# Patient Record
Sex: MALE | Race: WHITE | NOT HISPANIC OR LATINO | Employment: OTHER | URBAN - NONMETROPOLITAN AREA
[De-identification: names, ages, dates, MRNs, and addresses within clinical notes are randomized per-mention and may not be internally consistent; named-entity substitution may affect disease eponyms.]

---

## 2019-06-28 ENCOUNTER — HOSPITAL ENCOUNTER (EMERGENCY)
Facility: HOSPITAL | Age: 44
Discharge: HOME | End: 2019-06-29
Attending: EMERGENCY MEDICINE | Admitting: EMERGENCY MEDICINE

## 2019-06-28 DIAGNOSIS — R07.9 CHEST PAIN, UNSPECIFIED TYPE: ICD-10-CM

## 2019-06-28 DIAGNOSIS — R45.851 SUICIDAL IDEATION: Primary | ICD-10-CM

## 2019-06-28 LAB
AMPHET+METHAMPHET UR QL: NEGATIVE
BARBITURATES UR QL SCN: NEGATIVE
BENZODIAZ UR QL SCN: POSITIVE
BILIRUB UR QL STRIP: ABNORMAL
CANNABINOIDS SERPL QL: POSITIVE
CLARITY UR: CLEAR
COCAINE UR QL: POSITIVE
COLOR UR: ABNORMAL
ETHANOL UR QL: <0.01 %
GLUCOSE UR STRIP-MCNC: NEGATIVE MG/DL
HGB UR QL STRIP.AUTO: NEGATIVE
HOLD SPECIMEN: NORMAL
KETONES UR QL STRIP: ABNORMAL
LEUKOCYTE ESTERASE UR QL STRIP.AUTO: NEGATIVE
METHADONE UR QL SCN: NEGATIVE
NITRITE UR QL STRIP: NEGATIVE
OPIATES UR QL: POSITIVE
PCP UR QL SCN: NEGATIVE
PH UR STRIP.AUTO: 6 [PH] (ref 5–8)
PROT UR QL STRIP: NEGATIVE
SP GR UR STRIP: 1.02 (ref 1–1.03)
TROPONIN I SERPL-MCNC: <0.012 NG/ML (ref 0–0.03)
UROBILINOGEN UR QL STRIP: ABNORMAL
WHOLE BLOOD HOLD SPECIMEN: NORMAL
WHOLE BLOOD HOLD SPECIMEN: NORMAL

## 2019-06-28 PROCEDURE — 36415 COLL VENOUS BLD VENIPUNCTURE: CPT

## 2019-06-28 PROCEDURE — 93005 ELECTROCARDIOGRAM TRACING: CPT

## 2019-06-28 PROCEDURE — 80307 DRUG TEST PRSMV CHEM ANLYZR: CPT | Performed by: PHYSICIAN ASSISTANT

## 2019-06-28 PROCEDURE — 99285 EMERGENCY DEPT VISIT HI MDM: CPT

## 2019-06-28 PROCEDURE — 93010 ELECTROCARDIOGRAM REPORT: CPT | Performed by: INTERNAL MEDICINE

## 2019-06-28 PROCEDURE — 93005 ELECTROCARDIOGRAM TRACING: CPT | Performed by: EMERGENCY MEDICINE

## 2019-06-28 PROCEDURE — 84484 ASSAY OF TROPONIN QUANT: CPT

## 2019-06-28 PROCEDURE — 25010000003 HYDROXYZINE PER 25 MG: Performed by: PHYSICIAN ASSISTANT

## 2019-06-28 PROCEDURE — 96372 THER/PROPH/DIAG INJ SC/IM: CPT

## 2019-06-28 PROCEDURE — 81003 URINALYSIS AUTO W/O SCOPE: CPT | Performed by: PHYSICIAN ASSISTANT

## 2019-06-28 RX ORDER — HYDROXYZINE HYDROCHLORIDE 50 MG/ML
25 INJECTION, SOLUTION INTRAMUSCULAR ONCE
Status: COMPLETED | OUTPATIENT
Start: 2019-06-28 | End: 2019-06-28

## 2019-06-28 RX ORDER — ALPRAZOLAM 1 MG/1
1 TABLET ORAL 2 TIMES DAILY PRN
COMMUNITY
End: 2019-10-23 | Stop reason: SDUPTHER

## 2019-06-28 RX ORDER — NICOTINE 21 MG/24HR
1 PATCH, TRANSDERMAL 24 HOURS TRANSDERMAL
Status: DISCONTINUED | OUTPATIENT
Start: 2019-06-28 | End: 2019-06-29 | Stop reason: HOSPADM

## 2019-06-28 RX ORDER — HYDROCODONE BITARTRATE AND ACETAMINOPHEN 10; 325 MG/1; MG/1
1 TABLET ORAL EVERY 6 HOURS PRN
COMMUNITY
End: 2019-10-23 | Stop reason: SDUPTHER

## 2019-06-28 RX ADMIN — NICOTINE 1 PATCH: 21 PATCH, EXTENDED RELEASE TRANSDERMAL at 19:17

## 2019-06-28 RX ADMIN — HYDROXYZINE HYDROCHLORIDE 25 MG: 50 INJECTION, SOLUTION INTRAMUSCULAR at 17:33

## 2019-06-29 VITALS
DIASTOLIC BLOOD PRESSURE: 76 MMHG | SYSTOLIC BLOOD PRESSURE: 131 MMHG | HEIGHT: 73 IN | TEMPERATURE: 98.5 F | BODY MASS INDEX: 33.13 KG/M2 | OXYGEN SATURATION: 98 % | HEART RATE: 84 BPM | RESPIRATION RATE: 17 BRPM | WEIGHT: 250 LBS

## 2019-06-29 RX ORDER — HYDROCODONE BITARTRATE AND ACETAMINOPHEN 7.5; 325 MG/1; MG/1
1 TABLET ORAL ONCE
Status: COMPLETED | OUTPATIENT
Start: 2019-06-29 | End: 2019-06-29

## 2019-06-29 RX ORDER — ALPRAZOLAM 0.5 MG/1
0.25 TABLET ORAL ONCE
Status: COMPLETED | OUTPATIENT
Start: 2019-06-29 | End: 2019-06-29

## 2019-06-29 RX ADMIN — ALPRAZOLAM 0.25 MG: 0.5 TABLET ORAL at 04:26

## 2019-06-29 RX ADMIN — METOPROLOL TARTRATE 25 MG: 25 TABLET, FILM COATED ORAL at 07:37

## 2019-06-29 RX ADMIN — HYDROCODONE BITARTRATE AND ACETAMINOPHEN 1 TABLET: 7.5; 325 TABLET ORAL at 07:37

## 2019-10-23 ENCOUNTER — OFFICE VISIT (OUTPATIENT)
Dept: INTERNAL MEDICINE | Facility: CLINIC | Age: 44
End: 2019-10-23

## 2019-10-23 ENCOUNTER — TELEPHONE (OUTPATIENT)
Dept: INTERNAL MEDICINE | Facility: CLINIC | Age: 44
End: 2019-10-23

## 2019-10-23 VITALS
TEMPERATURE: 99.3 F | WEIGHT: 268.13 LBS | HEIGHT: 73 IN | HEART RATE: 87 BPM | RESPIRATION RATE: 18 BRPM | SYSTOLIC BLOOD PRESSURE: 160 MMHG | OXYGEN SATURATION: 97 % | BODY MASS INDEX: 35.54 KG/M2 | DIASTOLIC BLOOD PRESSURE: 98 MMHG

## 2019-10-23 DIAGNOSIS — E66.09 CLASS 2 OBESITY DUE TO EXCESS CALORIES WITH BODY MASS INDEX (BMI) OF 35.0 TO 35.9 IN ADULT, UNSPECIFIED WHETHER SERIOUS COMORBIDITY PRESENT: ICD-10-CM

## 2019-10-23 DIAGNOSIS — F41.9 ANXIETY: ICD-10-CM

## 2019-10-23 DIAGNOSIS — R06.02 SHORTNESS OF BREATH: ICD-10-CM

## 2019-10-23 DIAGNOSIS — G89.21 CHRONIC PAIN DUE TO TRAUMA: Primary | ICD-10-CM

## 2019-10-23 DIAGNOSIS — F31.9 BIPOLAR 1 DISORDER (HCC): ICD-10-CM

## 2019-10-23 DIAGNOSIS — R06.2 WHEEZING: ICD-10-CM

## 2019-10-23 DIAGNOSIS — Z13.220 SCREENING, LIPID: ICD-10-CM

## 2019-10-23 DIAGNOSIS — I10 ESSENTIAL HYPERTENSION: ICD-10-CM

## 2019-10-23 DIAGNOSIS — Z82.49 FAMILY HISTORY OF HEART ATTACK: ICD-10-CM

## 2019-10-23 PROCEDURE — 99204 OFFICE O/P NEW MOD 45 MIN: CPT | Performed by: FAMILY MEDICINE

## 2019-10-23 RX ORDER — HYDROCODONE BITARTRATE AND ACETAMINOPHEN 10; 325 MG/1; MG/1
1 TABLET ORAL EVERY 6 HOURS PRN
Qty: 90 TABLET | Refills: 0 | Status: SHIPPED | OUTPATIENT
Start: 2019-10-23 | End: 2019-10-25 | Stop reason: SDUPTHER

## 2019-10-23 RX ORDER — LURASIDONE HYDROCHLORIDE 40 MG/1
40 TABLET, FILM COATED ORAL DAILY
COMMUNITY
End: 2020-07-31 | Stop reason: ALTCHOICE

## 2019-10-23 RX ORDER — ALPRAZOLAM 1 MG/1
1 TABLET ORAL 2 TIMES DAILY PRN
Qty: 60 TABLET | Refills: 0 | Status: SHIPPED | OUTPATIENT
Start: 2019-10-23 | End: 2019-11-20 | Stop reason: SDUPTHER
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
## 2019-10-23 NOTE — TELEPHONE ENCOUNTER
PA only approved for 7 day supply due to [facility] policy on new members.

## 2019-10-24 ENCOUNTER — TELEPHONE (OUTPATIENT)
Dept: INTERNAL MEDICINE | Facility: CLINIC | Age: 44
End: 2019-10-24

## 2019-10-24 LAB
ALBUMIN SERPL-MCNC: 5 G/DL (ref 3.5–5.5)
ALBUMIN/GLOB SERPL: 2.2 {RATIO} (ref 1.2–2.2)
ALP SERPL-CCNC: 70 IU/L (ref 39–117)
ALT SERPL-CCNC: 23 IU/L (ref 0–44)
AST SERPL-CCNC: 16 IU/L (ref 0–40)
BASOPHILS # BLD AUTO: 0 X10E3/UL (ref 0–0.2)
BASOPHILS NFR BLD AUTO: 0 %
BILIRUB SERPL-MCNC: 0.5 MG/DL (ref 0–1.2)
BUN SERPL-MCNC: 13 MG/DL (ref 6–24)
BUN/CREAT SERPL: 14 (ref 9–20)
CALCIUM SERPL-MCNC: 9.5 MG/DL (ref 8.7–10.2)
CHLORIDE SERPL-SCNC: 104 MMOL/L (ref 96–106)
CHOLEST SERPL-MCNC: 259 MG/DL (ref 100–199)
CO2 SERPL-SCNC: 22 MMOL/L (ref 20–29)
CREAT SERPL-MCNC: 0.95 MG/DL (ref 0.76–1.27)
EOSINOPHIL # BLD AUTO: 0.1 X10E3/UL (ref 0–0.4)
EOSINOPHIL NFR BLD AUTO: 1 %
ERYTHROCYTE [DISTWIDTH] IN BLOOD BY AUTOMATED COUNT: 13.6 % (ref 12.3–15.4)
GLOBULIN SER CALC-MCNC: 2.3 G/DL (ref 1.5–4.5)
GLUCOSE SERPL-MCNC: 91 MG/DL (ref 65–99)
HCT VFR BLD AUTO: 46 % (ref 37.5–51)
HDLC SERPL-MCNC: 42 MG/DL
HGB BLD-MCNC: 15.9 G/DL (ref 13–17.7)
IMM GRANULOCYTES # BLD AUTO: 0 X10E3/UL (ref 0–0.1)
IMM GRANULOCYTES NFR BLD AUTO: 0 %
LDLC SERPL CALC-MCNC: 182 MG/DL (ref 0–99)
LYMPHOCYTES # BLD AUTO: 1.7 X10E3/UL (ref 0.7–3.1)
LYMPHOCYTES NFR BLD AUTO: 24 %
MCH RBC QN AUTO: 29.5 PG (ref 26.6–33)
MCHC RBC AUTO-ENTMCNC: 34.6 G/DL (ref 31.5–35.7)
MCV RBC AUTO: 85 FL (ref 79–97)
MONOCYTES # BLD AUTO: 0.4 X10E3/UL (ref 0.1–0.9)
MONOCYTES NFR BLD AUTO: 6 %
NEUTROPHILS # BLD AUTO: 4.9 X10E3/UL (ref 1.4–7)
NEUTROPHILS NFR BLD AUTO: 69 %
PLATELET # BLD AUTO: 225 X10E3/UL (ref 150–450)
POTASSIUM SERPL-SCNC: 4.3 MMOL/L (ref 3.5–5.2)
PROT SERPL-MCNC: 7.3 G/DL (ref 6–8.5)
RBC # BLD AUTO: 5.39 X10E6/UL (ref 4.14–5.8)
SODIUM SERPL-SCNC: 141 MMOL/L (ref 134–144)
TRIGL SERPL-MCNC: 173 MG/DL (ref 0–149)
VLDLC SERPL CALC-MCNC: 35 MG/DL (ref 5–40)
WBC # BLD AUTO: 7.2 X10E3/UL (ref 3.4–10.8)

## 2019-10-24 NOTE — TELEPHONE ENCOUNTER
I see where you have a message on this,    Pt had come in to get the rest of the rx,  Told him Dr Hagen is out and you will discuss with her on Friday and then give him a call.

## 2019-10-24 NOTE — TELEPHONE ENCOUNTER
Yessica arellano called in regards to the patients PA # for his prescription for hydrocodone. He was seeing if we had the same number that he had. Couldn't find any info in notes about it. Told him to call Jackie in the morning for more information.  The PA# that he gave me was [id number]

## 2019-10-25 RX ORDER — HYDROCODONE BITARTRATE AND ACETAMINOPHEN 10; 325 MG/1; MG/1
1 TABLET ORAL EVERY 6 HOURS PRN
Qty: 69 TABLET | Refills: 0 | Status: SHIPPED | OUTPATIENT
Start: 2019-10-25 | End: 2019-11-20 | Stop reason: SDUPTHER

## 2019-10-26 DIAGNOSIS — E78.00 HIGH CHOLESTEROL: Primary | ICD-10-CM

## 2019-10-26 NOTE — TELEPHONE ENCOUNTER
----- Message from Liza Hagen DO sent at 10/25/2019  7:23 AM CDT -----  Cholesterol is too high.  Recommend using a statin to lower.  lipitor (atorvastatin) 10 mg daily.    Low cholesterol diet.   Exercise.   Repeat liver function studies at 4 and 8 weeks   Office visit at 12 weeks with cmp and lipid profile

## 2019-10-26 NOTE — TELEPHONE ENCOUNTER
Called Pt and informed him of his lab results and recommendations from Dr. Hagen. Pt voiced understanding and has no questions or concerns at this time, Pt is doing well at this time and he was able to the remaining medication filled he will  on the 29th.

## 2019-10-26 NOTE — TELEPHONE ENCOUNTER
Additional not from Dr. Hagen.....    Cholesterol profile is abnl  Elevated total cholesterol, LDL and triglycerides.   Needs statin   Lipitor 10 mg po daily   Repeat lfts in 4 and 8 weeks   OV and lab in 12 weeks   cmp and lipid.   Please do 2 days prior to visit.

## 2019-10-29 RX ORDER — ATORVASTATIN CALCIUM 10 MG/1
10 TABLET, FILM COATED ORAL DAILY
Qty: 30 TABLET | Refills: 3 | Status: SHIPPED | OUTPATIENT
Start: 2019-10-29 | End: 2019-12-31

## 2019-11-04 ENCOUNTER — HOSPITAL ENCOUNTER (OUTPATIENT)
Dept: CARDIOLOGY | Facility: HOSPITAL | Age: 44
Discharge: HOME | End: 2019-11-04
Admitting: FAMILY MEDICINE

## 2019-11-04 VITALS
HEIGHT: 73 IN | DIASTOLIC BLOOD PRESSURE: 79 MMHG | SYSTOLIC BLOOD PRESSURE: 140 MMHG | WEIGHT: 268.96 LBS | BODY MASS INDEX: 35.65 KG/M2

## 2019-11-04 DIAGNOSIS — R06.02 SHORTNESS OF BREATH: ICD-10-CM

## 2019-11-04 LAB
BH CV ECHO MEAS - AO MAX PG (FULL): 2.9 MMHG
BH CV ECHO MEAS - AO MAX PG: 7.3 MMHG
BH CV ECHO MEAS - AO MEAN PG (FULL): 2 MMHG
BH CV ECHO MEAS - AO MEAN PG: 4 MMHG
BH CV ECHO MEAS - AO ROOT AREA (BSA CORRECTED): 1.4
BH CV ECHO MEAS - AO ROOT AREA: 8.6 CM^2
BH CV ECHO MEAS - AO ROOT DIAM: 3.3 CM
BH CV ECHO MEAS - AO V2 MAX: 135 CM/SEC
BH CV ECHO MEAS - AO V2 MEAN: 96.6 CM/SEC
BH CV ECHO MEAS - AO V2 VTI: 27.8 CM
BH CV ECHO MEAS - AVA(I,A): 3.2 CM^2
BH CV ECHO MEAS - AVA(I,D): 3.2 CM^2
BH CV ECHO MEAS - AVA(V,A): 3.5 CM^2
BH CV ECHO MEAS - AVA(V,D): 3.5 CM^2
BH CV ECHO MEAS - BSA(HAYCOCK): 2.5 M^2
BH CV ECHO MEAS - BSA: 2.4 M^2
BH CV ECHO MEAS - BZI_BMI: 35.4 KILOGRAMS/M^2
BH CV ECHO MEAS - BZI_METRIC_HEIGHT: 185.4 CM
BH CV ECHO MEAS - BZI_METRIC_WEIGHT: 121.6 KG
BH CV ECHO MEAS - EDV(CUBED): 139 ML
BH CV ECHO MEAS - EDV(MOD-SP4): 130 ML
BH CV ECHO MEAS - EDV(TEICH): 128.4 ML
BH CV ECHO MEAS - EF(CUBED): 74.6 %
BH CV ECHO MEAS - EF(MOD-SP4): 52 %
BH CV ECHO MEAS - EF(TEICH): 66.1 %
BH CV ECHO MEAS - ESV(CUBED): 35.3 ML
BH CV ECHO MEAS - ESV(MOD-SP4): 62.4 ML
BH CV ECHO MEAS - ESV(TEICH): 43.5 ML
BH CV ECHO MEAS - FS: 36.7 %
BH CV ECHO MEAS - IVS/LVPW: 0.87
BH CV ECHO MEAS - IVSD: 0.94 CM
BH CV ECHO MEAS - LA DIMENSION: 3.8 CM
BH CV ECHO MEAS - LA/AO: 1.2
BH CV ECHO MEAS - LAT PEAK E' VEL: 11.1 CM/SEC
BH CV ECHO MEAS - LV DIASTOLIC VOL/BSA (35-75): 53.3 ML/M^2
BH CV ECHO MEAS - LV MASS(C)D: 193.6 GRAMS
BH CV ECHO MEAS - LV MASS(C)DI: 79.4 GRAMS/M^2
BH CV ECHO MEAS - LV MAX PG: 4.4 MMHG
BH CV ECHO MEAS - LV MEAN PG: 2 MMHG
BH CV ECHO MEAS - LV SYSTOLIC VOL/BSA (12-30): 25.6 ML/M^2
BH CV ECHO MEAS - LV V1 MAX: 105 CM/SEC
BH CV ECHO MEAS - LV V1 MEAN: 67.6 CM/SEC
BH CV ECHO MEAS - LV V1 VTI: 19.8 CM
BH CV ECHO MEAS - LVIDD: 5.2 CM
BH CV ECHO MEAS - LVIDS: 3.3 CM
BH CV ECHO MEAS - LVLD AP4: 9.6 CM
BH CV ECHO MEAS - LVLS AP4: 7.9 CM
BH CV ECHO MEAS - LVOT AREA (M): 4.5 CM^2
BH CV ECHO MEAS - LVOT AREA: 4.5 CM^2
BH CV ECHO MEAS - LVOT DIAM: 2.4 CM
BH CV ECHO MEAS - LVPWD: 1.1 CM
BH CV ECHO MEAS - MED PEAK E' VEL: 6.64 CM/SEC
BH CV ECHO MEAS - MV A MAX VEL: 58.7 CM/SEC
BH CV ECHO MEAS - MV DEC TIME: 0.23 SEC
BH CV ECHO MEAS - MV E MAX VEL: 52.9 CM/SEC
BH CV ECHO MEAS - MV E/A: 0.9
BH CV ECHO MEAS - SI(AO): 97.6 ML/M^2
BH CV ECHO MEAS - SI(CUBED): 42.6 ML/M^2
BH CV ECHO MEAS - SI(LVOT): 36.8 ML/M^2
BH CV ECHO MEAS - SI(MOD-SP4): 27.7 ML/M^2
BH CV ECHO MEAS - SI(TEICH): 34.8 ML/M^2
BH CV ECHO MEAS - SV(AO): 237.8 ML
BH CV ECHO MEAS - SV(CUBED): 103.7 ML
BH CV ECHO MEAS - SV(LVOT): 89.6 ML
BH CV ECHO MEAS - SV(MOD-SP4): 67.6 ML
BH CV ECHO MEAS - SV(TEICH): 84.9 ML
BH CV ECHO MEASUREMENTS AVERAGE E/E' RATIO: 5.96
LEFT ATRIUM VOLUME INDEX: 26.4 ML/M2
LEFT ATRIUM VOLUME: 64.5 CM3
MAXIMAL PREDICTED HEART RATE: 176 BPM
STRESS TARGET HR: 150 BPM

## 2019-11-04 PROCEDURE — 93306 TTE W/DOPPLER COMPLETE: CPT | Performed by: INTERNAL MEDICINE

## 2019-11-04 PROCEDURE — 93306 TTE W/DOPPLER COMPLETE: CPT

## 2019-11-05 ENCOUNTER — TELEPHONE (OUTPATIENT)
Dept: INTERNAL MEDICINE | Facility: CLINIC | Age: 44
End: 2019-11-05

## 2019-11-05 NOTE — TELEPHONE ENCOUNTER
Pt called and stated that he made his apt with pain management they can't get him In until January.

## 2019-11-05 NOTE — TELEPHONE ENCOUNTER
Called pt to let him know of the results of his echocardiogram. Pt voiced understanding no questions at this time.

## 2019-11-20 ENCOUNTER — OFFICE VISIT (OUTPATIENT)
Dept: INTERNAL MEDICINE | Facility: CLINIC | Age: 44
End: 2019-11-20

## 2019-11-20 ENCOUNTER — TELEPHONE (OUTPATIENT)
Dept: INTERNAL MEDICINE | Facility: CLINIC | Age: 44
End: 2019-11-20

## 2019-11-20 VITALS
OXYGEN SATURATION: 99 % | RESPIRATION RATE: 18 BRPM | BODY MASS INDEX: 36.91 KG/M2 | SYSTOLIC BLOOD PRESSURE: 132 MMHG | HEART RATE: 76 BPM | TEMPERATURE: 99.1 F | WEIGHT: 278.5 LBS | DIASTOLIC BLOOD PRESSURE: 92 MMHG | HEIGHT: 73 IN

## 2019-11-20 DIAGNOSIS — R06.2 WHEEZING: ICD-10-CM

## 2019-11-20 DIAGNOSIS — G89.21 CHRONIC PAIN DUE TO TRAUMA: ICD-10-CM

## 2019-11-20 DIAGNOSIS — Z23 IMMUNIZATION DUE: Primary | ICD-10-CM

## 2019-11-20 DIAGNOSIS — M62.838 MUSCLE SPASM: ICD-10-CM

## 2019-11-20 PROCEDURE — G0008 ADMIN INFLUENZA VIRUS VAC: HCPCS | Performed by: FAMILY MEDICINE

## 2019-11-20 PROCEDURE — 99214 OFFICE O/P EST MOD 30 MIN: CPT | Performed by: FAMILY MEDICINE

## 2019-11-20 PROCEDURE — 90686 IIV4 VACC NO PRSV 0.5 ML IM: CPT | Performed by: FAMILY MEDICINE

## 2019-11-20 RX ORDER — CHLORPROMAZINE HYDROCHLORIDE 25 MG/1
TABLET, FILM COATED ORAL
COMMUNITY
End: 2020-10-02 | Stop reason: SDUPTHER

## 2019-11-20 RX ORDER — TIZANIDINE HYDROCHLORIDE 4 MG/1
4 CAPSULE, GELATIN COATED ORAL 3 TIMES DAILY
Qty: 90 CAPSULE | Refills: 1 | Status: SHIPPED | OUTPATIENT
Start: 2019-11-20 | End: 2019-11-22 | Stop reason: SDUPTHER

## 2019-11-20 RX ORDER — HYDROCODONE BITARTRATE AND ACETAMINOPHEN 10; 325 MG/1; MG/1
1 TABLET ORAL EVERY 6 HOURS PRN
Qty: 69 TABLET | Refills: 0 | Status: SHIPPED | OUTPATIENT
Start: 2019-11-20 | End: 2019-12-18

## 2019-11-20 RX ORDER — CEPHALEXIN 500 MG/1
CAPSULE ORAL
COMMUNITY
End: 2020-03-13

## 2019-11-20 RX ORDER — ALPRAZOLAM 1 MG/1
1 TABLET ORAL 2 TIMES DAILY PRN
Qty: 60 TABLET | Refills: 0 | Status: SHIPPED | OUTPATIENT
Start: 2019-11-20 | End: 2019-12-18 | Stop reason: SDUPTHER

## 2019-11-20 NOTE — PROGRESS NOTES
Subjective     Chief Complaint   Patient presents with   • Follow-up   • Nasal Congestion     chest congestion has had a fever, has had about two months        History of Present Illness  Patient presents today for follow-up for refills on his medication specifically his pain medication.  He notes that he has been having some issues with coughing and congestion over the last week has really felt kind of poorly.  He has been doing Sudafed from over-the-counter  To see if this would help his respiratory issues.  Overall he feels his pain control is not as good as it needs to be he is waking up in the middle the night.  He does have significant leg pain.  He notes that he has muscle spasms that start and then after that it started and the pain intensifies.  He does not take a muscle relaxer at this point.  He notes that he does have an appointment with pain management in January.    Patient's PMR from outside medical facility reviewed and noted.    Review of Systems     Otherwise complete ROS reviewed and negative except as mentioned in the HPI.    Past Medical History:   Past Medical History:   Diagnosis Date   • Anxiety    • Arthritis    • Bipolar 1 disorder (CMS/HCC)    • Hypertension    • PTSD (post-traumatic stress disorder)      Past Surgical History:  Past Surgical History:   Procedure Laterality Date   • HIP SURGERY       Social History:  reports that he has been smoking cigarettes.  He has been smoking about 1.50 packs per day. He has never used smokeless tobacco. He reports that he does not drink alcohol or use drugs.    Family History: family history includes Arthritis in his father; Diabetes in his father; Heart attack in his mother; Hypertension in his father.       Allergies:  Allergies   Allergen Reactions   • Meloxicam Nausea Only   • Darvon [Propoxyphene] GI Intolerance   • Motrin [Ibuprofen] GI Intolerance   • Toradol [Ketorolac Tromethamine] GI Intolerance   • Tramadol GI Intolerance  "    Medications:  Prior to Admission medications    Medication Sig Start Date End Date Taking? Authorizing Provider   ALPRAZolam (XANAX) 1 MG tablet Take 1 tablet by mouth 2 (Two) Times a Day As Needed for Anxiety. 10/23/19  Yes Liza Hagen DO   atorvastatin (LIPITOR) 10 MG tablet Take 1 tablet by mouth Daily. 10/29/19  Yes Liza Hagen DO   chlorproMAZINE (THORAZINE) 25 MG tablet chlorpromazine 25 mg tablet   take one tablet po twice a day   Yes Roxanne Carrasco MD   HYDROcodone-acetaminophen (NORCO)  MG per tablet Take 1 tablet by mouth Every 6 (Six) Hours As Needed for Moderate Pain . 10/25/19  Yes Liza Hagen DO   lurasidone (LATUDA) 40 MG tablet tablet Take 40 mg by mouth Daily.   Yes Roxanne Carrasco MD   metoprolol tartrate (LOPRESSOR) 25 MG tablet Take 1 tablet by mouth 2 (Two) Times a Day. 10/23/19  Yes Liza Hagen DO   cephalexin (KEFLEX) 500 MG capsule cephalexin 500 mg capsule    ProviderRoxanne MD       Objective     Vital Signs: /92 (BP Location: Left arm, Patient Position: Sitting, Cuff Size: Adult)   Pulse 76   Temp 99.1 °F (37.3 °C) (Temporal)   Resp 18   Ht 185.4 cm (72.99\")   Wt 126 kg (278 lb 8 oz)   SpO2 99%   BMI 36.75 kg/m²   Physical Exam   Constitutional: He is oriented to person, place, and time. He appears well-developed and well-nourished.   HENT:   Head: Normocephalic and atraumatic.   Right Ear: External ear normal.   Left Ear: External ear normal.   Mouth/Throat: Oropharynx is clear and moist.   Eyes: Conjunctivae and EOM are normal. Pupils are equal, round, and reactive to light.   Neck: Normal range of motion. Neck supple.   Cardiovascular: Normal rate, regular rhythm, normal heart sounds and intact distal pulses. Exam reveals no gallop and no friction rub.   No murmur heard.  Pulmonary/Chest: Effort normal. He has wheezes.   Abdominal: Soft. Bowel sounds are normal.   Musculoskeletal: He exhibits no edema. "   Ambulates with the use of a cane.   Neurological: He is alert and oriented to person, place, and time.   Skin: Skin is warm and dry.   Psychiatric: He has a normal mood and affect. His behavior is normal.   Nursing note and vitals reviewed.      Patient's Body mass index is 36.75 kg/m².  T      Results Reviewed:  BUN   Date Value Ref Range Status   10/23/2019 13 6 - 24 mg/dL Final     Creatinine   Date Value Ref Range Status   10/23/2019 0.95 0.76 - 1.27 mg/dL Final     Sodium   Date Value Ref Range Status   10/23/2019 141 134 - 144 mmol/L Final     Potassium   Date Value Ref Range Status   10/23/2019 4.3 3.5 - 5.2 mmol/L Final     Chloride   Date Value Ref Range Status   10/23/2019 104 96 - 106 mmol/L Final     Total CO2   Date Value Ref Range Status   10/23/2019 22 20 - 29 mmol/L Final     Calcium   Date Value Ref Range Status   10/23/2019 9.5 8.7 - 10.2 mg/dL Final     ALT (SGPT)   Date Value Ref Range Status   10/23/2019 23 0 - 44 IU/L Final     AST (SGOT)   Date Value Ref Range Status   10/23/2019 16 0 - 40 IU/L Final     WBC   Date Value Ref Range Status   10/23/2019 7.2 3.4 - 10.8 x10E3/uL Final     Hematocrit   Date Value Ref Range Status   10/23/2019 46.0 37.5 - 51.0 % Final     Platelets   Date Value Ref Range Status   10/23/2019 225 150 - 450 x10E3/uL Final     Triglycerides   Date Value Ref Range Status   10/23/2019 173 (H) 0 - 149 mg/dL Final     HDL Cholesterol   Date Value Ref Range Status   10/23/2019 42 >39 mg/dL Final     LDL Cholesterol    Date Value Ref Range Status   10/23/2019 182 (H) 0 - 99 mg/dL Final         Assessment / Plan     Assessment/Plan:  1. Immunization due    - pneumococcal conj. 13-valent (PREVNAR-13) vaccine 0.5 mL  Influenza vaccine      2. Wheezing  Combivent Respimat 2 puffs 4 times a day  When his wheezing is better controlled he will need to have pulmonary function studies.    3. Muscle spasm  Tizanidine 4 mg every 8 hours as needed muscle spasm    4. Chronic pain due to  trauma    Refill pain medications and Xanax      Return in about 4 weeks (around 12/18/2019). unless patient needs to be seen sooner or acute issues arise.    Code Status: <no information>     I have discussed the patient results/orders and and plan/recommendation with them at today's visit.      Liza Hagen, DO   11/20/2019

## 2019-11-22 ENCOUNTER — TELEPHONE (OUTPATIENT)
Dept: INTERNAL MEDICINE | Facility: CLINIC | Age: 44
End: 2019-11-22

## 2019-11-22 RX ORDER — TIZANIDINE 4 MG/1
4 TABLET ORAL EVERY 8 HOURS PRN
Qty: 90 TABLET | Refills: 3 | Status: SHIPPED | OUTPATIENT
Start: 2019-11-22 | End: 2020-04-14 | Stop reason: SDUPTHER

## 2019-12-18 ENCOUNTER — OFFICE VISIT (OUTPATIENT)
Dept: INTERNAL MEDICINE | Facility: CLINIC | Age: 44
End: 2019-12-18

## 2019-12-18 ENCOUNTER — TELEPHONE (OUTPATIENT)
Dept: INTERNAL MEDICINE | Facility: CLINIC | Age: 44
End: 2019-12-18

## 2019-12-18 VITALS — BODY MASS INDEX: 37.69 KG/M2 | HEIGHT: 73 IN | RESPIRATION RATE: 22 BRPM | WEIGHT: 284.38 LBS

## 2019-12-18 DIAGNOSIS — Z79.891 NARCOTIC USE AGREEMENT EXISTS: ICD-10-CM

## 2019-12-18 DIAGNOSIS — Z01.00 EYE EXAM, ROUTINE: Primary | ICD-10-CM

## 2019-12-18 DIAGNOSIS — G89.21 CHRONIC PAIN DUE TO TRAUMA: ICD-10-CM

## 2019-12-18 DIAGNOSIS — F41.9 ANXIETY: Primary | ICD-10-CM

## 2019-12-18 DIAGNOSIS — Z02.83 ENCOUNTER FOR DRUG SCREENING: Primary | ICD-10-CM

## 2019-12-18 PROCEDURE — 99213 OFFICE O/P EST LOW 20 MIN: CPT | Performed by: FAMILY MEDICINE

## 2019-12-18 RX ORDER — HYDROXYZINE HYDROCHLORIDE 25 MG/1
TABLET, FILM COATED ORAL
Refills: 2 | COMMUNITY
Start: 2019-11-27 | End: 2020-07-31 | Stop reason: ALTCHOICE

## 2019-12-18 RX ORDER — PROPRANOLOL HYDROCHLORIDE 20 MG/1
20 TABLET ORAL 2 TIMES DAILY
Refills: 2 | COMMUNITY
Start: 2019-11-24 | End: 2020-04-14 | Stop reason: SDUPTHER

## 2019-12-18 RX ORDER — DOXEPIN HYDROCHLORIDE 10 MG/1
CAPSULE ORAL
Refills: 1 | COMMUNITY
Start: 2019-11-27 | End: 2020-10-02 | Stop reason: SDUPTHER

## 2019-12-18 RX ORDER — HYDROCODONE BITARTRATE AND ACETAMINOPHEN 10; 325 MG/1; MG/1
1 TABLET ORAL EVERY 4 HOURS PRN
Qty: 100 TABLET | Refills: 0 | Status: SHIPPED | OUTPATIENT
Start: 2019-12-18 | End: 2020-01-15 | Stop reason: SDUPTHER

## 2019-12-18 NOTE — PROGRESS NOTES
Subjective     Chief Complaint   Patient presents with   • Follow-up     med refills, pain management appt is next month       History of Present Illness  Still sore, lots of pain.  With the weather change he is experiencing more pain.  Notes that the medication does not last for 6 hours.  He does tend to take his Xanax at night.  This helps him sleep.  Thinks maybe needs stronger medication.  The patient does have an appointment with pain management, Dr. Meyer next month.  Stress with son.  Has been working at reducing the number of cigarettes he smokes per day he is down to 11.  He feels that this is helped his breathing significantly.    Patient's PMR from outside medical facility reviewed and noted.      Review of Systems     Otherwise complete ROS reviewed and negative except as mentioned in the HPI.    Past Medical History:   Past Medical History:   Diagnosis Date   • Anxiety    • Arthritis    • Bipolar 1 disorder (CMS/HCC)    • Hypertension    • PTSD (post-traumatic stress disorder)      Past Surgical History:  Past Surgical History:   Procedure Laterality Date   • HIP SURGERY       Social History:  reports that he has been smoking cigarettes. He has been smoking about 1.50 packs per day. He has never used smokeless tobacco. He reports that he does not drink alcohol or use drugs.    Family History: family history includes Arthritis in his father; Diabetes in his father; Heart attack in his mother; Hypertension in his father.       Allergies:  Allergies   Allergen Reactions   • Meloxicam Nausea Only   • Darvon [Propoxyphene] GI Intolerance   • Motrin [Ibuprofen] GI Intolerance   • Toradol [Ketorolac Tromethamine] GI Intolerance   • Tramadol GI Intolerance     Medications:  Prior to Admission medications    Medication Sig Start Date End Date Taking? Authorizing Provider   ALPRAZolam (XANAX) 1 MG tablet Take 1 tablet by mouth 2 (Two) Times a Day As Needed for Anxiety. 11/20/19  Yes Liza Hagen,  "DO   atorvastatin (LIPITOR) 10 MG tablet Take 1 tablet by mouth Daily. 10/29/19  Yes Liza Hagen DO   cephalexin (KEFLEX) 500 MG capsule cephalexin 500 mg capsule   Yes ProviderRoxanne MD   chlorproMAZINE (THORAZINE) 25 MG tablet chlorpromazine 25 mg tablet   take one tablet po twice a day   Yes Provider, MD Roxanne   doxepin (SINEquan) 10 MG capsule TAKE 1 TO 3 CAPSULES BY MOUTH AT BEDTIME AS NEEDED 11/27/19  Yes ProviderRoxanne MD   HYDROcodone-acetaminophen (NORCO)  MG per tablet Take 1 tablet by mouth Every 6 (Six) Hours As Needed for Moderate Pain . 11/20/19  Yes Liza Hagen DO   hydrOXYzine (ATARAX) 25 MG tablet TAKE 1/2 - 1 TABLET BY MOUTH THREE TIMES A DAY AS NEEDED ANXIETY 11/27/19  Yes ProviderRoxanne MD   ipratropium-albuterol (COMBIVENT RESPIMAT)  MCG/ACT inhaler Inhale 1 puff 4 (Four) Times a Day. 11/20/19  Yes Liza Hagen DO   lurasidone (LATUDA) 40 MG tablet tablet Take 40 mg by mouth Daily.   Yes ProviderRoxanne MD   metoprolol tartrate (LOPRESSOR) 25 MG tablet Take 1 tablet by mouth 2 (Two) Times a Day. 10/23/19  Yes Liza Hagen DO   propranolol (INDERAL) 20 MG tablet Take 20 mg by mouth 2 (Two) Times a Day. 11/24/19  Yes ProviderRoxanne MD   tiZANidine (ZANAFLEX) 4 MG tablet Take 1 tablet by mouth Every 8 (Eight) Hours As Needed for Muscle Spasms. 11/22/19  Yes Liza Hagen DO       Objective     Vital Signs: Resp 22   Ht 185.4 cm (72.99\")   Wt 129 kg (284 lb 6 oz)   BMI 37.53 kg/m²   Physical Exam   Constitutional: He is oriented to person, place, and time. He appears well-developed and well-nourished.   HENT:   Head: Normocephalic and atraumatic.   Right Ear: External ear normal.   Left Ear: External ear normal.   Mouth/Throat: Oropharynx is clear and moist.   Eyes: Pupils are equal, round, and reactive to light. Conjunctivae and EOM are normal.   Neck: Normal range of motion. No JVD present. "   Cardiovascular: Normal rate, regular rhythm and normal heart sounds. Exam reveals no gallop and no friction rub.   No murmur heard.  Pulmonary/Chest: Effort normal and breath sounds normal.   Improved inspiratory effort.   Abdominal: Soft. Bowel sounds are normal.   Musculoskeletal: He exhibits no edema.   Moves all extremities.  Does walk with a cane.   Neurological: He is alert and oriented to person, place, and time.   Skin: Skin is warm and dry.   Psychiatric: He has a normal mood and affect. His behavior is normal.   Nursing note and vitals reviewed.          Results Reviewed:  BUN   Date Value Ref Range Status   10/23/2019 13 6 - 24 mg/dL Final     Creatinine   Date Value Ref Range Status   10/23/2019 0.95 0.76 - 1.27 mg/dL Final     Sodium   Date Value Ref Range Status   10/23/2019 141 134 - 144 mmol/L Final     Potassium   Date Value Ref Range Status   10/23/2019 4.3 3.5 - 5.2 mmol/L Final     Chloride   Date Value Ref Range Status   10/23/2019 104 96 - 106 mmol/L Final     Total CO2   Date Value Ref Range Status   10/23/2019 22 20 - 29 mmol/L Final     Calcium   Date Value Ref Range Status   10/23/2019 9.5 8.7 - 10.2 mg/dL Final     ALT (SGPT)   Date Value Ref Range Status   10/23/2019 23 0 - 44 IU/L Final     AST (SGOT)   Date Value Ref Range Status   10/23/2019 16 0 - 40 IU/L Final     WBC   Date Value Ref Range Status   10/23/2019 7.2 3.4 - 10.8 x10E3/uL Final     Hematocrit   Date Value Ref Range Status   10/23/2019 46.0 37.5 - 51.0 % Final     Platelets   Date Value Ref Range Status   10/23/2019 225 150 - 450 x10E3/uL Final     Triglycerides   Date Value Ref Range Status   10/23/2019 173 (H) 0 - 149 mg/dL Final     HDL Cholesterol   Date Value Ref Range Status   10/23/2019 42 >39 mg/dL Final     LDL Cholesterol    Date Value Ref Range Status   10/23/2019 182 (H) 0 - 99 mg/dL Final         Assessment / Plan     Assessment/Plan:  1. Narcotic use agreement exists    - [employer] 13 (MW) - Urine,  Clean Catch  2.  Chronic pain.  Increase Norco to every 4 hours as needed.  patient did ask if he could utilize just a half a tablet instead of taking a whole tablet at the 4-hour alana.  Patient needs to keep a pain log/diary.  3.  Tobacco abuse   patient is to continue working to reduce the number of cigarettes he smokes.      Return in about 2 months (around 2/18/2020). unless patient needs to be seen sooner or acute issues arise.    Code Status: Full     I have discussed the patient results/orders and and plan/recommendation with them at today's visit.      Liza Hagen, DO   12/18/2019

## 2019-12-18 NOTE — TELEPHONE ENCOUNTER
Pt needs a referral for [facility] in order to establish care with them. Their fax number is listed below.     Fax: [phone]

## 2019-12-18 NOTE — TELEPHONE ENCOUNTER
Jayson called and stated that when he went to  his RX from his visit today his Xanax was not sent in.     After looking, it looks like it wasn't refilled. I have attached the Xanax as an unsigned order to this encounter if you wish to proceed with this refill.

## 2019-12-19 ENCOUNTER — TELEPHONE (OUTPATIENT)
Dept: INTERNAL MEDICINE | Facility: CLINIC | Age: 44
End: 2019-12-19

## 2019-12-19 DIAGNOSIS — F41.9 ANXIETY: Primary | ICD-10-CM

## 2019-12-20 RX ORDER — ALPRAZOLAM 1 MG/1
1 TABLET ORAL 2 TIMES DAILY PRN
Qty: 60 TABLET | Refills: 0 | OUTPATIENT
Start: 2019-12-20

## 2019-12-20 RX ORDER — ALPRAZOLAM 1 MG/1
1 TABLET ORAL 2 TIMES DAILY PRN
Qty: 60 TABLET | Refills: 0 | Status: SHIPPED | OUTPATIENT
Start: 2019-12-20 | End: 2020-01-15 | Stop reason: SDUPTHER

## 2019-12-23 LAB — DRUGS UR: NORMAL

## 2019-12-23 NOTE — TELEPHONE ENCOUNTER
I spoke to patient. He states that he is needing the referral because he is needing new eye glasses. Referral ordered at this time per Dr. Hagen's note.

## 2019-12-28 DIAGNOSIS — E78.00 HIGH CHOLESTEROL: ICD-10-CM

## 2019-12-31 RX ORDER — ATORVASTATIN CALCIUM 10 MG/1
TABLET, FILM COATED ORAL
Qty: 30 TABLET | Refills: 3 | Status: SHIPPED | OUTPATIENT
Start: 2019-12-31 | End: 2020-03-23

## 2020-01-15 ENCOUNTER — OFFICE VISIT (OUTPATIENT)
Dept: INTERNAL MEDICINE | Facility: CLINIC | Age: 45
End: 2020-01-15

## 2020-01-15 VITALS
RESPIRATION RATE: 20 BRPM | OXYGEN SATURATION: 95 % | TEMPERATURE: 98 F | SYSTOLIC BLOOD PRESSURE: 136 MMHG | BODY MASS INDEX: 36.78 KG/M2 | HEART RATE: 98 BPM | DIASTOLIC BLOOD PRESSURE: 102 MMHG | WEIGHT: 277.5 LBS | HEIGHT: 73 IN

## 2020-01-15 DIAGNOSIS — M62.838 MUSCLE SPASM: ICD-10-CM

## 2020-01-15 DIAGNOSIS — F31.9 BIPOLAR 1 DISORDER (HCC): ICD-10-CM

## 2020-01-15 DIAGNOSIS — I10 ESSENTIAL HYPERTENSION: ICD-10-CM

## 2020-01-15 DIAGNOSIS — F41.9 ANXIETY: ICD-10-CM

## 2020-01-15 DIAGNOSIS — M25.551 PAIN OF RIGHT HIP JOINT: Primary | ICD-10-CM

## 2020-01-15 DIAGNOSIS — Z79.891 NARCOTIC USE AGREEMENT EXISTS: ICD-10-CM

## 2020-01-15 DIAGNOSIS — M25.561 CHRONIC PAIN OF BOTH KNEES: ICD-10-CM

## 2020-01-15 DIAGNOSIS — G89.21 CHRONIC PAIN DUE TO TRAUMA: ICD-10-CM

## 2020-01-15 DIAGNOSIS — M25.562 CHRONIC PAIN OF BOTH KNEES: ICD-10-CM

## 2020-01-15 DIAGNOSIS — M54.41 CHRONIC MIDLINE LOW BACK PAIN WITH RIGHT-SIDED SCIATICA: ICD-10-CM

## 2020-01-15 DIAGNOSIS — G89.29 CHRONIC PAIN OF BOTH KNEES: ICD-10-CM

## 2020-01-15 DIAGNOSIS — G89.29 CHRONIC MIDLINE LOW BACK PAIN WITH RIGHT-SIDED SCIATICA: ICD-10-CM

## 2020-01-15 PROCEDURE — 99214 OFFICE O/P EST MOD 30 MIN: CPT | Performed by: FAMILY MEDICINE

## 2020-01-15 RX ORDER — ALPRAZOLAM 1 MG/1
1 TABLET ORAL 2 TIMES DAILY PRN
Qty: 60 TABLET | Refills: 0 | Status: SHIPPED | OUTPATIENT
Start: 2020-01-15 | End: 2020-02-12 | Stop reason: SDUPTHER

## 2020-01-15 RX ORDER — HYDROCODONE BITARTRATE AND ACETAMINOPHEN 10; 325 MG/1; MG/1
1 TABLET ORAL EVERY 4 HOURS PRN
Qty: 100 TABLET | Refills: 0 | Status: SHIPPED | OUTPATIENT
Start: 2020-01-15 | End: 2020-02-12 | Stop reason: SDUPTHER

## 2020-01-15 NOTE — PROGRESS NOTES
Subjective     Chief Complaint   Patient presents with   • Follow-up     needs med refill norco, james, needs xrays of right hip and back, requested by AURELIA       History of Present Illness  New left knee pain.  Patient states that he has been having cracking of his knee.  He has been having pain with some swelling.  He has no trauma.  He denies falling.  He has stumbled but he has caught himself.  He continues to have significant chronic daily pain related to low back.  He also has right hip into the right thigh pain that is chronic in nature.  He does have associated numbness with this in certain positions.  If he sits for too long his right leg becomes numb.  He continues to use Norco routinely for pain.  This does allow him to remain functional to care for his young son.  He has been having increased anxiety/panic attacks.  He feels this is mostly associated with the fact that he has run short on money this month and has had no money to pay for food.  He indicates that for the last 3 days he is not really eaten much but he was able to feed his son which was much more important to him.  He is utilizing food stamps and does visit [facility] and apparently the [facility] in [facility] also has a program that he can access every other month to get some food.  The patient does have an appointment with pain management.  Apparently they do want x-rays prior to his coming to see them.  We are more than happy to accommodate them and do the x-rays at our facility.  The patient will need to  a disc of his films prior to going to his appointment.  Patient's PMR from outside medical facility reviewed and noted.    Review of Systems     Otherwise complete ROS reviewed and negative except as mentioned in the HPI.    Past Medical History:   Past Medical History:   Diagnosis Date   • Anxiety    • Arthritis    • Bipolar 1 disorder (CMS/[facility])    • Hypertension    • PTSD (post-traumatic stress disorder)       Past Surgical History:  Past Surgical History:   Procedure Laterality Date   • HIP SURGERY       Social History:  reports that he has been smoking cigarettes. He has been smoking about 1.50 packs per day. He has never used smokeless tobacco. He reports that he does not drink alcohol or use drugs.    Family History: family history includes Arthritis in his father; Diabetes in his father; Heart attack in his mother; Hypertension in his father.       Allergies:  Allergies   Allergen Reactions   • Meloxicam Nausea Only   • Darvon [Propoxyphene] GI Intolerance   • Motrin [Ibuprofen] GI Intolerance   • Toradol [Ketorolac Tromethamine] GI Intolerance   • Tramadol GI Intolerance     Medications:  Prior to Admission medications    Medication Sig Start Date End Date Taking? Authorizing Provider   ALPRAZolam (XANAX) 1 MG tablet Take 1 tablet by mouth 2 (Two) Times a Day As Needed for Anxiety. 12/20/19  Yes Liza Hagen DO   atorvastatin (LIPITOR) 10 MG tablet TAKE 1 TABLET BY MOUTH EVERY DAY 12/31/19  Yes Liza Hagen DO   cephalexin (KEFLEX) 500 MG capsule cephalexin 500 mg capsule   Yes Provider, MD Roxanne   chlorproMAZINE (THORAZINE) 25 MG tablet chlorpromazine 25 mg tablet   take one tablet po twice a day   Yes Provider, MD Roxanne   doxepin (SINEquan) 10 MG capsule TAKE 1 TO 3 CAPSULES BY MOUTH AT BEDTIME AS NEEDED 11/27/19  Yes Provider, MD Roxanne   HYDROcodone-acetaminophen (NORCO)  MG per tablet Take 1 tablet by mouth Every 4 (Four) Hours As Needed for Moderate Pain . 12/18/19  Yes Liza Hagen DO   hydrOXYzine (ATARAX) 25 MG tablet TAKE 1/2 - 1 TABLET BY MOUTH THREE TIMES A DAY AS NEEDED ANXIETY 11/27/19  Yes ProviderRoxanne MD   ipratropium-albuterol (COMBIVENT RESPIMAT)  MCG/ACT inhaler Inhale 1 puff 4 (Four) Times a Day. 11/20/19  Yes Liza Hagen DO   lurasidone (LATUDA) 40 MG tablet tablet Take 40 mg by mouth Daily.   Yes ProviderRoxanne,  "MD   metoprolol tartrate (LOPRESSOR) 25 MG tablet Take 1 tablet by mouth 2 (Two) Times a Day. 10/23/19  Yes Liza Hagen DO   propranolol (INDERAL) 20 MG tablet Take 20 mg by mouth 2 (Two) Times a Day. 11/24/19  Yes Provider, Historical, MD   tiZANidine (ZANAFLEX) 4 MG tablet Take 1 tablet by mouth Every 8 (Eight) Hours As Needed for Muscle Spasms. 11/22/19  Yes Lzia Hagen DO       Objective     Vital Signs: BP (!) 136/102 (BP Location: Left arm, Patient Position: Sitting, Cuff Size: Adult)   Pulse 98   Temp 98 °F (36.7 °C) (Oral)   Resp 20   Ht 185.4 cm (72.99\")   Wt 126 kg (277 lb 8 oz)   SpO2 95%   BMI 36.62 kg/m²   Physical Exam   Constitutional: He is oriented to person, place, and time. He appears well-developed and well-nourished.   HENT:   Head: Normocephalic and atraumatic.   Right Ear: External ear normal.   Left Ear: External ear normal.   Nose: Nose normal.   Mouth/Throat: Oropharynx is clear and moist. No oropharyngeal exudate.   Eyes: Pupils are equal, round, and reactive to light. Conjunctivae and EOM are normal.   Neck: Normal range of motion. Neck supple. No JVD present.   Cardiovascular: Normal rate, regular rhythm, normal heart sounds and intact distal pulses. Exam reveals no gallop and no friction rub.   No murmur heard.  Pulmonary/Chest: Effort normal and breath sounds normal.   Abdominal: Soft. Bowel sounds are normal.   Musculoskeletal:   The patient has reduced flexion extension at the waist.  The patient has new crepitus at the right knee with flexion extension.  He has marked tenderness palpation over the supra patellar bursa laterally.  He is also experiencing infrapatellar pain with palpation.  He does have crepitus at the right knee with flexion extension however this is chronic.   Neurological: He is alert and oriented to person, place, and time. No cranial nerve deficit.   Skin: Skin is warm and dry.   Psychiatric: He has a normal mood and affect. His behavior " is normal.   Nursing note and vitals reviewed.            Results Reviewed:  BUN   Date Value Ref Range Status   10/23/2019 13 6 - 24 mg/dL Final     Creatinine   Date Value Ref Range Status   10/23/2019 0.95 0.76 - 1.27 mg/dL Final     Sodium   Date Value Ref Range Status   10/23/2019 141 134 - 144 mmol/L Final     Potassium   Date Value Ref Range Status   10/23/2019 4.3 3.5 - 5.2 mmol/L Final     Chloride   Date Value Ref Range Status   10/23/2019 104 96 - 106 mmol/L Final     Total CO2   Date Value Ref Range Status   10/23/2019 22 20 - 29 mmol/L Final     Calcium   Date Value Ref Range Status   10/23/2019 9.5 8.7 - 10.2 mg/dL Final     ALT (SGPT)   Date Value Ref Range Status   10/23/2019 23 0 - 44 IU/L Final     AST (SGOT)   Date Value Ref Range Status   10/23/2019 16 0 - 40 IU/L Final     WBC   Date Value Ref Range Status   10/23/2019 7.2 3.4 - 10.8 x10E3/uL Final     Hematocrit   Date Value Ref Range Status   10/23/2019 46.0 37.5 - 51.0 % Final     Platelets   Date Value Ref Range Status   10/23/2019 225 150 - 450 x10E3/uL Final     Triglycerides   Date Value Ref Range Status   10/23/2019 173 (H) 0 - 149 mg/dL Final     HDL Cholesterol   Date Value Ref Range Status   10/23/2019 42 >39 mg/dL Final     LDL Cholesterol    Date Value Ref Range Status   10/23/2019 182 (H) 0 - 99 mg/dL Final         Assessment / Plan     Assessment/Plan:  1. Pain of right hip joint    - XR Hip With or Without Pelvis 2 - 3 View Right    2. Chronic pain of both knees    - XR Knee 3 View Right  - XR Knee 3 View Left    3. Chronic midline low back pain with right-sided sciatica    - XR Spine Lumbar 2 or 3 View (In Office)    4. Essential hypertension  Continue current medications    5. Chronic pain due to trauma  LAMBERT  Refill Norco  6. Muscle spasm    Continue current medications   7. Anxiety    Refill alprazolam  8. Bipolar 1 disorder (CMS/HCC)  Continue current medications        Return in about 4 weeks (around 2/12/2020). unless  patient needs to be seen sooner or acute issues arise.    Code Status: full    I have discussed the patient results/orders and and plan/recommendation with them at today's visit.      Liza Hagen,    01/15/2020

## 2020-02-12 ENCOUNTER — OFFICE VISIT (OUTPATIENT)
Dept: INTERNAL MEDICINE | Facility: CLINIC | Age: 45
End: 2020-02-12

## 2020-02-12 VITALS
OXYGEN SATURATION: 95 % | DIASTOLIC BLOOD PRESSURE: 78 MMHG | SYSTOLIC BLOOD PRESSURE: 118 MMHG | RESPIRATION RATE: 22 BRPM | WEIGHT: 268.5 LBS | BODY MASS INDEX: 35.59 KG/M2 | HEIGHT: 73 IN | TEMPERATURE: 97.6 F | HEART RATE: 88 BPM

## 2020-02-12 DIAGNOSIS — I10 ESSENTIAL HYPERTENSION: Primary | ICD-10-CM

## 2020-02-12 DIAGNOSIS — J02.9 PHARYNGITIS, UNSPECIFIED ETIOLOGY: ICD-10-CM

## 2020-02-12 DIAGNOSIS — M25.562 CHRONIC PAIN OF BOTH KNEES: ICD-10-CM

## 2020-02-12 DIAGNOSIS — F31.9 BIPOLAR 1 DISORDER (HCC): ICD-10-CM

## 2020-02-12 DIAGNOSIS — G89.29 CHRONIC PAIN OF BOTH KNEES: ICD-10-CM

## 2020-02-12 DIAGNOSIS — M25.561 CHRONIC PAIN OF BOTH KNEES: ICD-10-CM

## 2020-02-12 DIAGNOSIS — J01.90 ACUTE NON-RECURRENT SINUSITIS, UNSPECIFIED LOCATION: ICD-10-CM

## 2020-02-12 DIAGNOSIS — G89.21 CHRONIC PAIN DUE TO TRAUMA: ICD-10-CM

## 2020-02-12 DIAGNOSIS — M25.551 PAIN OF RIGHT HIP JOINT: ICD-10-CM

## 2020-02-12 DIAGNOSIS — F41.9 ANXIETY: ICD-10-CM

## 2020-02-12 DIAGNOSIS — Z79.891 NARCOTIC USE AGREEMENT EXISTS: ICD-10-CM

## 2020-02-12 PROCEDURE — 99214 OFFICE O/P EST MOD 30 MIN: CPT | Performed by: FAMILY MEDICINE

## 2020-02-12 RX ORDER — MOMETASONE FUROATE 50 UG/1
2 SPRAY, METERED NASAL DAILY
Qty: 1 EACH | Refills: 12 | Status: SHIPPED | OUTPATIENT
Start: 2020-02-12 | End: 2020-10-02 | Stop reason: SDUPTHER

## 2020-02-12 RX ORDER — CEFUROXIME AXETIL 500 MG/1
500 TABLET ORAL 2 TIMES DAILY
Qty: 20 TABLET | Refills: 0 | Status: SHIPPED | OUTPATIENT
Start: 2020-02-12 | End: 2020-03-13

## 2020-02-12 RX ORDER — ALPRAZOLAM 1 MG/1
1 TABLET ORAL 2 TIMES DAILY PRN
Qty: 60 TABLET | Refills: 0 | Status: SHIPPED | OUTPATIENT
Start: 2020-02-12 | End: 2020-03-13 | Stop reason: SDUPTHER

## 2020-02-12 RX ORDER — HYDROCODONE BITARTRATE AND ACETAMINOPHEN 10; 325 MG/1; MG/1
1 TABLET ORAL EVERY 4 HOURS PRN
Qty: 100 TABLET | Refills: 0 | Status: SHIPPED | OUTPATIENT
Start: 2020-02-12 | End: 2020-03-13 | Stop reason: SDUPTHER

## 2020-02-12 NOTE — PROGRESS NOTES
Subjective     Chief Complaint   Patient presents with   • Follow-up     Chronic pain, hypertension, anxiety, and Bipolar   • Sore Throat     had a week, feels dehydrated, loosing voice, drainage, has been taking sudifed    • Med Refill     pain meds and BP meds       History of Present Illness   Patient has not been feeling well over the last week or so.  He had been up to [place] to visit his sister.  He became ill.  He is having hoarseness, drainage, head stuffiness.  He does not feel short of breath.  He has had no fever to his knowledge.  He has been taking his medications as prescribed.  Pain control is about her usual he does have more issues in the right cold weather with pain.  Stiffness is also worse during that timeframe.  He has been having issues with balance and has fallen.  His issues with anxiety are about the same he is taking his medication routinely.  His blood pressure has not been monitored.  Today it is in the normal range.  This is good considering the fact that he has been taking a decongestant.  Using afrin and sudafed.  Pain management appointment is in April.  He had to move back because he multiple x-rays prior to going.  Patient's PMR from outside medical facility reviewed and noted.    Review of Systems     Otherwise complete ROS reviewed and negative except as mentioned in the HPI.    Past Medical History:   Past Medical History:   Diagnosis Date   • Anxiety    • Arthritis    • Bipolar 1 disorder (CMS/[facility])    • Hypertension    • PTSD (post-traumatic stress disorder)      Past Surgical History:  Past Surgical History:   Procedure Laterality Date   • HIP SURGERY       Social History:  reports that he has been smoking cigarettes. He has been smoking about 1.50 packs per day. He has never used smokeless tobacco. He reports that he does not drink alcohol or use drugs.    Family History: family history includes Arthritis in his father; Diabetes in his father; Heart attack in his  mother; Hypertension in his father.     Allergies:  Allergies   Allergen Reactions   • Meloxicam Nausea Only   • Darvon [Propoxyphene] GI Intolerance   • Motrin [Ibuprofen] GI Intolerance   • Toradol [Ketorolac Tromethamine] GI Intolerance   • Tramadol GI Intolerance     Medications:  Prior to Admission medications    Medication Sig Start Date End Date Taking? Authorizing Provider   ALPRAZolam (XANAX) 1 MG tablet Take 1 tablet by mouth 2 (Two) Times a Day As Needed for Anxiety. 1/15/20  Yes Liza Hagen DO   atorvastatin (LIPITOR) 10 MG tablet TAKE 1 TABLET BY MOUTH EVERY DAY 12/31/19  Yes Liza Hagen DO   cephalexin (KEFLEX) 500 MG capsule cephalexin 500 mg capsule   Yes ProviderRoxanne MD   chlorproMAZINE (THORAZINE) 25 MG tablet chlorpromazine 25 mg tablet   take one tablet po twice a day   Yes ProviderRoxanne MD   doxepin (SINEquan) 10 MG capsule TAKE 1 TO 3 CAPSULES BY MOUTH AT BEDTIME AS NEEDED 11/27/19  Yes ProviderRoxanne MD   HYDROcodone-acetaminophen (NORCO)  MG per tablet Take 1 tablet by mouth Every 4 (Four) Hours As Needed for Moderate Pain . 1/15/20  Yes Liza Hagen DO   hydrOXYzine (ATARAX) 25 MG tablet TAKE 1/2 - 1 TABLET BY MOUTH THREE TIMES A DAY AS NEEDED ANXIETY 11/27/19  Yes ProviderRoxanne MD   ipratropium-albuterol (COMBIVENT RESPIMAT)  MCG/ACT inhaler Inhale 1 puff 4 (Four) Times a Day. 11/20/19  Yes Liza Hagen DO   lurasidone (LATUDA) 40 MG tablet tablet Take 40 mg by mouth Daily.   Yes Provider, MD Roxanne   metoprolol tartrate (LOPRESSOR) 25 MG tablet Take 1 tablet by mouth 2 (Two) Times a Day. 10/23/19  Yes Liza Hagen DO   propranolol (INDERAL) 20 MG tablet Take 20 mg by mouth 2 (Two) Times a Day. 11/24/19  Yes ProviderRoxanen MD   tiZANidine (ZANAFLEX) 4 MG tablet Take 1 tablet by mouth Every 8 (Eight) Hours As Needed for Muscle Spasms. 11/22/19  Yes Liza Hagen DO       Objective      "    Vital Signs: /78 (BP Location: Left arm, Patient Position: Sitting, Cuff Size: Large Adult)   Pulse 88   Temp 97.6 °F (36.4 °C) (Oral)   Resp 22   Ht 185.4 cm (72.99\")   Wt 122 kg (268 lb 8 oz)   SpO2 95%   BMI 35.43 kg/m²   Physical Exam   Constitutional: He is oriented to person, place, and time. He appears well-developed and well-nourished.   HENT:   Head: Normocephalic and atraumatic.   Right Ear: External ear normal.   Left Ear: External ear normal.   Mouth/Throat: Oropharynx is clear and moist.   Posterior oropharynx is erythematous.  Nasal mucosa is markedly edematous with purulent drainage.   Eyes: Pupils are equal, round, and reactive to light. Conjunctivae and EOM are normal.   Neck: Normal range of motion. Neck supple. No JVD present.   Cardiovascular: Normal rate, regular rhythm, normal heart sounds and intact distal pulses. Exam reveals no gallop and no friction rub.   No murmur heard.  Pulmonary/Chest: Effort normal and breath sounds normal.   Abdominal: Soft. Bowel sounds are normal.   Musculoskeletal: Normal range of motion.   Neurological: He is alert and oriented to person, place, and time.   Skin: Skin is warm and dry.   Psychiatric: He has a normal mood and affect. His behavior is normal.   Nursing note and vitals reviewed.            Results Reviewed:  BUN   Date Value Ref Range Status   10/23/2019 13 6 - 24 mg/dL Final     Creatinine   Date Value Ref Range Status   10/23/2019 0.95 0.76 - 1.27 mg/dL Final     Sodium   Date Value Ref Range Status   10/23/2019 141 134 - 144 mmol/L Final     Potassium   Date Value Ref Range Status   10/23/2019 4.3 3.5 - 5.2 mmol/L Final     Chloride   Date Value Ref Range Status   10/23/2019 104 96 - 106 mmol/L Final     Total CO2   Date Value Ref Range Status   10/23/2019 22 20 - 29 mmol/L Final     Calcium   Date Value Ref Range Status   10/23/2019 9.5 8.7 - 10.2 mg/dL Final     ALT (SGPT)   Date Value Ref Range Status   10/23/2019 23 0 - 44 IU/L " Final     AST (SGOT)   Date Value Ref Range Status   10/23/2019 16 0 - 40 IU/L Final     WBC   Date Value Ref Range Status   10/23/2019 7.2 3.4 - 10.8 x10E3/uL Final     Hematocrit   Date Value Ref Range Status   10/23/2019 46.0 37.5 - 51.0 % Final     Platelets   Date Value Ref Range Status   10/23/2019 225 150 - 450 x10E3/uL Final     Triglycerides   Date Value Ref Range Status   10/23/2019 173 (H) 0 - 149 mg/dL Final     HDL Cholesterol   Date Value Ref Range Status   10/23/2019 42 >39 mg/dL Final     LDL Cholesterol    Date Value Ref Range Status   10/23/2019 182 (H) 0 - 99 mg/dL Final         Assessment / Plan     Assessment/Plan:  1. Anxiety    - ALPRAZolam (XANAX) 1 MG tablet; Take 1 tablet by mouth 2 (Two) Times a Day As Needed for Anxiety.  Dispense: 60 tablet; Refill: 0    2. Narcotic use agreement exists    - HYDROcodone-acetaminophen (NORCO)  MG per tablet; Take 1 tablet by mouth Every 4 (Four) Hours As Needed for Moderate Pain .  Dispense: 100 tablet; Refill: 0    3. Essential hypertension  Continue present meds    4. Chronic pain due to trauma    Following with pain management is unable we will continue pain treatment until that time.  5. Pain of right hip joint      6. Chronic pain of both knees      7. Bipolar 1 disorder (CMS/HCC)  Continue current meds    8. Acute non-recurrent sinusitis, unspecified location    Ceftin 20 mg twice daily 10 days  Nasonex 1 puff each nares twice daily    9. Pharyngitis, unspecified etiology  Follow-up for 1        Return in about 4 weeks (around 3/11/2020). unless patient needs to be seen sooner or acute issues arise.        I have discussed the patient results/orders and and plan/recommendation with them at today's visit.      Liza Hagen,    02/12/2020

## 2020-03-13 ENCOUNTER — OFFICE VISIT (OUTPATIENT)
Dept: INTERNAL MEDICINE | Facility: CLINIC | Age: 45
End: 2020-03-13

## 2020-03-13 VITALS
SYSTOLIC BLOOD PRESSURE: 140 MMHG | RESPIRATION RATE: 22 BRPM | BODY MASS INDEX: 35.92 KG/M2 | HEIGHT: 73 IN | DIASTOLIC BLOOD PRESSURE: 92 MMHG | WEIGHT: 271 LBS | OXYGEN SATURATION: 97 % | TEMPERATURE: 97.5 F | HEART RATE: 81 BPM

## 2020-03-13 DIAGNOSIS — Z79.891 NARCOTIC USE AGREEMENT EXISTS: ICD-10-CM

## 2020-03-13 DIAGNOSIS — G56.02 CARPAL TUNNEL SYNDROME OF LEFT WRIST: ICD-10-CM

## 2020-03-13 DIAGNOSIS — Z02.83 ENCOUNTER FOR DRUG SCREENING: Primary | ICD-10-CM

## 2020-03-13 DIAGNOSIS — F31.9 BIPOLAR 1 DISORDER (HCC): ICD-10-CM

## 2020-03-13 DIAGNOSIS — Z79.899 ENCOUNTER FOR DRUG THERAPY: ICD-10-CM

## 2020-03-13 DIAGNOSIS — G89.29 CHRONIC MIDLINE LOW BACK PAIN WITH RIGHT-SIDED SCIATICA: ICD-10-CM

## 2020-03-13 DIAGNOSIS — M54.41 CHRONIC MIDLINE LOW BACK PAIN WITH RIGHT-SIDED SCIATICA: ICD-10-CM

## 2020-03-13 DIAGNOSIS — I10 ESSENTIAL HYPERTENSION: ICD-10-CM

## 2020-03-13 DIAGNOSIS — F41.9 ANXIETY: ICD-10-CM

## 2020-03-13 PROCEDURE — 99214 OFFICE O/P EST MOD 30 MIN: CPT | Performed by: FAMILY MEDICINE

## 2020-03-13 RX ORDER — ALPRAZOLAM 1 MG/1
1 TABLET ORAL 2 TIMES DAILY PRN
Qty: 60 TABLET | Refills: 0 | Status: SHIPPED | OUTPATIENT
Start: 2020-03-13 | End: 2020-04-14 | Stop reason: SDUPTHER

## 2020-03-13 RX ORDER — HYDROCODONE BITARTRATE AND ACETAMINOPHEN 10; 325 MG/1; MG/1
1 TABLET ORAL EVERY 4 HOURS PRN
Qty: 100 TABLET | Refills: 0 | Status: SHIPPED | OUTPATIENT
Start: 2020-03-13 | End: 2020-04-14 | Stop reason: SDUPTHER
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
## 2020-03-19 LAB — DRUGS UR: NORMAL

## 2020-03-23 DIAGNOSIS — E78.00 HIGH CHOLESTEROL: ICD-10-CM

## 2020-03-24 RX ORDER — ATORVASTATIN CALCIUM 10 MG/1
TABLET, FILM COATED ORAL
Qty: 90 TABLET | Refills: 1 | Status: SHIPPED | OUTPATIENT
Start: 2020-03-24 | End: 2020-10-02 | Stop reason: SDUPTHER

## 2020-04-14 ENCOUNTER — OFFICE VISIT (OUTPATIENT)
Dept: INTERNAL MEDICINE | Facility: CLINIC | Age: 45
End: 2020-04-14

## 2020-04-14 VITALS
HEIGHT: 73 IN | TEMPERATURE: 97.5 F | HEART RATE: 88 BPM | OXYGEN SATURATION: 99 % | DIASTOLIC BLOOD PRESSURE: 89 MMHG | SYSTOLIC BLOOD PRESSURE: 135 MMHG | BODY MASS INDEX: 34.99 KG/M2 | WEIGHT: 264 LBS

## 2020-04-14 DIAGNOSIS — Z79.899 LONG TERM USE OF DRUG: Primary | ICD-10-CM

## 2020-04-14 DIAGNOSIS — F31.9 BIPOLAR 1 DISORDER (HCC): ICD-10-CM

## 2020-04-14 DIAGNOSIS — Z79.899 ENCOUNTER FOR DRUG THERAPY: ICD-10-CM

## 2020-04-14 DIAGNOSIS — F41.9 ANXIETY: ICD-10-CM

## 2020-04-14 DIAGNOSIS — G89.21 CHRONIC PAIN DUE TO TRAUMA: ICD-10-CM

## 2020-04-14 DIAGNOSIS — I10 ESSENTIAL HYPERTENSION: ICD-10-CM

## 2020-04-14 DIAGNOSIS — M62.838 MUSCLE SPASM: ICD-10-CM

## 2020-04-14 DIAGNOSIS — Z79.891 NARCOTIC USE AGREEMENT EXISTS: ICD-10-CM

## 2020-04-14 DIAGNOSIS — M54.41 CHRONIC MIDLINE LOW BACK PAIN WITH RIGHT-SIDED SCIATICA: ICD-10-CM

## 2020-04-14 DIAGNOSIS — G89.29 CHRONIC MIDLINE LOW BACK PAIN WITH RIGHT-SIDED SCIATICA: ICD-10-CM

## 2020-04-14 PROCEDURE — 99214 OFFICE O/P EST MOD 30 MIN: CPT | Performed by: FAMILY MEDICINE

## 2020-04-14 RX ORDER — TIZANIDINE 4 MG/1
4 TABLET ORAL EVERY 8 HOURS PRN
Qty: 90 TABLET | Refills: 3 | Status: SHIPPED | OUTPATIENT
Start: 2020-04-14 | End: 2020-07-31 | Stop reason: SDUPTHER

## 2020-04-14 RX ORDER — HYDROCODONE BITARTRATE AND ACETAMINOPHEN 10; 325 MG/1; MG/1
1 TABLET ORAL EVERY 4 HOURS PRN
Qty: 100 TABLET | Refills: 0 | Status: SHIPPED | OUTPATIENT
Start: 2020-04-14 | End: 2020-06-23 | Stop reason: ALTCHOICE

## 2020-04-14 RX ORDER — ALPRAZOLAM 1 MG/1
1 TABLET ORAL 2 TIMES DAILY PRN
Qty: 60 TABLET | Refills: 0 | Status: SHIPPED | OUTPATIENT
Start: 2020-04-14 | End: 2020-05-20 | Stop reason: SDUPTHER

## 2020-04-14 NOTE — PROGRESS NOTES
Subjective     Chief Complaint   Patient presents with   • Med Refill       History of Present Illness  Patient has a pain management appointment on 14 May.  Overall he feels he has been doing about the same.  The cold weather does bother his joints a lot more.  He is tolerating his medications overall.  He did have an instance where he forgot to take his blood pressure medication, remembered it when he was in bed trying to sleep.  He got up and took what he believed to be his blood pressure medication unfortunately there was a sense Adderall.  He had a significant allergic reaction to this that included swelling of the face and hands shortness of breath.  He was transported to [facility] via private vehicle.  While there he received 2 doses of Ativan.  Patient now states that he has moved his sons location to a covered and has placed his medication on top of the refrigerator.  Patient's PMR from outside medical facility reviewed and noted.    Review of Systems     Otherwise complete ROS reviewed and negative except as mentioned in the HPI.    Past Medical History:   Past Medical History:   Diagnosis Date   • Anxiety    • Arthritis    • Bipolar 1 disorder (CMS/[facility])    • Hypertension    • PTSD (post-traumatic stress disorder)      Past Surgical History:  Past Surgical History:   Procedure Laterality Date   • HIP SURGERY       Social History:  reports that he has been smoking cigarettes. He has been smoking about 1.50 packs per day. He has never used smokeless tobacco. He reports that he does not drink alcohol or use drugs.    Family History: family history includes Arthritis in his father; Diabetes in his father; Heart attack in his mother; Hypertension in his father.       Allergies:  Allergies   Allergen Reactions   • Adderall [Amphetamine-Dextroamphetamine] Shortness Of Breath, Swelling, Palpitations and Hallucinations   • Meloxicam Nausea Only   • Darvon [Propoxyphene] GI Intolerance   •  Motrin [Ibuprofen] GI Intolerance   • Toradol [Ketorolac Tromethamine] GI Intolerance   • Tramadol GI Intolerance     Medications:  Prior to Admission medications    Medication Sig Start Date End Date Taking? Authorizing Provider   ALPRAZolam (XANAX) 1 MG tablet Take 1 tablet by mouth 2 (Two) Times a Day As Needed for Anxiety. 3/13/20  Yes Liza Hagen DO   atorvastatin (LIPITOR) 10 MG tablet TAKE 1 TABLET BY MOUTH EVERY DAY 3/24/20  Yes Liza Hagen DO   chlorproMAZINE (THORAZINE) 25 MG tablet chlorpromazine 25 mg tablet   take one tablet po twice a day   Yes Roxanne Carrasco MD   HYDROcodone-acetaminophen (NORCO)  MG per tablet Take 1 tablet by mouth Every 4 (Four) Hours As Needed for Moderate Pain . 3/13/20  Yes Liza Hagen DO   hydrOXYzine (ATARAX) 25 MG tablet TAKE 1/2 - 1 TABLET BY MOUTH THREE TIMES A DAY AS NEEDED ANXIETY 11/27/19  Yes Roxanne Carrasco MD   metoprolol tartrate (LOPRESSOR) 25 MG tablet Take 1 tablet by mouth 2 (Two) Times a Day. 10/23/19  Yes Liza Hagen DO   mometasone (NASONEX) 50 MCG/ACT nasal spray 2 sprays into the nostril(s) as directed by provider Daily. 2/12/20  Yes Liza Hagen DO   doxepin (SINEquan) 10 MG capsule TAKE 1 TO 3 CAPSULES BY MOUTH AT BEDTIME AS NEEDED 11/27/19   Roxanne Carrasco MD   ipratropium-albuterol (COMBIVENT RESPIMAT)  MCG/ACT inhaler Inhale 1 puff 4 (Four) Times a Day. 11/20/19   Liza Hagen DO   lurasidone (LATUDA) 40 MG tablet tablet Take 40 mg by mouth Daily.    Roxanne Carrasco MD   propranolol (INDERAL) 20 MG tablet Take 20 mg by mouth 2 (Two) Times a Day. 11/24/19   Roxanne Carrasco MD   tiZANidine (ZANAFLEX) 4 MG tablet Take 1 tablet by mouth Every 8 (Eight) Hours As Needed for Muscle Spasms. 11/22/19   Liza Hagen DO       Objective     Vital Signs: /89 (BP Location: Right arm, Patient Position: Sitting, Cuff Size: Adult)   Pulse 88   Temp 97.5 °F  "(36.4 °C) (Oral)   Ht 185.4 cm (72.99\")   Wt 120 kg (264 lb)   SpO2 99%   BMI 34.84 kg/m²   Physical Exam   Constitutional: He is oriented to person, place, and time. He appears well-developed and well-nourished.   HENT:   Head: Normocephalic and atraumatic.   Right Ear: External ear normal.   Left Ear: External ear normal.   Eyes: Pupils are equal, round, and reactive to light. Conjunctivae and EOM are normal.   Neck: Normal range of motion. Neck supple. No JVD present.   Cardiovascular: Normal rate, regular rhythm, normal heart sounds and intact distal pulses. Exam reveals no gallop and no friction rub.   No murmur heard.  Pulmonary/Chest: Effort normal and breath sounds normal.   Abdominal: Soft. Bowel sounds are normal.   Musculoskeletal: He exhibits no edema.   Moves all extremities.  Ambulates with cane.   Neurological: He is alert and oriented to person, place, and time. No cranial nerve deficit.   Skin: Skin is warm and dry.   Psychiatric: He has a normal mood and affect. His behavior is normal.   Nursing note and vitals reviewed.          Results Reviewed:  BUN   Date Value Ref Range Status   10/23/2019 13 6 - 24 mg/dL Final     Creatinine   Date Value Ref Range Status   10/23/2019 0.95 0.76 - 1.27 mg/dL Final     Sodium   Date Value Ref Range Status   10/23/2019 141 134 - 144 mmol/L Final     Potassium   Date Value Ref Range Status   10/23/2019 4.3 3.5 - 5.2 mmol/L Final     Chloride   Date Value Ref Range Status   10/23/2019 104 96 - 106 mmol/L Final     Total CO2   Date Value Ref Range Status   10/23/2019 22 20 - 29 mmol/L Final     Calcium   Date Value Ref Range Status   10/23/2019 9.5 8.7 - 10.2 mg/dL Final     ALT (SGPT)   Date Value Ref Range Status   10/23/2019 23 0 - 44 IU/L Final     AST (SGOT)   Date Value Ref Range Status   10/23/2019 16 0 - 40 IU/L Final     WBC   Date Value Ref Range Status   10/23/2019 7.2 3.4 - 10.8 x10E3/uL Final     Hematocrit   Date Value Ref Range Status   10/23/2019 " 46.0 37.5 - 51.0 % Final     Platelets   Date Value Ref Range Status   10/23/2019 225 150 - 450 x10E3/uL Final     Triglycerides   Date Value Ref Range Status   10/23/2019 173 (H) 0 - 149 mg/dL Final     HDL Cholesterol   Date Value Ref Range Status   10/23/2019 42 >39 mg/dL Final     LDL Cholesterol    Date Value Ref Range Status   10/23/2019 182 (H) 0 - 99 mg/dL Final         Assessment / Plan     Assessment/Plan:  1. Long term use of drug    - ToxASSURE Select 13 (MW) - Urine, Clean Catch    2. Narcotic use agreement exists    - HYDROcodone-acetaminophen (NORCO)  MG per tablet; Take 1 tablet by mouth Every 4 (Four) Hours As Needed for Moderate Pain .  Dispense: 100 tablet; Refill: 0    3. Anxiety    - ALPRAZolam (XANAX) 1 MG tablet; Take 1 tablet by mouth 2 (Two) Times a Day As Needed for Anxiety.  Dispense: 60 tablet; Refill: 0    4. Essential hypertension      5. Chronic midline low back pain with right-sided sciatica      6. Chronic pain due to trauma      7. Muscle spasm      8. Bipolar 1 disorder (CMS/[facility])      9. Encounter for drug therapy          Refill Norco as well as Xanax.  Monitor blood pressure at home 2-3 times a week.  Goal less than 130/80.  Keep pain management appointment.  We will transition to 3-month follow-ups.  He will continue to need drug screen due to the Xanax.  Use caution when taking medication.  Read labels prior to consuming medication.  Did discuss that if he inadvertently took another of his son's Adderall that the next rash would most likely be worse.  If it occurs again or anything like that occurs with any medication then the recommendation is to call 911 and transport via ambulance.    Return in about 3 months (around 7/14/2020). unless patient needs to be seen sooner or acute issues arise.    I have discussed the patient results/orders and and plan/recommendation with them at today's visit.      Liza Hagen, DO   04/14/2020

## 2020-04-16 LAB — DRUGS UR: NORMAL

## 2020-05-20 ENCOUNTER — TELEMEDICINE (OUTPATIENT)
Dept: INTERNAL MEDICINE | Facility: CLINIC | Age: 45
End: 2020-05-20

## 2020-05-20 DIAGNOSIS — F41.9 ANXIETY: ICD-10-CM

## 2020-05-20 DIAGNOSIS — Z72.0 TOBACCO ABUSE: Primary | ICD-10-CM

## 2020-05-20 DIAGNOSIS — G89.21 CHRONIC PAIN DUE TO TRAUMA: ICD-10-CM

## 2020-05-20 PROCEDURE — 99213 OFFICE O/P EST LOW 20 MIN: CPT | Performed by: FAMILY MEDICINE

## 2020-05-20 RX ORDER — ALPRAZOLAM 1 MG/1
1 TABLET ORAL 2 TIMES DAILY PRN
Qty: 60 TABLET | Refills: 0 | Status: SHIPPED | OUTPATIENT
Start: 2020-05-20 | End: 2020-06-23 | Stop reason: SDUPTHER

## 2020-05-20 NOTE — PROGRESS NOTES
Subjective     Cc: Patient is in need of a refill on his anxiety medicine.  Was in January.    History of Present Illness  Not have any new symptoms.  He is seeing pain management now.  They do not do anxiolytics.  He notes that they told him he has to quit smoking by October also quit seeing him.  Patient's PMR from outside medical facility reviewed and noted.    Review of Systems     Otherwise complete ROS reviewed and negative except as mentioned in the HPI.    Past Medical History:   Past Medical History:   Diagnosis Date   • Anxiety    • Arthritis    • Bipolar 1 disorder (CMS/HCC)    • Hypertension    • PTSD (post-traumatic stress disorder)      Past Surgical History:  Past Surgical History:   Procedure Laterality Date   • HIP SURGERY       Social History:  reports that he has been smoking cigarettes. He has been smoking about 1.50 packs per day. He has never used smokeless tobacco. He reports that he does not drink alcohol or use drugs.    Family History: family history includes Arthritis in his father; Diabetes in his father; Heart attack in his mother; Hypertension in his father.       Allergies:  Allergies   Allergen Reactions   • Adderall [Amphetamine-Dextroamphetamine] Shortness Of Breath, Swelling, Palpitations and Hallucinations   • Meloxicam Nausea Only   • Darvon [Propoxyphene] GI Intolerance   • Motrin [Ibuprofen] GI Intolerance   • Toradol [Ketorolac Tromethamine] GI Intolerance   • Tramadol GI Intolerance     Medications:  Prior to Admission medications    Medication Sig Start Date End Date Taking? Authorizing Provider   ALPRAZolam (XANAX) 1 MG tablet Take 1 tablet by mouth 2 (Two) Times a Day As Needed for Anxiety. 5/20/20   Liza Hagen DO   atorvastatin (LIPITOR) 10 MG tablet TAKE 1 TABLET BY MOUTH EVERY DAY 3/24/20   Liza Hagen DO   chlorproMAZINE (THORAZINE) 25 MG tablet chlorpromazine 25 mg tablet   take one tablet po twice a day    Provider, Historical, MD   doxepin  (SINEquan) 10 MG capsule TAKE 1 TO 3 CAPSULES BY MOUTH AT BEDTIME AS NEEDED 11/27/19   ProviderRoxanne MD   HYDROcodone-acetaminophen (NORCO)  MG per tablet Take 1 tablet by mouth Every 4 (Four) Hours As Needed for Moderate Pain . 4/14/20   Liza Hagen DO   hydrOXYzine (ATARAX) 25 MG tablet TAKE 1/2 - 1 TABLET BY MOUTH THREE TIMES A DAY AS NEEDED ANXIETY 11/27/19   ProviderRoxanne MD   ipratropium-albuterol (COMBIVENT RESPIMAT)  MCG/ACT inhaler Inhale 1 puff 4 (Four) Times a Day. 11/20/19   Liza Hagen DO   lurasidone (LATUDA) 40 MG tablet tablet Take 40 mg by mouth Daily.    ProviderRoxanne MD   metoprolol tartrate (LOPRESSOR) 25 MG tablet Take 1 tablet by mouth 2 (Two) Times a Day. 4/14/20   Liza Hagen DO   mometasone (NASONEX) 50 MCG/ACT nasal spray 2 sprays into the nostril(s) as directed by provider Daily. 2/12/20   Liza Hagen DO   tiZANidine (ZANAFLEX) 4 MG tablet Take 1 tablet by mouth Every 8 (Eight) Hours As Needed for Muscle Spasms. 4/14/20   Liza Hagen DO   ALPRAZolam (XANAX) 1 MG tablet Take 1 tablet by mouth 2 (Two) Times a Day As Needed for Anxiety. 4/14/20 5/20/20  Liza Hagen DO       Objective     Vital Signs: There were no vitals taken for this visit.  Physical Exam  This is a video of visit.  There is no physical exam.  The patient is alert and oriented x3 mood and affect are appropriate situation.          Results Reviewed:  BUN   Date Value Ref Range Status   10/23/2019 13 6 - 24 mg/dL Final     Creatinine   Date Value Ref Range Status   10/23/2019 0.95 0.76 - 1.27 mg/dL Final     Sodium   Date Value Ref Range Status   10/23/2019 141 134 - 144 mmol/L Final     Potassium   Date Value Ref Range Status   10/23/2019 4.3 3.5 - 5.2 mmol/L Final     Chloride   Date Value Ref Range Status   10/23/2019 104 96 - 106 mmol/L Final     Total CO2   Date Value Ref Range Status   10/23/2019 22 20 - 29 mmol/L Final     Calcium    Date Value Ref Range Status   10/23/2019 9.5 8.7 - 10.2 mg/dL Final     ALT (SGPT)   Date Value Ref Range Status   10/23/2019 23 0 - 44 IU/L Final     AST (SGOT)   Date Value Ref Range Status   10/23/2019 16 0 - 40 IU/L Final     WBC   Date Value Ref Range Status   10/23/2019 7.2 3.4 - 10.8 x10E3/uL Final     Hematocrit   Date Value Ref Range Status   10/23/2019 46.0 37.5 - 51.0 % Final     Platelets   Date Value Ref Range Status   10/23/2019 225 150 - 450 x10E3/uL Final     Triglycerides   Date Value Ref Range Status   10/23/2019 173 (H) 0 - 149 mg/dL Final     HDL Cholesterol   Date Value Ref Range Status   10/23/2019 42 >39 mg/dL Final     LDL Cholesterol    Date Value Ref Range Status   10/23/2019 182 (H) 0 - 99 mg/dL Final         Assessment / Plan     Assessment/Plan:  1. Anxiety    - ALPRAZolam (XANAX) 1 MG tablet; Take 1 tablet by mouth 2 (Two) Times a Day As Needed for Anxiety.  Dispense: 60 tablet; Refill: 0    2. Tobacco abuse  Tips and techniques for smoking cessation discussed with patient who verbalized understanding.    3. Chronic pain due to trauma    We will follow with pain management for control of his pain issues they are planning an injection soon.      No follow-ups on file. unless patient needs to be seen sooner or acute issues arise.      I have discussed the patient results/orders and and plan/recommendation with them at today's visit.      Liza Hagen,    05/20/2020

## 2020-06-23 ENCOUNTER — OFFICE VISIT (OUTPATIENT)
Dept: INTERNAL MEDICINE | Facility: CLINIC | Age: 45
End: 2020-06-23

## 2020-06-23 VITALS
HEART RATE: 80 BPM | TEMPERATURE: 98.3 F | DIASTOLIC BLOOD PRESSURE: 82 MMHG | SYSTOLIC BLOOD PRESSURE: 142 MMHG | WEIGHT: 270 LBS | BODY MASS INDEX: 35.78 KG/M2 | HEIGHT: 73 IN | OXYGEN SATURATION: 99 %

## 2020-06-23 DIAGNOSIS — E66.01 MORBIDLY OBESE (HCC): ICD-10-CM

## 2020-06-23 DIAGNOSIS — F41.9 ANXIETY: ICD-10-CM
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
## 2020-06-23 DIAGNOSIS — G89.21 CHRONIC PAIN DUE TO TRAUMA: Primary | ICD-10-CM

## 2020-06-23 PROCEDURE — 99214 OFFICE O/P EST MOD 30 MIN: CPT | Performed by: FAMILY MEDICINE

## 2020-06-23 RX ORDER — METHOCARBAMOL 500 MG/1
500 TABLET, FILM COATED ORAL 4 TIMES DAILY PRN
Qty: 120 TABLET | Refills: 3 | Status: SHIPPED | OUTPATIENT
Start: 2020-06-23 | End: 2020-10-02 | Stop reason: SDUPTHER

## 2020-06-23 RX ORDER — ALPRAZOLAM 1 MG/1
1 TABLET ORAL 2 TIMES DAILY PRN
Qty: 60 TABLET | Refills: 0 | Status: SHIPPED | OUTPATIENT
Start: 2020-06-23 | End: 2020-07-23 | Stop reason: SDUPTHER

## 2020-06-23 RX ORDER — OXYCODONE AND ACETAMINOPHEN 10; 325 MG/1; MG/1
1 TABLET ORAL EVERY 6 HOURS PRN
Qty: 120 TABLET | Refills: 0 | Status: SHIPPED | OUTPATIENT
Start: 2020-06-23 | End: 2020-07-23 | Stop reason: SDUPTHER

## 2020-06-23 NOTE — PROGRESS NOTES
Subjective     Chief Complaint   Patient presents with   • Follow-up     1 month follow up.       History of Present Illness  Patient here for follow-up.  He has been to pain management.  They did change his pain medicine to Percocet 10 1 every 6 hours as needed pain.  They also prescribed a muscle relaxer that his insurance would not pay for.  And subsequently then told him they would no longer see him because he did not tell the doctor he took Sudafed for his allergies.  Patient states he told the tech who took his urine about the medications he took including the Sudafed.  The patient is distraught and does not understand what he is supposed to do.  He is very angry about being thought that he might be taking medications because he is doing everything he can in his prior to be a good influence for his young son who has full custody.  Apparently the child's mother took significant drugs and in fact is in prison for this.  Patient did fall recently.  His leg popped at the hip and then gave way.  He landed on his knee there is bruising.  He is able to walk with his cane.    Patient's PMR from outside medical facility reviewed and noted.    Review of Systems     Otherwise complete ROS reviewed and negative except as mentioned in the HPI.    Past Medical History:   Past Medical History:   Diagnosis Date   • Anxiety    • Arthritis    • Bipolar 1 disorder (CMS/HCC)    • Hypertension    • PTSD (post-traumatic stress disorder)      Past Surgical History:  Past Surgical History:   Procedure Laterality Date   • HIP SURGERY       Social History:  reports that he has been smoking cigarettes. He has been smoking about 1.50 packs per day. He has never used smokeless tobacco. He reports that he does not drink alcohol or use drugs.    Family History: family history includes Arthritis in his father; Diabetes in his father; Heart attack in his mother; Hypertension in his father.       Allergies:  Allergies   Allergen  Reactions   • Adderall [Amphetamine-Dextroamphetamine] Shortness Of Breath, Swelling, Palpitations and Hallucinations   • Meloxicam Nausea Only   • Darvon [Propoxyphene] GI Intolerance   • Motrin [Ibuprofen] GI Intolerance   • Toradol [Ketorolac Tromethamine] GI Intolerance   • Tramadol GI Intolerance     Medications:  Prior to Admission medications    Medication Sig Start Date End Date Taking? Authorizing Provider   ALPRAZolam (XANAX) 1 MG tablet Take 1 tablet by mouth 2 (Two) Times a Day As Needed for Anxiety. 5/20/20  Yes Liza Hagen DO   atorvastatin (LIPITOR) 10 MG tablet TAKE 1 TABLET BY MOUTH EVERY DAY 3/24/20  Yes Liza Hagen DO   metoprolol tartrate (LOPRESSOR) 25 MG tablet Take 1 tablet by mouth 2 (Two) Times a Day. 4/14/20  Yes Liza Hagen DO   chlorproMAZINE (THORAZINE) 25 MG tablet chlorpromazine 25 mg tablet   take one tablet po twice a day    ProviderRoxanne MD   doxepin (SINEquan) 10 MG capsule TAKE 1 TO 3 CAPSULES BY MOUTH AT BEDTIME AS NEEDED 11/27/19   Roxanne Carrasco MD   HYDROcodone-acetaminophen (NORCO)  MG per tablet Take 1 tablet by mouth Every 4 (Four) Hours As Needed for Moderate Pain . 4/14/20   Liza Hagen DO   hydrOXYzine (ATARAX) 25 MG tablet TAKE 1/2 - 1 TABLET BY MOUTH THREE TIMES A DAY AS NEEDED ANXIETY 11/27/19   Roxanne Carrasco MD   ipratropium-albuterol (COMBIVENT RESPIMAT)  MCG/ACT inhaler Inhale 1 puff 4 (Four) Times a Day. 11/20/19   Liza Hagen DO   lurasidone (LATUDA) 40 MG tablet tablet Take 40 mg by mouth Daily.    Roxanne Carrasco MD   mometasone (NASONEX) 50 MCG/ACT nasal spray 2 sprays into the nostril(s) as directed by provider Daily. 2/12/20   Liza Hagen DO   tiZANidine (ZANAFLEX) 4 MG tablet Take 1 tablet by mouth Every 8 (Eight) Hours As Needed for Muscle Spasms. 4/14/20   Horn, Liza Fidelia, DO       Objective     Vital Signs: /82 (BP Location: Right arm, Patient  "Position: Sitting, Cuff Size: Adult)   Pulse 80   Temp 98.3 °F (36.8 °C) (Skin)   Ht 185.4 cm (72.99\")   Wt 122 kg (270 lb)   SpO2 99%   BMI 35.63 kg/m²   Physical Exam   Constitutional: He is oriented to person, place, and time. He appears well-developed and well-nourished.   HENT:   Head: Normocephalic and atraumatic.   Right Ear: External ear normal.   Left Ear: External ear normal.   Nose: Nose normal.   Mouth/Throat: Oropharynx is clear and moist. No oropharyngeal exudate.   Eyes: Pupils are equal, round, and reactive to light. Conjunctivae and EOM are normal.   Neck: Normal range of motion. Neck supple. No JVD present.   Cardiovascular: Normal rate, regular rhythm, normal heart sounds and intact distal pulses. Exam reveals no gallop and no friction rub.   No murmur heard.  Pulmonary/Chest: Effort normal and breath sounds normal.   Abdominal: Soft. Bowel sounds are normal.   Musculoskeletal: Normal range of motion. He exhibits no edema.   Neurological: He is alert and oriented to person, place, and time. No cranial nerve deficit.   Skin: Skin is warm and dry. Capillary refill takes less than 2 seconds.   Right knee bruising, evolving.   Psychiatric: He has a normal mood and affect. His behavior is normal. Judgment and thought content normal.   Nursing note and vitals reviewed.      Patient's Body mass index is 35.63 kg/m².    Results Reviewed:  BUN   Date Value Ref Range Status   10/23/2019 13 6 - 24 mg/dL Final     Creatinine   Date Value Ref Range Status   10/23/2019 0.95 0.76 - 1.27 mg/dL Final     Sodium   Date Value Ref Range Status   10/23/2019 141 134 - 144 mmol/L Final     Potassium   Date Value Ref Range Status   10/23/2019 4.3 3.5 - 5.2 mmol/L Final     Chloride   Date Value Ref Range Status   10/23/2019 104 96 - 106 mmol/L Final     Total CO2   Date Value Ref Range Status   10/23/2019 22 20 - 29 mmol/L Final     Calcium   Date Value Ref Range Status   10/23/2019 9.5 8.7 - 10.2 mg/dL Final "     ALT (SGPT)   Date Value Ref Range Status   10/23/2019 23 0 - 44 IU/L Final     AST (SGOT)   Date Value Ref Range Status   10/23/2019 16 0 - 40 IU/L Final     WBC   Date Value Ref Range Status   10/23/2019 7.2 3.4 - 10.8 x10E3/uL Final     Hematocrit   Date Value Ref Range Status   10/23/2019 46.0 37.5 - 51.0 % Final     Platelets   Date Value Ref Range Status   10/23/2019 225 150 - 450 x10E3/uL Final     Triglycerides   Date Value Ref Range Status   10/23/2019 173 (H) 0 - 149 mg/dL Final     HDL Cholesterol   Date Value Ref Range Status   10/23/2019 42 >39 mg/dL Final     LDL Cholesterol    Date Value Ref Range Status   10/23/2019 182 (H) 0 - 99 mg/dL Final         Assessment / Plan     Assessment/Plan:  1. Chronic pain due to trauma  [place] obtained and reviewed- Ambulatory Referral to Pain Management Dr. Mcneal, patient has seen him before in the distant past.  - oxyCODONE-acetaminophen (PERCOCET)  MG per tablet; Take 1 tablet by mouth Every 6 (Six) Hours As Needed for Moderate Pain .  Dispense: 120 tablet; Refill: 0  Trial of Robaxin to 500 mg 4 times daily as needed for muscle spasm  2. Anxiety    - ALPRAZolam (XANAX) 1 MG tablet; Take 1 tablet by mouth 2 (Two) Times a Day As Needed for Anxiety.  Dispense: 60 tablet; Refill: 0    3. Fall in home, initial encounter  Use cane when ambulatory    4. Morbidly obese (CMS/HCC)          Return in about 4 weeks (around 7/21/2020). unless patient needs to be seen sooner or acute issues arise.        I have discussed the patient results/orders and and plan/recommendation with them at today's visit.      Liza Hagen, DO   06/23/2020

## 2020-07-22 ENCOUNTER — TELEPHONE (OUTPATIENT)
Dept: INTERNAL MEDICINE | Facility: CLINIC | Age: 45
End: 2020-07-22

## 2020-07-23 DIAGNOSIS — G89.21 CHRONIC PAIN DUE TO TRAUMA: ICD-10-CM

## 2020-07-23 DIAGNOSIS — F41.9 ANXIETY: ICD-10-CM

## 2020-07-23 RX ORDER — OXYCODONE AND ACETAMINOPHEN 10; 325 MG/1; MG/1
1 TABLET ORAL EVERY 6 HOURS PRN
Qty: 16 TABLET | Refills: 0 | Status: SHIPPED | OUTPATIENT
Start: 2020-07-23 | End: 2020-07-31 | Stop reason: SDUPTHER

## 2020-07-23 RX ORDER — ALPRAZOLAM 1 MG/1
1 TABLET ORAL 2 TIMES DAILY PRN
Qty: 10 TABLET | Refills: 0 | Status: SHIPPED | OUTPATIENT
Start: 2020-07-23 | End: 2020-07-31 | Stop reason: SDUPTHER

## 2020-07-23 NOTE — TELEPHONE ENCOUNTER
Patient called needing refills on the following medicine. Dr. Hagen is out of office until Tuesday.

## 2020-07-31 ENCOUNTER — TELEPHONE (OUTPATIENT)
Dept: INTERNAL MEDICINE | Facility: CLINIC | Age: 45
End: 2020-07-31

## 2020-07-31 ENCOUNTER — OFFICE VISIT (OUTPATIENT)
Dept: INTERNAL MEDICINE | Facility: CLINIC | Age: 45
End: 2020-07-31

## 2020-07-31 VITALS
TEMPERATURE: 98.2 F | WEIGHT: 275 LBS | HEART RATE: 99 BPM | RESPIRATION RATE: 18 BRPM | SYSTOLIC BLOOD PRESSURE: 155 MMHG | BODY MASS INDEX: 36.45 KG/M2 | DIASTOLIC BLOOD PRESSURE: 95 MMHG | HEIGHT: 73 IN | OXYGEN SATURATION: 98 %

## 2020-07-31 DIAGNOSIS — G89.29 CHRONIC MIDLINE LOW BACK PAIN WITH RIGHT-SIDED SCIATICA: ICD-10-CM

## 2020-07-31 DIAGNOSIS — M54.41 CHRONIC MIDLINE LOW BACK PAIN WITH RIGHT-SIDED SCIATICA: ICD-10-CM

## 2020-07-31 DIAGNOSIS — F41.9 ANXIETY: ICD-10-CM

## 2020-07-31 DIAGNOSIS — M62.838 MUSCLE SPASM: ICD-10-CM

## 2020-07-31 DIAGNOSIS — G89.21 CHRONIC PAIN DUE TO TRAUMA: ICD-10-CM

## 2020-07-31 DIAGNOSIS — I10 ESSENTIAL HYPERTENSION: ICD-10-CM

## 2020-07-31 DIAGNOSIS — G89.21 CHRONIC PAIN DUE TO TRAUMA: Primary | ICD-10-CM

## 2020-07-31 PROCEDURE — 99213 OFFICE O/P EST LOW 20 MIN: CPT | Performed by: FAMILY MEDICINE

## 2020-07-31 RX ORDER — ALPRAZOLAM 1 MG/1
1 TABLET ORAL 2 TIMES DAILY PRN
Qty: 60 TABLET | Refills: 0 | Status: SHIPPED | OUTPATIENT
Start: 2020-07-31 | End: 2020-09-04 | Stop reason: SDUPTHER

## 2020-07-31 RX ORDER — OXYCODONE AND ACETAMINOPHEN 10; 325 MG/1; MG/1
1 TABLET ORAL EVERY 6 HOURS PRN
Qty: 16 TABLET | Refills: 0 | Status: SHIPPED | OUTPATIENT
Start: 2020-07-31 | End: 2020-07-31 | Stop reason: SDUPTHER

## 2020-07-31 RX ORDER — TIZANIDINE 4 MG/1
4 TABLET ORAL EVERY 8 HOURS PRN
Qty: 90 TABLET | Refills: 3 | Status: SHIPPED | OUTPATIENT
Start: 2020-07-31 | End: 2020-10-02 | Stop reason: SDUPTHER

## 2020-07-31 RX ORDER — ALPRAZOLAM 1 MG/1
1 TABLET ORAL 2 TIMES DAILY PRN
Qty: 10 TABLET | Refills: 0 | Status: SHIPPED | OUTPATIENT
Start: 2020-07-31 | End: 2020-07-31 | Stop reason: SDUPTHER

## 2020-07-31 RX ORDER — OXYCODONE AND ACETAMINOPHEN 10; 325 MG/1; MG/1
1 TABLET ORAL EVERY 6 HOURS PRN
Qty: 120 TABLET | Refills: 0 | Status: SHIPPED | OUTPATIENT
Start: 2020-07-31 | End: 2020-09-04 | Stop reason: SDUPTHER

## 2020-07-31 NOTE — TELEPHONE ENCOUNTER
Patient called stating that his RX quantities for Xanax and Percocet were wrong. The were refilled based on the previous order and not a 30 day supply. Please review.

## 2020-09-04 ENCOUNTER — OFFICE VISIT (OUTPATIENT)
Dept: INTERNAL MEDICINE | Facility: CLINIC | Age: 45
End: 2020-09-04

## 2020-09-04 VITALS
RESPIRATION RATE: 18 BRPM | WEIGHT: 272 LBS | HEART RATE: 100 BPM | DIASTOLIC BLOOD PRESSURE: 94 MMHG | TEMPERATURE: 97.8 F | SYSTOLIC BLOOD PRESSURE: 142 MMHG | HEIGHT: 73 IN | OXYGEN SATURATION: 99 % | BODY MASS INDEX: 36.05 KG/M2

## 2020-09-04 DIAGNOSIS — R60.0 SWELLING OF LEFT PAROTID GLAND: ICD-10-CM

## 2020-09-04 DIAGNOSIS — Z79.899 HIGH RISK MEDICATION USE: ICD-10-CM

## 2020-09-04 DIAGNOSIS — F41.9 ANXIETY: ICD-10-CM

## 2020-09-04 DIAGNOSIS — I10 ESSENTIAL HYPERTENSION: ICD-10-CM

## 2020-09-04 DIAGNOSIS — M54.50 PAIN, LUMBAR REGION: Primary | ICD-10-CM

## 2020-09-04 DIAGNOSIS — F31.9 BIPOLAR 1 DISORDER (HCC): ICD-10-CM

## 2020-09-04 DIAGNOSIS — G89.21 CHRONIC PAIN DUE TO TRAUMA: ICD-10-CM

## 2020-09-04 PROCEDURE — 99214 OFFICE O/P EST MOD 30 MIN: CPT | Performed by: FAMILY MEDICINE

## 2020-09-04 RX ORDER — OXYCODONE AND ACETAMINOPHEN 10; 325 MG/1; MG/1
1 TABLET ORAL EVERY 6 HOURS PRN
Qty: 120 TABLET | Refills: 0 | Status: SHIPPED | OUTPATIENT
Start: 2020-09-04 | End: 2020-10-02 | Stop reason: SDUPTHER

## 2020-09-04 RX ORDER — ALPRAZOLAM 1 MG/1
1 TABLET ORAL 2 TIMES DAILY PRN
Qty: 60 TABLET | Refills: 0 | Status: SHIPPED | OUTPATIENT
Start: 2020-09-04 | End: 2020-10-02 | Stop reason: SDUPTHER

## 2020-09-04 RX ORDER — RISPERIDONE 1 MG/1
1 TABLET ORAL NIGHTLY
Qty: 30 TABLET | Refills: 3 | Status: SHIPPED | OUTPATIENT
Start: 2020-09-04 | End: 2020-10-02 | Stop reason: SDUPTHER

## 2020-09-04 RX ORDER — AMOXICILLIN AND CLAVULANATE POTASSIUM 250; 62.5 MG/5ML; MG/5ML
500 POWDER, FOR SUSPENSION ORAL 3 TIMES DAILY
Qty: 300 ML | Refills: 0 | Status: SHIPPED | OUTPATIENT
Start: 2020-09-04 | End: 2020-09-14

## 2020-09-04 RX ORDER — CITALOPRAM 20 MG/1
20 TABLET ORAL DAILY
Qty: 30 TABLET | Refills: 3 | Status: SHIPPED | OUTPATIENT
Start: 2020-09-04 | End: 2020-10-02 | Stop reason: SDUPTHER

## 2020-09-04 NOTE — PROGRESS NOTES
Subjective     Chief Complaint   Patient presents with   • Pain     Infected tooth (Dentist said to see Physician)   • Med Refill       History of Present Illness  Patient presents this morning with complaints of left sided jaw pain anterior to the ear.  He had called his dentist and his dentist told him to come see us to get him on an antibiotic.  Patient is also here for refill on his medications.  The patient is actually quite anxious and anxiety stricken today.  He apparently has just recently lost his brother-in-law due to MVA.  He is getting ready to travel to [place] to be with his sister during her time of loss.  He has having a an increased amount of life stressors.  His son who has autism has just started back to school.  And is having some issues with behavior.  Patient relates that he has been able to contact his previous pain management physician and anticipates that he will be able to transition into his care for the pain management aspect of things in December some time.  He promises to keep us abreast of this occurrence.  Patient's PMR from outside medical facility reviewed and noted.    Review of Systems     Otherwise complete ROS reviewed and negative except as mentioned in the HPI.    Past Medical History:   Past Medical History:   Diagnosis Date   • Anxiety    • Arthritis    • Bipolar 1 disorder (CMS/[facility])    • Hypertension    • PTSD (post-traumatic stress disorder)      Past Surgical History:  Past Surgical History:   Procedure Laterality Date   • HIP SURGERY       Social History:  reports that he has been smoking cigarettes. He has a 15.00 pack-year smoking history. He has never used smokeless tobacco. He reports that he does not drink alcohol or use drugs.    Family History: family history includes Arthritis in his father; Diabetes in his father; Heart attack in his mother; Hypertension in his father.       Allergies:  Allergies   Allergen Reactions   • Adderall  [Amphetamine-Dextroamphetamine] Shortness Of Breath, Swelling, Palpitations and Hallucinations   • Meloxicam Nausea Only   • Darvon [Propoxyphene] GI Intolerance   • Motrin [Ibuprofen] GI Intolerance   • Toradol [Ketorolac Tromethamine] GI Intolerance   • Tramadol GI Intolerance     Medications:  Prior to Admission medications    Medication Sig Start Date End Date Taking? Authorizing Provider   ALPRAZolam (XANAX) 1 MG tablet Take 1 tablet by mouth 2 (Two) Times a Day As Needed for Anxiety. 7/31/20   Liza Hagen DO   atorvastatin (LIPITOR) 10 MG tablet TAKE 1 TABLET BY MOUTH EVERY DAY 3/24/20   Liza Hagen DO   chlorproMAZINE (THORAZINE) 25 MG tablet chlorpromazine 25 mg tablet   take one tablet po twice a day    ProviderRoxanne MD   citalopram (CeleXA) 20 MG tablet Take 1 tablet by mouth Daily. 9/4/20   Liza Hagen DO   doxepin (SINEquan) 10 MG capsule TAKE 1 TO 3 CAPSULES BY MOUTH AT BEDTIME AS NEEDED 11/27/19   ProviderRoxanne MD   ipratropium-albuterol (COMBIVENT RESPIMAT)  MCG/ACT inhaler Inhale 1 puff 4 (Four) Times a Day. 11/20/19   Liza Hagen DO   methocarbamol (ROBAXIN) 500 MG tablet Take 1 tablet by mouth 4 (Four) Times a Day As Needed for Muscle Spasms. 6/23/20   Liza Hagen DO   metoprolol tartrate (LOPRESSOR) 25 MG tablet Take 1 tablet by mouth 2 (Two) Times a Day. 7/31/20   Liza Hagen DO   mometasone (NASONEX) 50 MCG/ACT nasal spray 2 sprays into the nostril(s) as directed by provider Daily. 2/12/20   Liza Hagen DO   oxyCODONE-acetaminophen (PERCOCET)  MG per tablet Take 1 tablet by mouth Every 6 (Six) Hours As Needed for Moderate Pain . 7/31/20   Liza Hagen DO   risperiDONE (RisperDAL) 1 MG tablet Take 1 tablet by mouth Every Night. 9/4/20   Liza Hagen DO   tiZANidine (ZANAFLEX) 4 MG tablet Take 1 tablet by mouth Every 8 (Eight) Hours As Needed for Muscle Spasms. 7/31/20   Liza Hagen,  "DO       Objective     Vital Signs: /94 (BP Location: Right arm, Patient Position: Sitting, Cuff Size: Adult)   Pulse 100   Temp 97.8 °F (36.6 °C) (Skin)   Resp 18   Ht 185.4 cm (72.99\")   Wt 123 kg (272 lb)   SpO2 99%   BMI 35.90 kg/m²   Physical Exam   Constitutional: He is oriented to person, place, and time. He appears well-developed and well-nourished. He appears distressed.   Patient is markedly anxious and was unable to stand being in a room with no window.  We did transition him to a exam room with a window and he was able to calm himself down somewhat.   HENT:   Head: Normocephalic and atraumatic.   Right Ear: External ear normal.   Left Ear: External ear normal.   Nose: Nose normal.   Mouth/Throat: Oropharynx is clear and moist. No oropharyngeal exudate.   Patient is unable to fully open his mouth the left bucca mucosa is swollen.  I do not actually see any broken teeth at the lower jaw that would indicate a reason for abscessing.  When palpating on the exterior preauricular jaw there is a firm nodular mass.  This actually is in the area of the parotid gland.   Eyes: Pupils are equal, round, and reactive to light. Conjunctivae and EOM are normal. Right eye exhibits no discharge. Left eye exhibits no discharge. No scleral icterus.   Neck: Normal range of motion. Neck supple. No JVD present.   Cardiovascular: Normal rate, regular rhythm, normal heart sounds and intact distal pulses. Exam reveals no gallop and no friction rub.   No murmur heard.  Pulmonary/Chest: Effort normal and breath sounds normal.   Abdominal: Soft. Bowel sounds are normal.   Musculoskeletal: He exhibits no edema.   Moves all extremities.   Neurological: He is alert and oriented to person, place, and time. No cranial nerve deficit.   Skin: Skin is warm and dry. Capillary refill takes less than 2 seconds.   Psychiatric: He has a normal mood and affect. His behavior is normal. Judgment and thought content normal.   Nursing " note and vitals reviewed.      Patient's Body mass index is 35.9 kg/m².     Results Reviewed:  BUN   Date Value Ref Range Status   10/23/2019 13 6 - 24 mg/dL Final     Creatinine   Date Value Ref Range Status   10/23/2019 0.95 0.76 - 1.27 mg/dL Final     Sodium   Date Value Ref Range Status   10/23/2019 141 134 - 144 mmol/L Final     Potassium   Date Value Ref Range Status   10/23/2019 4.3 3.5 - 5.2 mmol/L Final     Chloride   Date Value Ref Range Status   10/23/2019 104 96 - 106 mmol/L Final     Total CO2   Date Value Ref Range Status   10/23/2019 22 20 - 29 mmol/L Final     Calcium   Date Value Ref Range Status   10/23/2019 9.5 8.7 - 10.2 mg/dL Final     ALT (SGPT)   Date Value Ref Range Status   10/23/2019 23 0 - 44 IU/L Final     AST (SGOT)   Date Value Ref Range Status   10/23/2019 16 0 - 40 IU/L Final     WBC   Date Value Ref Range Status   10/23/2019 7.2 3.4 - 10.8 x10E3/uL Final     Hematocrit   Date Value Ref Range Status   10/23/2019 46.0 37.5 - 51.0 % Final     Platelets   Date Value Ref Range Status   10/23/2019 225 150 - 450 x10E3/uL Final     Triglycerides   Date Value Ref Range Status   10/23/2019 173 (H) 0 - 149 mg/dL Final     HDL Cholesterol   Date Value Ref Range Status   10/23/2019 42 >39 mg/dL Final     LDL Cholesterol    Date Value Ref Range Status   10/23/2019 182 (H) 0 - 99 mg/dL Final         Assessment / Plan     Assessment/Plan:  1. Pain, lumbar region    - XR Spine Lumbar 2 or 3 View (In Office)    2. High risk medication use    - ECG 12 Lead; Future    3. Anxiety    - ALPRAZolam (XANAX) 1 MG tablet; Take 1 tablet by mouth 2 (Two) Times a Day As Needed for Anxiety.  Dispense: 60 tablet; Refill: 0    4. Chronic pain due to trauma    - oxyCODONE-acetaminophen (PERCOCET)  MG per tablet; Take 1 tablet by mouth Every 6 (Six) Hours As Needed for Moderate Pain .  Dispense: 120 tablet; Refill: 0    5. Swelling of left parotid gland  Augmentin for milligrams 3 times daily 10 days  Lemon  juice/lemon drops to improve saliva production    6. Bipolar 1 disorder (CMS/[facility])  Continue current medications    7. Essential hypertension  Continue current medications    Lab when patient returns  Return in about 4 weeks (around 10/2/2020). unless patient needs to be seen sooner or acute issues arise.      I have discussed the patient results/orders and and plan/recommendation with them at today's visit.      Liza Hagen,    09/04/2020

## 2020-09-15 RX ORDER — IPRATROPIUM/ALBUTEROL SULFATE 20-100 MCG
MIST INHALER (GRAM) INHALATION
Qty: 1 INHALER | Refills: 3 | Status: SHIPPED | OUTPATIENT
Start: 2020-09-15 | End: 2020-10-02 | Stop reason: SDUPTHER

## 2020-10-02 ENCOUNTER — OFFICE VISIT (OUTPATIENT)
Dept: INTERNAL MEDICINE | Facility: CLINIC | Age: 45
End: 2020-10-02

## 2020-10-02 VITALS
HEIGHT: 73 IN | WEIGHT: 280 LBS | DIASTOLIC BLOOD PRESSURE: 89 MMHG | OXYGEN SATURATION: 99 % | RESPIRATION RATE: 18 BRPM | TEMPERATURE: 98.7 F | SYSTOLIC BLOOD PRESSURE: 127 MMHG | HEART RATE: 88 BPM | BODY MASS INDEX: 37.11 KG/M2

## 2020-10-02 DIAGNOSIS — F41.9 ANXIETY: ICD-10-CM

## 2020-10-02 DIAGNOSIS — Z79.899 HIGH RISK MEDICATION USE: ICD-10-CM

## 2020-10-02 DIAGNOSIS — Z79.899 LONG TERM USE OF DRUG: Primary | ICD-10-CM

## 2020-10-02 DIAGNOSIS — F31.9 BIPOLAR 1 DISORDER (HCC): ICD-10-CM

## 2020-10-02 DIAGNOSIS — R79.89 LOW TESTOSTERONE: ICD-10-CM

## 2020-10-02 DIAGNOSIS — Z23 INFLUENZA VACCINE ADMINISTERED: ICD-10-CM

## 2020-10-02 DIAGNOSIS — G89.21 CHRONIC PAIN DUE TO TRAUMA: ICD-10-CM

## 2020-10-02 DIAGNOSIS — E78.00 HIGH CHOLESTEROL: ICD-10-CM

## 2020-10-02 PROCEDURE — 90686 IIV4 VACC NO PRSV 0.5 ML IM: CPT | Performed by: FAMILY MEDICINE

## 2020-10-02 PROCEDURE — 99214 OFFICE O/P EST MOD 30 MIN: CPT | Performed by: FAMILY MEDICINE

## 2020-10-02 PROCEDURE — G0008 ADMIN INFLUENZA VIRUS VAC: HCPCS | Performed by: FAMILY MEDICINE

## 2020-10-02 RX ORDER — IPRATROPIUM/ALBUTEROL SULFATE 20-100 MCG
1 MIST INHALER (GRAM) INHALATION 4 TIMES DAILY
Qty: 1 INHALER | Refills: 3 | Status: SHIPPED | OUTPATIENT
Start: 2020-10-02 | End: 2021-03-30 | Stop reason: SDUPTHER

## 2020-10-02 RX ORDER — CITALOPRAM 20 MG/1
20 TABLET ORAL DAILY
Qty: 30 TABLET | Refills: 3 | Status: SHIPPED | OUTPATIENT
Start: 2020-10-02 | End: 2020-12-29 | Stop reason: SDUPTHER

## 2020-10-02 RX ORDER — ALPRAZOLAM 1 MG/1
1 TABLET ORAL 2 TIMES DAILY PRN
Qty: 60 TABLET | Refills: 0 | Status: SHIPPED | OUTPATIENT
Start: 2020-10-02 | End: 2020-11-01 | Stop reason: SDUPTHER

## 2020-10-02 RX ORDER — RISPERIDONE 1 MG/1
1 TABLET ORAL NIGHTLY
Qty: 30 TABLET | Refills: 3 | Status: SHIPPED | OUTPATIENT
Start: 2020-10-02 | End: 2020-12-29 | Stop reason: SDUPTHER

## 2020-10-02 RX ORDER — METHOCARBAMOL 500 MG/1
500 TABLET, FILM COATED ORAL 4 TIMES DAILY PRN
Qty: 120 TABLET | Refills: 3 | Status: SHIPPED | OUTPATIENT
Start: 2020-10-02 | End: 2020-11-01 | Stop reason: SDUPTHER

## 2020-10-02 RX ORDER — CHLORPROMAZINE HYDROCHLORIDE 25 MG/1
25 TABLET, FILM COATED ORAL 2 TIMES DAILY
Qty: 180 TABLET | Refills: 1 | Status: SHIPPED | OUTPATIENT
Start: 2020-10-02 | End: 2020-11-03

## 2020-10-02 RX ORDER — ATORVASTATIN CALCIUM 10 MG/1
10 TABLET, FILM COATED ORAL DAILY
Qty: 90 TABLET | Refills: 1 | Status: SHIPPED | OUTPATIENT
Start: 2020-10-02 | End: 2020-11-03

## 2020-10-02 RX ORDER — TIZANIDINE 4 MG/1
4 TABLET ORAL EVERY 8 HOURS PRN
Qty: 90 TABLET | Refills: 3 | Status: SHIPPED | OUTPATIENT
Start: 2020-10-02 | End: 2020-11-03

## 2020-10-02 RX ORDER — AMOXICILLIN 500 MG/1
500 CAPSULE ORAL 3 TIMES DAILY
Qty: 30 CAPSULE | Refills: 0 | Status: SHIPPED | OUTPATIENT
Start: 2020-10-02 | End: 2020-11-03

## 2020-10-02 RX ORDER — MOMETASONE FUROATE 50 UG/1
2 SPRAY, METERED NASAL DAILY
Qty: 1 EACH | Refills: 12 | Status: SHIPPED | OUTPATIENT
Start: 2020-10-02 | End: 2021-03-30 | Stop reason: SDUPTHER

## 2020-10-02 RX ORDER — DOXEPIN HYDROCHLORIDE 10 MG/1
CAPSULE ORAL
Qty: 90 CAPSULE | Refills: 3 | Status: SHIPPED | OUTPATIENT
Start: 2020-10-02 | End: 2020-10-28 | Stop reason: SDUPTHER

## 2020-10-02 RX ORDER — OXYCODONE AND ACETAMINOPHEN 10; 325 MG/1; MG/1
1 TABLET ORAL EVERY 6 HOURS PRN
Qty: 120 TABLET | Refills: 0 | Status: SHIPPED | OUTPATIENT
Start: 2020-10-02 | End: 2020-11-01 | Stop reason: SDUPTHER

## 2020-10-02 NOTE — PROGRESS NOTES
Subjective     Chief Complaint   Patient presents with   • Med Refill       History of Present Illness  Patient is here for medication refill.  He continues to take Xanax and Percocet routinely.  He notes that he is having trouble stiffness.  His hands are stiff this morning he feels stiff for approximately 4 hours after he gets up and starts moving around.  He is hopeful to be established with a pain management doctor after the first of the year.  He is recently had to deal with increased stress with the murder of his sister's .  He believes she might come down to [place] to live now.  Patient's PMR from outside medical facility reviewed and noted.    Review of Systems     Otherwise complete ROS reviewed and negative except as mentioned in the HPI.    Past Medical History:   Past Medical History:   Diagnosis Date   • Anxiety    • Arthritis    • Bipolar 1 disorder (CMS/HCC)    • Hypertension    • PTSD (post-traumatic stress disorder)      Past Surgical History:  Past Surgical History:   Procedure Laterality Date   • HIP SURGERY       Social History:  reports that he has been smoking cigarettes. He has a 15.00 pack-year smoking history. He has never used smokeless tobacco. He reports that he does not drink alcohol or use drugs.    Family History: family history includes Arthritis in his father; Diabetes in his father; Heart attack in his mother; Hypertension in his father.       Allergies:  Allergies   Allergen Reactions   • Adderall [Amphetamine-Dextroamphetamine] Shortness Of Breath, Swelling, Palpitations and Hallucinations   • Meloxicam Nausea Only   • Darvon [Propoxyphene] GI Intolerance   • Motrin [Ibuprofen] GI Intolerance   • Toradol [Ketorolac Tromethamine] GI Intolerance   • Tramadol GI Intolerance     Medications:  Prior to Admission medications    Medication Sig Start Date End Date Taking? Authorizing Provider   ALPRAZolam (XANAX) 1 MG tablet Take 1 tablet by mouth 2 (Two) Times a Day As  "Needed for Anxiety. 9/4/20  Yes Liza Hagen DO   atorvastatin (LIPITOR) 10 MG tablet TAKE 1 TABLET BY MOUTH EVERY DAY 3/24/20  Yes Liza Hagen DO   chlorproMAZINE (THORAZINE) 25 MG tablet chlorpromazine 25 mg tablet   take one tablet po twice a day   Yes Provider, MD Roxanne   citalopram (CeleXA) 20 MG tablet Take 1 tablet by mouth Daily. 9/4/20  Yes Liza Hagen DO   Combivent Respimat  MCG/ACT inhaler INHALE 1 PUFF 4 (FOUR) TIMES A DAY. 9/15/20  Yes Liza Hagen DO   doxepin (SINEquan) 10 MG capsule TAKE 1 TO 3 CAPSULES BY MOUTH AT BEDTIME AS NEEDED 11/27/19  Yes ProviderRoxanne MD   methocarbamol (ROBAXIN) 500 MG tablet Take 1 tablet by mouth 4 (Four) Times a Day As Needed for Muscle Spasms. 6/23/20  Yes Liza Hagen DO   metoprolol tartrate (LOPRESSOR) 25 MG tablet Take 1 tablet by mouth 2 (Two) Times a Day. 7/31/20  Yes Liza Hagen DO   mometasone (NASONEX) 50 MCG/ACT nasal spray 2 sprays into the nostril(s) as directed by provider Daily. 2/12/20  Yes Liza Hagen DO   oxyCODONE-acetaminophen (PERCOCET)  MG per tablet Take 1 tablet by mouth Every 6 (Six) Hours As Needed for Moderate Pain . 9/4/20  Yes Liza Hagen DO   risperiDONE (RisperDAL) 1 MG tablet Take 1 tablet by mouth Every Night. 9/4/20  Yes Liza Hagen DO   tiZANidine (ZANAFLEX) 4 MG tablet Take 1 tablet by mouth Every 8 (Eight) Hours As Needed for Muscle Spasms. 7/31/20  Yes Liza Hagen,        Objective     Vital Signs: /89 (BP Location: Left arm, Patient Position: Sitting, Cuff Size: Large Adult)   Pulse 88   Temp 98.7 °F (37.1 °C) (Skin)   Resp 18   Ht 185.4 cm (72.99\")   Wt 127 kg (280 lb)   SpO2 99%   BMI 36.95 kg/m²   Physical Exam  Vitals signs and nursing note reviewed.   Constitutional:       General: He is not in acute distress.     Appearance: Normal appearance.   HENT:      Head: Normocephalic and atraumatic.      " Right Ear: External ear normal.      Left Ear: External ear normal.      Nose: Nose normal.      Mouth/Throat:      Mouth: Mucous membranes are moist.      Pharynx: Oropharynx is clear.   Eyes:      Extraocular Movements: Extraocular movements intact.      Conjunctiva/sclera: Conjunctivae normal.      Pupils: Pupils are equal, round, and reactive to light.   Neck:      Musculoskeletal: Normal range of motion and neck supple.   Cardiovascular:      Rate and Rhythm: Normal rate and regular rhythm.      Pulses: Normal pulses.      Heart sounds: Normal heart sounds.   Pulmonary:      Effort: Pulmonary effort is normal.      Breath sounds: Normal breath sounds.   Abdominal:      General: Bowel sounds are normal.      Palpations: Abdomen is soft.   Musculoskeletal:      Comments: Mild swelling of the PIP and the MP joints bilaterally   Skin:     General: Skin is warm and dry.      Capillary Refill: Capillary refill takes less than 2 seconds.   Neurological:      General: No focal deficit present.      Mental Status: He is alert and oriented to person, place, and time. Mental status is at baseline.   Psychiatric:         Mood and Affect: Mood normal.         Behavior: Behavior normal.         Thought Content: Thought content normal.         Judgment: Judgment normal.         Patient's Body mass index is 36.95 kg/m². .      Results Reviewed:  BUN   Date Value Ref Range Status   10/23/2019 13 6 - 24 mg/dL Final     Creatinine   Date Value Ref Range Status   10/23/2019 0.95 0.76 - 1.27 mg/dL Final     Sodium   Date Value Ref Range Status   10/23/2019 141 134 - 144 mmol/L Final     Potassium   Date Value Ref Range Status   10/23/2019 4.3 3.5 - 5.2 mmol/L Final     Chloride   Date Value Ref Range Status   10/23/2019 104 96 - 106 mmol/L Final     Total CO2   Date Value Ref Range Status   10/23/2019 22 20 - 29 mmol/L Final     Calcium   Date Value Ref Range Status   10/23/2019 9.5 8.7 - 10.2 mg/dL Final     ALT (SGPT)   Date  Value Ref Range Status   10/23/2019 23 0 - 44 IU/L Final     AST (SGOT)   Date Value Ref Range Status   10/23/2019 16 0 - 40 IU/L Final     WBC   Date Value Ref Range Status   10/23/2019 7.2 3.4 - 10.8 x10E3/uL Final     Hematocrit   Date Value Ref Range Status   10/23/2019 46.0 37.5 - 51.0 % Final     Platelets   Date Value Ref Range Status   10/23/2019 225 150 - 450 x10E3/uL Final     Triglycerides   Date Value Ref Range Status   10/23/2019 173 (H) 0 - 149 mg/dL Final     HDL Cholesterol   Date Value Ref Range Status   10/23/2019 42 >39 mg/dL Final     LDL Cholesterol    Date Value Ref Range Status   10/23/2019 182 (H) 0 - 99 mg/dL Final         Assessment / Plan     Assessment/Plan:  1. Long term use of drug    - Compliance Drug Analysis, Ur - Urine, Clean Catch  - CBC w AUTO Differential; Future    2. Anxiety    - ALPRAZolam (XANAX) 1 MG tablet; Take 1 tablet by mouth 2 (Two) Times a Day As Needed for Anxiety.  Dispense: 60 tablet; Refill: 0    3. Chronic pain due to trauma    - oxyCODONE-acetaminophen (PERCOCET)  MG per tablet; Take 1 tablet by mouth Every 6 (Six) Hours As Needed for Moderate Pain .  Dispense: 120 tablet; Refill: 0    4. High cholesterol    - atorvastatin (LIPITOR) 10 MG tablet; Take 1 tablet by mouth Daily.  Dispense: 90 tablet; Refill: 1  - Comprehensive metabolic panel; Future  - Lipid panel; Future    5. Low testosterone    - Testosterone; Future    6. Influenza vaccine administered    - FluLaval Quad >6 Months ([id number])    7. Bipolar 1 disorder (CMS/[facility])      8. High risk medication use      Continue current medications.  Follow-up in 1 month.  Patient is asking if he can have his Medicare wellness exam at that time.        Return in about 4 weeks (around 10/30/2020). unless patient needs to be seen sooner or acute issues arise.        I have discussed the patient results/orders and and plan/recommendation with them at today's visit.      Liza Hagen,    10/02/2020

## 2020-10-08 LAB — DRUGS UR: NORMAL

## 2020-10-28 RX ORDER — DOXEPIN HYDROCHLORIDE 10 MG/1
CAPSULE ORAL
Qty: 90 CAPSULE | Refills: 3 | Status: SHIPPED | OUTPATIENT
Start: 2020-10-28 | End: 2020-12-29 | Stop reason: SDUPTHER

## 2020-11-01 DIAGNOSIS — G89.21 CHRONIC PAIN DUE TO TRAUMA: ICD-10-CM

## 2020-11-01 DIAGNOSIS — F41.9 ANXIETY: ICD-10-CM

## 2020-11-03 ENCOUNTER — OFFICE VISIT (OUTPATIENT)
Dept: INTERNAL MEDICINE | Facility: CLINIC | Age: 45
End: 2020-11-03

## 2020-11-03 VITALS
BODY MASS INDEX: 37.25 KG/M2 | WEIGHT: 281.1 LBS | TEMPERATURE: 97.7 F | HEART RATE: 89 BPM | RESPIRATION RATE: 18 BRPM | OXYGEN SATURATION: 99 % | SYSTOLIC BLOOD PRESSURE: 142 MMHG | HEIGHT: 73 IN | DIASTOLIC BLOOD PRESSURE: 82 MMHG

## 2020-11-03 DIAGNOSIS — R50.81 FEVER IN OTHER DISEASES: ICD-10-CM

## 2020-11-03 DIAGNOSIS — R06.2 WHEEZING: ICD-10-CM

## 2020-11-03 DIAGNOSIS — R06.02 SHORTNESS OF BREATH: Primary | ICD-10-CM

## 2020-11-03 PROCEDURE — 99214 OFFICE O/P EST MOD 30 MIN: CPT | Performed by: FAMILY MEDICINE

## 2020-11-03 RX ORDER — ALPRAZOLAM 1 MG/1
1 TABLET ORAL 2 TIMES DAILY PRN
Qty: 60 TABLET | Refills: 0 | Status: SHIPPED | OUTPATIENT
Start: 2020-11-03 | End: 2020-11-30 | Stop reason: SDUPTHER

## 2020-11-03 RX ORDER — ALBUTEROL SULFATE 90 UG/1
2 AEROSOL, METERED RESPIRATORY (INHALATION) EVERY 4 HOURS PRN
Qty: 18 G | Refills: 1 | Status: SHIPPED | OUTPATIENT
Start: 2020-11-03 | End: 2021-03-02

## 2020-11-03 RX ORDER — AZITHROMYCIN 250 MG/1
TABLET, FILM COATED ORAL
Qty: 6 TABLET | Refills: 0 | Status: SHIPPED | OUTPATIENT
Start: 2020-11-03 | End: 2020-11-03

## 2020-11-03 RX ORDER — CEFDINIR 300 MG/1
300 CAPSULE ORAL 2 TIMES DAILY
Qty: 20 CAPSULE | Refills: 0 | Status: SHIPPED | OUTPATIENT
Start: 2020-11-03 | End: 2021-03-02

## 2020-11-03 RX ORDER — METHOCARBAMOL 500 MG/1
500 TABLET, FILM COATED ORAL 4 TIMES DAILY PRN
Qty: 120 TABLET | Refills: 3 | Status: SHIPPED | OUTPATIENT
Start: 2020-11-03 | End: 2020-11-30 | Stop reason: SDUPTHER

## 2020-11-03 RX ORDER — ZINC SULFATE 50(220)MG
220 CAPSULE ORAL 3 TIMES DAILY
Qty: 90 CAPSULE | Refills: 0 | Status: SHIPPED | OUTPATIENT
Start: 2020-11-03 | End: 2021-01-04

## 2020-11-03 RX ORDER — OXYCODONE AND ACETAMINOPHEN 10; 325 MG/1; MG/1
1 TABLET ORAL EVERY 6 HOURS PRN
Qty: 120 TABLET | Refills: 0 | Status: SHIPPED | OUTPATIENT
Start: 2020-11-03 | End: 2020-11-30 | Stop reason: SDUPTHER

## 2020-11-03 RX ORDER — ATORVASTATIN CALCIUM 40 MG/1
40 TABLET, FILM COATED ORAL DAILY
Qty: 30 TABLET | Refills: 0 | Status: SHIPPED | OUTPATIENT
Start: 2020-11-03 | End: 2020-11-25

## 2020-11-03 NOTE — PROGRESS NOTES
Clyde Tamayo  1975  [id number]    Verified patient's NAME and  before test was performed.     COVID-19 Test Performed:   Oropharyngeal Swab     Location:  [facility]     How did the patient tolerate the test?   Well tolerated by patient..    Staff Member Performing Test:  Liza Hagen DO    PPE Worn:    mask, gloves, eye protection, face shield, gown and respirator          Patient presented for COVID-19 testing as ordered by the provider. Appropriate PPE donned. Patient tolerated well. Patient was given COVID-19 Fact Sheet, How to Quarantine at Home Instructions, and Infection Prevention in the Home handout. Patient advised that results are expected within 2-4 days depending on the time of test.     While waiting for results of test, patient was asked to please call their primary care physician's office or the [place] hotline at [phone] for support of non-emergent symptoms expected with this virus such as increased shortness of breath, fever, cough, or other questions.    He was advised to seek care in the Emergency Department should extreme symptoms arise such as new onset confusion, extreme lethargy, hypoxia, or new hypotension.     Questions were engaged and answered to the best of my ability, patient expressed verbal understanding.

## 2020-11-03 NOTE — PROGRESS NOTES
Subjective     Chief Complaint   Patient presents with   • Cough     Symptoms started last Thursday.    • Fever     103.9 this morning.    • Vomiting   • Diarrhea   • Fatigue   • Shortness of Breath   • Headache       History of Present Illness  Patient presented with symptoms of cough, fever, myalgia, diarrhea, nasuea, headache.    Onset 5 days ago.  Seems to be getting worse.   Increased shortness of breath  Has been trying to use inhaler.   Feels like he has COVID because his brother was sick when he visited although he was not tested for covid.     Unable to take doxipin due to issues with making drug screen positive.     Patient's PMR from outside medical facility reviewed and noted.    Review of Systems     Otherwise complete ROS reviewed and negative except as mentioned in the HPI.    Past Medical History:   Past Medical History:   Diagnosis Date   • Anxiety    • Arthritis    • Bipolar 1 disorder (CMS/HCC)    • Hypertension    • PTSD (post-traumatic stress disorder)      Past Surgical History:  Past Surgical History:   Procedure Laterality Date   • HIP SURGERY       Social History:  reports that he has been smoking cigarettes. He has a 15.00 pack-year smoking history. He has never used smokeless tobacco. He reports that he does not drink alcohol or use drugs.    Family History: family history includes Arthritis in his father; Diabetes in his father; Heart attack in his mother; Hypertension in his father.       Allergies:  Allergies   Allergen Reactions   • Adderall [Amphetamine-Dextroamphetamine] Shortness Of Breath, Swelling, Palpitations and Hallucinations   • Meloxicam Nausea Only   • Darvon [Propoxyphene] GI Intolerance   • Motrin [Ibuprofen] GI Intolerance   • Toradol [Ketorolac Tromethamine] GI Intolerance   • Tramadol GI Intolerance     Medications:  Prior to Admission medications    Medication Sig Start Date End Date Taking? Authorizing Provider   chlorproMAZINE (THORAZINE) 25 MG tablet Take 1  tablet by mouth 2 (Two) Times a Day. 10/2/20  Yes Liza Hagen, DO   citalopram (CeleXA) 20 MG tablet Take 1 tablet by mouth Daily. 10/2/20  Yes Liza Hagen, DO   doxepin (SINEquan) 10 MG capsule Take 1-3 tabs at hs 10/28/20  Yes Liza Hagen, DO   ipratropium-albuterol (Combivent Respimat)  MCG/ACT inhaler Inhale 1 puff 4 (Four) Times a Day. 10/2/20  Yes Liza Hagen, DO   metoprolol tartrate (LOPRESSOR) 25 MG tablet Take 1 tablet by mouth 2 (Two) Times a Day. 10/2/20  Yes Liza Hagen, DO   mometasone (Nasonex) 50 MCG/ACT nasal spray 2 sprays into the nostril(s) as directed by provider Daily. 10/2/20  Liza Henriquez DO   risperiDONE (RisperDAL) 1 MG tablet Take 1 tablet by mouth Every Night. 10/2/20  Yes Liza Hagen DO   atorvastatin (LIPITOR) 10 MG tablet Take 1 tablet by mouth Daily. 10/2/20 11/3/20 Yes Liza Hagen, DO   tiZANidine (ZANAFLEX) 4 MG tablet Take 1 tablet by mouth Every 8 (Eight) Hours As Needed for Muscle Spasms. 10/2/20 11/3/20 Yes Liza Hagen, DO   albuterol sulfate  (90 Base) MCG/ACT inhaler Inhale 2 puffs Every 4 (Four) Hours As Needed for Wheezing. 11/3/20   Liza Hagen, DO   ALPRAZolam (XANAX) 1 MG tablet Take 1 tablet by mouth 2 (Two) Times a Day As Needed for Anxiety. 11/3/20   Liza Hagen, DO   atorvastatin (Lipitor) 40 MG tablet Take 1 tablet by mouth Daily. 11/3/20   Liza Hagen, DO   methocarbamol (ROBAXIN) 500 MG tablet Take 1 tablet by mouth 4 (Four) Times a Day As Needed for Muscle Spasms. 11/3/20   Liza Hagen, DO   oxyCODONE-acetaminophen (PERCOCET)  MG per tablet Take 1 tablet by mouth Every 6 (Six) Hours As Needed for Moderate Pain . 11/3/20   Liza Hagen DO   Spacer/Aero-Holding Chambers (E-Z Spacer) inhaler Use as instructed 11/3/20 11/3/21  Liza Hagen DO   Zinc 220 (50 Zn) MG capsule Take 220 mg by mouth 3 (Three) Times a Day. 11/3/20    "Liza Hagen DO       Objective     Vital Signs: /82 (BP Location: Left arm, Patient Position: Standing, Cuff Size: Adult)   Pulse 89   Temp 97.7 °F (36.5 °C) (Skin)   Resp 18   Ht 185.4 cm (72.99\")   Wt 128 kg (281 lb 1.6 oz)   SpO2 99%   BMI 37.10 kg/m²   Physical Exam  Vitals signs and nursing note reviewed.   Constitutional:       General: He is in acute distress.      Appearance: He is ill-appearing.   HENT:      Head: Normocephalic and atraumatic.      Right Ear: External ear normal.      Left Ear: External ear normal.      Nose: Nose normal.      Mouth/Throat:      Mouth: Mucous membranes are moist.   Eyes:      Extraocular Movements: Extraocular movements intact.      Conjunctiva/sclera: Conjunctivae normal.      Pupils: Pupils are equal, round, and reactive to light.   Neck:      Musculoskeletal: Normal range of motion and neck supple.   Cardiovascular:      Rate and Rhythm: Normal rate and regular rhythm.      Pulses: Normal pulses.      Heart sounds: Normal heart sounds.   Pulmonary:      Breath sounds: Wheezing (bilateral inspiratory and expiratory) present.   Abdominal:      General: Bowel sounds are normal.      Palpations: Abdomen is soft.   Musculoskeletal:      Comments: Moves all extremities   Skin:     General: Skin is warm and dry.      Capillary Refill: Capillary refill takes less than 2 seconds.   Neurological:      General: No focal deficit present.      Mental Status: He is alert and oriented to person, place, and time.   Psychiatric:         Mood and Affect: Mood normal.         Behavior: Behavior normal.         Thought Content: Thought content normal.         Judgment: Judgment normal.         Patient's Body mass index is 37.1 kg/m².       Results Reviewed:  BUN   Date Value Ref Range Status   10/23/2019 13 6 - 24 mg/dL Final     Creatinine   Date Value Ref Range Status   10/23/2019 0.95 0.76 - 1.27 mg/dL Final     Sodium   Date Value Ref Range Status   10/23/2019 141 " 134 - 144 mmol/L Final     Potassium   Date Value Ref Range Status   10/23/2019 4.3 3.5 - 5.2 mmol/L Final     Chloride   Date Value Ref Range Status   10/23/2019 104 96 - 106 mmol/L Final     Total CO2   Date Value Ref Range Status   10/23/2019 22 20 - 29 mmol/L Final     Calcium   Date Value Ref Range Status   10/23/2019 9.5 8.7 - 10.2 mg/dL Final     ALT (SGPT)   Date Value Ref Range Status   10/23/2019 23 0 - 44 IU/L Final     AST (SGOT)   Date Value Ref Range Status   10/23/2019 16 0 - 40 IU/L Final     WBC   Date Value Ref Range Status   10/23/2019 7.2 3.4 - 10.8 x10E3/uL Final     Hematocrit   Date Value Ref Range Status   10/23/2019 46.0 37.5 - 51.0 % Final     Platelets   Date Value Ref Range Status   10/23/2019 225 150 - 450 x10E3/uL Final     Triglycerides   Date Value Ref Range Status   10/23/2019 173 (H) 0 - 149 mg/dL Final     HDL Cholesterol   Date Value Ref Range Status   10/23/2019 42 >39 mg/dL Final     LDL Cholesterol    Date Value Ref Range Status   10/23/2019 182 (H) 0 - 99 mg/dL Final         Assessment / Plan     Assessment/Plan:  1. Shortness of breath    - COVID-19,[facility],NP/OP Swab in Transport Media or ESwab 72 HR TAT - Swab, Oropharynx    2. Wheezing      3. Fever in other diseases    Symptoms consistent with COVID  Add lipitor 40 mg daily, stop 10 mg daily tab  Add Zinc 220 mg tid  Albuterol inhaler with spacer    Omnicef 300 mg bid 10 days    If symptoms worsen go to the ER  Quarantine until results known        I have discussed the patient results/orders and and plan/recommendation with them at today's visit.      Liza Hagen,    11/03/2020

## 2020-11-05 LAB — SARS-COV-2 RNA RESP QL NAA+PROBE: NOT DETECTED

## 2020-11-11 ENCOUNTER — TELEPHONE (OUTPATIENT)
Dept: INTERNAL MEDICINE | Facility: CLINIC | Age: 45
End: 2020-11-11

## 2020-11-25 RX ORDER — ATORVASTATIN CALCIUM 40 MG/1
TABLET, FILM COATED ORAL
Qty: 30 TABLET | Refills: 0 | Status: SHIPPED | OUTPATIENT
Start: 2020-11-25 | End: 2020-12-02

## 2020-11-30 DIAGNOSIS — G89.21 CHRONIC PAIN DUE TO TRAUMA: ICD-10-CM

## 2020-11-30 DIAGNOSIS — F41.9 ANXIETY: ICD-10-CM

## 2020-12-01 RX ORDER — ALPRAZOLAM 1 MG/1
1 TABLET ORAL 2 TIMES DAILY PRN
Qty: 60 TABLET | Refills: 0 | Status: SHIPPED | OUTPATIENT
Start: 2020-12-01 | End: 2021-01-01 | Stop reason: SDUPTHER

## 2020-12-01 RX ORDER — METHOCARBAMOL 500 MG/1
500 TABLET, FILM COATED ORAL 4 TIMES DAILY PRN
Qty: 120 TABLET | Refills: 3 | Status: SHIPPED | OUTPATIENT
Start: 2020-12-01 | End: 2021-03-30

## 2020-12-01 RX ORDER — OXYCODONE AND ACETAMINOPHEN 10; 325 MG/1; MG/1
1 TABLET ORAL EVERY 6 HOURS PRN
Qty: 120 TABLET | Refills: 0 | Status: SHIPPED | OUTPATIENT
Start: 2020-12-01 | End: 2021-01-01 | Stop reason: SDUPTHER

## 2020-12-02 RX ORDER — ATORVASTATIN CALCIUM 40 MG/1
TABLET, FILM COATED ORAL
Qty: 30 TABLET | Refills: 0 | Status: SHIPPED | OUTPATIENT
Start: 2020-12-02 | End: 2021-01-26 | Stop reason: SDUPTHER

## 2020-12-29 RX ORDER — RISPERIDONE 1 MG/1
1 TABLET ORAL NIGHTLY
Qty: 30 TABLET | Refills: 3 | Status: SHIPPED | OUTPATIENT
Start: 2020-12-29 | End: 2021-03-02 | Stop reason: SDUPTHER

## 2020-12-29 RX ORDER — DOXEPIN HYDROCHLORIDE 10 MG/1
CAPSULE ORAL
Qty: 90 CAPSULE | Refills: 3 | Status: SHIPPED | OUTPATIENT
Start: 2020-12-29 | End: 2021-03-02

## 2020-12-29 RX ORDER — CITALOPRAM 20 MG/1
20 TABLET ORAL DAILY
Qty: 30 TABLET | Refills: 3 | Status: SHIPPED | OUTPATIENT
Start: 2020-12-29 | End: 2021-03-30 | Stop reason: SDUPTHER

## 2021-01-01 DIAGNOSIS — G89.21 CHRONIC PAIN DUE TO TRAUMA: ICD-10-CM

## 2021-01-01 DIAGNOSIS — F41.9 ANXIETY: ICD-10-CM

## 2021-01-04 ENCOUNTER — TELEPHONE (OUTPATIENT)
Dept: INTERNAL MEDICINE | Facility: CLINIC | Age: 46
End: 2021-01-04

## 2021-01-04 ENCOUNTER — OFFICE VISIT (OUTPATIENT)
Dept: INTERNAL MEDICINE | Facility: CLINIC | Age: 46
End: 2021-01-04

## 2021-01-04 VITALS
DIASTOLIC BLOOD PRESSURE: 78 MMHG | SYSTOLIC BLOOD PRESSURE: 150 MMHG | OXYGEN SATURATION: 99 % | RESPIRATION RATE: 20 BRPM | HEART RATE: 102 BPM | TEMPERATURE: 96.6 F

## 2021-01-04 DIAGNOSIS — R11.2 NON-INTRACTABLE VOMITING WITH NAUSEA, UNSPECIFIED VOMITING TYPE: ICD-10-CM

## 2021-01-04 DIAGNOSIS — I10 ESSENTIAL HYPERTENSION: ICD-10-CM

## 2021-01-04 DIAGNOSIS — H66.001 ACUTE SUPPURATIVE OTITIS MEDIA OF RIGHT EAR WITHOUT SPONTANEOUS RUPTURE OF TYMPANIC MEMBRANE, RECURRENCE NOT SPECIFIED: ICD-10-CM

## 2021-01-04 DIAGNOSIS — R50.9 FEVER, UNSPECIFIED FEVER CAUSE: Primary | ICD-10-CM

## 2021-01-04 DIAGNOSIS — R06.02 SHORTNESS OF BREATH: ICD-10-CM

## 2021-01-04 LAB
EXPIRATION DATE: NORMAL
EXPIRATION DATE: NORMAL
FLUAV AG NPH QL: NEGATIVE
FLUBV AG NPH QL: NEGATIVE
INTERNAL CONTROL: NORMAL
INTERNAL CONTROL: NORMAL
Lab: NORMAL
Lab: NORMAL
S PYO AG THROAT QL: NEGATIVE

## 2021-01-04 PROCEDURE — 96372 THER/PROPH/DIAG INJ SC/IM: CPT | Performed by: NURSE PRACTITIONER

## 2021-01-04 PROCEDURE — 87804 INFLUENZA ASSAY W/OPTIC: CPT | Performed by: NURSE PRACTITIONER

## 2021-01-04 PROCEDURE — 99213 OFFICE O/P EST LOW 20 MIN: CPT | Performed by: NURSE PRACTITIONER

## 2021-01-04 PROCEDURE — 87880 STREP A ASSAY W/OPTIC: CPT | Performed by: NURSE PRACTITIONER

## 2021-01-04 RX ORDER — ZINC GLUCONATE 50 MG
50 TABLET ORAL DAILY
Qty: 30 TABLET | Refills: 0 | Status: SHIPPED | OUTPATIENT
Start: 2021-01-04 | End: 2021-12-29 | Stop reason: SDUPTHER

## 2021-01-04 RX ORDER — BENZONATATE 100 MG/1
100 CAPSULE ORAL 3 TIMES DAILY PRN
Qty: 21 CAPSULE | Refills: 0 | Status: SHIPPED | OUTPATIENT
Start: 2021-01-04 | End: 2021-03-02

## 2021-01-04 RX ORDER — CHOLECALCIFEROL (VITAMIN D3) 50 MCG
2000 TABLET ORAL DAILY
Qty: 30 TABLET | Refills: 0 | Status: SHIPPED | OUTPATIENT
Start: 2021-01-04 | End: 2021-03-30

## 2021-01-04 RX ORDER — ALPRAZOLAM 1 MG/1
1 TABLET ORAL 2 TIMES DAILY PRN
Qty: 60 TABLET | Refills: 0 | Status: SHIPPED | OUTPATIENT
Start: 2021-01-04 | End: 2021-02-02 | Stop reason: SDUPTHER

## 2021-01-04 RX ORDER — METOPROLOL TARTRATE 50 MG/1
50 TABLET, FILM COATED ORAL 2 TIMES DAILY
Qty: 60 TABLET | Refills: 1 | Status: SHIPPED | OUTPATIENT
Start: 2021-01-04 | End: 2021-03-02 | Stop reason: SDUPTHER

## 2021-01-04 RX ORDER — DEXAMETHASONE SODIUM PHOSPHATE 10 MG/ML
10 INJECTION INTRAMUSCULAR; INTRAVENOUS ONCE
Status: COMPLETED | OUTPATIENT
Start: 2021-01-04 | End: 2021-01-04

## 2021-01-04 RX ORDER — ONDANSETRON 4 MG/1
4 TABLET, ORALLY DISINTEGRATING ORAL EVERY 8 HOURS PRN
Qty: 15 TABLET | Refills: 0 | Status: SHIPPED | OUTPATIENT
Start: 2021-01-04 | End: 2021-03-30 | Stop reason: SDUPTHER

## 2021-01-04 RX ORDER — OXYCODONE AND ACETAMINOPHEN 10; 325 MG/1; MG/1
1 TABLET ORAL EVERY 6 HOURS PRN
Qty: 60 TABLET | Refills: 0 | Status: SHIPPED | OUTPATIENT
Start: 2021-01-04 | End: 2021-01-21 | Stop reason: SDUPTHER

## 2021-01-04 RX ORDER — AZITHROMYCIN 250 MG/1
TABLET, FILM COATED ORAL
Qty: 6 TABLET | Refills: 0 | Status: SHIPPED | OUTPATIENT
Start: 2021-01-04 | End: 2021-03-02

## 2021-01-04 RX ADMIN — DEXAMETHASONE SODIUM PHOSPHATE 10 MG: 10 INJECTION INTRAMUSCULAR; INTRAVENOUS at 10:25

## 2021-01-04 NOTE — TELEPHONE ENCOUNTER
Called patient to confirm with him why his prescription was just half of the usual monthly prescription. Patient voiced understanding and had no further questions.

## 2021-01-04 NOTE — PROGRESS NOTES
"        Subjective     Chief Complaint   Patient presents with   • Fever   • Vomiting       History of Present Illness  Pt comes in today with complaints of fever, myalgias, nausea, vomiting, diarrhea, shortness of breath. Some chest pain. States his taste and smell are off. His blood pressure has been elevated over the past month. Consistently 150/100. Admits to cough and congestion. Fever as high as 101. Onset of symptoms was Thursday 12/31. States he has felt horrible. The only place he goes is [facility] for groceries, otherwise, he is at home with his son. States no hematemesis, no bloody stools. His right ear has been painful. Has not taken any medications for nausea. His fever \"broke\" this am.     Review of Systems   Otherwise ROS has been reviewed.     Past Medical History:   Past Medical History:   Diagnosis Date   • Anxiety    • Arthritis    • Bipolar 1 disorder (CMS/HCC)    • Hypertension    • PTSD (post-traumatic stress disorder)      Past Surgical History:  Past Surgical History:   Procedure Laterality Date   • HIP SURGERY       Social History:  reports that he has been smoking cigarettes. He has a 15.00 pack-year smoking history. He has never used smokeless tobacco. He reports that he does not drink alcohol or use drugs.    Family History: family history includes Arthritis in his father; Diabetes in his father; Heart attack in his mother; Hypertension in his father.      Allergies:  Allergies   Allergen Reactions   • Adderall [Amphetamine-Dextroamphetamine] Shortness Of Breath, Swelling, Palpitations and Hallucinations   • Meloxicam Nausea Only   • Darvon [Propoxyphene] GI Intolerance   • Motrin [Ibuprofen] GI Intolerance   • Toradol [Ketorolac Tromethamine] GI Intolerance   • Tramadol GI Intolerance     Medications:  Prior to Admission medications    Medication Sig Start Date End Date Taking? Authorizing Provider   albuterol sulfate  (90 Base) MCG/ACT inhaler Inhale 2 puffs Every 4 (Four) Hours " As Needed for Wheezing. 11/3/20   Liza Hagen DO   ALPRAZolam (XANAX) 1 MG tablet Take 1 tablet by mouth 2 (Two) Times a Day As Needed for Anxiety. 12/1/20   Liza Hagen DO   atorvastatin (LIPITOR) 40 MG tablet TAKE 1 TABLET BY MOUTH EVERY DAY 12/2/20   Liza Hagen DO   cefdinir (OMNICEF) 300 MG capsule Take 1 capsule by mouth 2 (Two) Times a Day. 11/3/20   Liza Hagen DO   citalopram (CeleXA) 20 MG tablet Take 1 tablet by mouth Daily. 12/29/20   Eli Teresa APRN   doxepin (SINEquan) 10 MG capsule Take 1-3 tabs at hs 12/29/20   Eli Teresa APRN   ipratropium-albuterol (Combivent Respimat)  MCG/ACT inhaler Inhale 1 puff 4 (Four) Times a Day. 10/2/20   Liza Hagen DO   methocarbamol (ROBAXIN) 500 MG tablet Take 1 tablet by mouth 4 (Four) Times a Day As Needed for Muscle Spasms. 12/1/20   Liza Hagen DO   metoprolol tartrate (LOPRESSOR) 25 MG tablet Take 1 tablet by mouth 2 (Two) Times a Day. 12/1/20   Liza Hagen DO   mometasone (Nasonex) 50 MCG/ACT nasal spray 2 sprays into the nostril(s) as directed by provider Daily. 10/2/20   Liza Hagen DO   oxyCODONE-acetaminophen (PERCOCET)  MG per tablet Take 1 tablet by mouth Every 6 (Six) Hours As Needed for Moderate Pain . 12/1/20   Liza Hagen DO   risperiDONE (RisperDAL) 1 MG tablet Take 1 tablet by mouth Every Night. 12/29/20   Eli Teresa APRN   Spacer/Aero-Holding Chambers (E-Z Spacer) inhaler Use as instructed 11/3/20 11/3/21  Liza Hagen DO   Zinc 220 (50 Zn) MG capsule Take 220 mg by mouth 3 (Three) Times a Day. 11/3/20   Liza Hagen DO       Objective     Vital Signs: /78   Pulse 102   Temp 96.6 °F (35.9 °C)   Resp 20   SpO2 99%   Physical Exam  Vitals signs reviewed.   Constitutional:       Appearance: He is well-developed. He is ill-appearing.   HENT:      Head: Normocephalic and atraumatic.      Right  Ear: Tympanic membrane is erythematous.      Mouth/Throat:      Mouth: Mucous membranes are dry.      Pharynx: Posterior oropharyngeal erythema present.   Eyes:      Conjunctiva/sclera: Conjunctivae normal.      Pupils: Pupils are equal, round, and reactive to light.   Neck:      Musculoskeletal: Normal range of motion and neck supple.      Vascular: No JVD.   Cardiovascular:      Rate and Rhythm: Normal rate and regular rhythm.   Pulmonary:      Effort: Pulmonary effort is normal.      Breath sounds: Wheezing and rhonchi present.   Abdominal:      General: Bowel sounds are normal.      Palpations: Abdomen is soft.   Musculoskeletal:         General: No deformity.   Lymphadenopathy:      Cervical: No cervical adenopathy.   Skin:     General: Skin is warm and dry.   Neurological:      Mental Status: He is alert and oriented to person, place, and time.   Psychiatric:         Behavior: Behavior normal.         Thought Content: Thought content normal.         Judgment: Judgment normal.       Results Reviewed:  BUN   Date Value Ref Range Status   10/23/2019 13 6 - 24 mg/dL Final     Creatinine   Date Value Ref Range Status   10/23/2019 0.95 0.76 - 1.27 mg/dL Final     Sodium   Date Value Ref Range Status   10/23/2019 141 134 - 144 mmol/L Final     Potassium   Date Value Ref Range Status   10/23/2019 4.3 3.5 - 5.2 mmol/L Final     Chloride   Date Value Ref Range Status   10/23/2019 104 96 - 106 mmol/L Final     Total CO2   Date Value Ref Range Status   10/23/2019 22 20 - 29 mmol/L Final     Calcium   Date Value Ref Range Status   10/23/2019 9.5 8.7 - 10.2 mg/dL Final     ALT (SGPT)   Date Value Ref Range Status   10/23/2019 23 0 - 44 IU/L Final     AST (SGOT)   Date Value Ref Range Status   10/23/2019 16 0 - 40 IU/L Final     WBC   Date Value Ref Range Status   10/23/2019 7.2 3.4 - 10.8 x10E3/uL Final     Hematocrit   Date Value Ref Range Status   10/23/2019 46.0 37.5 - 51.0 % Final     Platelets   Date Value Ref Range  Status   10/23/2019 225 150 - 450 x10E3/uL Final     Triglycerides   Date Value Ref Range Status   10/23/2019 173 (H) 0 - 149 mg/dL Final     HDL Cholesterol   Date Value Ref Range Status   10/23/2019 42 >39 mg/dL Final     LDL Cholesterol    Date Value Ref Range Status   10/23/2019 182 (H) 0 - 99 mg/dL Final         Assessment / Plan     Assessment/Plan:  Diagnoses and all orders for this visit:    1. Fever, unspecified fever cause (Primary)  -     POCT rapid strep A  -     POCT Influenza A/B  -     COVID-19,[facility] ROUTINE, NP/OP SWAB IN TRANSPORT MEDIA OR ESWAB 72 HR TAT - Swab, Oropharynx    2. Shortness of breath  -     COVID-19,[facility] ROUTINE, NP/OP SWAB IN TRANSPORT MEDIA OR ESWAB 72 HR TAT - Swab, Oropharynx  -     dexamethasone (DECADRON) injection 10 mg  -     ascorbic acid (VITAMIN C) 1000 MG tablet; Take 0.5 tablets by mouth Daily.  Dispense: 30 tablet; Refill: 0  -     Cholecalciferol (Vitamin D) 50 MCG (2000 UT) tablet; Take 2,000 Units by mouth Daily.  Dispense: 30 tablet; Refill: 0  -     benzonatate (Tessalon Perles) 100 MG capsule; Take 1 capsule by mouth 3 (Three) Times a Day As Needed for Cough.  Dispense: 21 capsule; Refill: 0    3. Essential hypertension  -     metoprolol tartrate (LOPRESSOR) 50 MG tablet; Take 1 tablet by mouth 2 (Two) Times a Day.  Dispense: 60 tablet; Refill: 1    4. Acute suppurative otitis media of right ear without spontaneous rupture of tympanic membrane, recurrence not specified  -     azithromycin (Zithromax Z-Wale) 250 MG tablet; Take 2 tablets the first day, then 1 tablet daily for 4 days.  Dispense: 6 tablet; Refill: 0    5. Non-intractable vomiting with nausea, unspecified vomiting type  -     ondansetron ODT (Zofran ODT) 4 MG disintegrating tablet; Place 1 tablet on the tongue Every 8 (Eight) Hours As Needed for Nausea or Vomiting.  Dispense: 15 tablet; Refill: 0    Other orders  -     zinc gluconate 50 MG tablet; Take 1 tablet by mouth Daily.  Dispense: 30  tablet; Refill: 0      No follow-ups on file. unless patient needs to be seen sooner or acute issues arise.     Code Status: Full.     I have discussed the patient results/orders and and plan/recommendation with them at today's visit.      Eli Teresa, APRN   01/04/2021

## 2021-01-04 NOTE — TELEPHONE ENCOUNTER
Pt called and stated her got disconnected from a call with one of the MA's earlier. He is wanting to know when he will be able to get the 120 percocet's instead of 60. I informed pt that Dr. Cortez does not fill for 120, only 60. I told pt that I would have someone call him back.

## 2021-01-06 ENCOUNTER — TELEPHONE (OUTPATIENT)
Dept: INTERNAL MEDICINE | Facility: CLINIC | Age: 46
End: 2021-01-06

## 2021-01-06 LAB — SARS-COV-2 RNA RESP QL NAA+PROBE: NOT DETECTED

## 2021-01-06 NOTE — TELEPHONE ENCOUNTER
[facility] called stating insurance won't pay for the following and they are waiting for us to call in something else.  [facility] stated they sent [facility] a [facility] to our clinic.      benzonatate (Tessalon Perles) 100 MG capsule [[id number]] (Order [id number])  Order

## 2021-01-06 NOTE — TELEPHONE ENCOUNTER
Called pt to tell him that his covid test was negative. Pt voiced understanding and also voiced that he still felt very bad. Pt stated that he had a high fever and felt dehydrated, but was drinking a lot of fluids. Spoke with Jessica and pt was advised to keep drinking fluids and that if he continued to feel bad that he should go to the ER.

## 2021-01-07 DIAGNOSIS — R06.02 SHORTNESS OF BREATH: Primary | ICD-10-CM

## 2021-01-07 RX ORDER — GUAIFENESIN AND CODEINE PHOSPHATE 100; 10 MG/5ML; MG/5ML
5 SOLUTION ORAL 3 TIMES DAILY PRN
Qty: 180 ML | Refills: 0 | Status: SHIPPED | OUTPATIENT
Start: 2021-01-07 | End: 2021-03-02

## 2021-01-07 NOTE — TELEPHONE ENCOUNTER
What would you suggest instead of the Tessalon Perles?  Please let me know and I can put in and send , thanks.

## 2021-01-19 DIAGNOSIS — G89.21 CHRONIC PAIN DUE TO TRAUMA: ICD-10-CM

## 2021-01-19 RX ORDER — OXYCODONE AND ACETAMINOPHEN 10; 325 MG/1; MG/1
1 TABLET ORAL EVERY 6 HOURS PRN
Qty: 60 TABLET | Refills: 0 | Status: CANCELLED | OUTPATIENT
Start: 2021-01-19

## 2021-01-19 NOTE — TELEPHONE ENCOUNTER
Caller: Clyde Tamayo    Relationship: Self    Best call back number: [phone]  Medication needed:   Requested Prescriptions     Pending Prescriptions Disp Refills   • oxyCODONE-acetaminophen (PERCOCET)  MG per tablet 60 tablet 0     Sig: Take 1 tablet by mouth Every 6 (Six) Hours As Needed for Moderate Pain .       When do you need the refill by: 01.19.21    What details did the patient provide when requesting the medication: WAS TOLD TO CALL BACK TO ORDER THE 2ND HALF   Does the patient have less than a 3 day supply:  [x] Yes  [] No    What is the patient's preferred pharmacy:        [facility]/pharmacy #[id number] - [place], [place] - [address] [phone] [facility] [phone] FX

## 2021-01-20 DIAGNOSIS — G89.21 CHRONIC PAIN DUE TO TRAUMA: ICD-10-CM

## 2021-01-20 RX ORDER — OXYCODONE AND ACETAMINOPHEN 10; 325 MG/1; MG/1
1 TABLET ORAL EVERY 6 HOURS PRN
Qty: 60 TABLET | Refills: 0 | Status: CANCELLED | OUTPATIENT
Start: 2021-01-20

## 2021-01-21 ENCOUNTER — TELEPHONE (OUTPATIENT)
Dept: INTERNAL MEDICINE | Facility: CLINIC | Age: 46
End: 2021-01-21

## 2021-01-21 DIAGNOSIS — G89.21 CHRONIC PAIN DUE TO TRAUMA: ICD-10-CM

## 2021-01-21 NOTE — TELEPHONE ENCOUNTER
PT called requesting other 1/2 of his refills for following.  PT is out of.    oxyCODONE-acetaminophen (PERCOCET)  MG per tablet [61567] (Order [id number])

## 2021-01-22 ENCOUNTER — TELEPHONE (OUTPATIENT)
Dept: INTERNAL MEDICINE | Facility: CLINIC | Age: 46
End: 2021-01-22

## 2021-01-22 RX ORDER — OXYCODONE AND ACETAMINOPHEN 10; 325 MG/1; MG/1
1 TABLET ORAL EVERY 6 HOURS PRN
Qty: 12 TABLET | Refills: 0 | Status: SHIPPED | OUTPATIENT
Start: 2021-01-22 | End: 2021-01-23 | Stop reason: SDUPTHER

## 2021-01-22 NOTE — TELEPHONE ENCOUNTER
PT CALLED CHECKING STATUS OF HIS PERCOCET.  I ACCIDENTALLY OPENED UP THIS MESSAGE.  I TOLD PT WAS CALLED INTO TO [facility] THIS MORNING.

## 2021-01-23 DIAGNOSIS — G89.21 CHRONIC PAIN DUE TO TRAUMA: ICD-10-CM

## 2021-01-23 RX ORDER — OXYCODONE AND ACETAMINOPHEN 10; 325 MG/1; MG/1
1 TABLET ORAL EVERY 6 HOURS PRN
Qty: 120 TABLET | Refills: 0 | Status: SHIPPED | OUTPATIENT
Start: 2021-01-23 | End: 2021-02-25 | Stop reason: SDUPTHER

## 2021-01-27 RX ORDER — ATORVASTATIN CALCIUM 40 MG/1
40 TABLET, FILM COATED ORAL DAILY
Qty: 30 TABLET | Refills: 0 | Status: SHIPPED | OUTPATIENT
Start: 2021-01-27 | End: 2021-02-24

## 2021-01-27 RX ORDER — TIZANIDINE HYDROCHLORIDE 4 MG/1
4 CAPSULE, GELATIN COATED ORAL 3 TIMES DAILY
Qty: 90 CAPSULE | Refills: 3 | Status: SHIPPED | OUTPATIENT
Start: 2021-01-27 | End: 2021-03-30 | Stop reason: SDUPTHER

## 2021-01-29 ENCOUNTER — TELEPHONE (OUTPATIENT)
Dept: INTERNAL MEDICINE | Facility: CLINIC | Age: 46
End: 2021-01-29

## 2021-01-29 NOTE — TELEPHONE ENCOUNTER
JACQUES FROM [facility] STATED THAT SHE WOULD LIKE SOME CLARIFICATION ON THE MEDICATION THAT WAS RECEIVED BY THEM.    QUESTIONS REGARDING:   methocarbamol (ROBAXIN) 500 MG tablet      TiZANidine (ZANAFLEX) 4 MG capsule     CALLBACK: [phone]

## 2021-01-29 NOTE — TELEPHONE ENCOUNTER
Called pharmacy to let them know that the patient is no longer taking the Robaxin, and will only be taking Zanaflex 4 mg. Pharmacy made note of the change.

## 2021-02-01 ENCOUNTER — TELEPHONE (OUTPATIENT)
Dept: INTERNAL MEDICINE | Facility: CLINIC | Age: 46
End: 2021-02-01

## 2021-02-01 NOTE — TELEPHONE ENCOUNTER
I called pt to reschedule his appt. He stated all he is needing is a refill on his ALPRAZolam (XANAX) 1 MG tablet [[id number]] (Order [id number])

## 2021-02-02 DIAGNOSIS — F41.9 ANXIETY: ICD-10-CM

## 2021-02-03 RX ORDER — ALPRAZOLAM 1 MG/1
1 TABLET ORAL 2 TIMES DAILY PRN
Qty: 60 TABLET | Refills: 0 | Status: SHIPPED | OUTPATIENT
Start: 2021-02-03 | End: 2021-03-02 | Stop reason: SDUPTHER

## 2021-02-24 ENCOUNTER — TELEPHONE (OUTPATIENT)
Dept: INTERNAL MEDICINE | Facility: CLINIC | Age: 46
End: 2021-02-24

## 2021-02-24 RX ORDER — ATORVASTATIN CALCIUM 40 MG/1
TABLET, FILM COATED ORAL
Qty: 30 TABLET | Refills: 0 | Status: SHIPPED | OUTPATIENT
Start: 2021-02-24 | End: 2021-03-23

## 2021-02-25 DIAGNOSIS — G89.21 CHRONIC PAIN DUE TO TRAUMA: ICD-10-CM

## 2021-02-25 NOTE — TELEPHONE ENCOUNTER
Needs refill on    oxyCODONE-acetaminophen (PERCOCET)  MG per tablet    [facility]/pharmacy #[id number] - [place], [place] - [address] [phone] [facility] [phone] FX

## 2021-02-26 RX ORDER — OXYCODONE AND ACETAMINOPHEN 10; 325 MG/1; MG/1
1 TABLET ORAL EVERY 6 HOURS PRN
Qty: 120 TABLET | Refills: 0 | Status: SHIPPED | OUTPATIENT
Start: 2021-02-26 | End: 2021-03-30 | Stop reason: SDUPTHER

## 2021-03-02 ENCOUNTER — OFFICE VISIT (OUTPATIENT)
Dept: INTERNAL MEDICINE | Facility: CLINIC | Age: 46
End: 2021-03-02

## 2021-03-02 VITALS
RESPIRATION RATE: 18 BRPM | DIASTOLIC BLOOD PRESSURE: 95 MMHG | BODY MASS INDEX: 40.87 KG/M2 | SYSTOLIC BLOOD PRESSURE: 155 MMHG | TEMPERATURE: 97.2 F | OXYGEN SATURATION: 98 % | WEIGHT: 308.4 LBS | HEIGHT: 73 IN | HEART RATE: 84 BPM

## 2021-03-02 DIAGNOSIS — Z79.899 LONG TERM USE OF DRUG: Primary | ICD-10-CM

## 2021-03-02 DIAGNOSIS — F41.9 ANXIETY: ICD-10-CM

## 2021-03-02 DIAGNOSIS — E78.2 MIXED HYPERLIPIDEMIA: ICD-10-CM

## 2021-03-02 DIAGNOSIS — R20.2 RIGHT HAND PARESTHESIA: ICD-10-CM

## 2021-03-02 DIAGNOSIS — R49.0 HOARSENESS OF VOICE: ICD-10-CM

## 2021-03-02 DIAGNOSIS — I10 ESSENTIAL HYPERTENSION: ICD-10-CM

## 2021-03-02 PROCEDURE — 99214 OFFICE O/P EST MOD 30 MIN: CPT | Performed by: FAMILY MEDICINE

## 2021-03-02 RX ORDER — DOXEPIN HYDROCHLORIDE 10 MG/1
CAPSULE ORAL
Qty: 90 CAPSULE | Refills: 3 | Status: SHIPPED | OUTPATIENT
Start: 2021-03-02 | End: 2021-03-30 | Stop reason: SDUPTHER

## 2021-03-02 RX ORDER — ALPRAZOLAM 1 MG/1
1 TABLET ORAL 2 TIMES DAILY PRN
Qty: 60 TABLET | Refills: 0 | Status: SHIPPED | OUTPATIENT
Start: 2021-03-02 | End: 2021-03-30 | Stop reason: SDUPTHER

## 2021-03-02 RX ORDER — RISPERIDONE 1 MG/1
1 TABLET ORAL NIGHTLY
Qty: 30 TABLET | Refills: 3 | Status: SHIPPED | OUTPATIENT
Start: 2021-03-02 | End: 2021-03-30 | Stop reason: SDUPTHER

## 2021-03-02 RX ORDER — METOPROLOL TARTRATE 50 MG/1
50 TABLET, FILM COATED ORAL 2 TIMES DAILY
Qty: 180 TABLET | Refills: 1 | Status: SHIPPED | OUTPATIENT
Start: 2021-03-02 | End: 2021-06-29 | Stop reason: SDUPTHER

## 2021-03-02 RX ORDER — ALBUTEROL SULFATE 90 UG/1
2 AEROSOL, METERED RESPIRATORY (INHALATION) EVERY 4 HOURS PRN
Qty: 18 G | Refills: 3 | Status: SHIPPED | OUTPATIENT
Start: 2021-03-02 | End: 2021-03-30 | Stop reason: SDUPTHER

## 2021-03-02 NOTE — PROGRESS NOTES
Subjective     Chief Complaint   Patient presents with   • Med Refill     Follow up.    • Hypertension     Keeping record. Reletively high, with occational low readings.    • Numbness     Right arm from elbow down.        History of Present Illness  Patient presents to office for follow-up.  He has been having many issues.  He has been having highs and lows in his blood pressure.  He has documented that has been down as low as 69 systolic.  He states he felt bad at that time lightheaded, dizzy not seeing well.  He is also been having some marked highs.  180 - 190 .  Patient notes that he is having numbness in his right arm from the elbow to the fourth and fifth fingers.  The numbness is in the medial elbow and radiates distally.  This comes and goes.  He has had a fall in the past and did injure that elbow he is not sure whether this is something that is related to that.  He is having problems with increasing hoarseness.  He is unable to talk for an extended period of time.  He has voice fluctuations when he is talking.  He is very hoarse.  Is very concerning to him.  He notes that it is causing his son to worry.  Patient notes that he has fallen again recently.  He is having some knee discomfort associated with this.  He has been more anxious over the last month or 2.  He is actually increased his Xanax to 3 times a day.  He has gained a significant amount of weight over the last 3 months.  He has gone from 128 kg to 140 kg    Patient's PMR from outside medical facility reviewed and noted.    Review of Systems     Otherwise complete ROS reviewed and negative except as mentioned in the HPI.    Past Medical History:   Past Medical History:   Diagnosis Date   • Anxiety    • Arthritis    • Bipolar 1 disorder (CMS/[facility])    • Hypertension    • PTSD (post-traumatic stress disorder)      Past Surgical History:  Past Surgical History:   Procedure Laterality Date   • HIP SURGERY       Social History: The patient is  .  He currently has a fiancé and then plans on getting  in 2 months.  He does not drink.  He does have history of smoking.  He does not use illicit drugs.      Family History: family history includes Arthritis in his father; Diabetes in his father; Heart attack in his mother; Hypertension in his father.       Allergies:  Allergies   Allergen Reactions   • Adderall [Amphetamine-Dextroamphetamine] Shortness Of Breath, Swelling, Palpitations and Hallucinations   • Meloxicam Nausea Only   • Darvon [Propoxyphene] GI Intolerance   • Motrin [Ibuprofen] GI Intolerance   • Toradol [Ketorolac Tromethamine] GI Intolerance   • Tramadol GI Intolerance     Medications:  Prior to Admission medications    Medication Sig Start Date End Date Taking? Authorizing Provider   albuterol sulfate  (90 Base) MCG/ACT inhaler Inhale 2 puffs Every 4 (Four) Hours As Needed for Wheezing. 11/3/20  Yes Liza Hagen DO   ALPRAZolam (XANAX) 1 MG tablet Take 1 tablet by mouth 2 (Two) Times a Day As Needed for Anxiety. 2/3/21  Yes Liza Hagen DO   ascorbic acid (VITAMIN C) 1000 MG tablet Take 0.5 tablets by mouth Daily. 1/4/21  Yes Eli Teresa APRN   atorvastatin (LIPITOR) 40 MG tablet TAKE 1 TABLET BY MOUTH EVERY DAY 2/24/21  Yes Liza Hagen DO   Cholecalciferol (Vitamin D) 50 MCG (2000 UT) tablet Take 2,000 Units by mouth Daily. 1/4/21 1/4/22 Yes Eli Teresa APRN   citalopram (CeleXA) 20 MG tablet Take 1 tablet by mouth Daily. 12/29/20  Yes Eli Teresa APRN   ipratropium-albuterol (Combivent Respimat)  MCG/ACT inhaler Inhale 1 puff 4 (Four) Times a Day. 10/2/20  Yes Liza Hagen DO   methocarbamol (ROBAXIN) 500 MG tablet Take 1 tablet by mouth 4 (Four) Times a Day As Needed for Muscle Spasms. 12/1/20  Yes Liza Hagen DO   metoprolol tartrate (LOPRESSOR) 50 MG tablet Take 1 tablet by mouth 2 (Two) Times a Day. 1/4/21  Yes Eli Teresa  "JOELLE Phoenix   mometasone (Nasonex) 50 MCG/ACT nasal spray 2 sprays into the nostril(s) as directed by provider Daily. 10/2/20  Yes Liza Hagen DO   ondansetron ODT (Zofran ODT) 4 MG disintegrating tablet Place 1 tablet on the tongue Every 8 (Eight) Hours As Needed for Nausea or Vomiting. 1/4/21  Yes Eli Teresa APRN   oxyCODONE-acetaminophen (PERCOCET)  MG per tablet Take 1 tablet by mouth Every 6 (Six) Hours As Needed for Moderate Pain . 2/26/21  Yes Liza Hagen DO   risperiDONE (RisperDAL) 1 MG tablet Take 1 tablet by mouth Every Night. 12/29/20  Yes Eli Teresa APRN   Spacer/Aero-Holding Chambers (E-Z Spacer) inhaler Use as instructed 11/3/20 11/3/21 Yes Liza Hagen DO   TiZANidine (ZANAFLEX) 4 MG capsule Take 1 capsule by mouth 3 (Three) Times a Day. 1/27/21  Yes Liza Hagen DO   zinc gluconate 50 MG tablet Take 1 tablet by mouth Daily. 1/4/21  Yes Eli Teresa APRN   azithromycin (Zithromax Z-Wale) 250 MG tablet Take 2 tablets the first day, then 1 tablet daily for 4 days. 1/4/21 3/2/21  Eli Teresa APRN   benzonatate (Tessalon Perles) 100 MG capsule Take 1 capsule by mouth 3 (Three) Times a Day As Needed for Cough. 1/4/21 3/2/21  Eli Teresa APRN   cefdinir (OMNICEF) 300 MG capsule Take 1 capsule by mouth 2 (Two) Times a Day. 11/3/20 3/2/21  Liza Hagen DO   doxepin (SINEquan) 10 MG capsule Take 1-3 tabs at hs 12/29/20 3/2/21  Eli Teresa APRN   guaiFENesin-codeine (GUAIFENESIN AC) 100-10 MG/5ML liquid Take 5 mL by mouth 3 (Three) Times a Day As Needed for Cough. 1/7/21 3/2/21  Eli Teresa, JOELLE       Objective     Vital Signs: /95 (BP Location: Left arm, Patient Position: Sitting, Cuff Size: Adult)   Pulse 84   Temp 97.2 °F (36.2 °C) (Skin)   Resp 18   Ht 185.4 cm (72.99\")   Wt (!) 140 kg (308 lb 6.4 oz)   SpO2 98%   BMI 40.70 kg/m²   Physical Exam  Vitals " signs and nursing note reviewed.   Constitutional:       General: He is not in acute distress.     Appearance: Normal appearance. He is obese. He is not ill-appearing or toxic-appearing.   HENT:      Head: Normocephalic and atraumatic.      Right Ear: Tympanic membrane, ear canal and external ear normal.      Left Ear: Tympanic membrane, ear canal and external ear normal.      Nose: Nose normal.      Mouth/Throat:      Mouth: Mucous membranes are moist.      Pharynx: Oropharynx is clear.      Comments: Vocal quality is hoarse.  The volume decreases as he talks more.  Eyes:      Extraocular Movements: Extraocular movements intact.      Conjunctiva/sclera: Conjunctivae normal.      Pupils: Pupils are equal, round, and reactive to light.   Neck:      Musculoskeletal: Normal range of motion and neck supple.   Cardiovascular:      Rate and Rhythm: Normal rate and regular rhythm.      Pulses: Normal pulses.      Heart sounds: Normal heart sounds.   Pulmonary:      Effort: Pulmonary effort is normal.      Breath sounds: Normal breath sounds.   Abdominal:      General: Bowel sounds are normal.      Palpations: Abdomen is soft.   Musculoskeletal:      Comments: Patient moves all extremities.  He has some medial elbow tenderness palpation on the right.  There is no reduced flexion or extension at the elbow.  He does have some suprapatellar bursal swelling on the left.  There is bruising noted below both knees.  Patient is able to move all extremities.  He walks with a cane.   Lymphadenopathy:      Cervical: No cervical adenopathy.   Skin:     General: Skin is warm.      Capillary Refill: Capillary refill takes less than 2 seconds.   Neurological:      General: No focal deficit present.      Mental Status: He is alert and oriented to person, place, and time.   Psychiatric:         Mood and Affect: Mood normal.         Behavior: Behavior normal.         Thought Content: Thought content normal.         Judgment: Judgment normal.          Patient's Body mass index is 40.7 kg/m². BMI is above normal parameters. Recommendations include: nutrition counseling.      Results Reviewed:  BUN   Date Value Ref Range Status   10/23/2019 13 6 - 24 mg/dL Final     Creatinine   Date Value Ref Range Status   10/23/2019 0.95 0.76 - 1.27 mg/dL Final     Sodium   Date Value Ref Range Status   10/23/2019 141 134 - 144 mmol/L Final     Potassium   Date Value Ref Range Status   10/23/2019 4.3 3.5 - 5.2 mmol/L Final     Chloride   Date Value Ref Range Status   10/23/2019 104 96 - 106 mmol/L Final     Total CO2   Date Value Ref Range Status   10/23/2019 22 20 - 29 mmol/L Final     Calcium   Date Value Ref Range Status   10/23/2019 9.5 8.7 - 10.2 mg/dL Final     ALT (SGPT)   Date Value Ref Range Status   10/23/2019 23 0 - 44 IU/L Final     AST (SGOT)   Date Value Ref Range Status   10/23/2019 16 0 - 40 IU/L Final     WBC   Date Value Ref Range Status   10/23/2019 7.2 3.4 - 10.8 x10E3/uL Final     Hematocrit   Date Value Ref Range Status   10/23/2019 46.0 37.5 - 51.0 % Final     Platelets   Date Value Ref Range Status   10/23/2019 225 150 - 450 x10E3/uL Final     Triglycerides   Date Value Ref Range Status   10/23/2019 173 (H) 0 - 149 mg/dL Final     HDL Cholesterol   Date Value Ref Range Status   10/23/2019 42 >39 mg/dL Final     LDL Cholesterol    Date Value Ref Range Status   10/23/2019 182 (H) 0 - 99 mg/dL Final         Assessment / Plan     Assessment/Plan:  1. Essential hypertension    - metoprolol tartrate (LOPRESSOR) 50 MG tablet; Take 1 tablet by mouth 2 (Two) Times a Day.  Dispense: 180 tablet; Refill: 1  Resume metoprolol.  Monitor blood pressure daily.    2. Anxiety    - ALPRAZolam (XANAX) 1 MG tablet; Take 1 tablet by mouth 2 (Two) Times a Day As Needed for Anxiety.  Dispense: 60 tablet; Refill: 0  Have encouraged patient to take medication as it is prescribed.  [place] has been reviewed.    3. Long term use of drug    - ToxASSURE Select 13 (MW) - Urine,  Clean Catch    4. Hoarseness of voice    - Ambulatory Referral to ENT (Otolaryngology)  Refer to Dr. Quiroz here in town.  Patient has a history of smoking.  Persistent hoarseness in a patient has smoked is concerning.    5. Right hand paresthesia    - Ambulatory Referral to Orthopedic Surgery    6. Mixed hyperlipidemia  Continue Lipitor.          Return in about 4 weeks (around 3/30/2021). unless patient needs to be seen sooner or acute issues arise.      I have discussed the patient results/orders and and plan/recommendation with them at today's visit.      Liza Hagen,    03/02/2021

## 2021-03-08 LAB — DRUGS UR: NORMAL

## 2021-03-23 RX ORDER — ATORVASTATIN CALCIUM 40 MG/1
TABLET, FILM COATED ORAL
Qty: 30 TABLET | Refills: 0 | Status: SHIPPED | OUTPATIENT
Start: 2021-03-23 | End: 2021-03-30 | Stop reason: SDUPTHER

## 2021-03-29 ENCOUNTER — TELEPHONE (OUTPATIENT)
Dept: INTERNAL MEDICINE | Facility: CLINIC | Age: 46
End: 2021-03-29

## 2021-03-29 RX ORDER — CITALOPRAM 20 MG/1
20 TABLET ORAL DAILY
Qty: 30 TABLET | Refills: 3 | Status: CANCELLED | OUTPATIENT
Start: 2021-03-29

## 2021-03-29 NOTE — TELEPHONE ENCOUNTER
Pharmacy Name:  [facility]     Pharmacy representative name: MITZY  Pharmacy representative phone number: [phone]     What medication are you calling in regards to: PHARMACIST ASKED WHY 90 DAYS SUPPLY     What question does the pharmacy have:     Who is the provider that prescribed the medication: MITZY NOGUEIRA

## 2021-03-30 ENCOUNTER — OFFICE VISIT (OUTPATIENT)
Dept: INTERNAL MEDICINE | Facility: CLINIC | Age: 46
End: 2021-03-30

## 2021-03-30 VITALS
TEMPERATURE: 98.2 F | SYSTOLIC BLOOD PRESSURE: 151 MMHG | RESPIRATION RATE: 19 BRPM | BODY MASS INDEX: 40.48 KG/M2 | DIASTOLIC BLOOD PRESSURE: 116 MMHG | WEIGHT: 305.4 LBS | HEIGHT: 73 IN | HEART RATE: 73 BPM

## 2021-03-30 DIAGNOSIS — G89.21 CHRONIC PAIN DUE TO TRAUMA: ICD-10-CM

## 2021-03-30 DIAGNOSIS — R49.0 HOARSENESS OF VOICE: ICD-10-CM

## 2021-03-30 DIAGNOSIS — F41.9 ANXIETY: ICD-10-CM

## 2021-03-30 DIAGNOSIS — E78.2 MIXED HYPERLIPIDEMIA: ICD-10-CM

## 2021-03-30 DIAGNOSIS — R11.2 NON-INTRACTABLE VOMITING WITH NAUSEA, UNSPECIFIED VOMITING TYPE: ICD-10-CM

## 2021-03-30 DIAGNOSIS — I10 ESSENTIAL HYPERTENSION: Primary | ICD-10-CM

## 2021-03-30 DIAGNOSIS — I10 ESSENTIAL HYPERTENSION: ICD-10-CM

## 2021-03-30 PROCEDURE — 99214 OFFICE O/P EST MOD 30 MIN: CPT | Performed by: FAMILY MEDICINE

## 2021-03-30 RX ORDER — OXYCODONE AND ACETAMINOPHEN 10; 325 MG/1; MG/1
1 TABLET ORAL EVERY 6 HOURS PRN
Qty: 120 TABLET | Refills: 0 | Status: SHIPPED | OUTPATIENT
Start: 2021-03-30 | End: 2021-04-29 | Stop reason: SDUPTHER

## 2021-03-30 RX ORDER — CITALOPRAM 20 MG/1
20 TABLET ORAL DAILY
Qty: 30 TABLET | Refills: 3 | Status: SHIPPED | OUTPATIENT
Start: 2021-03-30 | End: 2021-06-29 | Stop reason: SDUPTHER

## 2021-03-30 RX ORDER — METHYLPREDNISOLONE 4 MG/1
TABLET ORAL
Qty: 1 EACH | Refills: 0 | Status: SHIPPED | OUTPATIENT
Start: 2021-03-30 | End: 2021-04-30

## 2021-03-30 RX ORDER — DOXEPIN HYDROCHLORIDE 10 MG/1
CAPSULE ORAL
Qty: 90 CAPSULE | Refills: 3 | Status: SHIPPED | OUTPATIENT
Start: 2021-03-30 | End: 2021-04-21 | Stop reason: SDUPTHER

## 2021-03-30 RX ORDER — MOMETASONE FUROATE 50 UG/1
2 SPRAY, METERED NASAL DAILY
Qty: 1 EACH | Refills: 12 | Status: SHIPPED | OUTPATIENT
Start: 2021-03-30 | End: 2021-06-29 | Stop reason: SDUPTHER

## 2021-03-30 RX ORDER — RISPERIDONE 1 MG/1
1 TABLET ORAL NIGHTLY
Qty: 30 TABLET | Refills: 3 | Status: SHIPPED | OUTPATIENT
Start: 2021-03-30 | End: 2021-04-29 | Stop reason: SDUPTHER

## 2021-03-30 RX ORDER — IPRATROPIUM/ALBUTEROL SULFATE 20-100 MCG
1 MIST INHALER (GRAM) INHALATION 4 TIMES DAILY
Qty: 1 EACH | Refills: 3 | Status: SHIPPED | OUTPATIENT
Start: 2021-03-30 | End: 2021-04-29 | Stop reason: SDUPTHER

## 2021-03-30 RX ORDER — ALPRAZOLAM 1 MG/1
1 TABLET ORAL 2 TIMES DAILY PRN
Qty: 60 TABLET | Refills: 0 | Status: SHIPPED | OUTPATIENT
Start: 2021-03-30 | End: 2021-05-03 | Stop reason: SDUPTHER

## 2021-03-30 RX ORDER — TIZANIDINE HYDROCHLORIDE 4 MG/1
4 CAPSULE, GELATIN COATED ORAL 3 TIMES DAILY
Qty: 90 CAPSULE | Refills: 3 | Status: SHIPPED | OUTPATIENT
Start: 2021-03-30 | End: 2021-05-03 | Stop reason: SDUPTHER

## 2021-03-30 RX ORDER — ONDANSETRON 4 MG/1
4 TABLET, ORALLY DISINTEGRATING ORAL EVERY 8 HOURS PRN
Qty: 30 TABLET | Refills: 3 | Status: SHIPPED | OUTPATIENT
Start: 2021-03-30 | End: 2021-06-29 | Stop reason: SDUPTHER

## 2021-03-30 RX ORDER — ATORVASTATIN CALCIUM 40 MG/1
40 TABLET, FILM COATED ORAL DAILY
Qty: 90 TABLET | Refills: 3 | Status: SHIPPED | OUTPATIENT
Start: 2021-03-30 | End: 2021-06-29 | Stop reason: SDUPTHER

## 2021-03-30 RX ORDER — ALBUTEROL SULFATE 90 UG/1
2 AEROSOL, METERED RESPIRATORY (INHALATION) EVERY 4 HOURS PRN
Qty: 18 G | Refills: 3 | Status: SHIPPED | OUTPATIENT
Start: 2021-03-30 | End: 2021-04-29 | Stop reason: SDUPTHER

## 2021-03-30 NOTE — PROGRESS NOTES
Subjective     Chief Complaint   Patient presents with   • Back Pain     Follow up.        History of Present Illness  Saw Dr Quiroz for throat.  Needs throat biopsy.  Concern for vocal cord cancer.   Patient is scared.     Eating soft foods only  Voice is whispering.  Feels ill, bad. Like a shooting numbness through body.  Fleeting and periodic through the day.  Right knee is hurting a lot.  Feels like it pops out of place and hurts really bad.     Patient got stuck in [place] and needs rapid covid test prior to biopsy.        Patient's PMR from outside medical facility reviewed and noted.    Review of Systems     Otherwise complete ROS reviewed and negative except as mentioned in the HPI.    Past Medical History:   Past Medical History:   Diagnosis Date   • Anxiety    • Arthritis    • Bipolar 1 disorder (CMS/HCC)    • Hypertension    • PTSD (post-traumatic stress disorder)      Past Surgical History:  Past Surgical History:   Procedure Laterality Date   • HIP SURGERY       Social History:  reports that he has been smoking cigarettes. He has a 15.00 pack-year smoking history. He has never used smokeless tobacco. He reports that he does not drink alcohol and does not use drugs.    Family History: family history includes Arthritis in his father; Diabetes in his father; Heart attack in his mother; Hypertension in his father.       Allergies:  Allergies   Allergen Reactions   • Adderall [Amphetamine-Dextroamphetamine] Shortness Of Breath, Swelling, Palpitations and Hallucinations   • Meloxicam Nausea Only   • Darvon [Propoxyphene] GI Intolerance   • Motrin [Ibuprofen] GI Intolerance   • Toradol [Ketorolac Tromethamine] GI Intolerance   • Tramadol GI Intolerance     Medications:  Prior to Admission medications    Medication Sig Start Date End Date Taking? Authorizing Provider   albuterol sulfate  (90 Base) MCG/ACT inhaler Inhale 2 puffs Every 4 (Four) Hours As Needed for Wheezing. 3/2/21  Yes Xiao  Liza Mistry DO   ALPRAZolam (XANAX) 1 MG tablet Take 1 tablet by mouth 2 (Two) Times a Day As Needed for Anxiety. 3/2/21  Yes Liza Hagen DO   ascorbic acid (VITAMIN C) 1000 MG tablet Take 0.5 tablets by mouth Daily. 1/4/21  Yes Eli Teresa APRN   atorvastatin (LIPITOR) 40 MG tablet TAKE 1 TABLET BY MOUTH EVERY DAY 3/23/21  Yes Liza Hagen DO   Cholecalciferol (Vitamin D) 50 MCG (2000 UT) tablet Take 2,000 Units by mouth Daily. 1/4/21 1/4/22 Yes Eli Teresa APRN   citalopram (CeleXA) 20 MG tablet Take 1 tablet by mouth Daily. 12/29/20  Yes Eli Teresa APRN   doxepin (SINEquan) 10 MG capsule Take 1-3 tabs at hs 3/2/21  Yes Liza Hagen DO   ipratropium-albuterol (Combivent Respimat)  MCG/ACT inhaler Inhale 1 puff 4 (Four) Times a Day. 10/2/20  Yes Liza Hagen, DO   methocarbamol (ROBAXIN) 500 MG tablet Take 1 tablet by mouth 4 (Four) Times a Day As Needed for Muscle Spasms. 12/1/20  Yes Liza Hagen, DO   metoprolol tartrate (LOPRESSOR) 50 MG tablet Take 1 tablet by mouth 2 (Two) Times a Day. 3/2/21  Yes Liza Hagen, DO   mometasone (Nasonex) 50 MCG/ACT nasal spray 2 sprays into the nostril(s) as directed by provider Daily. 10/2/20  Yes Liza Hagen, DO   ondansetron ODT (Zofran ODT) 4 MG disintegrating tablet Place 1 tablet on the tongue Every 8 (Eight) Hours As Needed for Nausea or Vomiting. 1/4/21  Yes Eli Teresa APRN   oxyCODONE-acetaminophen (PERCOCET)  MG per tablet Take 1 tablet by mouth Every 6 (Six) Hours As Needed for Moderate Pain . 2/26/21  Yes Liza Hagen DO   risperiDONE (RisperDAL) 1 MG tablet Take 1 tablet by mouth Every Night. 3/2/21  Yes Liza Hagen DO   Spacer/Aero-Holding Chambers (E-Z Spacer) inhaler Use as instructed 11/3/20 11/3/21 Yes Liza Hagen DO   TiZANidine (ZANAFLEX) 4 MG capsule Take 1 capsule by mouth 3 (Three) Times a Day. 1/27/21  Yes  "Xiao Liza FideliaDO   zinc gluconate 50 MG tablet Take 1 tablet by mouth Daily. 1/4/21  Yes Eli Teresa APRN       Objective     Vital Signs: BP (!) 151/116 (BP Location: Left arm, Patient Position: Sitting, Cuff Size: Adult)   Pulse 73   Temp 98.2 °F (36.8 °C) (Skin)   Resp 19   Ht 185.4 cm (72.99\")   Wt (!) 139 kg (305 lb 6.4 oz)   BMI 40.30 kg/m²   Physical Exam  Vitals and nursing note reviewed.   Constitutional:       Appearance: Normal appearance. He is obese. He is not toxic-appearing.   HENT:      Head: Normocephalic and atraumatic.      Right Ear: Tympanic membrane, ear canal and external ear normal.      Left Ear: Tympanic membrane, ear canal and external ear normal.      Nose: Nose normal.      Mouth/Throat:      Mouth: Mucous membranes are moist.      Comments: Hoarse   Eyes:      Extraocular Movements: Extraocular movements intact.      Conjunctiva/sclera: Conjunctivae normal.      Pupils: Pupils are equal, round, and reactive to light.   Cardiovascular:      Rate and Rhythm: Normal rate and regular rhythm.      Pulses: Normal pulses.      Heart sounds: Normal heart sounds.   Pulmonary:      Effort: Pulmonary effort is normal.   Abdominal:      General: Bowel sounds are normal.   Musculoskeletal:         General: Normal range of motion.      Cervical back: Normal range of motion.   Skin:     General: Skin is warm and dry.      Capillary Refill: Capillary refill takes less than 2 seconds.   Neurological:      General: No focal deficit present.      Mental Status: He is alert and oriented to person, place, and time.   Psychiatric:         Mood and Affect: Mood normal.         Behavior: Behavior normal.         Patient's Body mass index is 40.3 kg/m².       Results Reviewed:  BUN   Date Value Ref Range Status   10/23/2019 13 6 - 24 mg/dL Final     Creatinine   Date Value Ref Range Status   10/23/2019 0.95 0.76 - 1.27 mg/dL Final     Sodium   Date Value Ref Range Status   10/23/2019 " 141 134 - 144 mmol/L Final     Potassium   Date Value Ref Range Status   10/23/2019 4.3 3.5 - 5.2 mmol/L Final     Chloride   Date Value Ref Range Status   10/23/2019 104 96 - 106 mmol/L Final     Total CO2   Date Value Ref Range Status   10/23/2019 22 20 - 29 mmol/L Final     Calcium   Date Value Ref Range Status   10/23/2019 9.5 8.7 - 10.2 mg/dL Final     ALT (SGPT)   Date Value Ref Range Status   10/23/2019 23 0 - 44 IU/L Final     AST (SGOT)   Date Value Ref Range Status   10/23/2019 16 0 - 40 IU/L Final     WBC   Date Value Ref Range Status   10/23/2019 7.2 3.4 - 10.8 x10E3/uL Final     Hematocrit   Date Value Ref Range Status   10/23/2019 46.0 37.5 - 51.0 % Final     Platelets   Date Value Ref Range Status   10/23/2019 225 150 - 450 x10E3/uL Final     Triglycerides   Date Value Ref Range Status   10/23/2019 173 (H) 0 - 149 mg/dL Final     HDL Cholesterol   Date Value Ref Range Status   10/23/2019 42 >39 mg/dL Final     LDL Cholesterol    Date Value Ref Range Status   10/23/2019 182 (H) 0 - 99 mg/dL Final         Assessment / Plan     Assessment/Plan:  1. Essential hypertension    - Comprehensive metabolic panel    2. Mixed hyperlipidemia    - Lipid Panel    3. Hoarseness of voice    - CBC w AUTO Differential  - TSH    4. Anxiety    - ALPRAZolam (XANAX) 1 MG tablet; Take 1 tablet by mouth 2 (Two) Times a Day As Needed for Anxiety.  Dispense: 60 tablet; Refill: 0    5. Non-intractable vomiting with nausea, unspecified vomiting type    - ondansetron ODT (Zofran ODT) 4 MG disintegrating tablet; Place 1 tablet on the tongue Every 8 (Eight) Hours As Needed for Nausea or Vomiting.  Dispense: 30 tablet; Refill: 3    6. Chronic pain due to trauma    - oxyCODONE-acetaminophen (PERCOCET)  MG per tablet; Take 1 tablet by mouth Every 6 (Six) Hours As Needed for Moderate Pain .  Dispense: 120 tablet; Refill: 0    Encouraged patient to follow through with evaluation of vocal cord mass.       Return in about 4 weeks  (around 4/27/2021). unless patient needs to be seen sooner or acute issues arise.      I have discussed the patient results/orders and and plan/recommendation with them at today's visit.      Liza Hagen,    03/30/2021

## 2021-03-31 ENCOUNTER — TELEPHONE (OUTPATIENT)
Dept: INTERNAL MEDICINE | Facility: CLINIC | Age: 46
End: 2021-03-31

## 2021-03-31 LAB
ALBUMIN SERPL-MCNC: 4.7 G/DL (ref 4–5)
ALBUMIN/GLOB SERPL: 1.7 {RATIO} (ref 1.2–2.2)
ALP SERPL-CCNC: 78 IU/L (ref 39–117)
ALT SERPL-CCNC: 27 IU/L (ref 0–44)
AST SERPL-CCNC: 21 IU/L (ref 0–40)
BASOPHILS # BLD AUTO: 0.1 X10E3/UL (ref 0–0.2)
BASOPHILS NFR BLD AUTO: 1 %
BILIRUB SERPL-MCNC: 0.2 MG/DL (ref 0–1.2)
BUN SERPL-MCNC: 16 MG/DL (ref 6–24)
BUN/CREAT SERPL: 21 (ref 9–20)
CALCIUM SERPL-MCNC: 9.2 MG/DL (ref 8.7–10.2)
CHLORIDE SERPL-SCNC: 106 MMOL/L (ref 96–106)
CHOLEST SERPL-MCNC: 221 MG/DL (ref 100–199)
CO2 SERPL-SCNC: 20 MMOL/L (ref 20–29)
CREAT SERPL-MCNC: 0.78 MG/DL (ref 0.76–1.27)
EOSINOPHIL # BLD AUTO: 0.2 X10E3/UL (ref 0–0.4)
EOSINOPHIL NFR BLD AUTO: 3 %
ERYTHROCYTE [DISTWIDTH] IN BLOOD BY AUTOMATED COUNT: 13.1 % (ref 11.6–15.4)
GLOBULIN SER CALC-MCNC: 2.8 G/DL (ref 1.5–4.5)
GLUCOSE SERPL-MCNC: 119 MG/DL (ref 65–99)
HCT VFR BLD AUTO: 48.1 % (ref 37.5–51)
HDLC SERPL-MCNC: 40 MG/DL
HGB BLD-MCNC: 16.1 G/DL (ref 13–17.7)
IMM GRANULOCYTES # BLD AUTO: 0 X10E3/UL (ref 0–0.1)
IMM GRANULOCYTES NFR BLD AUTO: 0 %
LDLC SERPL CALC-MCNC: 122 MG/DL (ref 0–99)
LYMPHOCYTES # BLD AUTO: 1.8 X10E3/UL (ref 0.7–3.1)
LYMPHOCYTES NFR BLD AUTO: 24 %
MCH RBC QN AUTO: 29.2 PG (ref 26.6–33)
MCHC RBC AUTO-ENTMCNC: 33.5 G/DL (ref 31.5–35.7)
MCV RBC AUTO: 87 FL (ref 79–97)
MONOCYTES # BLD AUTO: 0.5 X10E3/UL (ref 0.1–0.9)
MONOCYTES NFR BLD AUTO: 7 %
NEUTROPHILS # BLD AUTO: 5.1 X10E3/UL (ref 1.4–7)
NEUTROPHILS NFR BLD AUTO: 65 %
PLATELET # BLD AUTO: 263 X10E3/UL (ref 150–450)
POTASSIUM SERPL-SCNC: 4.7 MMOL/L (ref 3.5–5.2)
PROT SERPL-MCNC: 7.5 G/DL (ref 6–8.5)
RBC # BLD AUTO: 5.52 X10E6/UL (ref 4.14–5.8)
SODIUM SERPL-SCNC: 141 MMOL/L (ref 134–144)
TRIGL SERPL-MCNC: 338 MG/DL (ref 0–149)
TSH SERPL DL<=0.005 MIU/L-ACNC: 1.72 UIU/ML (ref 0.45–4.5)
VLDLC SERPL CALC-MCNC: 59 MG/DL (ref 5–40)
WBC # BLD AUTO: 7.7 X10E3/UL (ref 3.4–10.8)

## 2021-03-31 NOTE — TELEPHONE ENCOUNTER
----- Message from Liza Hagen DO sent at 3/31/2021 10:07 AM CDT -----  Sugar is a little elevated   recommend a1c  Cholesterol is better but triglycerides are worse  watch diet.  Cbc and thyroid normal

## 2021-04-23 RX ORDER — DOXEPIN HYDROCHLORIDE 10 MG/1
CAPSULE ORAL
Qty: 90 CAPSULE | Refills: 3 | Status: SHIPPED | OUTPATIENT
Start: 2021-04-23 | End: 2021-06-29 | Stop reason: SDUPTHER

## 2021-04-29 DIAGNOSIS — R06.02 SHORTNESS OF BREATH: ICD-10-CM

## 2021-04-29 DIAGNOSIS — G89.21 CHRONIC PAIN DUE TO TRAUMA: ICD-10-CM

## 2021-04-29 RX ORDER — OXYCODONE AND ACETAMINOPHEN 10; 325 MG/1; MG/1
1 TABLET ORAL EVERY 6 HOURS PRN
Qty: 120 TABLET | Refills: 0 | Status: SHIPPED | OUTPATIENT
Start: 2021-04-29 | End: 2021-06-01 | Stop reason: SDUPTHER

## 2021-04-29 RX ORDER — IPRATROPIUM/ALBUTEROL SULFATE 20-100 MCG
1 MIST INHALER (GRAM) INHALATION 4 TIMES DAILY
Qty: 1 EACH | Refills: 3 | Status: SHIPPED | OUTPATIENT
Start: 2021-04-29 | End: 2021-06-29 | Stop reason: SDUPTHER

## 2021-04-29 RX ORDER — ALBUTEROL SULFATE 90 UG/1
2 AEROSOL, METERED RESPIRATORY (INHALATION) EVERY 4 HOURS PRN
Qty: 18 G | Refills: 3 | Status: SHIPPED | OUTPATIENT
Start: 2021-04-29 | End: 2021-06-29 | Stop reason: SDUPTHER

## 2021-04-29 RX ORDER — RISPERIDONE 1 MG/1
1 TABLET ORAL NIGHTLY
Qty: 30 TABLET | Refills: 3 | Status: SHIPPED | OUTPATIENT
Start: 2021-04-29 | End: 2021-06-01 | Stop reason: SDUPTHER

## 2021-04-29 NOTE — TELEPHONE ENCOUNTER
Caller: Yonatanjered Clyde S    Relationship: Self    Best call back number: [phone]    Medication needed:   Requested Prescriptions     Pending Prescriptions Disp Refills   • oxyCODONE-acetaminophen (PERCOCET)  MG per tablet 120 tablet 0     Sig: Take 1 tablet by mouth Every 6 (Six) Hours As Needed for Moderate Pain .   • albuterol sulfate  (90 Base) MCG/ACT inhaler 18 g 3     Sig: Inhale 2 puffs Every 4 (Four) Hours As Needed for Wheezing.   • ipratropium-albuterol (Combivent Respimat)  MCG/ACT inhaler 1 each 3     Sig: Inhale 1 puff 4 (Four) Times a Day.   • risperiDONE (RisperDAL) 1 MG tablet 30 tablet 3     Sig: Take 1 tablet by mouth Every Night.       When do you need the refill by: ASAP    What additional details did the patient provide when requesting the medication: Completely out.     Does the patient have less than a 3 day supply:  [x] Yes  [] No    What is the patient's preferred pharmacy: [facility]ACY #[id number] - [place] - [address] [phone] [facility] [phone]

## 2021-04-30 ENCOUNTER — OFFICE VISIT (OUTPATIENT)
Dept: INTERNAL MEDICINE | Facility: CLINIC | Age: 46
End: 2021-04-30

## 2021-04-30 VITALS
WEIGHT: 283.6 LBS | OXYGEN SATURATION: 99 % | TEMPERATURE: 97.8 F | HEART RATE: 67 BPM | RESPIRATION RATE: 18 BRPM | DIASTOLIC BLOOD PRESSURE: 90 MMHG | SYSTOLIC BLOOD PRESSURE: 139 MMHG | BODY MASS INDEX: 37.59 KG/M2 | HEIGHT: 73 IN

## 2021-04-30 DIAGNOSIS — Z79.899 LONG TERM USE OF DRUG: ICD-10-CM

## 2021-04-30 DIAGNOSIS — L02.419 ABSCESS OF ARM: ICD-10-CM

## 2021-04-30 DIAGNOSIS — S40.862A INSECT BITE OF LEFT UPPER ARM, INITIAL ENCOUNTER: ICD-10-CM
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
## 2021-04-30 DIAGNOSIS — R10.10 PAIN OF UPPER ABDOMEN: Primary | ICD-10-CM

## 2021-04-30 DIAGNOSIS — R73.09 ELEVATED GLUCOSE LEVEL: ICD-10-CM

## 2021-04-30 LAB — HBA1C MFR BLD: 5.6 %

## 2021-04-30 PROCEDURE — 10060 I&D ABSCESS SIMPLE/SINGLE: CPT | Performed by: FAMILY MEDICINE

## 2021-04-30 PROCEDURE — 99214 OFFICE O/P EST MOD 30 MIN: CPT | Performed by: FAMILY MEDICINE

## 2021-04-30 PROCEDURE — 83036 HEMOGLOBIN GLYCOSYLATED A1C: CPT | Performed by: FAMILY MEDICINE

## 2021-04-30 RX ORDER — SULFAMETHOXAZOLE AND TRIMETHOPRIM 800; 160 MG/1; MG/1
1 TABLET ORAL 2 TIMES DAILY
Qty: 20 TABLET | Refills: 0 | Status: SHIPPED | OUTPATIENT
Start: 2021-04-30 | End: 2021-07-27

## 2021-04-30 NOTE — PROGRESS NOTES
Subjective     Chief Complaint   Patient presents with   • Hypertension     Follow up.    • Insect Bite     Wasp sting       History of Present Illness  Stung by wasps. One on bilateral arms.   Now with puss.  Has been draining a little.  No fever or chills. Painful.  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   Overall doing well with current medications.  Pain is tolerable.  Back has been giving him some more problems.      Patient's PMR from outside medical facility reviewed and noted.    Review of Systems     Otherwise complete ROS reviewed and negative except as mentioned in the HPI.    Past Medical History:   Past Medical History:   Diagnosis Date   • Anxiety    • Arthritis    • Bipolar 1 disorder (CMS/HCC)    • Hypertension    • PTSD (post-traumatic stress disorder)      Past Surgical History:  Past Surgical History:   Procedure Laterality Date   • HIP SURGERY       Social History:  reports that he has been smoking cigarettes. He has a 15.00 pack-year smoking history. He has never used smokeless tobacco. He reports that he does not drink alcohol and does not use drugs.    Family History: family history includes Arthritis in his father; Diabetes in his father; Heart attack in his mother; Hypertension in his father.       Allergies:  Allergies   Allergen Reactions   • Adderall [Amphetamine-Dextroamphetamine] Shortness Of Breath, Swelling, Palpitations and Hallucinations   • Meloxicam Nausea Only   • Darvon [Propoxyphene] GI Intolerance   • Motrin [Ibuprofen] GI Intolerance   • Toradol [Ketorolac Tromethamine] GI Intolerance   • Tramadol GI Intolerance     Medications:  Prior to Admission medications    Medication Sig Start Date End Date Taking? Authorizing Provider   albuterol sulfate  (90 Base) MCG/ACT inhaler Inhale 2 puffs Every 4 (Four) Hours As Needed for Wheezing. 4/29/21  Yes Liza Hagen DO   ALPRAZolam (XANAX) 1 MG tablet Take 1  tablet by mouth 2 (Two) Times a Day As Needed for Anxiety. 3/30/21  Yes Liza Hagen, DO   ascorbic acid (VITAMIN C) 1000 MG tablet Take 0.5 tablets by mouth Daily. 4/29/21  Liza Henriquez, DO   atorvastatin (LIPITOR) 40 MG tablet Take 1 tablet by mouth Daily. 3/30/21  Yes Liza Hagen, DO   citalopram (CeleXA) 20 MG tablet Take 1 tablet by mouth Daily. 3/30/21  Liza Henriquez, DO   doxepin (SINEquan) 10 MG capsule Take 1-3 tabs at hs 4/23/21  Yes Liza Hagen, DO   ipratropium-albuterol (Combivent Respimat)  MCG/ACT inhaler Inhale 1 puff 4 (Four) Times a Day. 4/29/21  Liza Henriquez DO   methylPREDNISolone (MEDROL) 4 MG dose pack Take as directed on package instructions. 3/30/21  Liza Henriquez,    metoprolol tartrate (LOPRESSOR) 50 MG tablet Take 1 tablet by mouth 2 (Two) Times a Day. 3/2/21  Yes Liza Hagen, DO   mometasone (Nasonex) 50 MCG/ACT nasal spray 2 sprays into the nostril(s) as directed by provider Daily. 3/30/21  Yes Liza Hagen DO   ondansetron ODT (Zofran ODT) 4 MG disintegrating tablet Place 1 tablet on the tongue Every 8 (Eight) Hours As Needed for Nausea or Vomiting. 3/30/21  Liza Henriquez,    oxyCODONE-acetaminophen (PERCOCET)  MG per tablet Take 1 tablet by mouth Every 6 (Six) Hours As Needed for Moderate Pain . 4/29/21  Liza Henriquez DO   risperiDONE (RisperDAL) 1 MG tablet Take 1 tablet by mouth Every Night. 4/29/21  Liza Henriquez, DO   Spacer/Aero-Holding Chambers (E-Z Spacer) inhaler Use as instructed 11/3/20 11/3/21 Liza Henriquez DO   TiZANidine (ZANAFLEX) 4 MG capsule Take 1 capsule by mouth 3 (Three) Times a Day. 3/30/21  Yes Liza Hagen DO   zinc gluconate 50 MG tablet Take 1 tablet by mouth Daily. 1/4/21  Yes Eli Teresa APRN       Objective     Vital Signs: /90 (BP Location: Right arm, Patient Position: Sitting, Cuff Size:  "Large Adult)   Pulse 67   Temp 97.8 °F (36.6 °C) (Skin)   Resp 18   Ht 185.4 cm (73\")   Wt 129 kg (283 lb 9.6 oz)   SpO2 99%   BMI 37.42 kg/m²   Physical Exam  Vitals and nursing note reviewed.   Constitutional:       General: He is not in acute distress.     Appearance: Normal appearance. He is not ill-appearing.   HENT:      Head: Normocephalic and atraumatic.      Right Ear: Tympanic membrane, ear canal and external ear normal.      Left Ear: Tympanic membrane, ear canal and external ear normal.      Nose: Nose normal.      Mouth/Throat:      Mouth: Mucous membranes are moist.      Pharynx: Oropharynx is clear.   Eyes:      Extraocular Movements: Extraocular movements intact.      Conjunctiva/sclera: Conjunctivae normal.      Pupils: Pupils are equal, round, and reactive to light.   Cardiovascular:      Rate and Rhythm: Normal rate and regular rhythm.      Pulses: Normal pulses.      Heart sounds: Normal heart sounds.   Pulmonary:      Effort: Pulmonary effort is normal.      Breath sounds: Normal breath sounds.   Abdominal:      General: Bowel sounds are normal.      Palpations: Abdomen is soft.   Musculoskeletal:      Cervical back: Normal range of motion and neck supple.      Comments: Moves all extremities   Skin:     General: Skin is warm and dry.      Capillary Refill: Capillary refill takes less than 2 seconds.      Comments: There are 2 abscesses. 1 on each forearm.  The one in the right forearm near the antecubital space notes approximately 1 inch in diameter. The one on the left forearm is more mid forearm and medial it is 2 inches in diameter it is fluctuant in the middle then does have an open area distally. It is markedly tender to palpation.   Neurological:      General: No focal deficit present.      Mental Status: He is alert and oriented to person, place, and time.   Psychiatric:         Mood and Affect: Mood normal.         Behavior: Behavior normal.         Patient's Body mass index is " 37.42 kg/m².     Incision & Drainage    Date/Time: 4/30/2021 1:09 PM  Performed by: Liza Hagen DO  Authorized by: Liza Hagen DO   Type: abscess  Body area: upper extremity  Location details: left arm  Anesthesia: local infiltration    Anesthesia:  Local Anesthetic: lidocaine 1% without epinephrine  Anesthetic total: 2 mL    Sedation:  Patient sedated: no    Scalpel size: 11  Incision type: single straight  Drainage: serosanguinous  Drainage amount: scant  Wound treatment: wound left open  Packing material: none  Patient tolerance: patient tolerated the procedure well with no immediate complications  Comments: cleansed with betadine.        hgb a1c 5.6      Results Reviewed:  BUN   Date Value Ref Range Status   03/30/2021 16 6 - 24 mg/dL Final     Creatinine   Date Value Ref Range Status   03/30/2021 0.78 0.76 - 1.27 mg/dL Final     Sodium   Date Value Ref Range Status   03/30/2021 141 134 - 144 mmol/L Final     Potassium   Date Value Ref Range Status   03/30/2021 4.7 3.5 - 5.2 mmol/L Final     Chloride   Date Value Ref Range Status   03/30/2021 106 96 - 106 mmol/L Final     Total CO2   Date Value Ref Range Status   03/30/2021 20 20 - 29 mmol/L Final     Calcium   Date Value Ref Range Status   03/30/2021 9.2 8.7 - 10.2 mg/dL Final     ALT (SGPT)   Date Value Ref Range Status   03/30/2021 27 0 - 44 IU/L Final     AST (SGOT)   Date Value Ref Range Status   03/30/2021 21 0 - 40 IU/L Final     WBC   Date Value Ref Range Status   03/30/2021 7.7 3.4 - 10.8 x10E3/uL Final     Hematocrit   Date Value Ref Range Status   03/30/2021 48.1 37.5 - 51.0 % Final     Platelets   Date Value Ref Range Status   03/30/2021 263 150 - 450 x10E3/uL Final     Triglycerides   Date Value Ref Range Status   03/30/2021 338 (H) 0 - 149 mg/dL Final     HDL Cholesterol   Date Value Ref Range Status   03/30/2021 40 >39 mg/dL Final     LDL Chol Calc (NIH)   Date Value Ref Range Status   03/30/2021 122 (H) 0 - 99 mg/dL Final                 Assessment / Plan     Assessment/Plan:  1. Pain of upper abdomen    - CT Abdomen Pelvis Without Contrast; Future    2. Insect bite of left upper arm, initial encounter    - Anaerobic & Aerobic Culture ([facility] Only) - Swab, Arm, Left    3. Long term use of drug    - ToxASSURE Select 13 (MW) - Urine, Clean Catch    4. Abscess of arm    - Incision & Drainage  Bactrim ds po bid 10 days  Increase oral fluids.         Return in about 4 weeks (around 5/28/2021). unless patient needs to be seen sooner or acute issues arise.        I have discussed the patient results/orders and and plan/recommendation with them at today's visit.      Liza Hagen, DO   04/30/2021

## 2021-05-03 DIAGNOSIS — F41.9 ANXIETY: ICD-10-CM

## 2021-05-03 NOTE — TELEPHONE ENCOUNTER
Caller: Clyde Tamayo    Relationship: Self    Best call back number: [phone]    Medication needed:   Requested Prescriptions     Pending Prescriptions Disp Refills   • ALPRAZolam (XANAX) 1 MG tablet 60 tablet 0     Sig: Take 1 tablet by mouth 2 (Two) Times a Day As Needed for Anxiety.   • TiZANidine (ZANAFLEX) 4 MG capsule 90 capsule 3     Sig: Take 1 capsule by mouth 3 (Three) Times a Day.       When do you need the refill by: 5/3/21    Does the patient have less than a 3 day supply:  [x] Yes  [] No    What is the patient's preferred pharmacy: [facility]/PHARMACY #[id number] - [place] [phone] [facility] [phone]

## 2021-05-04 LAB
BACTERIA SPEC AEROBE CULT: NORMAL
BACTERIA SPEC ANAEROBE CULT: NORMAL
BACTERIA SPEC CULT: NORMAL
BACTERIA SPEC CULT: NORMAL

## 2021-05-04 RX ORDER — TIZANIDINE HYDROCHLORIDE 4 MG/1
4 CAPSULE, GELATIN COATED ORAL 3 TIMES DAILY
Qty: 90 CAPSULE | Refills: 3 | Status: SHIPPED | OUTPATIENT
Start: 2021-05-04 | End: 2021-05-06

## 2021-05-04 RX ORDER — ALPRAZOLAM 1 MG/1
1 TABLET ORAL 2 TIMES DAILY PRN
Qty: 60 TABLET | Refills: 0 | Status: SHIPPED | OUTPATIENT
Start: 2021-05-04 | End: 2021-06-01 | Stop reason: SDUPTHER

## 2021-05-05 ENCOUNTER — TELEPHONE (OUTPATIENT)
Dept: INTERNAL MEDICINE | Facility: CLINIC | Age: 46
End: 2021-05-05

## 2021-05-05 NOTE — TELEPHONE ENCOUNTER
Attempted to call patient to inform him of no growth on culture. No answer. VM box full. Will try again at a later time.

## 2021-05-06 ENCOUNTER — TELEPHONE (OUTPATIENT)
Dept: INTERNAL MEDICINE | Facility: CLINIC | Age: 46
End: 2021-05-06

## 2021-05-06 LAB — DRUGS UR: NORMAL

## 2021-05-06 NOTE — TELEPHONE ENCOUNTER
Caller: Clyde Tamayo    Relationship: Self    Best call back number: [phone]    What is the best time to reach you:   ANYTIME    Who are you requesting to speak with (clinical staff, provider,  specific staff member):   PCP OR NURSE    Do you know the name of the person who called:   CLYDE TAMAYO    What was the call regarding:   PATIENT REQUESTED TO HAVE PCP OR NURSE CALL PHARMACY TO ADVISE THAT HE CAN BE PRESCRIBED ALPRAZolam (XANAX) 1 MG tablet AND THAT HE NO LONGER TAKES TiZANidine (ZANAFLEX) 4 MG capsule    Do you require a callback:   YES

## 2021-06-01 DIAGNOSIS — G89.21 CHRONIC PAIN DUE TO TRAUMA: ICD-10-CM

## 2021-06-01 DIAGNOSIS — F41.9 ANXIETY: ICD-10-CM

## 2021-06-01 NOTE — TELEPHONE ENCOUNTER
Caller: Clyde Tamayo S    Relationship: Self    Best call back number: [phone]    Medication needed:   Requested Prescriptions     Pending Prescriptions Disp Refills   • risperiDONE (RisperDAL) 1 MG tablet 30 tablet 3     Sig: Take 1 tablet by mouth Every Night.   • ALPRAZolam (XANAX) 1 MG tablet 60 tablet 0     Sig: Take 1 tablet by mouth 2 (Two) Times a Day As Needed for Anxiety.   • oxyCODONE-acetaminophen (PERCOCET)  MG per tablet 120 tablet 0     Sig: Take 1 tablet by mouth Every 6 (Six) Hours As Needed for Moderate Pain .       When do you need the refill by: 06/01/2021    What additional details did the patient provide when requesting the medication: ALMOST OUT, PATIENT HAS UPCOMING APPOINTMENT ON 06/04/2021.    Does the patient have less than a 3 day supply:  [x] Yes  [] No    What is the patient's preferred pharmacy: [facility]/PHARMACY #[id number] - [place] - [address] [facility] [phone] [facility] [phone]

## 2021-06-02 ENCOUNTER — OFFICE VISIT (OUTPATIENT)
Dept: INTERNAL MEDICINE | Facility: CLINIC | Age: 46
End: 2021-06-02

## 2021-06-02 VITALS
SYSTOLIC BLOOD PRESSURE: 147 MMHG | RESPIRATION RATE: 18 BRPM | WEIGHT: 283 LBS | DIASTOLIC BLOOD PRESSURE: 100 MMHG | OXYGEN SATURATION: 99 % | TEMPERATURE: 97.8 F | HEART RATE: 83 BPM | BODY MASS INDEX: 37.51 KG/M2 | HEIGHT: 73 IN

## 2021-06-02 DIAGNOSIS — Z79.899 LONG TERM USE OF DRUG: ICD-10-CM

## 2021-06-02 DIAGNOSIS — M79.641 PAIN IN RIGHT HAND: ICD-10-CM

## 2021-06-02 DIAGNOSIS — M25.561 ACUTE PAIN OF RIGHT KNEE: Primary | ICD-10-CM

## 2021-06-02 PROCEDURE — 99214 OFFICE O/P EST MOD 30 MIN: CPT | Performed by: FAMILY MEDICINE

## 2021-06-02 RX ORDER — RISPERIDONE 1 MG/1
1 TABLET ORAL NIGHTLY
Qty: 30 TABLET | Refills: 3 | Status: SHIPPED | OUTPATIENT
Start: 2021-06-02 | End: 2021-06-29 | Stop reason: SDUPTHER

## 2021-06-02 RX ORDER — ALPRAZOLAM 1 MG/1
1 TABLET ORAL 2 TIMES DAILY PRN
Qty: 60 TABLET | Refills: 0 | Status: SHIPPED | OUTPATIENT
Start: 2021-06-02 | End: 2021-06-29 | Stop reason: SDUPTHER

## 2021-06-02 RX ORDER — OXYCODONE AND ACETAMINOPHEN 10; 325 MG/1; MG/1
1 TABLET ORAL EVERY 6 HOURS PRN
Qty: 120 TABLET | Refills: 0 | Status: SHIPPED | OUTPATIENT
Start: 2021-06-02 | End: 2021-06-29 | Stop reason: SDUPTHER

## 2021-06-02 NOTE — PROGRESS NOTES
Subjective     Chief Complaint   Patient presents with   • Hand Pain     Swelling and pain from injury last week.    • Med Refill     Follow up.        History of Present Illness  Presents today for follow up for pain medications.  He missed his appointment earlier this week.   He state he was camping.  He injured his right hand and knee while camping.  Hit by tree his friend was cutting down for fire wood.  He has been having problems getting in touch with Dr Qurioz. And setting up his appointment for treating his larynx lesion.    Patient's PMR from outside medical facility reviewed and noted.    Review of Systems     Otherwise complete ROS reviewed and negative except as mentioned in the HPI.    Past Medical History:   Past Medical History:   Diagnosis Date   • Anxiety    • Arthritis    • Bipolar 1 disorder (CMS/HCC)    • Hypertension    • PTSD (post-traumatic stress disorder)      Past Surgical History:  Past Surgical History:   Procedure Laterality Date   • HIP SURGERY       Social History:  reports that he has been smoking cigarettes. He has a 15.00 pack-year smoking history. He has never used smokeless tobacco. He reports that he does not drink alcohol and does not use drugs.    Family History: family history includes Arthritis in his father; Diabetes in his father; Heart attack in his mother; Hypertension in his father.       Allergies:  Allergies   Allergen Reactions   • Adderall [Amphetamine-Dextroamphetamine] Shortness Of Breath, Swelling, Palpitations and Hallucinations   • Meloxicam Nausea Only   • Darvon [Propoxyphene] GI Intolerance   • Motrin [Ibuprofen] GI Intolerance   • Toradol [Ketorolac Tromethamine] GI Intolerance   • Tramadol GI Intolerance     Medications:  Prior to Admission medications    Medication Sig Start Date End Date Taking? Authorizing Provider   albuterol sulfate  (90 Base) MCG/ACT inhaler Inhale 2 puffs Every 4 (Four) Hours As Needed for Wheezing. 4/29/21  Yes Xiao  Liza Mistry, DO   ALPRAZolam (XANAX) 1 MG tablet Take 1 tablet by mouth 2 (Two) Times a Day As Needed for Anxiety. 5/4/21  Yes Liza Hagen, DO   ascorbic acid (VITAMIN C) 1000 MG tablet Take 0.5 tablets by mouth Daily. 4/29/21  Yes Liza Hagen, DO   atorvastatin (LIPITOR) 40 MG tablet Take 1 tablet by mouth Daily. 3/30/21  Yes Liza Hagen, DO   citalopram (CeleXA) 20 MG tablet Take 1 tablet by mouth Daily. 3/30/21  Liza Henriquez, DO   doxepin (SINEquan) 10 MG capsule Take 1-3 tabs at hs 4/23/21  Yes Liza Hagen, DO   ipratropium-albuterol (Combivent Respimat)  MCG/ACT inhaler Inhale 1 puff 4 (Four) Times a Day. 4/29/21  Yes Liza Hagen, DO   metoprolol tartrate (LOPRESSOR) 50 MG tablet Take 1 tablet by mouth 2 (Two) Times a Day. 3/2/21  Yes Liza Hagen, DO   mometasone (Nasonex) 50 MCG/ACT nasal spray 2 sprays into the nostril(s) as directed by provider Daily. 3/30/21  Yes Liza Hagen, DO   ondansetron ODT (Zofran ODT) 4 MG disintegrating tablet Place 1 tablet on the tongue Every 8 (Eight) Hours As Needed for Nausea or Vomiting. 3/30/21  Yes Liza Hagen, DO   oxyCODONE-acetaminophen (PERCOCET)  MG per tablet Take 1 tablet by mouth Every 6 (Six) Hours As Needed for Moderate Pain . 4/29/21  Liza Henriquez, DO   risperiDONE (RisperDAL) 1 MG tablet Take 1 tablet by mouth Every Night. 4/29/21  Liza Henriquez, DO   Spacer/Aero-Holding Chambers (E-Z Spacer) inhaler Use as instructed 11/3/20 11/3/21 Yes Liza Hagen, DO   sulfamethoxazole-trimethoprim (Bactrim DS) 800-160 MG per tablet Take 1 tablet by mouth 2 (Two) Times a Day. 4/30/21  Yes Liza Hagen DO   zinc gluconate 50 MG tablet Take 1 tablet by mouth Daily. 1/4/21  Yes Eli Teresa APRN       Objective     Vital Signs: /100 (BP Location: Right arm, Patient Position: Sitting, Cuff Size: Large Adult)   Pulse 83   Temp 97.8 °F  "(36.6 °C) (Skin)   Resp 18   Ht 185.4 cm (73\")   Wt 128 kg (283 lb)   SpO2 99%   BMI 37.34 kg/m²   Physical Exam  Vitals and nursing note reviewed.   Constitutional:       Appearance: Normal appearance. He is obese.   HENT:      Head: Normocephalic and atraumatic.      Right Ear: Tympanic membrane, ear canal and external ear normal.      Left Ear: Tympanic membrane, ear canal and external ear normal.      Nose: Nose normal.      Mouth/Throat:      Mouth: Mucous membranes are moist.      Pharynx: Oropharynx is clear.   Eyes:      Extraocular Movements: Extraocular movements intact.      Conjunctiva/sclera: Conjunctivae normal.      Pupils: Pupils are equal, round, and reactive to light.   Cardiovascular:      Rate and Rhythm: Normal rate and regular rhythm.      Pulses: Normal pulses.      Heart sounds: Normal heart sounds.   Pulmonary:      Effort: Pulmonary effort is normal.      Breath sounds: Normal breath sounds.   Abdominal:      General: Bowel sounds are normal.      Palpations: Abdomen is soft.   Musculoskeletal:      Cervical back: Normal range of motion and neck supple.      Comments: Moves all extremities.  Pain with palpation over the right hand mp joints.  Pain at right knee with bruising.  Mild swelling.  Using cane.   Skin:     General: Skin is warm and dry.      Capillary Refill: Capillary refill takes less than 2 seconds.   Neurological:      General: No focal deficit present.      Mental Status: He is alert and oriented to person, place, and time.   Psychiatric:         Mood and Affect: Mood normal.         Behavior: Behavior normal.         Thought Content: Thought content normal.         Judgment: Judgment normal.         Patient's Body mass index is 37.34 kg/m². indicating that he is obese (BMI >30). Obesity-related health conditions include the following: dyslipidemias. Obesity is unchanged. BMI is is above average; BMI management plan is completed. We discussed portion control and " increasing exercise..      Results Reviewed:  BUN   Date Value Ref Range Status   03/30/2021 16 6 - 24 mg/dL Final     Creatinine   Date Value Ref Range Status   03/30/2021 0.78 0.76 - 1.27 mg/dL Final     Sodium   Date Value Ref Range Status   03/30/2021 141 134 - 144 mmol/L Final     Potassium   Date Value Ref Range Status   03/30/2021 4.7 3.5 - 5.2 mmol/L Final     Chloride   Date Value Ref Range Status   03/30/2021 106 96 - 106 mmol/L Final     Total CO2   Date Value Ref Range Status   03/30/2021 20 20 - 29 mmol/L Final     Calcium   Date Value Ref Range Status   03/30/2021 9.2 8.7 - 10.2 mg/dL Final     ALT (SGPT)   Date Value Ref Range Status   03/30/2021 27 0 - 44 IU/L Final     AST (SGOT)   Date Value Ref Range Status   03/30/2021 21 0 - 40 IU/L Final     WBC   Date Value Ref Range Status   03/30/2021 7.7 3.4 - 10.8 x10E3/uL Final     Hematocrit   Date Value Ref Range Status   03/30/2021 48.1 37.5 - 51.0 % Final     Platelets   Date Value Ref Range Status   03/30/2021 263 150 - 450 x10E3/uL Final     Triglycerides   Date Value Ref Range Status   03/30/2021 338 (H) 0 - 149 mg/dL Final     HDL Cholesterol   Date Value Ref Range Status   03/30/2021 40 >39 mg/dL Final     LDL Chol Calc (NIH)   Date Value Ref Range Status   03/30/2021 122 (H) 0 - 99 mg/dL Final     Hemoglobin A1C   Date Value Ref Range Status   04/30/2021 5.6 % Final         Assessment / Plan     Assessment/Plan:  1. Acute pain of right knee    - XR Knee 1 or 2 View Right (In Office)  No acute fracture    2. Pain in right hand    - XR Hand 3+ View Right (In Office)  No acute fracture    3. Long term use of drug    - ToxASSURE Select 13 (MW) - Urine, Clean Catch        Return in about 4 weeks (around 6/30/2021). unless patient needs to be seen sooner or acute issues arise.        I have discussed the patient results/orders and and plan/recommendation with them at today's visit.      Liza Hagen,    06/02/2021

## 2021-06-09 ENCOUNTER — HOSPITAL ENCOUNTER (EMERGENCY)
Age: 46
Discharge: HOME | End: 2021-06-09
Attending: EMERGENCY MEDICINE
Payer: MEDICARE

## 2021-06-09 VITALS
OXYGEN SATURATION: 98 % | TEMPERATURE: 97.6 F | SYSTOLIC BLOOD PRESSURE: 113 MMHG | HEIGHT: 72 IN | BODY MASS INDEX: 38.33 KG/M2 | WEIGHT: 283 LBS | RESPIRATION RATE: 18 BRPM | DIASTOLIC BLOOD PRESSURE: 62 MMHG | HEART RATE: 75 BPM

## 2021-06-09 DIAGNOSIS — R55 NEAR SYNCOPE: ICD-10-CM

## 2021-06-09 DIAGNOSIS — F43.0 ACUTE STRESS REACTION: Primary | ICD-10-CM

## 2021-06-09 LAB
ALBUMIN SERPL-MCNC: 4 G/DL (ref 3.5–5.2)
ALP BLD-CCNC: 64 U/L (ref 40–130)
ALT SERPL-CCNC: 20 U/L (ref 5–41)
AMPHETAMINE SCREEN, URINE: POSITIVE
ANION GAP SERPL CALCULATED.3IONS-SCNC: 9 MMOL/L (ref 7–19)
AST SERPL-CCNC: 25 U/L (ref 5–40)
BARBITURATE SCREEN URINE: NEGATIVE
BASOPHILS ABSOLUTE: 0 K/UL (ref 0–0.2)
BASOPHILS RELATIVE PERCENT: 0.3 % (ref 0–1)
BENZODIAZEPINE SCREEN, URINE: POSITIVE
BILIRUB SERPL-MCNC: 0.7 MG/DL (ref 0.2–1.2)
BUN BLDV-MCNC: 19 MG/DL (ref 6–20)
CALCIUM SERPL-MCNC: 8.6 MG/DL (ref 8.6–10)
CANNABINOID SCREEN URINE: NEGATIVE
CHLORIDE BLD-SCNC: 102 MMOL/L (ref 98–111)
CO2: 25 MMOL/L (ref 22–29)
COCAINE METABOLITE SCREEN URINE: NEGATIVE
CREAT SERPL-MCNC: 0.8 MG/DL (ref 0.5–1.2)
DRUGS UR: NORMAL
EOSINOPHILS ABSOLUTE: 0.2 K/UL (ref 0–0.6)
EOSINOPHILS RELATIVE PERCENT: 2.5 % (ref 0–5)
ETHANOL: <10 MG/DL (ref 0–0.08)
GFR AFRICAN AMERICAN: >59
GFR NON-AFRICAN AMERICAN: >60
GLUCOSE BLD-MCNC: 78 MG/DL (ref 74–109)
HCT VFR BLD CALC: 41.3 % (ref 42–52)
HEMOGLOBIN: 13.4 G/DL (ref 14–18)
IMMATURE GRANULOCYTES #: 0 K/UL
LACTIC ACID: 0.9 MMOL/L (ref 0.5–1.9)
LYMPHOCYTES ABSOLUTE: 1.5 K/UL (ref 1.1–4.5)
LYMPHOCYTES RELATIVE PERCENT: 24.9 % (ref 20–40)
Lab: ABNORMAL
MCH RBC QN AUTO: 29.2 PG (ref 27–31)
MCHC RBC AUTO-ENTMCNC: 32.4 G/DL (ref 33–37)
MCV RBC AUTO: 90 FL (ref 80–94)
MONOCYTES ABSOLUTE: 0.4 K/UL (ref 0–0.9)
MONOCYTES RELATIVE PERCENT: 6.4 % (ref 0–10)
NEUTROPHILS ABSOLUTE: 4 K/UL (ref 1.5–7.5)
NEUTROPHILS RELATIVE PERCENT: 65.6 % (ref 50–65)
OPIATE SCREEN URINE: NEGATIVE
PDW BLD-RTO: 12.9 % (ref 11.5–14.5)
PLATELET # BLD: 160 K/UL (ref 130–400)
PMV BLD AUTO: 9.5 FL (ref 9.4–12.4)
POTASSIUM SERPL-SCNC: 4.4 MMOL/L (ref 3.5–5)
RBC # BLD: 4.59 M/UL (ref 4.7–6.1)
SODIUM BLD-SCNC: 136 MMOL/L (ref 136–145)
TOTAL PROTEIN: 6.5 G/DL (ref 6.6–8.7)
WBC # BLD: 6.1 K/UL (ref 4.8–10.8)

## 2021-06-09 PROCEDURE — 83605 ASSAY OF LACTIC ACID: CPT

## 2021-06-09 PROCEDURE — 36415 COLL VENOUS BLD VENIPUNCTURE: CPT

## 2021-06-09 PROCEDURE — 80307 DRUG TEST PRSMV CHEM ANLYZR: CPT

## 2021-06-09 PROCEDURE — 82077 ASSAY SPEC XCP UR&BREATH IA: CPT

## 2021-06-09 PROCEDURE — 93005 ELECTROCARDIOGRAM TRACING: CPT | Performed by: EMERGENCY MEDICINE

## 2021-06-09 PROCEDURE — 80053 COMPREHEN METABOLIC PANEL: CPT

## 2021-06-09 PROCEDURE — 85025 COMPLETE CBC W/AUTO DIFF WBC: CPT

## 2021-06-09 PROCEDURE — 99283 EMERGENCY DEPT VISIT LOW MDM: CPT

## 2021-06-09 RX ORDER — ALBUTEROL SULFATE 90 UG/1
2 AEROSOL, METERED RESPIRATORY (INHALATION) EVERY 6 HOURS PRN
COMMUNITY

## 2021-06-09 ASSESSMENT — ENCOUNTER SYMPTOMS
ABDOMINAL PAIN: 0
SHORTNESS OF BREATH: 0
VOMITING: 0

## 2021-06-09 NOTE — ED NOTES
Pt ambulate in the parada with assist, pt with limp that pt states is normal . Called pt father and they will be here in 10 minutes to get him     German Lora RN  06/09/21 [id number]

## 2021-06-09 NOTE — ED PROVIDER NOTES
[facility]  eMERGENCY dEPARTMENT eNCOUnter      Pt Name: Glen Saldana  MRN: [id number]  [place] 1975  Date of evaluation: 6/9/2021  Provider: Aretha Carroll MD    [address]       Chief Complaint   Patient presents with    Altered Mental Status         HISTORY OF PRESENT ILLNESS   (Location/Symptom, Timing/Onset,Context/Setting, Quality, Duration, Modifying Factors, Severity)  Note limiting factors. Glen Saldana is a 54 y.o. male who presents to the emergency department via EMS for evaluation regarding period of altered mental status. EMS reported that upon their arrival patient was somewhat confused. He reported that he been in a verbal argument with his wife and then became upset and passed out. EMS did not report that anyone at the scene noticed any seizure-like activity. He is not real sure what happened. He tells me that he does usually take Percocet and Xanax. Upon EMS arrival he was given Narcan with some improvement in his mental status. In review of the report it does not sound that he was actually hypoxic. There was no reported loss of pulse. On arrival to the ED patient is alert to voice. He opens his eyes and answers basic questions and follows basic commands. HPI    NursingNotes were reviewed. REVIEW OF SYSTEMS    (2-9 systems for level 4, 10 or more for level 5)     Review of Systems   Constitutional: Negative for chills and fever. Respiratory: Negative for shortness of breath. Cardiovascular: Negative for chest pain. Gastrointestinal: Negative for abdominal pain and vomiting. Neurological: Positive for syncope. Psychiatric/Behavioral: Negative for suicidal ideas. The patient is nervous/anxious. All other systems reviewed and are negative. PAST MEDICALHISTORY     Past Medical History:   Diagnosis Date    CAD (coronary artery disease)     Hypertension          SURGICAL HISTORY     History reviewed.  No pertinent surgical history. CURRENT MEDICATIONS     Previous Medications    ALBUTEROL SULFATE HFA (VENTOLIN HFA) 108 (90 BASE) MCG/ACT INHALER    Inhale 2 puffs into the lungs every 6 hours as needed for Wheezing    ALBUTEROL-IPRATROPIUM (COMBIVENT RESPIMAT)  MCG/ACT AERS INHALER    Inhale 1 puff into the lungs every 6 hours    MOMETASONE (ASMANEX) 100 MCG/ACT AERO INHALER    Inhale 2 puffs into the lungs 2 times daily       ALLERGIES     Patient has no known allergies. FAMILY HISTORY     History reviewed. No pertinent family history. SOCIAL HISTORY       Social History     Socioeconomic History    Marital status: Single     Spouse name: None    Number of children: None    Years of education: None    Highest education level: None   Occupational History    None   Tobacco Use    Smoking status: None   Substance and Sexual Activity    Alcohol use: None    Drug use: None    Sexual activity: None   Other Topics Concern    None   Social History Narrative    None     Social Determinants of Health     Financial Resource Strain:     Difficulty of Paying Living Expenses:    Food Insecurity:     Worried About Running Out of Food in the Last Year:     Ran Out of Food in the Last Year:    Transportation Needs:     Lack of Transportation (Medical):      Lack of Transportation (Non-Medical):    Physical Activity:     Days of Exercise per Week:     Minutes of Exercise per Session:    Stress:     Feeling of Stress :    Social Connections:     Frequency of Communication with Friends and Family:     Frequency of Social Gatherings with Friends and Family:     Attends Confucianism Services:     Active Member of Clubs or Organizations:     Attends Club or Organization Meetings:     Marital Status:    Intimate Partner Violence:     Fear of Current or Ex-Partner:     Emotionally Abused:     Physically Abused:     Sexually Abused:        SCREENINGS             PHYSICAL EXAM    (up to 7 for level 4, 8 or more for level 5)     ED Triage Vitals   BP Temp Temp Source Pulse Resp SpO2 Height Weight   06/09/21 1635 06/09/21 1637 06/09/21 1637 06/09/21 1635 06/09/21 1635 06/09/21 1635 06/09/21 1635 06/09/21 1635   121/73 97.6 °F (36.4 °C) Oral 78 16 94 % 6' (1.829 m) 283 lb (128.4 kg)       Physical Exam  Vitals and nursing note reviewed. HENT:      Head: Atraumatic. Mouth/Throat:      Mouth: Mucous membranes are moist. Mucous membranes are not dry. Eyes:      General: No scleral icterus. Pupils: Pupils are equal, round, and reactive to light. Neck:      Trachea: No tracheal deviation. Cardiovascular:      Rate and Rhythm: Normal rate and regular rhythm. Pulses: Normal pulses. Heart sounds: Normal heart sounds. No murmur heard. Pulmonary:      Effort: Pulmonary effort is normal. No respiratory distress. Breath sounds: Normal breath sounds. No stridor. Abdominal:      General: There is no distension. Palpations: Abdomen is soft. Tenderness: There is no abdominal tenderness. There is no guarding. Musculoskeletal:      Right lower leg: No edema. Left lower leg: No edema. Skin:     Capillary Refill: Capillary refill takes less than 2 seconds. Coloration: Skin is not pale. Findings: No rash. Neurological:      General: No focal deficit present. Mental Status: He is alert and oriented to person, place, and time. Cranial Nerves: No cranial nerve deficit. Psychiatric:         Mood and Affect: Mood normal.         Behavior: Behavior is cooperative. DIAGNOSTIC RESULTS     EKG: All EKG's areinterpreted by the Emergency Department Physician who either signs or Co-signs this chart in the absence of a cardiologist.    [id number]: Normal sinus rhythm at 79, no acute ST change identified.       LABS:  Labs Reviewed   COMPREHENSIVE METABOLIC PANEL - Abnormal; Notable for the following components:       Result Value    Total Protein 6.5 (*)     All other components within normal limits   CBC WITH AUTO DIFFERENTIAL - Abnormal; Notable for the following components:    RBC 4.59 (*)     Hemoglobin 13.4 (*)     Hematocrit 41.3 (*)     MCHC 32.4 (*)     Neutrophils % 65.6 (*)     All other components within normal limits   URINE DRUG SCREEN - Abnormal; Notable for the following components:    Amphetamine Screen, Urine Positive (*)     Benzodiazepine Screen, Urine Positive (*)     All other components within normal limits   ETHANOL   LACTIC ACID, PLASMA       All other labs were within normal range or not returned as of this dictation. EMERGENCY DEPARTMENT COURSE and DIFFERENTIAL DIAGNOSIS/MDM:   Vitals:    Vitals:    06/09/21 1635 06/09/21 1637 06/09/21 1700 06/09/21 1730   BP: 121/73  116/65 (!) 108/57   Pulse: 78  79 79   Resp: 16  14 20   Temp:  97.6 °F (36.4 °C)     TempSrc:  Oral     SpO2: 94%  98% 93%   Weight: 283 lb (128.4 kg)      Height: 6' (1.829 m)          MDM    Reassessment    Urine drug screen noted to be positive for benzodiazepine and amphetamine. His vital signs have remained stable. He is alert, able to ambulate in the ED without difficulty. I do not see any indication for inpatient hospitalization at this time. PROCEDURES:  Unless otherwise noted below, none     Procedures    FINAL IMPRESSION      1. Acute stress reaction    2. Near syncope          DISPOSITION/PLAN   DISPOSITION  Discharged      PATIENT REFERRED TO:  [address]   [place] [address] [phone]  Schedule an appointment as soon as possible for a visit       (Please note that portions of this note were completed with a voice recognition program.  Efforts were made to edit thedictations but occasionally words are mis-transcribed.)    Belgica Cantu MD (electronically signed)  Attending Emergency Physician          Belgica Cantu MD  06/09/21 [id number]

## 2021-06-09 NOTE — ED NOTES
Pt father called, will call pt wife and have her call me back with more info and hx     Jackson Charles RN  06/09/21 [id number]

## 2021-06-10 LAB
EKG P AXIS: 18 DEGREES
EKG P-R INTERVAL: 152 MS
EKG Q-T INTERVAL: 394 MS
EKG QRS DURATION: 106 MS
EKG QTC CALCULATION (BAZETT): 425 MS
EKG T AXIS: 26 DEGREES

## 2021-06-10 PROCEDURE — 93010 ELECTROCARDIOGRAM REPORT: CPT | Performed by: INTERNAL MEDICINE

## 2021-06-22 DIAGNOSIS — R06.02 SHORTNESS OF BREATH: ICD-10-CM

## 2021-06-22 RX ORDER — ASCORBIC ACID 1000 MG
TABLET ORAL
Qty: 30 TABLET | Refills: 0 | Status: SHIPPED | OUTPATIENT
Start: 2021-06-22 | End: 2021-06-29 | Stop reason: SDUPTHER

## 2021-06-24 ENCOUNTER — PATIENT OUTREACH (OUTPATIENT)
Dept: PHARMACY | Facility: HOSPITAL | Age: 46
End: 2021-06-24

## 2021-06-24 NOTE — OUTREACH NOTE
Medication Adherence Call    Patient was called today to discuss medication adherence with atorvastatin, as the patient was identified as having care opportunities.     The patient did not answer. I will try again at a later date.    Patti Guradado PharmD  [facility] Pharmacist  06/24/21    Medication Adherence Call    Patient was called again today to discuss medication adherence with atorvastatin, as the patient was identified as having care opportunities.     The patient did not answer for the second time. I will try once more at a later date.    Patti Guardado PharmD  [facility] Pharmacist  06/25/21     Medication Adherence Call    Patient was called for a third time today to discuss medication adherence, as the patient was identified as having care opportunities.     The patient did not answer. There is nothing more I can do at this time.    Patti Guardado PharmD  [facility] Pharmacist  06/28/21

## 2021-06-29 ENCOUNTER — OFFICE VISIT (OUTPATIENT)
Dept: INTERNAL MEDICINE | Facility: CLINIC | Age: 46
End: 2021-06-29

## 2021-06-29 VITALS
BODY MASS INDEX: 37.64 KG/M2 | RESPIRATION RATE: 18 BRPM | DIASTOLIC BLOOD PRESSURE: 81 MMHG | HEIGHT: 73 IN | OXYGEN SATURATION: 98 % | TEMPERATURE: 98.7 F | WEIGHT: 284 LBS | SYSTOLIC BLOOD PRESSURE: 132 MMHG | HEART RATE: 76 BPM

## 2021-06-29 DIAGNOSIS — G89.21 CHRONIC PAIN DUE TO TRAUMA: Primary | ICD-10-CM

## 2021-06-29 DIAGNOSIS — I10 ESSENTIAL HYPERTENSION: ICD-10-CM

## 2021-06-29 DIAGNOSIS — R11.2 NON-INTRACTABLE VOMITING WITH NAUSEA, UNSPECIFIED VOMITING TYPE: ICD-10-CM

## 2021-06-29 DIAGNOSIS — R06.02 SHORTNESS OF BREATH: ICD-10-CM

## 2021-06-29 DIAGNOSIS — F41.9 ANXIETY: ICD-10-CM

## 2021-06-29 PROCEDURE — 99214 OFFICE O/P EST MOD 30 MIN: CPT | Performed by: FAMILY MEDICINE

## 2021-06-29 RX ORDER — CITALOPRAM 20 MG/1
20 TABLET ORAL DAILY
Qty: 30 TABLET | Refills: 3 | Status: SHIPPED | OUTPATIENT
Start: 2021-06-29 | End: 2021-07-27 | Stop reason: SDUPTHER

## 2021-06-29 RX ORDER — ALBUTEROL SULFATE 90 UG/1
2 AEROSOL, METERED RESPIRATORY (INHALATION) EVERY 4 HOURS PRN
Qty: 18 G | Refills: 3 | Status: SHIPPED | OUTPATIENT
Start: 2021-06-29 | End: 2021-09-01 | Stop reason: SDUPTHER

## 2021-06-29 RX ORDER — RISPERIDONE 1 MG/1
1 TABLET ORAL NIGHTLY
Qty: 30 TABLET | Refills: 3 | Status: SHIPPED | OUTPATIENT
Start: 2021-06-29 | End: 2021-07-21 | Stop reason: SDUPTHER

## 2021-06-29 RX ORDER — METOPROLOL TARTRATE 50 MG/1
50 TABLET, FILM COATED ORAL 2 TIMES DAILY
Qty: 180 TABLET | Refills: 1 | Status: SHIPPED | OUTPATIENT
Start: 2021-06-29 | End: 2021-07-27 | Stop reason: SDUPTHER

## 2021-06-29 RX ORDER — OXYCODONE AND ACETAMINOPHEN 10; 325 MG/1; MG/1
1 TABLET ORAL EVERY 6 HOURS PRN
Qty: 120 TABLET | Refills: 0 | Status: SHIPPED | OUTPATIENT
Start: 2021-06-29 | End: 2021-07-30 | Stop reason: SDUPTHER

## 2021-06-29 RX ORDER — IPRATROPIUM/ALBUTEROL SULFATE 20-100 MCG
1 MIST INHALER (GRAM) INHALATION 4 TIMES DAILY
Qty: 1 EACH | Refills: 3 | Status: SHIPPED | OUTPATIENT
Start: 2021-06-29 | End: 2021-12-29 | Stop reason: SDUPTHER

## 2021-06-29 RX ORDER — DOXEPIN HYDROCHLORIDE 10 MG/1
CAPSULE ORAL
Qty: 90 CAPSULE | Refills: 3 | Status: SHIPPED | OUTPATIENT
Start: 2021-06-29 | End: 2021-07-27 | Stop reason: SDUPTHER

## 2021-06-29 RX ORDER — ALPRAZOLAM 1 MG/1
1 TABLET ORAL 2 TIMES DAILY PRN
Qty: 60 TABLET | Refills: 0 | Status: SHIPPED | OUTPATIENT
Start: 2021-06-29 | End: 2021-07-30 | Stop reason: SDUPTHER

## 2021-06-29 RX ORDER — MOMETASONE FUROATE 50 UG/1
2 SPRAY, METERED NASAL DAILY
Qty: 1 EACH | Refills: 12 | Status: SHIPPED | OUTPATIENT
Start: 2021-06-29 | End: 2021-07-27 | Stop reason: SDUPTHER

## 2021-06-29 RX ORDER — ATORVASTATIN CALCIUM 40 MG/1
40 TABLET, FILM COATED ORAL DAILY
Qty: 90 TABLET | Refills: 3 | Status: SHIPPED | OUTPATIENT
Start: 2021-06-29 | End: 2021-07-27 | Stop reason: SDUPTHER

## 2021-06-29 RX ORDER — ONDANSETRON 4 MG/1
4 TABLET, ORALLY DISINTEGRATING ORAL EVERY 8 HOURS PRN
Qty: 30 TABLET | Refills: 3 | Status: SHIPPED | OUTPATIENT
Start: 2021-06-29 | End: 2021-07-27 | Stop reason: SDUPTHER

## 2021-06-29 NOTE — PROGRESS NOTES
Subjective     Chief Complaint   Patient presents with   • Hypertension     Follow up.    • Med Refill       History of Present Illness  Poor sleep  Increased difficulty swallowing  Loosing voice.  Has not heard back from the ENT doctor re surgery    Mood about the same.  No changes in medications  He is taking otc CBD oil now that he was not.      Patient's PMR from outside medical facility reviewed and noted.    Review of Systems     Otherwise complete ROS reviewed and negative except as mentioned in the HPI.    Past Medical History:   Past Medical History:   Diagnosis Date   • Anxiety    • Arthritis    • Bipolar 1 disorder (CMS/HCC)    • Hypertension    • PTSD (post-traumatic stress disorder)      Past Surgical History:  Past Surgical History:   Procedure Laterality Date   • HIP SURGERY       Social History:  reports that he has been smoking cigarettes. He has a 15.00 pack-year smoking history. He has never used smokeless tobacco. He reports that he does not drink alcohol and does not use drugs.    Family History: family history includes Arthritis in his father; Diabetes in his father; Heart attack in his mother; Hypertension in his father.       Allergies:  Allergies   Allergen Reactions   • Adderall [Amphetamine-Dextroamphetamine] Shortness Of Breath, Swelling, Palpitations and Hallucinations   • Meloxicam Nausea Only   • Darvon [Propoxyphene] GI Intolerance   • Motrin [Ibuprofen] GI Intolerance   • Toradol [Ketorolac Tromethamine] GI Intolerance   • Tramadol GI Intolerance     Medications:  Prior to Admission medications    Medication Sig Start Date End Date Taking? Authorizing Provider   albuterol sulfate  (90 Base) MCG/ACT inhaler Inhale 2 puffs Every 4 (Four) Hours As Needed for Wheezing. 4/29/21  Yes Liza Hagen DO   ALPRAZolam (XANAX) 1 MG tablet Take 1 tablet by mouth 2 (Two) Times a Day As Needed for Anxiety. 6/2/21  Yes Liza Hagen DO   atorvastatin (LIPITOR) 40 MG  "tablet Take 1 tablet by mouth Daily. 3/30/21  Yes Liza Hagen DO   citalopram (CeleXA) 20 MG tablet Take 1 tablet by mouth Daily. 3/30/21  Yes Liza Hagen, DO   [facility] Vitamin C 1000 MG tablet TAKE 1/2 TABLET BY MOUTH EVERY DAY 6/22/21  Yes Liza Hagen, DO   doxepin (SINEquan) 10 MG capsule Take 1-3 tabs at hs 4/23/21  Yes Liza Hagen, DO   ipratropium-albuterol (Combivent Respimat)  MCG/ACT inhaler Inhale 1 puff 4 (Four) Times a Day. 4/29/21  Yes Liza Hagen, DO   metoprolol tartrate (LOPRESSOR) 50 MG tablet Take 1 tablet by mouth 2 (Two) Times a Day. 3/2/21  Yes Liza Hagen, DO   mometasone (Nasonex) 50 MCG/ACT nasal spray 2 sprays into the nostril(s) as directed by provider Daily. 3/30/21  Yes Liza Hagen,    Mometasone Furoate 100 MCG/ACT aerosol Inhale 2 puffs 2 (Two) Times a Day.   Yes Provider, MD Roxanne   ondansetron ODT (Zofran ODT) 4 MG disintegrating tablet Place 1 tablet on the tongue Every 8 (Eight) Hours As Needed for Nausea or Vomiting. 3/30/21  Yes Liza Hagen,    oxyCODONE-acetaminophen (PERCOCET)  MG per tablet Take 1 tablet by mouth Every 6 (Six) Hours As Needed for Moderate Pain . 6/2/21  Yes Liza Hagen, DO   risperiDONE (RisperDAL) 1 MG tablet Take 1 tablet by mouth Every Night. 6/2/21  Yes Liza Hagen, DO   Spacer/Aero-Holding Chambers (E-Z Spacer) inhaler Use as instructed 11/3/20 11/3/21 Yes Liza Hagen, DO   sulfamethoxazole-trimethoprim (Bactrim DS) 800-160 MG per tablet Take 1 tablet by mouth 2 (Two) Times a Day. 4/30/21  Yes Liza Hagen, DO   zinc gluconate 50 MG tablet Take 1 tablet by mouth Daily. 1/4/21  Yes Eli Teresa APRN       Objective     Vital Signs: /81 (BP Location: Left arm, Patient Position: Sitting, Cuff Size: Large Adult)   Pulse 76   Temp 98.7 °F (37.1 °C) (Skin)   Resp 18   Ht 185.4 cm (73\")   Wt 129 kg (284 lb)   SpO2 98%   BMI " 37.47 kg/m²   Physical Exam  Vitals and nursing note reviewed.   Constitutional:       General: He is not in acute distress.     Appearance: Normal appearance. He is obese.   HENT:      Head: Normocephalic and atraumatic.      Right Ear: External ear normal.      Left Ear: External ear normal.      Nose: Nose normal.      Mouth/Throat:      Mouth: Mucous membranes are moist.   Eyes:      Extraocular Movements: Extraocular movements intact.      Conjunctiva/sclera: Conjunctivae normal.      Pupils: Pupils are equal, round, and reactive to light.   Cardiovascular:      Rate and Rhythm: Normal rate and regular rhythm.      Pulses: Normal pulses.      Heart sounds: Normal heart sounds.   Pulmonary:      Effort: Pulmonary effort is normal.      Breath sounds: Rales: vocal quality hoarse.    Abdominal:      General: Bowel sounds are normal.      Palpations: Abdomen is soft.   Musculoskeletal:         General: Normal range of motion.      Cervical back: Normal range of motion and neck supple.   Skin:     General: Skin is warm and dry.   Neurological:      General: No focal deficit present.      Mental Status: He is alert and oriented to person, place, and time.   Psychiatric:         Mood and Affect: Mood normal.         Patient's Body mass index is 37.47 kg/m².       Results Reviewed:  Glucose   Date Value Ref Range Status   06/09/2021 78 74 - 109 mg/dL Final     BUN   Date Value Ref Range Status   06/09/2021 19 6 - 20 mg/dL Final     Creatinine   Date Value Ref Range Status   06/09/2021 0.8 0.5 - 1.2 mg/dL Final     Sodium   Date Value Ref Range Status   06/09/2021 136 136 - 145 mmol/L Final     Potassium   Date Value Ref Range Status   06/09/2021 4.4 3.5 - 5.0 mmol/L Final     Chloride   Date Value Ref Range Status   06/09/2021 102 98 - 111 mmol/L Final     CO2   Date Value Ref Range Status   06/09/2021 25 22 - 29 mmol/L Final     Calcium   Date Value Ref Range Status   06/09/2021 8.6 8.6 - 10.0 mg/dL Final     ALT  (SGPT)   Date Value Ref Range Status   06/09/2021 20 5 - 41 U/L Final     AST (SGOT)   Date Value Ref Range Status   06/09/2021 25 5 - 40 U/L Final     WBC   Date Value Ref Range Status   06/09/2021 6.1 4.8 - 10.8 K/uL Final     Hematocrit   Date Value Ref Range Status   06/09/2021 41.3 (L) 42.0 - 52.0 % Final     Platelets   Date Value Ref Range Status   06/09/2021 160 130 - 400 K/uL Final     Triglycerides   Date Value Ref Range Status   03/30/2021 338 (H) 0 - 149 mg/dL Final     HDL Cholesterol   Date Value Ref Range Status   03/30/2021 40 >39 mg/dL Final     LDL Chol Calc (NIH)   Date Value Ref Range Status   03/30/2021 122 (H) 0 - 99 mg/dL Final     Hemoglobin A1C   Date Value Ref Range Status   04/30/2021 5.6 % Final         Assessment / Plan     Assessment/Plan:  1. Chronic pain due to trauma    - oxyCODONE-acetaminophen (PERCOCET)  MG per tablet; Take 1 tablet by mouth Every 6 (Six) Hours As Needed for Moderate Pain .  Dispense: 120 tablet; Refill: 0    2. Non-intractable vomiting with nausea, unspecified vomiting type    - ondansetron ODT (Zofran ODT) 4 MG disintegrating tablet; Place 1 tablet on the tongue Every 8 (Eight) Hours As Needed for Nausea or Vomiting.  Dispense: 30 tablet; Refill: 3    3. Essential hypertension    - metoprolol tartrate (LOPRESSOR) 50 MG tablet; Take 1 tablet by mouth 2 (Two) Times a Day.  Dispense: 180 tablet; Refill: 1    4. Shortness of breath    - ascorbic acid ([facility] Vitamin C) 1000 MG tablet; Take 0.5 tablets by mouth Daily.  Dispense: 30 tablet; Refill: 0    5. Anxiety    - ALPRAZolam (XANAX) 1 MG tablet; Take 1 tablet by mouth 2 (Two) Times a Day As Needed for Anxiety.  Dispense: 60 tablet; Refill: 0        Return in about 4 weeks (around 7/27/2021). unless patient needs to be seen sooner or acute issues arise.        I have discussed the patient results/orders and and plan/recommendation with them at today's visit.      Liza Hagen, DO   06/29/2021

## 2021-06-30 RX ORDER — DOXEPIN HYDROCHLORIDE 10 MG/1
CAPSULE ORAL
Qty: 270 CAPSULE | Refills: 1 | OUTPATIENT
Start: 2021-06-30

## 2021-07-18 NOTE — TELEPHONE ENCOUNTER
Caller: Clyde Tamayo    Relationship: Self    Best call back number: [phone]     Medication needed:   Requested Prescriptions     Pending Prescriptions Disp Refills   • ALPRAZolam (XANAX) 1 MG tablet 60 tablet 0     Sig: Take 1 tablet by mouth 2 (Two) Times a Day As Needed for Anxiety.       When do you need the refill by: 02/02/21    What details did the patient provide when requesting the medication: PATIENT IS NEEDING A REFILL. PLEASE CALL AND ADVISE.    Does the patient have less than a 3 day supply:  [x] Yes  [] No    What is the patient's preferred pharmacy:     [facility] [address] [place] [address]        
DISCHARGE

## 2021-07-21 RX ORDER — RISPERIDONE 1 MG/1
1 TABLET ORAL NIGHTLY
Qty: 30 TABLET | Refills: 3 | Status: SHIPPED | OUTPATIENT
Start: 2021-07-21 | End: 2021-07-27

## 2021-07-27 ENCOUNTER — OFFICE VISIT (OUTPATIENT)
Dept: INTERNAL MEDICINE | Facility: CLINIC | Age: 46
End: 2021-07-27

## 2021-07-27 VITALS — HEIGHT: 73 IN | WEIGHT: 285 LBS | BODY MASS INDEX: 37.77 KG/M2

## 2021-07-27 DIAGNOSIS — I10 ESSENTIAL HYPERTENSION: ICD-10-CM

## 2021-07-27 DIAGNOSIS — R06.02 SHORTNESS OF BREATH: ICD-10-CM

## 2021-07-27 DIAGNOSIS — G89.21 CHRONIC PAIN DUE TO TRAUMA: ICD-10-CM

## 2021-07-27 DIAGNOSIS — R11.2 NON-INTRACTABLE VOMITING WITH NAUSEA, UNSPECIFIED VOMITING TYPE: ICD-10-CM

## 2021-07-27 DIAGNOSIS — R50.9 FEVER, UNSPECIFIED FEVER CAUSE: Primary | ICD-10-CM

## 2021-07-27 PROCEDURE — 99214 OFFICE O/P EST MOD 30 MIN: CPT | Performed by: FAMILY MEDICINE

## 2021-07-27 PROCEDURE — 87635 SARS-COV-2 COVID-19 AMP PRB: CPT | Performed by: FAMILY MEDICINE

## 2021-07-27 RX ORDER — ATORVASTATIN CALCIUM 40 MG/1
40 TABLET, FILM COATED ORAL DAILY
Qty: 90 TABLET | Refills: 3 | Status: SHIPPED | OUTPATIENT
Start: 2021-07-27 | End: 2021-10-29 | Stop reason: SDUPTHER

## 2021-07-27 RX ORDER — MOMETASONE FUROATE 50 UG/1
2 SPRAY, METERED NASAL DAILY
Qty: 1 EACH | Refills: 12 | Status: SHIPPED | OUTPATIENT
Start: 2021-07-27 | End: 2021-10-29 | Stop reason: SDUPTHER

## 2021-07-27 RX ORDER — DOXYCYCLINE 100 MG/1
100 CAPSULE ORAL 2 TIMES DAILY
Qty: 20 CAPSULE | Refills: 0 | Status: SHIPPED | OUTPATIENT
Start: 2021-07-27 | End: 2021-09-01

## 2021-07-27 RX ORDER — METOPROLOL TARTRATE 50 MG/1
50 TABLET, FILM COATED ORAL 2 TIMES DAILY
Qty: 180 TABLET | Refills: 1 | Status: SHIPPED | OUTPATIENT
Start: 2021-07-27 | End: 2021-10-29 | Stop reason: SDUPTHER

## 2021-07-27 RX ORDER — RISPERIDONE 1 MG/1
2 TABLET ORAL NIGHTLY
Qty: 30 TABLET | Refills: 3 | Status: SHIPPED | OUTPATIENT
Start: 2021-07-27 | End: 2021-08-25 | Stop reason: SDUPTHER

## 2021-07-27 RX ORDER — DOXEPIN HYDROCHLORIDE 10 MG/1
CAPSULE ORAL
Qty: 90 CAPSULE | Refills: 3 | Status: SHIPPED | OUTPATIENT
Start: 2021-07-27 | End: 2021-08-24 | Stop reason: SDUPTHER

## 2021-07-27 RX ORDER — ONDANSETRON 4 MG/1
4 TABLET, ORALLY DISINTEGRATING ORAL EVERY 8 HOURS PRN
Qty: 30 TABLET | Refills: 3 | Status: SHIPPED | OUTPATIENT
Start: 2021-07-27 | End: 2021-10-29 | Stop reason: SDUPTHER

## 2021-07-27 RX ORDER — CITALOPRAM 20 MG/1
20 TABLET ORAL DAILY
Qty: 30 TABLET | Refills: 3 | Status: SHIPPED | OUTPATIENT
Start: 2021-07-27 | End: 2021-10-29 | Stop reason: SDUPTHER

## 2021-07-28 LAB — SARS-COV-2 RNA PNL SPEC NAA+PROBE: NOT DETECTED

## 2021-07-29 ENCOUNTER — TELEPHONE (OUTPATIENT)
Dept: INTERNAL MEDICINE | Facility: CLINIC | Age: 46
End: 2021-07-29

## 2021-07-29 NOTE — TELEPHONE ENCOUNTER
Called patient to communicate negative covid test. He voiced understanding and had no further questions.

## 2021-07-30 DIAGNOSIS — F41.9 ANXIETY: ICD-10-CM

## 2021-07-30 DIAGNOSIS — G89.21 CHRONIC PAIN DUE TO TRAUMA: ICD-10-CM

## 2021-07-30 RX ORDER — OXYCODONE AND ACETAMINOPHEN 10; 325 MG/1; MG/1
1 TABLET ORAL EVERY 6 HOURS PRN
Qty: 120 TABLET | Refills: 0 | Status: SHIPPED | OUTPATIENT
Start: 2021-07-30 | End: 2021-09-01 | Stop reason: SDUPTHER

## 2021-07-30 RX ORDER — ALPRAZOLAM 1 MG/1
1 TABLET ORAL 2 TIMES DAILY PRN
Qty: 60 TABLET | Refills: 0 | Status: SHIPPED | OUTPATIENT
Start: 2021-07-30 | End: 2021-09-01 | Stop reason: SDUPTHER

## 2021-07-30 NOTE — TELEPHONE ENCOUNTER
Caller: Clyde Tamayo    Relationship: Self    Best call back number: [phone]     Medication needed:   Requested Prescriptions     Pending Prescriptions Disp Refills   • ALPRAZolam (XANAX) 1 MG tablet 60 tablet 0     Sig: Take 1 tablet by mouth 2 (Two) Times a Day As Needed for Anxiety.   • oxyCODONE-acetaminophen (PERCOCET)  MG per tablet 120 tablet 0     Sig: Take 1 tablet by mouth Every 6 (Six) Hours As Needed for Moderate Pain .       When do you need the refill by: ASAP    What additional details did the patient provide when requesting the medication: PATIENT WILL RUN OUT OVER THE WEEKEND     Does the patient have less than a 3 day supply:  [x] Yes  [] No    What is the patient's preferred pharmacy: [id number] - [place] - [address] [phone] [facility] [phone]

## 2021-07-30 NOTE — PROGRESS NOTES
Subjective     Chief Complaint   Patient presents with   • Cough     Symptoms began one week ago   • Loss of taste and smell   • Fever     102 yesterday.    • Wound Infection     Suspects staph in private area       History of Present Illness  Patient presents with complaints of cough.  He has had a fever that was up to 102.  That was yesterday.  He has lost his taste and smell.  He feels short of breath.  Does have some sputum production.  Is also concerned about a boil he has in his groin area.  He sees fairly frequently.  He does not want it looked at today.  Patient's PMR from outside medical facility reviewed and noted.    Review of Systems     Otherwise complete ROS reviewed and negative except as mentioned in the HPI.    Past Medical History:   Past Medical History:   Diagnosis Date   • Anxiety    • Arthritis    • Bipolar 1 disorder (CMS/HCC)    • Hypertension    • PTSD (post-traumatic stress disorder)      Past Surgical History:  Past Surgical History:   Procedure Laterality Date   • HIP SURGERY       Social History:  reports that he has been smoking cigarettes. He has a 15.00 pack-year smoking history. He has never used smokeless tobacco. He reports that he does not drink alcohol and does not use drugs.    Family History: family history includes Arthritis in his father; Diabetes in his father; Heart attack in his mother; Hypertension in his father.       Allergies:  Allergies   Allergen Reactions   • Adderall [Amphetamine-Dextroamphetamine] Shortness Of Breath, Swelling, Palpitations and Hallucinations   • Meloxicam Nausea Only   • Darvon [Propoxyphene] GI Intolerance   • Motrin [Ibuprofen] GI Intolerance   • Toradol [Ketorolac Tromethamine] GI Intolerance   • Tramadol GI Intolerance     Medications:  Prior to Admission medications    Medication Sig Start Date End Date Taking? Authorizing Provider   albuterol sulfate  (90 Base) MCG/ACT inhaler Inhale 2 puffs Every 4 (Four) Hours As Needed for  Wheezing. 6/29/21  Yes Liza Hagen DO   ascorbic acid ([facility] Vitamin C) 1000 MG tablet Take 0.5 tablets by mouth Daily. 7/27/21  Yes Liza Hagen DO   atorvastatin (LIPITOR) 40 MG tablet Take 1 tablet by mouth Daily. 7/27/21  Yes Liza Hagen, DO   citalopram (CeleXA) 20 MG tablet Take 1 tablet by mouth Daily. 7/27/21  Yes Liza Hagen, DO   doxepin (SINEquan) 10 MG capsule Take 1-3 tabs at hs 7/27/21  Yes Liza Hagen, DO   ipratropium-albuterol (Combivent Respimat)  MCG/ACT inhaler Inhale 1 puff 4 (Four) Times a Day. 6/29/21  Yes Liza Hagen, DO   metoprolol tartrate (LOPRESSOR) 50 MG tablet Take 1 tablet by mouth 2 (Two) Times a Day. 7/27/21  Yes Liza Hagen, DO   mometasone (Nasonex) 50 MCG/ACT nasal spray 2 sprays into the nostril(s) as directed by provider Daily. 7/27/21  Yes Liza Hagen, DO   Mometasone Furoate 100 MCG/ACT aerosol Inhale 2 puffs 2 (Two) Times a Day. 6/29/21  Yes Liza Hagen, DO   ondansetron ODT (Zofran ODT) 4 MG disintegrating tablet Place 1 tablet on the tongue Every 8 (Eight) Hours As Needed for Nausea or Vomiting. 7/27/21  Yes Liza Hagen DO   risperiDONE (RisperDAL) 1 MG tablet Take 2 tablets by mouth Every Night. 7/27/21  Yes Liza Hagen, DO   Spacer/Aero-Holding Chambers (E-Z Spacer) inhaler Use as instructed 11/3/20 11/3/21 Yes Liza Hagen DO   zinc gluconate 50 MG tablet Take 1 tablet by mouth Daily. 1/4/21  Yes Eli Teresa APRN   ALPRAZolam (XANAX) 1 MG tablet Take 1 tablet by mouth 2 (Two) Times a Day As Needed for Anxiety. 6/29/21 7/30/21 Yes Liza Hagen DO   oxyCODONE-acetaminophen (PERCOCET)  MG per tablet Take 1 tablet by mouth Every 6 (Six) Hours As Needed for Moderate Pain . 6/29/21 7/30/21 Yes Liza Hagen DO   ALPRAZolam (XANAX) 1 MG tablet Take 1 tablet by mouth 2 (Two) Times a Day As Needed for Anxiety. 7/30/21   Liza Hagen DO  "  doxycycline (MONODOX) 100 MG capsule Take 1 capsule by mouth 2 (Two) Times a Day. 7/27/21   Liza Hagen DO   oxyCODONE-acetaminophen (PERCOCET)  MG per tablet Take 1 tablet by mouth Every 6 (Six) Hours As Needed for Moderate Pain . 7/30/21   Liza Hagen DO       Objective     Vital Signs:  T 97.7 P 85 R 20  SaO2 98%   Ht 185.4 cm (73\")   Wt 129 kg (285 lb)   BMI 37.60 kg/m²      Physical Exam  Vitals and nursing note reviewed.   Constitutional:       Appearance: Normal appearance. He is ill-appearing. He is not toxic-appearing.   HENT:      Head: Normocephalic and atraumatic.      Right Ear: Tympanic membrane, ear canal and external ear normal.      Left Ear: Ear canal and external ear normal.      Nose: Congestion present.      Mouth/Throat:      Mouth: Mucous membranes are moist.      Pharynx: Oropharynx is clear.   Eyes:      Extraocular Movements: Extraocular movements intact.      Conjunctiva/sclera: Conjunctivae normal.      Pupils: Pupils are equal, round, and reactive to light.   Cardiovascular:      Rate and Rhythm: Normal rate and regular rhythm.      Pulses: Normal pulses.      Heart sounds: Normal heart sounds.   Pulmonary:      Effort: Pulmonary effort is normal.      Breath sounds: Rhonchi present.   Abdominal:      General: Bowel sounds are normal.      Palpations: Abdomen is soft.   Musculoskeletal:      Cervical back: Normal range of motion.      Comments: Moves all extremities   Skin:     General: Skin is warm and dry.      Capillary Refill: Capillary refill takes less than 2 seconds.   Neurological:      General: No focal deficit present.      Mental Status: He is alert and oriented to person, place, and time.   Psychiatric:         Mood and Affect: Mood normal.         Behavior: Behavior normal.     Groin not evaluated per patient wish    Patient's Body mass index is 37.6 kg/m².       Results Reviewed:  Glucose   Date Value Ref Range Status   06/09/2021 78 74 - 109 " mg/dL Final     BUN   Date Value Ref Range Status   06/09/2021 19 6 - 20 mg/dL Final     Creatinine   Date Value Ref Range Status   06/09/2021 0.8 0.5 - 1.2 mg/dL Final     Sodium   Date Value Ref Range Status   06/09/2021 136 136 - 145 mmol/L Final     Potassium   Date Value Ref Range Status   06/09/2021 4.4 3.5 - 5.0 mmol/L Final     Chloride   Date Value Ref Range Status   06/09/2021 102 98 - 111 mmol/L Final     CO2   Date Value Ref Range Status   06/09/2021 25 22 - 29 mmol/L Final     Calcium   Date Value Ref Range Status   06/09/2021 8.6 8.6 - 10.0 mg/dL Final     ALT (SGPT)   Date Value Ref Range Status   06/09/2021 20 5 - 41 U/L Final     AST (SGOT)   Date Value Ref Range Status   06/09/2021 25 5 - 40 U/L Final     WBC   Date Value Ref Range Status   06/09/2021 6.1 4.8 - 10.8 K/uL Final     Hematocrit   Date Value Ref Range Status   06/09/2021 41.3 (L) 42.0 - 52.0 % Final     Platelets   Date Value Ref Range Status   06/09/2021 160 130 - 400 K/uL Final     Triglycerides   Date Value Ref Range Status   03/30/2021 338 (H) 0 - 149 mg/dL Final     HDL Cholesterol   Date Value Ref Range Status   03/30/2021 40 >39 mg/dL Final     LDL Chol Calc (NIH)   Date Value Ref Range Status   03/30/2021 122 (H) 0 - 99 mg/dL Final     Hemoglobin A1C   Date Value Ref Range Status   04/30/2021 5.6 % Final         Assessment / Plan     Assessment/Plan:  1. Shortness of breath    - ascorbic acid ([facility] Vitamin C) 1000 MG tablet; Take 0.5 tablets by mouth Daily.  Dispense: 30 tablet; Refill: 0  - COVID-19,[facility] IN-HOUSE,Nasal Swab No Transport Media 3-4 HR TAT - Swab, Nasal Cavity    2. Essential hypertension    - metoprolol tartrate (LOPRESSOR) 50 MG tablet; Take 1 tablet by mouth 2 (Two) Times a Day.  Dispense: 180 tablet; Refill: 1    3. Non-intractable vomiting with nausea, unspecified vomiting type    - ondansetron ODT (Zofran ODT) 4 MG disintegrating tablet; Place 1 tablet on the tongue Every 8 (Eight) Hours As Needed for  Nausea or Vomiting.  Dispense: 30 tablet; Refill: 3    4. Chronic pain due to trauma      5. Fever, unspecified fever cause    - COVID-19,[facility] IN-HOUSE,Nasal Swab No Transport Media 3-4 HR TAT - Swab, Nasal Cavity        Return if symptoms worsen or fail to improve. unless patient needs to be seen sooner or acute issues arise.      I have discussed the patient results/orders and and plan/recommendation with them at today's visit.      Liza Hagen,    07/27/2021

## 2021-08-23 DIAGNOSIS — R06.02 SHORTNESS OF BREATH: ICD-10-CM

## 2021-08-24 RX ORDER — ASCORBIC ACID 1000 MG
TABLET ORAL
Qty: 30 TABLET | Refills: 0 | Status: SHIPPED | OUTPATIENT
Start: 2021-08-24 | End: 2021-10-25

## 2021-08-24 NOTE — TELEPHONE ENCOUNTER
Rx Refill Note  Requested Prescriptions     Pending Prescriptions Disp Refills   • doxepin (SINEquan) 10 MG capsule 90 capsule 3     Sig: Take 1-3 tabs at hs      Last office visit with prescribing clinician: 7/27/2021      Next office visit with prescribing clinician: Visit date not found     COMPREHENSIVE METABOLIC PANEL (06/09/2021 17:42 EDT)  CBC WITH AUTO DIFFERENTIAL (06/09/2021 17:42 EDT)  ToxASSURE Select 13 (MW) - Urine, Clean Catch (06/02/2021 13:23)         Tennille Howard MA  08/24/21, 14:20 CDT

## 2021-08-25 RX ORDER — RISPERIDONE 1 MG/1
2 TABLET ORAL NIGHTLY
Qty: 30 TABLET | Refills: 3 | Status: SHIPPED | OUTPATIENT
Start: 2021-08-25 | End: 2021-09-01

## 2021-08-25 RX ORDER — DOXEPIN HYDROCHLORIDE 10 MG/1
CAPSULE ORAL
Qty: 90 CAPSULE | Refills: 3 | Status: SHIPPED | OUTPATIENT
Start: 2021-08-25 | End: 2021-09-01 | Stop reason: SDUPTHER

## 2021-08-25 NOTE — TELEPHONE ENCOUNTER
Rx Refill Note  Requested Prescriptions     Pending Prescriptions Disp Refills   • risperiDONE (RisperDAL) 1 MG tablet 30 tablet 3     Sig: Take 2 tablets by mouth Every Night.      Last office visit with prescribing clinician: 7/27/2021      Next office visit with prescribing clinician: Visit date not found            Tennille Howard MA  08/25/21, 16:46 CDT

## 2021-08-26 RX ORDER — DOXEPIN HYDROCHLORIDE 10 MG/1
CAPSULE ORAL
Qty: 90 CAPSULE | Refills: 3 | Status: CANCELLED | OUTPATIENT
Start: 2021-08-26

## 2021-08-26 NOTE — TELEPHONE ENCOUNTER
Rx Refill Note  Requested Prescriptions     Pending Prescriptions Disp Refills   • doxepin (SINEquan) 10 MG capsule 90 capsule 3     Sig: Take 1-3 tabs at hs      Last office visit with prescribing clinician: 7/27/2021      Next office visit with prescribing clinician: Visit date not found     Urine Drug Screen - (06/09/2021 17:42 EDT)         Tennille Howard MA  08/26/21, 13:19 CDT

## 2021-09-01 ENCOUNTER — OFFICE VISIT (OUTPATIENT)
Dept: INTERNAL MEDICINE | Facility: CLINIC | Age: 46
End: 2021-09-01

## 2021-09-01 VITALS — RESPIRATION RATE: 16 BRPM | TEMPERATURE: 99.5 F | OXYGEN SATURATION: 98 % | HEART RATE: 84 BPM

## 2021-09-01 DIAGNOSIS — F41.9 ANXIETY: ICD-10-CM

## 2021-09-01 DIAGNOSIS — J40 BRONCHITIS: Primary | ICD-10-CM

## 2021-09-01 DIAGNOSIS — G89.21 CHRONIC PAIN DUE TO TRAUMA: ICD-10-CM

## 2021-09-01 DIAGNOSIS — R50.9 FEVER, UNSPECIFIED FEVER CAUSE: ICD-10-CM

## 2021-09-01 PROCEDURE — 99213 OFFICE O/P EST LOW 20 MIN: CPT | Performed by: FAMILY MEDICINE

## 2021-09-01 RX ORDER — ALBUTEROL SULFATE 90 UG/1
2 AEROSOL, METERED RESPIRATORY (INHALATION) EVERY 4 HOURS PRN
Qty: 18 G | Refills: 3 | Status: SHIPPED | OUTPATIENT
Start: 2021-09-01 | End: 2021-10-29 | Stop reason: SDUPTHER

## 2021-09-01 RX ORDER — DOXYCYCLINE 100 MG/1
100 CAPSULE ORAL 2 TIMES DAILY
Qty: 20 CAPSULE | Refills: 0 | Status: SHIPPED | OUTPATIENT
Start: 2021-09-01 | End: 2021-11-30

## 2021-09-01 RX ORDER — RISPERIDONE 1 MG/1
2 TABLET ORAL NIGHTLY
Qty: 60 TABLET | Refills: 3 | Status: SHIPPED | OUTPATIENT
Start: 2021-09-01 | End: 2021-09-30 | Stop reason: SDUPTHER

## 2021-09-01 RX ORDER — DOXEPIN HYDROCHLORIDE 10 MG/1
CAPSULE ORAL
Qty: 90 CAPSULE | Refills: 3 | Status: SHIPPED | OUTPATIENT
Start: 2021-09-01 | End: 2021-10-29 | Stop reason: SDUPTHER

## 2021-09-01 RX ORDER — OXYCODONE AND ACETAMINOPHEN 10; 325 MG/1; MG/1
1 TABLET ORAL EVERY 6 HOURS PRN
Qty: 120 TABLET | Refills: 0 | Status: SHIPPED | OUTPATIENT
Start: 2021-09-01 | End: 2021-09-30 | Stop reason: SDUPTHER

## 2021-09-01 RX ORDER — ALPRAZOLAM 1 MG/1
1 TABLET ORAL 2 TIMES DAILY PRN
Qty: 60 TABLET | Refills: 0 | Status: SHIPPED | OUTPATIENT
Start: 2021-09-01 | End: 2021-09-30 | Stop reason: SDUPTHER

## 2021-09-01 RX ORDER — RISPERIDONE 1 MG/1
2 TABLET ORAL NIGHTLY
Qty: 60 TABLET | Refills: 3 | Status: SHIPPED | OUTPATIENT
Start: 2021-09-01 | End: 2021-09-01 | Stop reason: SDUPTHER

## 2021-09-01 NOTE — PROGRESS NOTES
.          Subjective     Chief Complaint   Patient presents with   • Fever   • Headache   • Cough       History of Present Illness  Patient presents with complaints of cough.  Onset approximately 2 days ago.  He has been very congested.  Has had green sputum.  He has had a fever of 102.  He has been taking NyQuil.  Been using his inhaler very frequently.  He has had a headache this.  Has also had a problem with a spider bite to the right lower extremity.  It is better than it has been.  Is area that is very tender to palpation and swelling.  Patient's PMR from outside medical facility reviewed and noted.    Review of Systems     Otherwise complete ROS reviewed and negative except as mentioned in the HPI.    Past Medical History:   Past Medical History:   Diagnosis Date   • Anxiety    • Arthritis    • Bipolar 1 disorder (CMS/HCC)    • Hypertension    • PTSD (post-traumatic stress disorder)      Past Surgical History:  Past Surgical History:   Procedure Laterality Date   • HIP SURGERY       Social History:  reports that he has been smoking cigarettes. He has a 15.00 pack-year smoking history. He has never used smokeless tobacco. He reports that he does not drink alcohol and does not use drugs.    Family History: family history includes Arthritis in his father; Diabetes in his father; Heart attack in his mother; Hypertension in his father.       Allergies:  Allergies   Allergen Reactions   • Adderall [Amphetamine-Dextroamphetamine] Shortness Of Breath, Swelling, Palpitations and Hallucinations   • Meloxicam Nausea Only   • Darvon [Propoxyphene] GI Intolerance   • Motrin [Ibuprofen] GI Intolerance   • Toradol [Ketorolac Tromethamine] GI Intolerance   • Tramadol GI Intolerance     Medications:  Prior to Admission medications    Medication Sig Start Date End Date Taking? Authorizing Provider   albuterol sulfate  (90 Base) MCG/ACT inhaler Inhale 2 puffs Every 4 (Four) Hours As Needed for Wheezing. 6/29/21   Xiao  Liza Mistry DO   ALPRAZolam (XANAX) 1 MG tablet Take 1 tablet by mouth 2 (Two) Times a Day As Needed for Anxiety. 7/30/21   Liza Hagen DO   atorvastatin (LIPITOR) 40 MG tablet Take 1 tablet by mouth Daily. 7/27/21   Liza Hagen, DO   citalopram (CeleXA) 20 MG tablet Take 1 tablet by mouth Daily. 7/27/21   Liza aHgen DO   [facility] Vitamin C 1000 MG tablet TAKE 1/2 TABLET BY MOUTH EVERY DAY 8/24/21   Liza Hagen, DO   doxepin (SINEquan) 10 MG capsule Take 1-3 tabs at hs 8/25/21   Liza Hagen DO   doxycycline (MONODOX) 100 MG capsule Take 1 capsule by mouth 2 (Two) Times a Day. 7/27/21   Liza Hagen, DO   ipratropium-albuterol (Combivent Respimat)  MCG/ACT inhaler Inhale 1 puff 4 (Four) Times a Day. 6/29/21   Liza Hagen,    metoprolol tartrate (LOPRESSOR) 50 MG tablet Take 1 tablet by mouth 2 (Two) Times a Day. 7/27/21   Liza Hagen, DO   mometasone (Nasonex) 50 MCG/ACT nasal spray 2 sprays into the nostril(s) as directed by provider Daily. 7/27/21   Liza Hagen,    Mometasone Furoate 100 MCG/ACT aerosol Inhale 2 puffs 2 (Two) Times a Day. 6/29/21   Liza Hagen, DO   ondansetron ODT (Zofran ODT) 4 MG disintegrating tablet Place 1 tablet on the tongue Every 8 (Eight) Hours As Needed for Nausea or Vomiting. 7/27/21   Liza Hagen, DO   oxyCODONE-acetaminophen (PERCOCET)  MG per tablet Take 1 tablet by mouth Every 6 (Six) Hours As Needed for Moderate Pain . 7/30/21   Liza Hagen, DO   risperiDONE (RisperDAL) 1 MG tablet Take 2 tablets by mouth Every Night. 8/25/21   Liza Hagen, DO   Spacer/Aero-Holding Chambers (E-Z Spacer) inhaler Use as instructed 11/3/20 11/3/21  Liza Hagen DO   zinc gluconate 50 MG tablet Take 1 tablet by mouth Daily. 1/4/21   Eli Teresa, JOELLE       Objective     Vital Signs: Pulse 84   Temp 99.5 °F (37.5 °C)   Resp 16   SpO2 98%   Physical Exam  Vitals  and nursing note reviewed.   Constitutional:       Appearance: Normal appearance. He is not toxic-appearing or diaphoretic.   HENT:      Head: Normocephalic and atraumatic.      Right Ear: External ear normal.      Left Ear: External ear normal.      Nose: Nose normal.      Mouth/Throat:      Mouth: Mucous membranes are moist.      Pharynx: Oropharynx is clear.   Eyes:      Extraocular Movements: Extraocular movements intact.      Conjunctiva/sclera: Conjunctivae normal.      Pupils: Pupils are equal, round, and reactive to light.   Cardiovascular:      Rate and Rhythm: Normal rate and regular rhythm.      Pulses: Normal pulses.      Heart sounds: Normal heart sounds.   Pulmonary:      Effort: Pulmonary effort is normal.      Breath sounds: Wheezing and rhonchi present.   Abdominal:      General: Bowel sounds are normal.      Palpations: Abdomen is soft.   Musculoskeletal:         General: Tenderness ( Right lower extremity over fluctuant mass) present. Normal range of motion.      Cervical back: Normal range of motion and neck supple.   Skin:     General: Skin is warm.      Capillary Refill: Capillary refill takes less than 2 seconds.   Neurological:      General: No focal deficit present.      Mental Status: He is alert.   Psychiatric:         Mood and Affect: Mood normal.         Behavior: Behavior normal.             Results Reviewed:  Glucose   Date Value Ref Range Status   06/09/2021 78 74 - 109 mg/dL Final     BUN   Date Value Ref Range Status   06/09/2021 19 6 - 20 mg/dL Final     Creatinine   Date Value Ref Range Status   06/09/2021 0.8 0.5 - 1.2 mg/dL Final     Sodium   Date Value Ref Range Status   06/09/2021 136 136 - 145 mmol/L Final     Potassium   Date Value Ref Range Status   06/09/2021 4.4 3.5 - 5.0 mmol/L Final     Chloride   Date Value Ref Range Status   06/09/2021 102 98 - 111 mmol/L Final     CO2   Date Value Ref Range Status   06/09/2021 25 22 - 29 mmol/L Final     Calcium   Date Value Ref  Range Status   06/09/2021 8.6 8.6 - 10.0 mg/dL Final     ALT (SGPT)   Date Value Ref Range Status   06/09/2021 20 5 - 41 U/L Final     AST (SGOT)   Date Value Ref Range Status   06/09/2021 25 5 - 40 U/L Final     WBC   Date Value Ref Range Status   06/09/2021 6.1 4.8 - 10.8 K/uL Final     Hematocrit   Date Value Ref Range Status   06/09/2021 41.3 (L) 42.0 - 52.0 % Final     Platelets   Date Value Ref Range Status   06/09/2021 160 130 - 400 K/uL Final     Triglycerides   Date Value Ref Range Status   03/30/2021 338 (H) 0 - 149 mg/dL Final     HDL Cholesterol   Date Value Ref Range Status   03/30/2021 40 >39 mg/dL Final     LDL Chol Calc (NIH)   Date Value Ref Range Status   03/30/2021 122 (H) 0 - 99 mg/dL Final     Hemoglobin A1C   Date Value Ref Range Status   04/30/2021 5.6 % Final         Assessment / Plan     Assessment/Plan:  1. Bronchitis  Covid test  Doxycycline 100 mg twice daily  Use inhaler as directed    2. Anxiety    - ALPRAZolam (XANAX) 1 MG tablet; Take 1 tablet by mouth 2 (Two) Times a Day As Needed for Anxiety.  Dispense: 60 tablet; Refill: 0    3. Chronic pain due to trauma    - oxyCODONE-acetaminophen (PERCOCET)  MG per tablet; Take 1 tablet by mouth Every 6 (Six) Hours As Needed for Moderate Pain .  Dispense: 120 tablet; Refill: 0    4. Fever, unspecified fever cause          Return if symptoms worsen or fail to improve, for Keep next scheduled appointment. unless patient needs to be seen sooner or acute issues arise.        I have discussed the patient results/orders and and plan/recommendation with them at today's visit.      Liza Hagen DO   09/01/2021

## 2021-09-02 LAB — SARS-COV-2 RNA RESP QL NAA+PROBE: NOT DETECTED

## 2021-09-07 ENCOUNTER — TELEPHONE (OUTPATIENT)
Dept: INTERNAL MEDICINE | Facility: CLINIC | Age: 46
End: 2021-09-07

## 2021-09-07 NOTE — TELEPHONE ENCOUNTER
Attempted to call patient. No answer. No vm set up to leave message. Will try to call patient again at a later time.

## 2021-09-30 ENCOUNTER — OFFICE VISIT (OUTPATIENT)
Dept: INTERNAL MEDICINE | Facility: CLINIC | Age: 46
End: 2021-09-30

## 2021-09-30 VITALS
TEMPERATURE: 98.6 F | RESPIRATION RATE: 17 BRPM | DIASTOLIC BLOOD PRESSURE: 76 MMHG | WEIGHT: 285 LBS | OXYGEN SATURATION: 99 % | HEIGHT: 73 IN | SYSTOLIC BLOOD PRESSURE: 150 MMHG | HEART RATE: 94 BPM | BODY MASS INDEX: 37.77 KG/M2

## 2021-09-30 DIAGNOSIS — M25.562 CHRONIC PAIN OF BOTH KNEES: ICD-10-CM

## 2021-09-30 DIAGNOSIS — R50.9 FEVER, UNSPECIFIED FEVER CAUSE: Primary | ICD-10-CM

## 2021-09-30 DIAGNOSIS — G89.29 CHRONIC PAIN OF BOTH KNEES: ICD-10-CM

## 2021-09-30 DIAGNOSIS — M25.561 CHRONIC PAIN OF BOTH KNEES: ICD-10-CM

## 2021-09-30 DIAGNOSIS — R05.9 COUGH: ICD-10-CM

## 2021-09-30 DIAGNOSIS — R49.0 HOARSE: ICD-10-CM

## 2021-09-30 DIAGNOSIS — F41.9 ANXIETY: ICD-10-CM

## 2021-09-30 DIAGNOSIS — G89.21 CHRONIC PAIN DUE TO TRAUMA: ICD-10-CM

## 2021-09-30 DIAGNOSIS — I10 ESSENTIAL HYPERTENSION: ICD-10-CM

## 2021-09-30 DIAGNOSIS — Z13.220 SCREENING CHOLESTEROL LEVEL: ICD-10-CM

## 2021-09-30 PROCEDURE — 99214 OFFICE O/P EST MOD 30 MIN: CPT | Performed by: FAMILY MEDICINE

## 2021-09-30 RX ORDER — RISPERIDONE 1 MG/1
2 TABLET ORAL NIGHTLY
Qty: 180 TABLET | Refills: 0 | Status: SHIPPED | OUTPATIENT
Start: 2021-09-30 | End: 2021-10-29 | Stop reason: SDUPTHER

## 2021-09-30 NOTE — PROGRESS NOTES
"        Subjective     Chief Complaint   Patient presents with   • Vomiting     Several times a day.    • Fever     101.0 yesterday.        History of Present Illness  \"feels like hammered dammit\"  Coughed up something that looked like fiber, though it was a piece of lung.  Unable to get into Dr Quiroz or a response from his office.  Cant sleep afraid he will choke.  Eating soft foods now.  Hard to swallow hard foods.   Thinks staph infection on arm.  Needs refills on his medications  Has had fever.  Took it and it was 101 yesterday.    Patient's PMR from outside medical facility reviewed and noted.    Review of Systems     Otherwise complete ROS reviewed and negative except as mentioned in the HPI.    Past Medical History:   Past Medical History:   Diagnosis Date   • Anxiety    • Arthritis    • Bipolar 1 disorder (CMS/HCC)    • Hypertension    • PTSD (post-traumatic stress disorder)      Past Surgical History:  Past Surgical History:   Procedure Laterality Date   • HIP SURGERY       Social History:  reports that he has been smoking cigarettes. He has a 15.00 pack-year smoking history. He has never used smokeless tobacco. He reports that he does not drink alcohol and does not use drugs.    Family History: family history includes Arthritis in his father; Diabetes in his father; Heart attack in his mother; Hypertension in his father.     Allergies:  Allergies   Allergen Reactions   • Adderall [Amphetamine-Dextroamphetamine] Shortness Of Breath, Swelling, Palpitations and Hallucinations   • Meloxicam Nausea Only   • Darvon [Propoxyphene] GI Intolerance   • Motrin [Ibuprofen] GI Intolerance   • Toradol [Ketorolac Tromethamine] GI Intolerance   • Tramadol GI Intolerance     Medications:  Prior to Admission medications    Medication Sig Start Date End Date Taking? Authorizing Provider   albuterol sulfate  (90 Base) MCG/ACT inhaler Inhale 2 puffs Every 4 (Four) Hours As Needed for Wheezing. 9/1/21  Yes Xiao, " Liza Mistry,    ALPRAZolam (XANAX) 1 MG tablet Take 1 tablet by mouth 2 (Two) Times a Day As Needed for Anxiety. 9/1/21  Yes Liza Hagen DO   atorvastatin (LIPITOR) 40 MG tablet Take 1 tablet by mouth Daily. 7/27/21  Yes Liza Hagen, DO   citalopram (CeleXA) 20 MG tablet Take 1 tablet by mouth Daily. 7/27/21  Yes Liza Hagen, DO   [facility] Vitamin C 1000 MG tablet TAKE 1/2 TABLET BY MOUTH EVERY DAY 8/24/21  Yes Liza Hagen, DO   doxepin (SINEquan) 10 MG capsule Take 1-3 tabs at hs 9/1/21  Yes Liza Hagen, DO   doxycycline (MONODOX) 100 MG capsule Take 1 capsule by mouth 2 (Two) Times a Day. 9/1/21  Yes Liza Hagen, DO   ipratropium-albuterol (Combivent Respimat)  MCG/ACT inhaler Inhale 1 puff 4 (Four) Times a Day. 6/29/21  Yes Liza Hagen, DO   metoprolol tartrate (LOPRESSOR) 50 MG tablet Take 1 tablet by mouth 2 (Two) Times a Day. 7/27/21  Yes Liza Hagen, DO   mometasone (Nasonex) 50 MCG/ACT nasal spray 2 sprays into the nostril(s) as directed by provider Daily. 7/27/21  Yes Liza Hagen,    Mometasone Furoate 100 MCG/ACT aerosol Inhale 2 puffs 2 (Two) Times a Day. 6/29/21  Yes Liza Hagen, DO   ondansetron ODT (Zofran ODT) 4 MG disintegrating tablet Place 1 tablet on the tongue Every 8 (Eight) Hours As Needed for Nausea or Vomiting. 7/27/21  Yes Liza Hagen, DO   oxyCODONE-acetaminophen (PERCOCET)  MG per tablet Take 1 tablet by mouth Every 6 (Six) Hours As Needed for Moderate Pain . 9/1/21  Yes Liza Hagen, DO   risperiDONE (RisperDAL) 1 MG tablet Take 2 tablets by mouth Every Night. 9/30/21  Yes Liza Hagen DO   Spacer/Aero-Holding Chambers (E-Z Spacer) inhaler Use as instructed 11/3/20 11/3/21 Yes Liza Hagen DO   zinc gluconate 50 MG tablet Take 1 tablet by mouth Daily. 1/4/21  Yes Eli Teresa APRN   risperiDONE (RisperDAL) 1 MG tablet Take 2 tablets by mouth Every Night.  "9/1/21 9/30/21  Liza HagenDO       Objective     Vital Signs: /76 (BP Location: Right arm, Patient Position: Sitting, Cuff Size: Adult)   Pulse 94   Temp 98.6 °F (37 °C) (Skin)   Resp 17   Ht 185.4 cm (73\")   Wt 129 kg (285 lb)   SpO2 99%   BMI 37.60 kg/m²   Physical Exam  Vitals and nursing note reviewed.   Constitutional:       Appearance: Normal appearance. He is obese. He is not toxic-appearing.   HENT:      Head: Normocephalic and atraumatic.      Right Ear: Tympanic membrane, ear canal and external ear normal.      Left Ear: Tympanic membrane, ear canal and external ear normal.      Nose: Congestion present.      Mouth/Throat:      Mouth: Mucous membranes are moist.      Comments: There is enlarged tissue on the right tonsilar pillar.  Eyes:      Extraocular Movements: Extraocular movements intact.      Conjunctiva/sclera: Conjunctivae normal.      Pupils: Pupils are equal, round, and reactive to light.   Cardiovascular:      Rate and Rhythm: Normal rate and regular rhythm.      Pulses: Normal pulses.      Heart sounds: Normal heart sounds.   Pulmonary:      Effort: Pulmonary effort is normal.      Breath sounds: Wheezing present.      Comments: Wheeze is in upper airway  Abdominal:      General: Bowel sounds are normal.      Palpations: Abdomen is soft.   Musculoskeletal:      Cervical back: Normal range of motion and neck supple.      Comments: Move all extremities  No swelling   Skin:     General: Skin is warm and dry.      Capillary Refill: Capillary refill takes less than 2 seconds.   Neurological:      General: No focal deficit present.      Mental Status: He is alert and oriented to person, place, and time.   Psychiatric:         Mood and Affect: Mood normal.         Behavior: Behavior normal.         Patient's Body mass index is 37.6 kg/m².     Results Reviewed:  Glucose   Date Value Ref Range Status   06/09/2021 78 74 - 109 mg/dL Final     BUN   Date Value Ref Range Status "   06/09/2021 19 6 - 20 mg/dL Final     Creatinine   Date Value Ref Range Status   06/09/2021 0.8 0.5 - 1.2 mg/dL Final     Sodium   Date Value Ref Range Status   06/09/2021 136 136 - 145 mmol/L Final     Potassium   Date Value Ref Range Status   06/09/2021 4.4 3.5 - 5.0 mmol/L Final     Chloride   Date Value Ref Range Status   06/09/2021 102 98 - 111 mmol/L Final     CO2   Date Value Ref Range Status   06/09/2021 25 22 - 29 mmol/L Final     Calcium   Date Value Ref Range Status   06/09/2021 8.6 8.6 - 10.0 mg/dL Final     ALT (SGPT)   Date Value Ref Range Status   06/09/2021 20 5 - 41 U/L Final     AST (SGOT)   Date Value Ref Range Status   06/09/2021 25 5 - 40 U/L Final     WBC   Date Value Ref Range Status   06/09/2021 6.1 4.8 - 10.8 K/uL Final     Hematocrit   Date Value Ref Range Status   06/09/2021 41.3 (L) 42.0 - 52.0 % Final     Platelets   Date Value Ref Range Status   06/09/2021 160 130 - 400 K/uL Final     Triglycerides   Date Value Ref Range Status   03/30/2021 338 (H) 0 - 149 mg/dL Final     HDL Cholesterol   Date Value Ref Range Status   03/30/2021 40 >39 mg/dL Final     LDL Chol Calc (NIH)   Date Value Ref Range Status   03/30/2021 122 (H) 0 - 99 mg/dL Final     Hemoglobin A1C   Date Value Ref Range Status   04/30/2021 5.6 % Final         Assessment / Plan     Assessment/Plan:  1. Fever, unspecified fever cause    - Comprehensive metabolic panel    2. Cough    - Ambulatory Referral to ENT (Otolaryngology)  - CT Chest Without Contrast; Future    3. Hoarse    - Ambulatory Referral to ENT (Otolaryngology)  - CT Chest Without Contrast; Future  - CBC w AUTO Differential  - TSH    4. Screening cholesterol level    - Lipid panel    5. Chronic pain of both knees  Medications refilled    6. Essential hypertension  Continue current medications.         Return in about 4 weeks (around 10/28/2021), or if symptoms worsen or fail to improve. unless patient needs to be seen sooner or acute issues arise.        I have  discussed the patient results/orders and and plan/recommendation with them at today's visit.      Liza Hagen,    09/30/2021

## 2021-09-30 NOTE — TELEPHONE ENCOUNTER
Rx Refill Note  Requested Prescriptions     Pending Prescriptions Disp Refills   • risperiDONE (RisperDAL) 1 MG tablet 180 tablet 0     Sig: Take 2 tablets by mouth Every Night.      Last office visit with prescribing clinician: 9/1/2021      Next office visit with prescribing clinician: 9/30/2021     ToxASSURE Select 13 (CINTIA) - Urine, Clean Catch (06/02/2021 13:23)         Tennille Howard MA  09/30/21, 07:39 CDT

## 2021-10-01 ENCOUNTER — TELEPHONE (OUTPATIENT)
Dept: INTERNAL MEDICINE | Facility: CLINIC | Age: 46
End: 2021-10-01

## 2021-10-01 LAB
ALBUMIN SERPL-MCNC: 5 G/DL (ref 4–5)
ALBUMIN/GLOB SERPL: 2.4 {RATIO} (ref 1.2–2.2)
ALP SERPL-CCNC: 74 IU/L (ref 44–121)
ALT SERPL-CCNC: 21 IU/L (ref 0–44)
AST SERPL-CCNC: 17 IU/L (ref 0–40)
BASOPHILS # BLD AUTO: 0.1 X10E3/UL (ref 0–0.2)
BASOPHILS NFR BLD AUTO: 1 %
BILIRUB SERPL-MCNC: 0.3 MG/DL (ref 0–1.2)
BUN SERPL-MCNC: 15 MG/DL (ref 6–24)
BUN/CREAT SERPL: 20 (ref 9–20)
CALCIUM SERPL-MCNC: 9.3 MG/DL (ref 8.7–10.2)
CHLORIDE SERPL-SCNC: 103 MMOL/L (ref 96–106)
CHOLEST SERPL-MCNC: 203 MG/DL (ref 100–199)
CO2 SERPL-SCNC: 21 MMOL/L (ref 20–29)
CREAT SERPL-MCNC: 0.75 MG/DL (ref 0.76–1.27)
EOSINOPHIL # BLD AUTO: 0.1 X10E3/UL (ref 0–0.4)
EOSINOPHIL NFR BLD AUTO: 2 %
ERYTHROCYTE [DISTWIDTH] IN BLOOD BY AUTOMATED COUNT: 13.6 % (ref 11.6–15.4)
GLOBULIN SER CALC-MCNC: 2.1 G/DL (ref 1.5–4.5)
GLUCOSE SERPL-MCNC: 97 MG/DL (ref 65–99)
HCT VFR BLD AUTO: 44.8 % (ref 37.5–51)
HDLC SERPL-MCNC: 51 MG/DL
HGB BLD-MCNC: 15 G/DL (ref 13–17.7)
IMM GRANULOCYTES # BLD AUTO: 0 X10E3/UL (ref 0–0.1)
IMM GRANULOCYTES NFR BLD AUTO: 1 %
LDLC SERPL CALC-MCNC: 116 MG/DL (ref 0–99)
LYMPHOCYTES # BLD AUTO: 1.7 X10E3/UL (ref 0.7–3.1)
LYMPHOCYTES NFR BLD AUTO: 26 %
MCH RBC QN AUTO: 29.5 PG (ref 26.6–33)
MCHC RBC AUTO-ENTMCNC: 33.5 G/DL (ref 31.5–35.7)
MCV RBC AUTO: 88 FL (ref 79–97)
MONOCYTES # BLD AUTO: 0.5 X10E3/UL (ref 0.1–0.9)
MONOCYTES NFR BLD AUTO: 7 %
NEUTROPHILS # BLD AUTO: 4.3 X10E3/UL (ref 1.4–7)
NEUTROPHILS NFR BLD AUTO: 63 %
PLATELET # BLD AUTO: 226 X10E3/UL (ref 150–450)
POTASSIUM SERPL-SCNC: 4.8 MMOL/L (ref 3.5–5.2)
PROT SERPL-MCNC: 7.1 G/DL (ref 6–8.5)
RBC # BLD AUTO: 5.08 X10E6/UL (ref 4.14–5.8)
SODIUM SERPL-SCNC: 139 MMOL/L (ref 134–144)
TRIGL SERPL-MCNC: 208 MG/DL (ref 0–149)
TSH SERPL DL<=0.005 MIU/L-ACNC: 0.96 UIU/ML (ref 0.45–4.5)
VLDLC SERPL CALC-MCNC: 36 MG/DL (ref 5–40)
WBC # BLD AUTO: 6.6 X10E3/UL (ref 3.4–10.8)

## 2021-10-01 RX ORDER — OXYCODONE AND ACETAMINOPHEN 10; 325 MG/1; MG/1
1 TABLET ORAL EVERY 6 HOURS PRN
Qty: 120 TABLET | Refills: 0 | Status: SHIPPED | OUTPATIENT
Start: 2021-10-01 | End: 2021-10-29 | Stop reason: SDUPTHER

## 2021-10-01 RX ORDER — ALPRAZOLAM 1 MG/1
1 TABLET ORAL 2 TIMES DAILY PRN
Qty: 60 TABLET | Refills: 0 | Status: SHIPPED | OUTPATIENT
Start: 2021-10-01 | End: 2021-10-29 | Stop reason: SDUPTHER

## 2021-10-01 NOTE — TELEPHONE ENCOUNTER
----- Message from Liza Hagen DO sent at 10/1/2021  7:13 AM CDT -----  Cmp, cbc, tsh are normal  Lipids are elevated  Is  he taking his statin?

## 2021-10-21 ENCOUNTER — OFFICE VISIT (OUTPATIENT)
Dept: INTERNAL MEDICINE | Facility: CLINIC | Age: 46
End: 2021-10-21

## 2021-10-21 VITALS
RESPIRATION RATE: 24 BRPM | TEMPERATURE: 98.4 F | OXYGEN SATURATION: 98 % | DIASTOLIC BLOOD PRESSURE: 90 MMHG | SYSTOLIC BLOOD PRESSURE: 160 MMHG | BODY MASS INDEX: 37.6 KG/M2 | HEIGHT: 73 IN | HEART RATE: 74 BPM

## 2021-10-21 DIAGNOSIS — R05.9 COUGH: Primary | ICD-10-CM

## 2021-10-21 DIAGNOSIS — J40 BRONCHITIS: ICD-10-CM

## 2021-10-21 LAB — SARS-COV-2 ORF1AB RESP QL NAA+PROBE: NOT DETECTED

## 2021-10-21 PROCEDURE — 96372 THER/PROPH/DIAG INJ SC/IM: CPT | Performed by: NURSE PRACTITIONER

## 2021-10-21 PROCEDURE — 99213 OFFICE O/P EST LOW 20 MIN: CPT | Performed by: NURSE PRACTITIONER

## 2021-10-21 PROCEDURE — U0004 COV-19 TEST NON-CDC HGH THRU: HCPCS | Performed by: NURSE PRACTITIONER

## 2021-10-21 RX ORDER — CEFDINIR 300 MG/1
300 CAPSULE ORAL 2 TIMES DAILY
Qty: 20 CAPSULE | Refills: 0 | Status: SHIPPED | OUTPATIENT
Start: 2021-10-21 | End: 2021-11-30

## 2021-10-21 RX ORDER — METHYLPREDNISOLONE 4 MG/1
TABLET ORAL
Qty: 21 EACH | Refills: 0 | Status: SHIPPED | OUTPATIENT
Start: 2021-10-21 | End: 2021-11-30

## 2021-10-21 RX ORDER — AZITHROMYCIN 250 MG/1
TABLET, FILM COATED ORAL
Qty: 6 TABLET | Refills: 0 | Status: SHIPPED | OUTPATIENT
Start: 2021-10-21 | End: 2021-11-30

## 2021-10-21 RX ORDER — METHYLPREDNISOLONE SODIUM SUCCINATE 125 MG/2ML
125 INJECTION, POWDER, LYOPHILIZED, FOR SOLUTION INTRAMUSCULAR; INTRAVENOUS ONCE
Status: COMPLETED | OUTPATIENT
Start: 2021-10-21 | End: 2021-10-21

## 2021-10-21 RX ORDER — BENZONATATE 100 MG/1
100 CAPSULE ORAL 3 TIMES DAILY PRN
Qty: 30 CAPSULE | Refills: 0 | Status: SHIPPED | OUTPATIENT
Start: 2021-10-21 | End: 2021-12-29 | Stop reason: SDUPTHER

## 2021-10-21 RX ORDER — GUAIFENESIN 600 MG/1
1200 TABLET, EXTENDED RELEASE ORAL 2 TIMES DAILY
Qty: 28 TABLET | Refills: 0 | Status: SHIPPED | OUTPATIENT
Start: 2021-10-21 | End: 2021-12-29 | Stop reason: SDUPTHER

## 2021-10-21 RX ADMIN — METHYLPREDNISOLONE SODIUM SUCCINATE 125 MG: 125 INJECTION, POWDER, LYOPHILIZED, FOR SOLUTION INTRAMUSCULAR; INTRAVENOUS at 10:20

## 2021-10-21 NOTE — PROGRESS NOTES
"        Subjective     Chief Complaint   Patient presents with   • Cough     Productive. Symptoms began 2 days ago.    • Nasal Congestion     Drainage. Feels like \"razor blades\". No fever.        History of Present Illness  Pt comes in today with complaints of sore throat and cough. STates it feels like razor blades in his throat when he coughs. He has lost his phone and did not get the message about ENT appointment or the chest CT. He continues to smoke. No fevers.No loss of taste or smell. No COVID contacts. He is not vaccinated.   Patient's PMR from outside medical facility reviewed and noted.    Review of Systems   Otherwise complete ROS reviewed and negative except as mentioned in the HPI.    Past Medical History:   Past Medical History:   Diagnosis Date   • Anxiety    • Arthritis    • Bipolar 1 disorder (HCC)    • Hypertension    • PTSD (post-traumatic stress disorder)      Past Surgical History:  Past Surgical History:   Procedure Laterality Date   • HIP SURGERY       Social History:  reports that he has been smoking cigarettes. He has a 15.00 pack-year smoking history. He has never used smokeless tobacco. He reports that he does not drink alcohol and does not use drugs.    Family History: family history includes Arthritis in his father; Diabetes in his father; Heart attack in his mother; Hypertension in his father.       Allergies:  Allergies   Allergen Reactions   • Adderall [Amphetamine-Dextroamphetamine] Shortness Of Breath, Swelling, Palpitations and Hallucinations   • Meloxicam Nausea Only   • Darvon [Propoxyphene] GI Intolerance   • Motrin [Ibuprofen] GI Intolerance   • Toradol [Ketorolac Tromethamine] GI Intolerance   • Tramadol GI Intolerance     Medications:  Prior to Admission medications    Medication Sig Start Date End Date Taking? Authorizing Provider   albuterol sulfate  (90 Base) MCG/ACT inhaler Inhale 2 puffs Every 4 (Four) Hours As Needed for Wheezing. 9/1/21  Yes Liza Hagen, " DO   ALPRAZolam (XANAX) 1 MG tablet Take 1 tablet by mouth 2 (Two) Times a Day As Needed for Anxiety. 10/1/21  Yes Liza Hagen DO   atorvastatin (LIPITOR) 40 MG tablet Take 1 tablet by mouth Daily. 7/27/21  Yes Liza Hagen, DO   citalopram (CeleXA) 20 MG tablet Take 1 tablet by mouth Daily. 7/27/21  Yes Liza Hagen, DO   [facility] Vitamin C 1000 MG tablet TAKE 1/2 TABLET BY MOUTH EVERY DAY 8/24/21  Yes Liza Hagen, DO   doxepin (SINEquan) 10 MG capsule Take 1-3 tabs at hs 9/1/21  Yes Liza Hagen, DO   doxycycline (MONODOX) 100 MG capsule Take 1 capsule by mouth 2 (Two) Times a Day. 9/1/21  Yes Liza Hagen, DO   ipratropium-albuterol (Combivent Respimat)  MCG/ACT inhaler Inhale 1 puff 4 (Four) Times a Day. 6/29/21  Yes Liza Hagen, DO   metoprolol tartrate (LOPRESSOR) 50 MG tablet Take 1 tablet by mouth 2 (Two) Times a Day. 7/27/21  Yes Liaz Hagen, DO   mometasone (Nasonex) 50 MCG/ACT nasal spray 2 sprays into the nostril(s) as directed by provider Daily. 7/27/21  Yes Liza Hagen, DO   Mometasone Furoate 100 MCG/ACT aerosol Inhale 2 puffs 2 (Two) Times a Day. 6/29/21  Yes Liza Hagen, DO   mupirocin (Bactroban) 2 % ointment Apply 1 application topically to the appropriate area as directed 3 (Three) Times a Day. 9/30/21  Yes Liza Hagen, DO   ondansetron ODT (Zofran ODT) 4 MG disintegrating tablet Place 1 tablet on the tongue Every 8 (Eight) Hours As Needed for Nausea or Vomiting. 7/27/21  Yes Liza Hagen, DO   oxyCODONE-acetaminophen (PERCOCET)  MG per tablet Take 1 tablet by mouth Every 6 (Six) Hours As Needed for Moderate Pain . 10/1/21  Yes Liza Hagen DO   risperiDONE (RisperDAL) 1 MG tablet Take 2 tablets by mouth Every Night. 9/30/21  Yes Liza Hagen DO   Spacer/Aero-Holding Chambers (E-Z Spacer) inhaler Use as instructed 11/3/20 11/3/21 Yes Liza Hagen DO   zinc gluconate 50 MG  "tablet Take 1 tablet by mouth Daily. 1/4/21  Yes Eli Teresa APRN       Objective     Vital Signs: /90   Pulse 74   Temp 98.4 °F (36.9 °C)   Resp 24   Ht 185.4 cm (73\")   SpO2 98%   BMI 37.60 kg/m²   Physical Exam  Constitutional:       Appearance: He is obese. He is ill-appearing.   HENT:      Left Ear: Tympanic membrane is erythematous.      Mouth/Throat:      Pharynx: Pharyngeal swelling and posterior oropharyngeal erythema present. No oropharyngeal exudate.   Cardiovascular:      Rate and Rhythm: Normal rate and regular rhythm.   Pulmonary:      Breath sounds: Wheezing and rhonchi present.   Neurological:      Mental Status: He is alert.       Results Reviewed:  Glucose   Date Value Ref Range Status   06/09/2021 78 74 - 109 mg/dL Final     BUN   Date Value Ref Range Status   09/30/2021 15 6 - 24 mg/dL Final   06/09/2021 19 6 - 20 mg/dL Final     Creatinine   Date Value Ref Range Status   09/30/2021 0.75 (L) 0.76 - 1.27 mg/dL Final   06/09/2021 0.8 0.5 - 1.2 mg/dL Final     Sodium   Date Value Ref Range Status   09/30/2021 139 134 - 144 mmol/L Final   06/09/2021 136 136 - 145 mmol/L Final     Potassium   Date Value Ref Range Status   09/30/2021 4.8 3.5 - 5.2 mmol/L Final   06/09/2021 4.4 3.5 - 5.0 mmol/L Final     Chloride   Date Value Ref Range Status   09/30/2021 103 96 - 106 mmol/L Final   06/09/2021 102 98 - 111 mmol/L Final     CO2   Date Value Ref Range Status   06/09/2021 25 22 - 29 mmol/L Final     Total CO2   Date Value Ref Range Status   09/30/2021 21 20 - 29 mmol/L Final     Calcium   Date Value Ref Range Status   09/30/2021 9.3 8.7 - 10.2 mg/dL Final   06/09/2021 8.6 8.6 - 10.0 mg/dL Final     ALT (SGPT)   Date Value Ref Range Status   09/30/2021 21 0 - 44 IU/L Final   06/09/2021 20 5 - 41 U/L Final     AST (SGOT)   Date Value Ref Range Status   09/30/2021 17 0 - 40 IU/L Final   06/09/2021 25 5 - 40 U/L Final     WBC   Date Value Ref Range Status   09/30/2021 6.6 3.4 - 10.8 " x10E3/uL Final   06/09/2021 6.1 4.8 - 10.8 K/uL Final     Hematocrit   Date Value Ref Range Status   09/30/2021 44.8 37.5 - 51.0 % Final   06/09/2021 41.3 (L) 42.0 - 52.0 % Final     Platelets   Date Value Ref Range Status   09/30/2021 226 150 - 450 x10E3/uL Final   06/09/2021 160 130 - 400 K/uL Final     Triglycerides   Date Value Ref Range Status   09/30/2021 208 (H) 0 - 149 mg/dL Final     HDL Cholesterol   Date Value Ref Range Status   09/30/2021 51 >39 mg/dL Final     LDL Chol Calc (NIH)   Date Value Ref Range Status   09/30/2021 116 (H) 0 - 99 mg/dL Final     Hemoglobin A1C   Date Value Ref Range Status   04/30/2021 5.6 % Final         Assessment / Plan     Assessment/Plan:  Diagnoses and all orders for this visit:    1. Cough (Primary)  -     COVID-19,APTIMA PANTHER,PAD IN-HOUSE,NP/OP/NASAL SWAB IN UTM/VTM/SALINE/LIQUID AMIES TRANSPORT MEDIA/NP WASH OR ASPIRATE, 24 HR TAT - Swab, Nasal Cavity  -     methylPREDNISolone sodium succinate (SOLU-Medrol) injection 125 mg  -     benzonatate (Tessalon Perles) 100 MG capsule; Take 1 capsule by mouth 3 (Three) Times a Day As Needed for Cough.  Dispense: 30 capsule; Refill: 0    2. Bronchitis  -     methylPREDNISolone (MEDROL) 4 MG dose pack; Take as directed on package instructions.  Dispense: 21 each; Refill: 0  -     azithromycin (Zithromax Z-Wale) 250 MG tablet; Take 2 tablets the first day, then 1 tablet daily for 4 days.  Dispense: 6 tablet; Refill: 0  -     cefdinir (OMNICEF) 300 MG capsule; Take 1 capsule by mouth 2 (Two) Times a Day.  Dispense: 20 capsule; Refill: 0  -     guaiFENesin (Mucinex) 600 MG 12 hr tablet; Take 2 tablets by mouth 2 (Two) Times a Day.  Dispense: 28 tablet; Refill: 0      No follow-ups on file. unless patient needs to be seen sooner or acute issues arise. He will keep his appointment with Dr. Hagen next week.     Code Status: Full.     I have discussed the patient results/orders and and plan/recommendation with them at today's visit.       Eli Teresa, APRN   10/21/2021

## 2021-10-22 ENCOUNTER — TELEPHONE (OUTPATIENT)
Dept: INTERNAL MEDICINE | Facility: CLINIC | Age: 46
End: 2021-10-22

## 2021-10-22 NOTE — TELEPHONE ENCOUNTER
COVID-19 Test Result   Telephone Encounter    Patient Name: Clyde Tamayo   : 1975   MRN: [id number]     COVID19   Date Value Ref Range Status   10/21/2021 Not Detected Not Detected - Ref. Range Final        Patient was counseled as follows:  • (-) negative COVID-19 test result with or without symptoms   • The test is not perfect, so there is a chance it could be falsely negative or the virus level is too low for detection due to being very early in the infectious process.   • The optimal duration of home isolation is uncertain. The [place] Centers for Disease Control and Prevention (CDC) has issued recommendations on discontinuation of home isolation.   • For this reason, Clyde is strongly encouraged to practice the safest standards in protecting their health and others given the current pandemic concerns. He is advised to:   o Practice social distancing in the community by staying at least 6 feet away from people   o Encouraged to use face mask while out in public   o Continue to wash their hands frequently with soap and hot water, and cover their mouth while coughing.   • If Clyde is asymptomatic, he should self isolate for a total of 14 days from time of potential contact with Covid-19.   • If Clyde is symptomatic then he may discontinue home isolation when the following criteria are met:   o At least seven days have passed since symptoms first appeared AND   o At least three days (72 hours) have passed since recovery of symptoms (defined as resolution of fever without the use of fever-reducing medications and improvement in respiratory symptoms [e.g., cough, shortness of breath])   • If Clyde has been asymptomatic but then develops non-emergent symptoms such as mild increased shortness of breath, fever, cough, or for other questions, he  was asked to please call their primary care physician’s office or the [place] hotline at [phone].   · Questions were engaged and answered to the  best of my ability. He         expressed verbal understanding of their test results and my advice.    Primary Care Physician verified as being: Liza Hagen DO      Electronically signed by Tennille Howard MA, 10/22/21, 8:14 AM CDT.

## 2021-10-22 NOTE — TELEPHONE ENCOUNTER
----- Message from JOELLE Cardenas sent at 10/22/2021  7:29 AM CDT -----  Please call him and let himk now that his COVID is negative. See how he is feeling.

## 2021-10-23 DIAGNOSIS — R06.02 SHORTNESS OF BREATH: ICD-10-CM

## 2021-10-25 RX ORDER — ASCORBIC ACID 1000 MG
TABLET ORAL
Qty: 30 TABLET | Refills: 0 | Status: SHIPPED | OUTPATIENT
Start: 2021-10-25 | End: 2021-12-22

## 2021-10-25 NOTE — TELEPHONE ENCOUNTER
Rx Refill Note  Requested Prescriptions     Pending Prescriptions Disp Refills   • [facility] Vitamin C 1000 MG tablet [Pharmacy Med Name: [facility] VITAMIN C 1,000 MG CAPLET] 30 tablet 0     Sig: TAKE 1/2 TABLET BY MOUTH EVERY DAY   Med last filled: 8/24/21   Last office visit with prescribing clinician: 9/30/2021      Next office visit with prescribing clinician: 10/29/2021    Dr. Hagen patient               Lesley Morris RN  10/25/21, 07:40 CDT

## 2021-10-29 ENCOUNTER — OFFICE VISIT (OUTPATIENT)
Dept: INTERNAL MEDICINE | Facility: CLINIC | Age: 46
End: 2021-10-29

## 2021-10-29 VITALS
WEIGHT: 285 LBS | DIASTOLIC BLOOD PRESSURE: 89 MMHG | OXYGEN SATURATION: 97 % | TEMPERATURE: 97.9 F | RESPIRATION RATE: 16 BRPM | HEIGHT: 73 IN | HEART RATE: 93 BPM | SYSTOLIC BLOOD PRESSURE: 145 MMHG | BODY MASS INDEX: 37.77 KG/M2

## 2021-10-29 DIAGNOSIS — G89.21 CHRONIC PAIN DUE TO TRAUMA: ICD-10-CM

## 2021-10-29 DIAGNOSIS — R06.2 WHEEZING: Primary | ICD-10-CM

## 2021-10-29 DIAGNOSIS — F41.9 ANXIETY: ICD-10-CM

## 2021-10-29 DIAGNOSIS — I10 ESSENTIAL HYPERTENSION: ICD-10-CM

## 2021-10-29 DIAGNOSIS — R11.2 NON-INTRACTABLE VOMITING WITH NAUSEA, UNSPECIFIED VOMITING TYPE: ICD-10-CM

## 2021-10-29 PROCEDURE — 96372 THER/PROPH/DIAG INJ SC/IM: CPT | Performed by: FAMILY MEDICINE

## 2021-10-29 PROCEDURE — 99214 OFFICE O/P EST MOD 30 MIN: CPT | Performed by: FAMILY MEDICINE

## 2021-10-29 RX ORDER — ALBUTEROL SULFATE 90 UG/1
2 AEROSOL, METERED RESPIRATORY (INHALATION) EVERY 4 HOURS PRN
Qty: 18 G | Refills: 3 | Status: SHIPPED | OUTPATIENT
Start: 2021-10-29 | End: 2021-12-29 | Stop reason: SDUPTHER

## 2021-10-29 RX ORDER — METHYLPREDNISOLONE SODIUM SUCCINATE 125 MG/2ML
125 INJECTION, POWDER, LYOPHILIZED, FOR SOLUTION INTRAMUSCULAR; INTRAVENOUS ONCE
Status: COMPLETED | OUTPATIENT
Start: 2021-10-29 | End: 2021-10-29

## 2021-10-29 RX ORDER — CITALOPRAM 20 MG/1
20 TABLET ORAL DAILY
Qty: 30 TABLET | Refills: 3 | Status: SHIPPED | OUTPATIENT
Start: 2021-10-29 | End: 2021-12-29 | Stop reason: SDUPTHER

## 2021-10-29 RX ORDER — METOPROLOL TARTRATE 50 MG/1
50 TABLET, FILM COATED ORAL 2 TIMES DAILY
Qty: 180 TABLET | Refills: 1 | Status: SHIPPED | OUTPATIENT
Start: 2021-10-29 | End: 2021-12-29 | Stop reason: SDUPTHER

## 2021-10-29 RX ORDER — ATORVASTATIN CALCIUM 40 MG/1
40 TABLET, FILM COATED ORAL DAILY
Qty: 90 TABLET | Refills: 3 | Status: SHIPPED | OUTPATIENT
Start: 2021-10-29 | End: 2021-12-29 | Stop reason: SDUPTHER

## 2021-10-29 RX ORDER — MOMETASONE FUROATE 50 UG/1
2 SPRAY, METERED NASAL DAILY
Qty: 1 EACH | Refills: 12 | Status: SHIPPED | OUTPATIENT
Start: 2021-10-29 | End: 2021-12-29 | Stop reason: SDUPTHER

## 2021-10-29 RX ORDER — ALPRAZOLAM 1 MG/1
1 TABLET ORAL 2 TIMES DAILY PRN
Qty: 60 TABLET | Refills: 0 | Status: SHIPPED | OUTPATIENT
Start: 2021-10-29 | End: 2021-11-30 | Stop reason: SDUPTHER

## 2021-10-29 RX ORDER — ONDANSETRON 4 MG/1
4 TABLET, ORALLY DISINTEGRATING ORAL EVERY 8 HOURS PRN
Qty: 30 TABLET | Refills: 3 | Status: SHIPPED | OUTPATIENT
Start: 2021-10-29 | End: 2021-12-29 | Stop reason: SDUPTHER

## 2021-10-29 RX ORDER — RISPERIDONE 1 MG/1
2 TABLET ORAL NIGHTLY
Qty: 180 TABLET | Refills: 0 | Status: SHIPPED | OUTPATIENT
Start: 2021-10-29 | End: 2021-12-29 | Stop reason: SDUPTHER

## 2021-10-29 RX ORDER — OXYCODONE AND ACETAMINOPHEN 10; 325 MG/1; MG/1
1 TABLET ORAL EVERY 6 HOURS PRN
Qty: 120 TABLET | Refills: 0 | Status: SHIPPED | OUTPATIENT
Start: 2021-10-29 | End: 2021-11-30 | Stop reason: SDUPTHER

## 2021-10-29 RX ORDER — DOXEPIN HYDROCHLORIDE 10 MG/1
CAPSULE ORAL
Qty: 90 CAPSULE | Refills: 3 | Status: SHIPPED | OUTPATIENT
Start: 2021-10-29 | End: 2021-12-29 | Stop reason: SDUPTHER

## 2021-10-29 RX ADMIN — METHYLPREDNISOLONE SODIUM SUCCINATE 125 MG: 125 INJECTION, POWDER, LYOPHILIZED, FOR SOLUTION INTRAMUSCULAR; INTRAVENOUS at 09:37

## 2021-11-30 ENCOUNTER — TELEPHONE (OUTPATIENT)
Dept: INTERNAL MEDICINE | Facility: CLINIC | Age: 46
End: 2021-11-30

## 2021-11-30 ENCOUNTER — OFFICE VISIT (OUTPATIENT)
Dept: INTERNAL MEDICINE | Facility: CLINIC | Age: 46
End: 2021-11-30

## 2021-11-30 VITALS
TEMPERATURE: 97.7 F | SYSTOLIC BLOOD PRESSURE: 140 MMHG | HEART RATE: 86 BPM | DIASTOLIC BLOOD PRESSURE: 92 MMHG | HEIGHT: 73 IN | BODY MASS INDEX: 38.51 KG/M2 | RESPIRATION RATE: 19 BRPM | WEIGHT: 290.6 LBS | OXYGEN SATURATION: 98 %

## 2021-11-30 DIAGNOSIS — F31.9 BIPOLAR 1 DISORDER (HCC): ICD-10-CM

## 2021-11-30 DIAGNOSIS — R61 NIGHT SWEATS: ICD-10-CM

## 2021-11-30 DIAGNOSIS — G89.21 CHRONIC PAIN DUE TO TRAUMA: ICD-10-CM

## 2021-11-30 DIAGNOSIS — Z01.818 PREOPERATIVE TESTING: Primary | ICD-10-CM

## 2021-11-30 DIAGNOSIS — R19.7 DIARRHEA, UNSPECIFIED TYPE: ICD-10-CM

## 2021-11-30 DIAGNOSIS — R51.9 NONINTRACTABLE HEADACHE, UNSPECIFIED CHRONICITY PATTERN, UNSPECIFIED HEADACHE TYPE: ICD-10-CM

## 2021-11-30 DIAGNOSIS — I10 ESSENTIAL HYPERTENSION: ICD-10-CM

## 2021-11-30 DIAGNOSIS — Z79.899 HIGH RISK MEDICATION USE: ICD-10-CM

## 2021-11-30 DIAGNOSIS — R06.2 WHEEZE: ICD-10-CM

## 2021-11-30 DIAGNOSIS — R49.0 HOARSE VOICE QUALITY: ICD-10-CM

## 2021-11-30 DIAGNOSIS — R05.9 COUGH: Primary | ICD-10-CM

## 2021-11-30 DIAGNOSIS — F41.9 ANXIETY: ICD-10-CM

## 2021-11-30 LAB — SARS-COV-2 RNA PNL SPEC NAA+PROBE: NOT DETECTED

## 2021-11-30 PROCEDURE — 99214 OFFICE O/P EST MOD 30 MIN: CPT | Performed by: FAMILY MEDICINE

## 2021-11-30 PROCEDURE — 90686 IIV4 VACC NO PRSV 0.5 ML IM: CPT | Performed by: FAMILY MEDICINE

## 2021-11-30 PROCEDURE — G0008 ADMIN INFLUENZA VIRUS VAC: HCPCS | Performed by: FAMILY MEDICINE

## 2021-11-30 PROCEDURE — 87635 SARS-COV-2 COVID-19 AMP PRB: CPT | Performed by: FAMILY MEDICINE

## 2021-11-30 RX ORDER — OXYCODONE AND ACETAMINOPHEN 10; 325 MG/1; MG/1
1 TABLET ORAL EVERY 6 HOURS PRN
Qty: 120 TABLET | Refills: 0 | Status: SHIPPED | OUTPATIENT
Start: 2021-11-30 | End: 2021-12-29 | Stop reason: SDUPTHER

## 2021-11-30 RX ORDER — ALPRAZOLAM 1 MG/1
1 TABLET ORAL 2 TIMES DAILY PRN
Qty: 60 TABLET | Refills: 0 | Status: SHIPPED | OUTPATIENT
Start: 2021-11-30 | End: 2021-12-29 | Stop reason: SDUPTHER

## 2021-11-30 RX ORDER — CLINDAMYCIN HYDROCHLORIDE 300 MG/1
300 CAPSULE ORAL 3 TIMES DAILY
Qty: 30 CAPSULE | Refills: 0 | Status: SHIPPED | OUTPATIENT
Start: 2021-11-30 | End: 2021-12-29

## 2021-11-30 NOTE — TELEPHONE ENCOUNTER
Called and spoke to patient's father. He will try to contact patient to have him return call to discuss xray findings.

## 2021-11-30 NOTE — PROGRESS NOTES
Subjective     Chief Complaint   Patient presents with   • Nasal Congestion     Follow up. Symptoms not subsiding.    • Jaw Pain     Left side.        History of Present Illness  Night sweats worsening.    Diarrhea for two months.  Has had elevated temp to 102 about one week ago.  None since.   Numbness and  shooting pain through head.   Has had nausea with vomiting.  Feels he is dehydrated.  States he has not urinated for two days.  Broke tooth biting into pork chop.   Hurts to breath.   Shortness of breath is about the same.   Using vape now instead of cigarette.     Appointment with ent in AM at 845.    Anxious about son who is currently at in patient facility for mental health.    Patient's PMR from outside medical facility reviewed and noted.    Review of Systems     Otherwise complete ROS reviewed and negative except as mentioned in the HPI.    Past Medical History:   Past Medical History:   Diagnosis Date   • Anxiety    • Arthritis    • Bipolar 1 disorder (HCC)    • Hypertension    • PTSD (post-traumatic stress disorder)      Past Surgical History:  Past Surgical History:   Procedure Laterality Date   • HIP SURGERY       Social History:  reports that he has been smoking cigarettes. He has a 15.00 pack-year smoking history. He has never used smokeless tobacco. He reports that he does not drink alcohol and does not use drugs.    Family History: family history includes Arthritis in his father; Diabetes in his father; Heart attack in his mother; Hypertension in his father.       Allergies:  Allergies   Allergen Reactions   • Adderall [Amphetamine-Dextroamphetamine] Shortness Of Breath, Swelling, Palpitations and Hallucinations   • Meloxicam Nausea Only   • Darvon [Propoxyphene] GI Intolerance   • Motrin [Ibuprofen] GI Intolerance   • Toradol [Ketorolac Tromethamine] GI Intolerance   • Tramadol GI Intolerance     Medications:  Prior to Admission medications    Medication Sig Start Date End Date Taking?  Authorizing Provider   albuterol sulfate  (90 Base) MCG/ACT inhaler Inhale 2 puffs Every 4 (Four) Hours As Needed for Wheezing. 10/29/21   Liza Hagen DO   ALPRAZolam (XANAX) 1 MG tablet Take 1 tablet by mouth 2 (Two) Times a Day As Needed for Anxiety. 10/29/21   Liza Hagen DO   atorvastatin (LIPITOR) 40 MG tablet Take 1 tablet by mouth Daily. 10/29/21   Liza Hagen DO   benzonatate (Tessalon Perles) 100 MG capsule Take 1 capsule by mouth 3 (Three) Times a Day As Needed for Cough. 10/21/21   Eli Teresa APRN   citalopram (CeleXA) 20 MG tablet Take 1 tablet by mouth Daily. 10/29/21   Liza Hagen DO   [facility] Vitamin C 1000 MG tablet TAKE 1/2 TABLET BY MOUTH EVERY DAY 10/25/21   Eli Teresa APRN   doxepin (SINEquan) 10 MG capsule Take 1-3 tabs at hs 10/29/21   Liza Hagen DO   guaiFENesin (Mucinex) 600 MG 12 hr tablet Take 2 tablets by mouth 2 (Two) Times a Day. 10/21/21   Eli Teresa APRN   ipratropium-albuterol (Combivent Respimat)  MCG/ACT inhaler Inhale 1 puff 4 (Four) Times a Day. 6/29/21   Liza Hagen DO   metoprolol tartrate (LOPRESSOR) 50 MG tablet Take 1 tablet by mouth 2 (Two) Times a Day. 10/29/21   Liza Hagen DO   mometasone (Nasonex) 50 MCG/ACT nasal spray 2 sprays into the nostril(s) as directed by provider Daily. 10/29/21   Liza Hagen DO   Mometasone Furoate 100 MCG/ACT aerosol Inhale 2 puffs 2 (Two) Times a Day. 6/29/21   Liza Hagen DO   mupirocin (Bactroban) 2 % ointment Apply 1 application topically to the appropriate area as directed 3 (Three) Times a Day. 9/30/21   Liza Hagen,    ondansetron ODT (Zofran ODT) 4 MG disintegrating tablet Place 1 tablet on the tongue Every 8 (Eight) Hours As Needed for Nausea or Vomiting. 10/29/21   Liza Hgaen DO   oxyCODONE-acetaminophen (PERCOCET)  MG per tablet Take 1 tablet by mouth Every 6 (Six) Hours As  "Needed for Moderate Pain . 10/29/21   Liza Hagen DO   risperiDONE (RisperDAL) 1 MG tablet Take 2 tablets by mouth Every Night. 10/29/21   Liza Hagen DO   zinc gluconate 50 MG tablet Take 1 tablet by mouth Daily. 1/4/21   Malick EliJOELLE Zhu       Objective     Vital Signs: /92 (BP Location: Left arm, Patient Position: Sitting, Cuff Size: Large Adult)   Pulse 86   Temp 97.7 °F (36.5 °C) (Skin)   Resp 19   Ht 185.4 cm (73\")   Wt 132 kg (290 lb 9.6 oz)   SpO2 98%   BMI 38.34 kg/m²   Physical Exam  Vitals and nursing note reviewed.   Constitutional:       Appearance: Normal appearance. He is obese.   HENT:      Head: Normocephalic and atraumatic.      Right Ear: Tympanic membrane, ear canal and external ear normal.      Left Ear: Tympanic membrane, ear canal and external ear normal.      Nose: Nose normal.      Mouth/Throat:      Mouth: Mucous membranes are moist.      Comments: Mild erythema posterior oropharynx  Vocal quality is hoarse.  The left mandibular molar in the last position has one cusp broken off.   Eyes:      General: No scleral icterus.     Extraocular Movements: Extraocular movements intact.      Conjunctiva/sclera: Conjunctivae normal.      Pupils: Pupils are equal, round, and reactive to light.   Cardiovascular:      Rate and Rhythm: Normal rate and regular rhythm.      Pulses: Normal pulses.      Heart sounds: Normal heart sounds.   Pulmonary:      Effort: Pulmonary effort is normal. No respiratory distress.      Breath sounds: Wheezing present.   Abdominal:      General: Bowel sounds are normal.      Palpations: Abdomen is soft.      Tenderness: There is abdominal tenderness ( mild generalized. ).   Musculoskeletal:         General: Tenderness ( bilateral lower extremities tender to palaption) present. No swelling. Normal range of motion.      Cervical back: Normal range of motion and neck supple.   Skin:     General: Skin is warm and dry.      Capillary " Refill: Capillary refill takes less than 2 seconds.   Neurological:      General: No focal deficit present.      Mental Status: He is alert and oriented to person, place, and time.      Cranial Nerves: No cranial nerve deficit.   Psychiatric:         Mood and Affect: Mood normal.         Behavior: Behavior normal.         Thought Content: Thought content normal.         Patient's Body mass index is 38.34 kg/m². indicating that he is obese (BMI >30). Obesity-related health conditions include the following: hypertension and dyslipidemias. Obesity is worsening. BMI is is above average; BMI management plan is completed. We discussed portion control and increasing exercise..      Results Reviewed:  Glucose   Date Value Ref Range Status   09/30/2021 97 65 - 99 mg/dL Final   06/09/2021 78 74 - 109 mg/dL Final     BUN   Date Value Ref Range Status   09/30/2021 15 6 - 24 mg/dL Final   06/09/2021 19 6 - 20 mg/dL Final     Creatinine   Date Value Ref Range Status   09/30/2021 0.75 (L) 0.76 - 1.27 mg/dL Final   06/09/2021 0.8 0.5 - 1.2 mg/dL Final     Sodium   Date Value Ref Range Status   09/30/2021 139 134 - 144 mmol/L Final   06/09/2021 136 136 - 145 mmol/L Final     Potassium   Date Value Ref Range Status   09/30/2021 4.8 3.5 - 5.2 mmol/L Final   06/09/2021 4.4 3.5 - 5.0 mmol/L Final     Chloride   Date Value Ref Range Status   09/30/2021 103 96 - 106 mmol/L Final   06/09/2021 102 98 - 111 mmol/L Final     CO2   Date Value Ref Range Status   06/09/2021 25 22 - 29 mmol/L Final     Total CO2   Date Value Ref Range Status   09/30/2021 21 20 - 29 mmol/L Final     Calcium   Date Value Ref Range Status   09/30/2021 9.3 8.7 - 10.2 mg/dL Final   06/09/2021 8.6 8.6 - 10.0 mg/dL Final     ALT (SGPT)   Date Value Ref Range Status   09/30/2021 21 0 - 44 IU/L Final   06/09/2021 20 5 - 41 U/L Final     AST (SGOT)   Date Value Ref Range Status   09/30/2021 17 0 - 40 IU/L Final   06/09/2021 25 5 - 40 U/L Final     WBC   Date Value Ref  Range Status   09/30/2021 6.6 3.4 - 10.8 x10E3/uL Final   06/09/2021 6.1 4.8 - 10.8 K/uL Final     Hematocrit   Date Value Ref Range Status   09/30/2021 44.8 37.5 - 51.0 % Final   06/09/2021 41.3 (L) 42.0 - 52.0 % Final     Platelets   Date Value Ref Range Status   09/30/2021 226 150 - 450 x10E3/uL Final   06/09/2021 160 130 - 400 K/uL Final     Triglycerides   Date Value Ref Range Status   09/30/2021 208 (H) 0 - 149 mg/dL Final     HDL Cholesterol   Date Value Ref Range Status   09/30/2021 51 >39 mg/dL Final     LDL Chol Calc (NIH)   Date Value Ref Range Status   09/30/2021 116 (H) 0 - 99 mg/dL Final     Hemoglobin A1C   Date Value Ref Range Status   04/30/2021 5.6 % Final         Assessment / Plan     Assessment/Plan:  1. Cough    - XR Chest PA & Lateral (In Office)  - COVID-19,[facility] IN-HOUSE,Nasal Swab No Transport Media 3-4 HR TAT - Swab, Nasal Cavity    2. Wheeze    - XR Chest PA & Lateral (In Office)  - COVID-19,[facility] IN-HOUSE,Nasal Swab No Transport Media 3-4 HR TAT - Swab, Nasal Cavity    3. Diarrhea, unspecified type    - Comprehensive metabolic panel  - CBC w AUTO Differential  GI panel   Patient will return to clinic if diarrhea continues on Thursday for this    4. Anxiety    - ALPRAZolam (XANAX) 1 MG tablet; Take 1 tablet by mouth 2 (Two) Times a Day As Needed for Anxiety.  Dispense: 60 tablet; Refill: 0    5. Chronic pain due to trauma    - oxyCODONE-acetaminophen (PERCOCET)  MG per tablet; Take 1 tablet by mouth Every 6 (Six) Hours As Needed for Moderate Pain .  Dispense: 120 tablet; Refill: 0    6. High risk medication use  Will need urine drug screen next visit.     7. Essential hypertension  Continue current mdications    8. Bipolar 1 disorder (HCC)  Continue current medications    9. Nonintractable headache, unspecified chronicity pattern, unspecified headache type  If this persist will likely need imaging study of head    10. Hoarse voice quality  Following with ENT in AM    11. Night  sweats  Check TB test Thursday when tubes available.         Return in about 4 weeks (around 12/28/2021). unless patient needs to be seen sooner or acute issues arise.    I have discussed the patient results/orders and and plan/recommendation with them at today's visit.      Liza Hagen, DO   11/30/2021

## 2021-12-01 LAB
ALBUMIN SERPL-MCNC: 5.1 G/DL (ref 4–5)
ALBUMIN/GLOB SERPL: 2 {RATIO} (ref 1.2–2.2)
ALP SERPL-CCNC: 67 IU/L (ref 44–121)
ALT SERPL-CCNC: 23 IU/L (ref 0–44)
AST SERPL-CCNC: 17 IU/L (ref 0–40)
BASOPHILS # BLD AUTO: 0 X10E3/UL (ref 0–0.2)
BASOPHILS NFR BLD AUTO: 1 %
BILIRUB SERPL-MCNC: 0.4 MG/DL (ref 0–1.2)
BUN SERPL-MCNC: 13 MG/DL (ref 6–24)
BUN/CREAT SERPL: 16 (ref 9–20)
CALCIUM SERPL-MCNC: 9.8 MG/DL (ref 8.7–10.2)
CHLORIDE SERPL-SCNC: 106 MMOL/L (ref 96–106)
CO2 SERPL-SCNC: 19 MMOL/L (ref 20–29)
CREAT SERPL-MCNC: 0.83 MG/DL (ref 0.76–1.27)
EOSINOPHIL # BLD AUTO: 0.1 X10E3/UL (ref 0–0.4)
EOSINOPHIL NFR BLD AUTO: 2 %
ERYTHROCYTE [DISTWIDTH] IN BLOOD BY AUTOMATED COUNT: 13.5 % (ref 11.6–15.4)
GLOBULIN SER CALC-MCNC: 2.5 G/DL (ref 1.5–4.5)
GLUCOSE SERPL-MCNC: 96 MG/DL (ref 65–99)
HCT VFR BLD AUTO: 47.4 % (ref 37.5–51)
HGB BLD-MCNC: 16.1 G/DL (ref 13–17.7)
IMM GRANULOCYTES # BLD AUTO: 0 X10E3/UL (ref 0–0.1)
IMM GRANULOCYTES NFR BLD AUTO: 0 %
LYMPHOCYTES # BLD AUTO: 2 X10E3/UL (ref 0.7–3.1)
LYMPHOCYTES NFR BLD AUTO: 26 %
MCH RBC QN AUTO: 29.2 PG (ref 26.6–33)
MCHC RBC AUTO-ENTMCNC: 34 G/DL (ref 31.5–35.7)
MCV RBC AUTO: 86 FL (ref 79–97)
MONOCYTES # BLD AUTO: 0.5 X10E3/UL (ref 0.1–0.9)
MONOCYTES NFR BLD AUTO: 6 %
NEUTROPHILS # BLD AUTO: 5.1 X10E3/UL (ref 1.4–7)
NEUTROPHILS NFR BLD AUTO: 65 %
PLATELET # BLD AUTO: 268 X10E3/UL (ref 150–450)
POTASSIUM SERPL-SCNC: 4.2 MMOL/L (ref 3.5–5.2)
PROT SERPL-MCNC: 7.6 G/DL (ref 6–8.5)
RBC # BLD AUTO: 5.52 X10E6/UL (ref 4.14–5.8)
SODIUM SERPL-SCNC: 140 MMOL/L (ref 134–144)
WBC # BLD AUTO: 7.7 X10E3/UL (ref 3.4–10.8)

## 2021-12-02 ENCOUNTER — TELEPHONE (OUTPATIENT)
Dept: INTERNAL MEDICINE | Facility: CLINIC | Age: 46
End: 2021-12-02

## 2021-12-02 NOTE — TELEPHONE ENCOUNTER
----- Message from Liza Hagen DO sent at 12/2/2021 11:59 AM CST -----  Neg covid  Cmp normal  Cbc normal

## 2021-12-02 NOTE — TELEPHONE ENCOUNTER
Called patient's father since patient has no phone. He states he will not see patient until tomorrow and will notify him of missed appointment and communicate lab results.

## 2021-12-21 DIAGNOSIS — R06.02 SHORTNESS OF BREATH: ICD-10-CM

## 2021-12-21 NOTE — TELEPHONE ENCOUNTER
Rx Refill Note  Requested Prescriptions     Pending Prescriptions Disp Refills   • [facility] Vitamin C 1000 MG tablet [Pharmacy Med Name: [facility]S VITAMIN C 1,000 MG CAPLET] 30 tablet 0     Sig: TAKE 1/2 TABLET BY MOUTH DAILY      Last office visit with prescribing clinician: 10/21/2021      Next office visit with prescribing clinician: Visit date not found            Tennille Howard MA  12/21/21, 13:51 CST

## 2021-12-22 RX ORDER — ASCORBIC ACID 1000 MG
TABLET ORAL
Qty: 30 TABLET | Refills: 0 | Status: SHIPPED | OUTPATIENT
Start: 2021-12-22 | End: 2021-12-29 | Stop reason: SDUPTHER

## 2021-12-29 ENCOUNTER — OFFICE VISIT (OUTPATIENT)
Dept: INTERNAL MEDICINE | Facility: CLINIC | Age: 46
End: 2021-12-29

## 2021-12-29 VITALS — OXYGEN SATURATION: 99 % | RESPIRATION RATE: 24 BRPM | HEART RATE: 81 BPM | TEMPERATURE: 98.6 F

## 2021-12-29 DIAGNOSIS — Z82.49 FAMILY HISTORY OF HEART ATTACK: ICD-10-CM

## 2021-12-29 DIAGNOSIS — E78.00 HIGH CHOLESTEROL: ICD-10-CM

## 2021-12-29 DIAGNOSIS — G89.21 CHRONIC PAIN DUE TO TRAUMA: ICD-10-CM

## 2021-12-29 DIAGNOSIS — R06.2 WHEEZING: Primary | ICD-10-CM

## 2021-12-29 DIAGNOSIS — F41.9 ANXIETY: ICD-10-CM

## 2021-12-29 DIAGNOSIS — R11.2 NON-INTRACTABLE VOMITING WITH NAUSEA, UNSPECIFIED VOMITING TYPE: ICD-10-CM

## 2021-12-29 DIAGNOSIS — J40 BRONCHITIS: ICD-10-CM

## 2021-12-29 DIAGNOSIS — R05.9 COUGH: ICD-10-CM

## 2021-12-29 DIAGNOSIS — R06.02 SHORTNESS OF BREATH: ICD-10-CM

## 2021-12-29 DIAGNOSIS — I10 ESSENTIAL HYPERTENSION: ICD-10-CM

## 2021-12-29 DIAGNOSIS — Z79.899 LONG TERM USE OF DRUG: ICD-10-CM

## 2021-12-29 PROCEDURE — 99214 OFFICE O/P EST MOD 30 MIN: CPT | Performed by: FAMILY MEDICINE

## 2021-12-29 RX ORDER — RISPERIDONE 1 MG/1
2 TABLET ORAL NIGHTLY
Qty: 180 TABLET | Refills: 0 | Status: SHIPPED | OUTPATIENT
Start: 2021-12-29 | End: 2022-02-23 | Stop reason: SDUPTHER

## 2021-12-29 RX ORDER — ZINC GLUCONATE 50 MG
50 TABLET ORAL DAILY
Qty: 30 TABLET | Refills: 0 | Status: SHIPPED | OUTPATIENT
Start: 2021-12-29 | End: 2022-02-23

## 2021-12-29 RX ORDER — GUAIFENESIN 600 MG/1
1200 TABLET, EXTENDED RELEASE ORAL 2 TIMES DAILY
Qty: 28 TABLET | Refills: 0 | Status: SHIPPED | OUTPATIENT
Start: 2021-12-29 | End: 2022-08-26 | Stop reason: SDUPTHER

## 2021-12-29 RX ORDER — METOPROLOL TARTRATE 50 MG/1
50 TABLET, FILM COATED ORAL 2 TIMES DAILY
Qty: 180 TABLET | Refills: 1 | Status: SHIPPED | OUTPATIENT
Start: 2021-12-29 | End: 2022-02-23 | Stop reason: SDUPTHER

## 2021-12-29 RX ORDER — OXYCODONE AND ACETAMINOPHEN 10; 325 MG/1; MG/1
1 TABLET ORAL EVERY 6 HOURS PRN
Qty: 120 TABLET | Refills: 0 | Status: SHIPPED | OUTPATIENT
Start: 2021-12-29 | End: 2022-01-25 | Stop reason: SDUPTHER

## 2021-12-29 RX ORDER — ALPRAZOLAM 1 MG/1
1 TABLET ORAL 2 TIMES DAILY PRN
Qty: 60 TABLET | Refills: 0 | Status: SHIPPED | OUTPATIENT
Start: 2021-12-29 | End: 2022-01-25 | Stop reason: SDUPTHER

## 2021-12-29 RX ORDER — MOMETASONE FUROATE 50 UG/1
2 SPRAY, METERED NASAL DAILY
Qty: 1 EACH | Refills: 12 | Status: SHIPPED | OUTPATIENT
Start: 2021-12-29 | End: 2022-08-26 | Stop reason: SDUPTHER

## 2021-12-29 RX ORDER — IPRATROPIUM/ALBUTEROL SULFATE 20-100 MCG
1 MIST INHALER (GRAM) INHALATION 4 TIMES DAILY
Qty: 1 EACH | Refills: 3 | Status: SHIPPED | OUTPATIENT
Start: 2021-12-29 | End: 2022-02-23 | Stop reason: SDUPTHER

## 2021-12-29 RX ORDER — ALBUTEROL SULFATE 90 UG/1
2 AEROSOL, METERED RESPIRATORY (INHALATION) EVERY 4 HOURS PRN
Qty: 18 G | Refills: 3 | Status: SHIPPED | OUTPATIENT
Start: 2021-12-29 | End: 2022-02-23 | Stop reason: SDUPTHER

## 2021-12-29 RX ORDER — ATORVASTATIN CALCIUM 40 MG/1
40 TABLET, FILM COATED ORAL DAILY
Qty: 90 TABLET | Refills: 3 | Status: SHIPPED | OUTPATIENT
Start: 2021-12-29 | End: 2022-02-23 | Stop reason: SDUPTHER

## 2021-12-29 RX ORDER — BENZONATATE 100 MG/1
100 CAPSULE ORAL 3 TIMES DAILY PRN
Qty: 30 CAPSULE | Refills: 0 | Status: SHIPPED | OUTPATIENT
Start: 2021-12-29 | End: 2022-01-25 | Stop reason: SDUPTHER

## 2021-12-29 RX ORDER — CITALOPRAM 20 MG/1
20 TABLET ORAL DAILY
Qty: 30 TABLET | Refills: 3 | Status: SHIPPED | OUTPATIENT
Start: 2021-12-29 | End: 2022-03-22 | Stop reason: SDUPTHER

## 2021-12-29 RX ORDER — ONDANSETRON 4 MG/1
4 TABLET, ORALLY DISINTEGRATING ORAL EVERY 8 HOURS PRN
Qty: 30 TABLET | Refills: 3 | Status: SHIPPED | OUTPATIENT
Start: 2021-12-29 | End: 2022-02-23 | Stop reason: SDUPTHER

## 2021-12-29 RX ORDER — DOXEPIN HYDROCHLORIDE 10 MG/1
CAPSULE ORAL
Qty: 90 CAPSULE | Refills: 3 | Status: SHIPPED | OUTPATIENT
Start: 2021-12-29 | End: 2022-02-23 | Stop reason: SDUPTHER

## 2021-12-29 NOTE — PROGRESS NOTES
Subjective     Chief Complaint   Patient presents with   • Cough       History of Present Illness   Patient refuses medicare wellness visit.  He notes he is sick.  Passed kidney stone yesterday/today.  Taking a long time to urinate.    Feels like he has a Kidney stone on the  Left that he is trying to pass.  Very painful.  Headache back to top  Two to three days  Cough  Shortness of breath  Sputum  Green  Sweating at night  [facility] and [facility]my in front of him was coughing.  Also when he visited son at [facility] there was coughing/cold symptoms there.     Was unable to keep appointment with ENT due to heart attack in father.     Increased stress with jono leaving him on [place]  Son is in mental hospital.    Chronic pain has not changed much.      Needs medication refills.     Patient's PMR from outside medical facility reviewed and noted.    Review of Systems     Otherwise complete ROS reviewed and negative except as mentioned in the HPI.    Past Medical History:   Past Medical History:   Diagnosis Date   • Anxiety    • Arthritis    • Bipolar 1 disorder (HCC)    • Hypertension    • PTSD (post-traumatic stress disorder)      Past Surgical History:  Past Surgical History:   Procedure Laterality Date   • HIP SURGERY       Social History:  reports that he has been smoking cigarettes. He has a 15.00 pack-year smoking history. He has never used smokeless tobacco. He reports that he does not drink alcohol and does not use drugs.    Family History: family history includes Arthritis in his father; Diabetes in his father; Heart attack in his mother; Hypertension in his father.       Allergies:  Allergies   Allergen Reactions   • Adderall [Amphetamine-Dextroamphetamine] Shortness Of Breath, Swelling, Palpitations and Hallucinations   • Meloxicam Nausea Only   • Darvon [Propoxyphene] GI Intolerance   • Motrin [Ibuprofen] GI Intolerance   • Toradol [Ketorolac Tromethamine] GI Intolerance   • Tramadol  GI Intolerance     Medications:  Prior to Admission medications    Medication Sig Start Date End Date Taking? Authorizing Provider   albuterol sulfate  (90 Base) MCG/ACT inhaler Inhale 2 puffs Every 4 (Four) Hours As Needed for Wheezing. 10/29/21   Liza Hagen DO   ALPRAZolam (XANAX) 1 MG tablet Take 1 tablet by mouth 2 (Two) Times a Day As Needed for Anxiety. 11/30/21   Liza Hagen DO   atorvastatin (LIPITOR) 40 MG tablet Take 1 tablet by mouth Daily. 10/29/21   Liza Hagen DO   benzonatate (Tessalon Perles) 100 MG capsule Take 1 capsule by mouth 3 (Three) Times a Day As Needed for Cough. 10/21/21   Eli Teresa APRN   citalopram (CeleXA) 20 MG tablet Take 1 tablet by mouth Daily. 10/29/21   Liza Hagen DO   clindamycin (Cleocin) 300 MG capsule Take 1 capsule by mouth 3 (Three) Times a Day. 11/30/21   Liza Hagen DO   [facility] Vitamin C 1000 MG tablet TAKE 1/2 TABLET BY MOUTH DAILY 12/22/21   Liza Hagen DO   doxepin (SINEquan) 10 MG capsule Take 1-3 tabs at hs 10/29/21   Liza Hagen DO   guaiFENesin (Mucinex) 600 MG 12 hr tablet Take 2 tablets by mouth 2 (Two) Times a Day. 10/21/21   Eli Teresa APRN   ipratropium-albuterol (Combivent Respimat)  MCG/ACT inhaler Inhale 1 puff 4 (Four) Times a Day. 6/29/21   Liza Hagen DO   metoprolol tartrate (LOPRESSOR) 50 MG tablet Take 1 tablet by mouth 2 (Two) Times a Day. 10/29/21   Liza Hagen DO   mometasone (Nasonex) 50 MCG/ACT nasal spray 2 sprays into the nostril(s) as directed by provider Daily. 10/29/21   Liza Hagen DO   Mometasone Furoate 100 MCG/ACT aerosol Inhale 2 puffs 2 (Two) Times a Day. 6/29/21   Liza Hagen DO   mupirocin (Bactroban) 2 % ointment Apply 1 application topically to the appropriate area as directed 3 (Three) Times a Day. 9/30/21   Liza Hagen DO   ondansetron ODT (Zofran ODT) 4 MG disintegrating tablet Place  1 tablet on the tongue Every 8 (Eight) Hours As Needed for Nausea or Vomiting. 10/29/21   Liza Hagen DO   oxyCODONE-acetaminophen (PERCOCET)  MG per tablet Take 1 tablet by mouth Every 6 (Six) Hours As Needed for Moderate Pain . 11/30/21   Liza Hagen DO   risperiDONE (RisperDAL) 1 MG tablet Take 2 tablets by mouth Every Night. 10/29/21   Liza Hagen DO   zinc gluconate 50 MG tablet Take 1 tablet by mouth Daily. 1/4/21   Eli Teresa APRN       Objective     Vital Signs: Pulse 81   Temp 98.6 °F (37 °C)   Resp 24   SpO2 99%   Physical Exam  Vitals and nursing note reviewed.   Constitutional:       General: He is in acute distress.      Appearance: Normal appearance. He is obese. He is ill-appearing and diaphoretic. He is not toxic-appearing.   HENT:      Head: Normocephalic and atraumatic.      Right Ear: Tympanic membrane, ear canal and external ear normal.      Left Ear: Tympanic membrane, ear canal and external ear normal.      Nose: Nose normal.      Mouth/Throat:      Mouth: Mucous membranes are moist.      Pharynx: Oropharynx is clear.   Eyes:      Extraocular Movements: Extraocular movements intact.      Conjunctiva/sclera: Conjunctivae normal.      Pupils: Pupils are equal, round, and reactive to light.   Neck:      Vascular: No carotid bruit.   Cardiovascular:      Rate and Rhythm: Normal rate and regular rhythm.      Pulses: Normal pulses.      Heart sounds: Normal heart sounds.   Pulmonary:      Effort: Pulmonary effort is normal.      Breath sounds: Wheezing present.   Abdominal:      General: Bowel sounds are normal. There is no distension.      Tenderness: There is no abdominal tenderness.   Musculoskeletal:      Cervical back: Normal range of motion.      Comments: Patient moves all extremities.  Using cane for balance stability.  Left CVA tenderness.    Skin:     General: Skin is warm.      Capillary Refill: Capillary refill takes less than 2 seconds.    Neurological:      General: No focal deficit present.      Mental Status: He is alert and oriented to person, place, and time.   Psychiatric:         Mood and Affect: Mood normal.         Behavior: Behavior normal.         Patient's There is no height or weight on file to calculate BMI. indicating that he is obese (BMI >30). Obesity-related health conditions include the following: hypertension. Obesity is unchanged. BMI is is above average; BMI management plan is completed. We discussed portion control and increasing exercise..  PAIN MANAGEMENT     Type of Pain: back and legs      Location:  bilateral low back   And legs  Pain: Pain described as ache, sharp, shooting and stabbing. does radiate.  Severity:  Pain rated as a severe.  Exacerbation: any weight bearing, lifting any weight and rising after sitting  Alleviation:  pain medication  Associated Symptoms:  nausea and fever   Ambulates: yes   Limitations: This pain limits the patient from perform routine daily activities    Pain Medications             citalopram (CeleXA) 20 MG tablet Take 1 tablet by mouth Daily.    doxepin (SINEquan) 10 MG capsule Take 1-3 tabs at hs    oxyCODONE-acetaminophen (PERCOCET)  MG per tablet Take 1 tablet by mouth Every 6 (Six) Hours As Needed for Moderate Pain .    risperiDONE (RisperDAL) 1 MG tablet Take 2 tablets by mouth Every Night.           UDS:  Last Urine Drug Screen: 6/2021  Result: Positive for benzo and amphetamine      Controlled Substance Agreement: up to date    LAMBERT REPORT      As part of this patient's treatment plan I am prescribing controlled substances.  The patient has been made aware of appropriate use of such medications, including potentinal risk of somnolence, limited ability to drive and/or work safely, and potential for dependence or overdose.  It has also been made clear that these medication are for use by this patient only, without concomitant use of alcohol or other substances unless  prescribed.      Patient has completed prescribing agreement detailing terms of continued prescribing of controlled substances, including monitoring LAMBERT reports, urine drug screening, and pill counts if necessary.      LAMBERT report has been reviewed and scanned into the patient's chart.    Date of last LAMBERT report :  12/29/21    History and physical exam exhibit continued safe and appropriate use of controlled substances.        Results Reviewed:  Glucose   Date Value Ref Range Status   11/30/2021 96 65 - 99 mg/dL Final   06/09/2021 78 74 - 109 mg/dL Final     BUN   Date Value Ref Range Status   11/30/2021 13 6 - 24 mg/dL Final   06/09/2021 19 6 - 20 mg/dL Final     Creatinine   Date Value Ref Range Status   11/30/2021 0.83 0.76 - 1.27 mg/dL Final   06/09/2021 0.8 0.5 - 1.2 mg/dL Final     Sodium   Date Value Ref Range Status   11/30/2021 140 134 - 144 mmol/L Final   06/09/2021 136 136 - 145 mmol/L Final     Potassium   Date Value Ref Range Status   11/30/2021 4.2 3.5 - 5.2 mmol/L Final   06/09/2021 4.4 3.5 - 5.0 mmol/L Final     Chloride   Date Value Ref Range Status   11/30/2021 106 96 - 106 mmol/L Final   06/09/2021 102 98 - 111 mmol/L Final     CO2   Date Value Ref Range Status   06/09/2021 25 22 - 29 mmol/L Final     Total CO2   Date Value Ref Range Status   11/30/2021 19 (L) 20 - 29 mmol/L Final     Calcium   Date Value Ref Range Status   11/30/2021 9.8 8.7 - 10.2 mg/dL Final   06/09/2021 8.6 8.6 - 10.0 mg/dL Final     ALT (SGPT)   Date Value Ref Range Status   11/30/2021 23 0 - 44 IU/L Final   06/09/2021 20 5 - 41 U/L Final     AST (SGOT)   Date Value Ref Range Status   11/30/2021 17 0 - 40 IU/L Final   06/09/2021 25 5 - 40 U/L Final     WBC   Date Value Ref Range Status   11/30/2021 7.7 3.4 - 10.8 x10E3/uL Final   06/09/2021 6.1 4.8 - 10.8 K/uL Final     Hematocrit   Date Value Ref Range Status   11/30/2021 47.4 37.5 - 51.0 % Final   06/09/2021 41.3 (L) 42.0 - 52.0 % Final     Platelets   Date Value  Ref Range Status   11/30/2021 268 150 - 450 x10E3/uL Final   06/09/2021 160 130 - 400 K/uL Final     Triglycerides   Date Value Ref Range Status   09/30/2021 208 (H) 0 - 149 mg/dL Final     HDL Cholesterol   Date Value Ref Range Status   09/30/2021 51 >39 mg/dL Final     LDL Chol Calc (NIH)   Date Value Ref Range Status   09/30/2021 116 (H) 0 - 99 mg/dL Final     Hemoglobin A1C   Date Value Ref Range Status   04/30/2021 5.6 % Final         Assessment / Plan     Assessment/Plan:  1. Anxiety    - ALPRAZolam (XANAX) 1 MG tablet; Take 1 tablet by mouth 2 (Two) Times a Day As Needed for Anxiety.  Dispense: 60 tablet; Refill: 0    2. Cough    - benzonatate (Tessalon Perles) 100 MG capsule; Take 1 capsule by mouth 3 (Three) Times a Day As Needed for Cough.  Dispense: 30 capsule; Refill: 0  - COVID-19,APTIMA PANTHER([facility]),[facility], NP/OP SWAB IN UTM/VTM/SALINE TRANSPORT MEDIA,24 HR TAT - Swab, Nasal Cavity    3. Shortness of breath    - ascorbic acid ([facility]S Vitamin C) 1000 MG tablet; Take 0.5 tablets by mouth Daily.  Dispense: 30 tablet; Refill: 0  - COVID-19,APTIMA PANTHER(CAROAL),[facility], NP/OP SWAB IN UTM/VTM/SALINE TRANSPORT MEDIA,24 HR TAT - Swab, Nasal Cavity  Quarantine until results known    4. Bronchitis    - guaiFENesin (Mucinex) 600 MG 12 hr tablet; Take 2 tablets by mouth 2 (Two) Times a Day.  Dispense: 28 tablet; Refill: 0  - COVID-19,APTIMA PANTHER([facility]OLA),[facility], NP/OP SWAB IN UTM/VTM/SALINE TRANSPORT MEDIA,24 HR TAT - Swab, Nasal Cavity    5. Essential hypertension    - metoprolol tartrate (LOPRESSOR) 50 MG tablet; Take 1 tablet by mouth 2 (Two) Times a Day.  Dispense: 180 tablet; Refill: 1    6. Non-intractable vomiting with nausea, unspecified vomiting type    - ondansetron ODT (Zofran ODT) 4 MG disintegrating tablet; Place 1 tablet on the tongue Every 8 (Eight) Hours As Needed for Nausea or Vomiting.  Dispense: 30 tablet; Refill: 3  - COVID-19,APTIMA PANTHER(CAROLA),SUZANNE REESE/SUZANNE ESTEBAN, NP/OP SWAB IN  UTM/VTM/SALINE TRANSPORT MEDIA,24 HR TAT - Swab, Nasal Cavity    7. Chronic pain due to trauma    - oxyCODONE-acetaminophen (PERCOCET)  MG per tablet; Take 1 tablet by mouth Every 6 (Six) Hours As Needed for Moderate Pain .  Dispense: 120 tablet; Refill: 0    8. Wheezing  Refill inhalers    9. Long term use of drug  monitor    10. Family history of heart attack  Monitor lipids.     11. High cholesterol  Lipid profile    12.  Nephrolithiasis  Monitor for stone passage.  If he is unable to pass he is to let us know.       Return in about 4 weeks (around 1/26/2022). unless patient needs to be seen sooner or acute issues arise.        I have discussed the patient results/orders and and plan/recommendation with them at today's visit.      Liza Hagen, DO   12/29/2021

## 2021-12-30 LAB — SARS-COV-2 ORF1AB RESP QL NAA+PROBE: NOT DETECTED

## 2021-12-30 PROCEDURE — C9803 HOPD COVID-19 SPEC COLLECT: HCPCS | Performed by: FAMILY MEDICINE

## 2021-12-30 PROCEDURE — U0004 COV-19 TEST NON-CDC HGH THRU: HCPCS | Performed by: FAMILY MEDICINE

## 2022-01-04 ENCOUNTER — TELEPHONE (OUTPATIENT)
Dept: INTERNAL MEDICINE | Facility: CLINIC | Age: 47
End: 2022-01-04

## 2022-01-04 NOTE — TELEPHONE ENCOUNTER
Called and left message with patient's father to have patient return call to our office to discuss Covid results. He states he will have patient call.

## 2022-01-25 ENCOUNTER — OFFICE VISIT (OUTPATIENT)
Dept: INTERNAL MEDICINE | Facility: CLINIC | Age: 47
End: 2022-01-25

## 2022-01-25 VITALS
DIASTOLIC BLOOD PRESSURE: 96 MMHG | HEART RATE: 69 BPM | BODY MASS INDEX: 38.97 KG/M2 | TEMPERATURE: 97.6 F | HEIGHT: 73 IN | OXYGEN SATURATION: 95 % | SYSTOLIC BLOOD PRESSURE: 150 MMHG | WEIGHT: 294 LBS | RESPIRATION RATE: 17 BRPM

## 2022-01-25 DIAGNOSIS — R44.0 AUDITORY HALLUCINATION: ICD-10-CM

## 2022-01-25 DIAGNOSIS — F25.0 SCHIZOAFFECTIVE DISORDER, BIPOLAR TYPE: ICD-10-CM

## 2022-01-25 DIAGNOSIS — G89.21 CHRONIC PAIN DUE TO TRAUMA: Primary | ICD-10-CM

## 2022-01-25 DIAGNOSIS — R05.9 COUGH: ICD-10-CM

## 2022-01-25 DIAGNOSIS — R44.1 HALLUCINATION, VISUAL: ICD-10-CM

## 2022-01-25 DIAGNOSIS — F41.9 ANXIETY: ICD-10-CM

## 2022-01-25 DIAGNOSIS — Z23 NEED FOR COVID-19 VACCINE: ICD-10-CM

## 2022-01-25 DIAGNOSIS — F51.04 PSYCHOPHYSIOLOGICAL INSOMNIA: ICD-10-CM

## 2022-01-25 DIAGNOSIS — K92.1 BLOOD IN STOOL: ICD-10-CM

## 2022-01-25 PROCEDURE — 99214 OFFICE O/P EST MOD 30 MIN: CPT | Performed by: FAMILY MEDICINE

## 2022-01-25 PROCEDURE — 91300 COVID-19 (PFIZER): CPT | Performed by: FAMILY MEDICINE

## 2022-01-25 PROCEDURE — 0001A COVID-19 (PFIZER): CPT | Performed by: FAMILY MEDICINE

## 2022-01-25 RX ORDER — OXYCODONE AND ACETAMINOPHEN 10; 325 MG/1; MG/1
1 TABLET ORAL EVERY 6 HOURS PRN
Qty: 120 TABLET | Refills: 0 | Status: SHIPPED | OUTPATIENT
Start: 2022-01-25 | End: 2022-02-23 | Stop reason: SDUPTHER

## 2022-01-25 RX ORDER — DOXYCYCLINE 100 MG/1
100 CAPSULE ORAL 2 TIMES DAILY
Qty: 20 CAPSULE | Refills: 0 | Status: SHIPPED | OUTPATIENT
Start: 2022-01-25 | End: 2022-02-23

## 2022-01-25 RX ORDER — ALPRAZOLAM 1 MG/1
1 TABLET ORAL 2 TIMES DAILY PRN
Qty: 60 TABLET | Refills: 0 | Status: SHIPPED | OUTPATIENT
Start: 2022-01-25 | End: 2022-02-23 | Stop reason: SDUPTHER

## 2022-01-25 RX ORDER — QUETIAPINE FUMARATE 50 MG/1
50 TABLET, EXTENDED RELEASE ORAL NIGHTLY
Qty: 90 EACH | Refills: 1 | Status: SHIPPED | OUTPATIENT
Start: 2022-01-25 | End: 2022-02-23 | Stop reason: SDUPTHER

## 2022-01-25 RX ORDER — BENZONATATE 100 MG/1
100 CAPSULE ORAL 3 TIMES DAILY PRN
Qty: 90 CAPSULE | Refills: 1 | Status: SHIPPED | OUTPATIENT
Start: 2022-01-25 | End: 2022-02-23 | Stop reason: SDUPTHER

## 2022-01-25 NOTE — PROGRESS NOTES
Subjective     Chief Complaint   Patient presents with   • Insomnia     Awake for 5 days.        History of Present Illness  Stressed, [place] and not [place].  Lisha came back but is messed up.    Not sleeping  Has started hallucinating, hearing and speaking to people that are not there  No self harm messaging.  Has had diarrhea for several months   Now has seen blood in stool.  Increased coughing.  Sputum production.    Needs refills on xanax and percocet.     Patient's PMR from outside medical facility reviewed and noted.    Review of Systems     Otherwise complete ROS reviewed and negative except as mentioned in the HPI.    Past Medical History:   Past Medical History:   Diagnosis Date   • Anxiety    • Arthritis    • Bipolar 1 disorder (HCC)    • Hypertension    • PTSD (post-traumatic stress disorder)      Past Surgical History:  Past Surgical History:   Procedure Laterality Date   • HIP SURGERY       Social History:  reports that he has been smoking cigarettes. He has a 15.00 pack-year smoking history. He has never used smokeless tobacco. He reports that he does not drink alcohol and does not use drugs.    Family History: family history includes Arthritis in his father; Diabetes in his father; Heart attack in his mother; Hypertension in his father.       Allergies:  Allergies   Allergen Reactions   • Adderall [Amphetamine-Dextroamphetamine] Shortness Of Breath, Swelling, Palpitations and Hallucinations   • Meloxicam Nausea Only   • Darvon [Propoxyphene] GI Intolerance   • Motrin [Ibuprofen] GI Intolerance   • Toradol [Ketorolac Tromethamine] GI Intolerance   • Tramadol GI Intolerance     Medications:  Prior to Admission medications    Medication Sig Start Date End Date Taking? Authorizing Provider   albuterol sulfate  (90 Base) MCG/ACT inhaler Inhale 2 puffs Every 4 (Four) Hours As Needed for Wheezing. 12/29/21  Yes Liza Hagen DO   ALPRAZolam (XANAX) 1 MG tablet  Take 1 tablet by mouth 2 (Two) Times a Day As Needed for Anxiety. 12/29/21  Yes Liza Hagen, DO   ascorbic acid ([facility] Vitamin C) 1000 MG tablet Take 0.5 tablets by mouth Daily. 12/29/21  Yes Liza Hagen DO   atorvastatin (LIPITOR) 40 MG tablet Take 1 tablet by mouth Daily. 12/29/21  Yes Liza Hagen DO   benzonatate (Tessalon Perles) 100 MG capsule Take 1 capsule by mouth 3 (Three) Times a Day As Needed for Cough. 12/29/21  Yes Liza Hagen DO   citalopram (CeleXA) 20 MG tablet Take 1 tablet by mouth Daily. 12/29/21  Yes Liza Hagen DO   doxepin (SINEquan) 10 MG capsule Take 1-3 tabs at hs 12/29/21  Yes Liza Hgaen DO   guaiFENesin (Mucinex) 600 MG 12 hr tablet Take 2 tablets by mouth 2 (Two) Times a Day. 12/29/21  Yes Liza Hagen DO   ipratropium-albuterol (Combivent Respimat)  MCG/ACT inhaler Inhale 1 puff 4 (Four) Times a Day. 12/29/21  Yes Liza Hagen DO   metoprolol tartrate (LOPRESSOR) 50 MG tablet Take 1 tablet by mouth 2 (Two) Times a Day. 12/29/21  Yes Liza Hagen,    mometasone (Nasonex) 50 MCG/ACT nasal spray 2 sprays into the nostril(s) as directed by provider Daily. 12/29/21  Yes Liza Hagen,    Mometasone Furoate 100 MCG/ACT aerosol Inhale 2 puffs 2 (Two) Times a Day. 12/29/21  Liza Henriquez, DO   mupirocin (Bactroban) 2 % ointment Apply 1 application topically to the appropriate area as directed 3 (Three) Times a Day. 12/29/21  Yes Liza Hagen, DO   ondansetron ODT (Zofran ODT) 4 MG disintegrating tablet Place 1 tablet on the tongue Every 8 (Eight) Hours As Needed for Nausea or Vomiting. 12/29/21  Yes Liza Hagen DO   oxyCODONE-acetaminophen (PERCOCET)  MG per tablet Take 1 tablet by mouth Every 6 (Six) Hours As Needed for Moderate Pain . 12/29/21  Yes Liza Hagen DO   risperiDONE (RisperDAL) 1 MG tablet Take 2 tablets by mouth Every Night. 12/29/21  Yes Liza Hagen  DO Fidelia   zinc gluconate 50 MG tablet Take 1 tablet by mouth Daily. 12/29/21  Yes Liza Hagen DO     PAIN MANAGEMENT     Type of Pain: back pain      Location:  bilateral low back  Pain: Pain described as ache, sharp, shooting, stabbing and tingling. does radiate.  Severity:  Pain rated as a severe.  Exacerbation: any weight bearing, going up and down stairs and lifting any weight  Alleviation:  pain medication  Associated Symptoms:  patient denies any problems    Ambulates: yes   Limitations: This pain limits the patient from perform routine daily activities    Pain Medications             citalopram (CeleXA) 20 MG tablet Take 1 tablet by mouth Daily.    doxepin (SINEquan) 10 MG capsule Take 1-3 tabs at hs    oxyCODONE-acetaminophen (PERCOCET)  MG per tablet Take 1 tablet by mouth Every 6 (Six) Hours As Needed for Moderate Pain .    risperiDONE (RisperDAL) 1 MG tablet Take 2 tablets by mouth Every Night.    QUEtiapine fumarate ER (SEROquel XR) 50 MG tablet sustained-release 24 hour tablet Take 1 tablet by mouth Every Night.           UDS:  Last Urine Drug Screen: 6/2021  Result: Positive for benzodiazapines, amphetamine      Controlled Substance Agreement: up to date    LAMBERT REPORT      As part of this patient's treatment plan I am prescribing controlled substances.  The patient has been made aware of appropriate use of such medications, including potentinal risk of somnolence, limited ability to drive and/or work safely, and potential for dependence or overdose.  It has also been made clear that these medication are for use by this patient only, without concomitant use of alcohol or other substances unless prescribed.      Patient has completed prescribing agreement detailing terms of continued prescribing of controlled substances, including monitoring LAMBERT reports, urine drug screening, and pill counts if necessary.      LAMBERT report has been reviewed and scanned into the patient's  "chart.    Date of last LAMBERT report :  1/25/2022    History and physical exam exhibit continued safe and appropriate use of controlled substances.      Objective     Vital Signs: /96 (BP Location: Left arm, Patient Position: Sitting, Cuff Size: Large Adult)   Pulse 69   Temp 97.6 °F (36.4 °C) (Skin)   Resp 17   Ht 185.4 cm (73\")   Wt 133 kg (294 lb)   SpO2 95%   BMI 38.79 kg/m²   Physical Exam  Vitals and nursing note reviewed.   Constitutional:       Appearance: Normal appearance. He is obese.      Comments: Restless     HENT:      Head: Normocephalic and atraumatic.      Right Ear: Tympanic membrane, ear canal and external ear normal.      Left Ear: Tympanic membrane, ear canal and external ear normal.      Nose: Nose normal.      Mouth/Throat:      Mouth: Mucous membranes are moist.      Pharynx: Oropharynx is clear.   Eyes:      Extraocular Movements: Extraocular movements intact.      Conjunctiva/sclera: Conjunctivae normal.      Pupils: Pupils are equal, round, and reactive to light.   Cardiovascular:      Rate and Rhythm: Normal rate and regular rhythm.      Pulses: Normal pulses.      Heart sounds: Normal heart sounds.   Pulmonary:      Effort: Pulmonary effort is normal.      Breath sounds: No wheezing.   Abdominal:      General: Bowel sounds are normal.      Palpations: Abdomen is soft.   Musculoskeletal:         General: Normal range of motion.      Cervical back: Normal range of motion and neck supple.   Skin:     General: Skin is warm and dry.      Capillary Refill: Capillary refill takes less than 2 seconds.   Neurological:      General: No focal deficit present.      Mental Status: He is alert and oriented to person, place, and time.      Cranial Nerves: No cranial nerve deficit.   Psychiatric:      Comments: Patient is restless today.   Having issues with hallucinations visual and auditory.  No self harm or intent to harm others verbalized when questioned directly.          Patient's " Body mass index is 38.79 kg/m². indicating that he is obese (BMI >30).     Results Reviewed:  Glucose   Date Value Ref Range Status   11/30/2021 96 65 - 99 mg/dL Final   06/09/2021 78 74 - 109 mg/dL Final     BUN   Date Value Ref Range Status   11/30/2021 13 6 - 24 mg/dL Final   06/09/2021 19 6 - 20 mg/dL Final     Creatinine   Date Value Ref Range Status   11/30/2021 0.83 0.76 - 1.27 mg/dL Final   06/09/2021 0.8 0.5 - 1.2 mg/dL Final     Sodium   Date Value Ref Range Status   11/30/2021 140 134 - 144 mmol/L Final   06/09/2021 136 136 - 145 mmol/L Final     Potassium   Date Value Ref Range Status   11/30/2021 4.2 3.5 - 5.2 mmol/L Final   06/09/2021 4.4 3.5 - 5.0 mmol/L Final     Chloride   Date Value Ref Range Status   11/30/2021 106 96 - 106 mmol/L Final   06/09/2021 102 98 - 111 mmol/L Final     CO2   Date Value Ref Range Status   06/09/2021 25 22 - 29 mmol/L Final     Total CO2   Date Value Ref Range Status   11/30/2021 19 (L) 20 - 29 mmol/L Final     Calcium   Date Value Ref Range Status   11/30/2021 9.8 8.7 - 10.2 mg/dL Final   06/09/2021 8.6 8.6 - 10.0 mg/dL Final     ALT (SGPT)   Date Value Ref Range Status   11/30/2021 23 0 - 44 IU/L Final   06/09/2021 20 5 - 41 U/L Final     AST (SGOT)   Date Value Ref Range Status   11/30/2021 17 0 - 40 IU/L Final   06/09/2021 25 5 - 40 U/L Final     WBC   Date Value Ref Range Status   11/30/2021 7.7 3.4 - 10.8 x10E3/uL Final   06/09/2021 6.1 4.8 - 10.8 K/uL Final     Hematocrit   Date Value Ref Range Status   11/30/2021 47.4 37.5 - 51.0 % Final   06/09/2021 41.3 (L) 42.0 - 52.0 % Final     Platelets   Date Value Ref Range Status   11/30/2021 268 150 - 450 x10E3/uL Final   06/09/2021 160 130 - 400 K/uL Final     Triglycerides   Date Value Ref Range Status   09/30/2021 208 (H) 0 - 149 mg/dL Final     HDL Cholesterol   Date Value Ref Range Status   09/30/2021 51 >39 mg/dL Final     LDL Chol Calc ([facility])   Date Value Ref Range Status   09/30/2021 116 (H) 0 - 99 mg/dL Final      Hemoglobin A1C   Date Value Ref Range Status   04/30/2021 5.6 % Final         Assessment / Plan     Assessment/Plan:  1. Chronic pain due to trauma    - oxyCODONE-acetaminophen (PERCOCET)  MG per tablet; Take 1 tablet by mouth Every 6 (Six) Hours As Needed for Moderate Pain .  Dispense: 120 tablet; Refill: 0  Patient is to do urine drug screen.  He states he cannot do that at this time.  2. Cough    - benzonatate (Tessalon Perles) 100 MG capsule; Take 1 capsule by mouth 3 (Three) Times a Day As Needed for Cough.  Dispense: 90 capsule; Refill: 1    3. Anxiety    - ALPRAZolam (XANAX) 1 MG tablet; Take 1 tablet by mouth 2 (Two) Times a Day As Needed for Anxiety.  Dispense: 60 tablet; Refill: 0    4. Blood in stool    - Ambulatory Referral to Gastroenterology    5. Schizoaffective disorder, bipolar type (HCC)  We'll add Seroquel XR 50 mg at at bedtime.  Monitor for tolerance.  If it is not adequate at increasing his ability to sleep and decreasing his hallucinations then we will increase it to 200 mg at bedtime    6. Auditory hallucination      7. Hallucination, visual    8.  Insomia        Return in about 4 weeks (around 2/22/2022). unless patient needs to be seen sooner or acute issues arise.        I have discussed the patient results/orders and and plan/recommendation with them at today's visit.      Liza Hagen, DO   01/25/2022

## 2022-02-19 DIAGNOSIS — R06.02 SHORTNESS OF BREATH: ICD-10-CM

## 2022-02-21 RX ORDER — ASCORBIC ACID 1000 MG
TABLET ORAL
Qty: 30 TABLET | Refills: 0 | Status: SHIPPED | OUTPATIENT
Start: 2022-02-21 | End: 2022-05-26

## 2022-02-21 NOTE — TELEPHONE ENCOUNTER
Rx Refill Note  Requested Prescriptions     Pending Prescriptions Disp Refills   • [facility]S Vitamin C 1000 MG tablet [Pharmacy Med Name: CVS VITAMIN C 1,000 MG CAPLET] 30 tablet 0     Sig: TAKE 1/2 TABLET BY MOUTH EVERY DAY      Last office visit with prescribing clinician: 1/25/2022      Next office visit with prescribing clinician: 2/23/2022            Xuan Rodriguez CMA  02/21/22, 07:42 CST

## 2022-02-23 ENCOUNTER — OFFICE VISIT (OUTPATIENT)
Dept: INTERNAL MEDICINE | Facility: CLINIC | Age: 47
End: 2022-02-23

## 2022-02-23 VITALS
TEMPERATURE: 96.9 F | DIASTOLIC BLOOD PRESSURE: 96 MMHG | OXYGEN SATURATION: 98 % | SYSTOLIC BLOOD PRESSURE: 144 MMHG | WEIGHT: 288 LBS | BODY MASS INDEX: 38.17 KG/M2 | HEIGHT: 73 IN | HEART RATE: 86 BPM

## 2022-02-23 DIAGNOSIS — G89.21 CHRONIC PAIN DUE TO TRAUMA: ICD-10-CM

## 2022-02-23 DIAGNOSIS — R49.0 HOARSE: ICD-10-CM

## 2022-02-23 DIAGNOSIS — I10 ESSENTIAL HYPERTENSION: ICD-10-CM

## 2022-02-23 DIAGNOSIS — R11.2 NON-INTRACTABLE VOMITING WITH NAUSEA, UNSPECIFIED VOMITING TYPE: ICD-10-CM

## 2022-02-23 DIAGNOSIS — F41.9 ANXIETY: ICD-10-CM

## 2022-02-23 DIAGNOSIS — E78.2 MIXED HYPERLIPIDEMIA: ICD-10-CM

## 2022-02-23 DIAGNOSIS — R05.9 COUGH: ICD-10-CM

## 2022-02-23 DIAGNOSIS — Z79.899 LONG TERM USE OF DRUG: Primary | ICD-10-CM

## 2022-02-23 DIAGNOSIS — R61 NIGHT SWEATS: ICD-10-CM

## 2022-02-23 PROCEDURE — 99214 OFFICE O/P EST MOD 30 MIN: CPT | Performed by: FAMILY MEDICINE

## 2022-02-23 RX ORDER — ATORVASTATIN CALCIUM 40 MG/1
40 TABLET, FILM COATED ORAL DAILY
Qty: 90 TABLET | Refills: 3 | Status: SHIPPED | OUTPATIENT
Start: 2022-02-23 | End: 2022-05-26 | Stop reason: SDUPTHER

## 2022-02-23 RX ORDER — QUETIAPINE FUMARATE 50 MG/1
50 TABLET, EXTENDED RELEASE ORAL NIGHTLY
Qty: 90 EACH | Refills: 1 | Status: SHIPPED | OUTPATIENT
Start: 2022-02-23 | End: 2022-05-26 | Stop reason: SDUPTHER

## 2022-02-23 RX ORDER — IPRATROPIUM/ALBUTEROL SULFATE 20-100 MCG
1 MIST INHALER (GRAM) INHALATION 4 TIMES DAILY
Qty: 1 EACH | Refills: 3 | Status: SHIPPED | OUTPATIENT
Start: 2022-02-23 | End: 2022-05-26 | Stop reason: SDUPTHER

## 2022-02-23 RX ORDER — BENZONATATE 100 MG/1
100 CAPSULE ORAL 3 TIMES DAILY PRN
Qty: 90 CAPSULE | Refills: 1 | Status: SHIPPED | OUTPATIENT
Start: 2022-02-23 | End: 2022-08-26 | Stop reason: SDUPTHER

## 2022-02-23 RX ORDER — ALBUTEROL SULFATE 90 UG/1
2 AEROSOL, METERED RESPIRATORY (INHALATION) EVERY 4 HOURS PRN
Qty: 18 G | Refills: 3 | Status: SHIPPED | OUTPATIENT
Start: 2022-02-23 | End: 2022-05-26 | Stop reason: SDUPTHER

## 2022-02-23 RX ORDER — DOXEPIN HYDROCHLORIDE 10 MG/1
CAPSULE ORAL
Qty: 90 CAPSULE | Refills: 3 | Status: SHIPPED | OUTPATIENT
Start: 2022-02-23 | End: 2022-05-26 | Stop reason: SDUPTHER

## 2022-02-23 RX ORDER — AMLODIPINE BESYLATE 5 MG/1
5 TABLET ORAL DAILY
Qty: 30 TABLET | Refills: 3 | Status: SHIPPED | OUTPATIENT
Start: 2022-02-23 | End: 2022-05-26 | Stop reason: SDUPTHER

## 2022-02-23 RX ORDER — RISPERIDONE 1 MG/1
2 TABLET ORAL NIGHTLY
Qty: 180 TABLET | Refills: 0 | Status: SHIPPED | OUTPATIENT
Start: 2022-02-23 | End: 2022-05-26 | Stop reason: SDUPTHER

## 2022-02-23 RX ORDER — ONDANSETRON 4 MG/1
4 TABLET, ORALLY DISINTEGRATING ORAL EVERY 8 HOURS PRN
Qty: 30 TABLET | Refills: 3 | Status: SHIPPED | OUTPATIENT
Start: 2022-02-23 | End: 2023-02-21

## 2022-02-23 RX ORDER — METOPROLOL TARTRATE 50 MG/1
50 TABLET, FILM COATED ORAL 2 TIMES DAILY
Qty: 180 TABLET | Refills: 1 | Status: SHIPPED | OUTPATIENT
Start: 2022-02-23 | End: 2022-05-26 | Stop reason: SDUPTHER

## 2022-02-23 RX ORDER — OXYCODONE AND ACETAMINOPHEN 10; 325 MG/1; MG/1
1 TABLET ORAL EVERY 6 HOURS PRN
Qty: 120 TABLET | Refills: 0 | Status: SHIPPED | OUTPATIENT
Start: 2022-02-23 | End: 2022-03-23 | Stop reason: SDUPTHER

## 2022-02-23 RX ORDER — ALPRAZOLAM 1 MG/1
1 TABLET ORAL 2 TIMES DAILY PRN
Qty: 60 TABLET | Refills: 0 | Status: SHIPPED | OUTPATIENT
Start: 2022-02-23 | End: 2022-03-23 | Stop reason: SDUPTHER

## 2022-02-24 NOTE — ASSESSMENT & PLAN NOTE
We will refill the medications that the patient has indicated are needed, including albuterol, alprazolam, atorvastatin, Tessalon, doxepin, Combivent, Lopressor, mometasone furoate, Zofran, oxycodone, Seroquel, and Risperdal.

## 2022-02-24 NOTE — PROGRESS NOTES
Subjective     Chief Complaint   Patient presents with   • Hypertension   • Follow-up     last seen 1/25/22 due to chronic pain; UDS done, CSA and PQ filled out    • Med Refill       History of Present Illness    Nino Tamayo is a 46-year-old male who presents today for a follow-up visit.    The patient reports experiencing postnasal drip, especially at night when he is lying in bed. He states that it makes him feel as though he constantly has to swallow to clear his throat, and it has also been causing him to experience stomach upset. Additionally, he has frequently been having night sweats that are so severe that he has had to replace his pillow. The patient has not scheduled with otolaryngology yet but would like to do so for further evaluation of these symptoms and his hoarseness. He also expresses concern that he has been losing weight due to difficulty swallowing. He adds that he would like to have this issue resolved before his son comes home from [facility].     The patient has been using a wrist cuff to check his blood pressure at home and notes that the readings are not accurate. He states that his blood pressure has been 140 to 155/90 mmHg on average, and he has still been experiencing dizziness and numbness in his face.     The patient reports that he has been experiencing diarrhea daily for the past 6 to 9 months. He states that he has been taking Imodium 3 times daily and typically has 1 bowel movement per day; however, if he does not take the Imodium, he moves his bowels 3 to 5 times per day. He has not had a cholecystectomy.     The patient states that his shortness of breath has been severe at night, and he has been using 2 pillows to prop his head up in bed.    Patient's PMR from outside medical facility reviewed and noted.    Review of Systems     Otherwise complete ROS reviewed and negative except as mentioned in the HPI.    Past Medical History:   Past Medical History:    Diagnosis Date   • Anxiety    • Arthritis    • Bipolar 1 disorder (HCC)    • Hypertension    • PTSD (post-traumatic stress disorder)      Past Surgical History:  Past Surgical History:   Procedure Laterality Date   • HIP SURGERY       Social History:  reports that he has been smoking cigarettes. He has a 15.00 pack-year smoking history. He has never used smokeless tobacco. He reports that he does not drink alcohol and does not use drugs.    Family History: family history includes Arthritis in his father; Diabetes in his father; Heart attack in his mother; Hypertension in his father.      Allergies:  Allergies   Allergen Reactions   • Adderall [Amphetamine-Dextroamphetamine] Shortness Of Breath, Swelling, Palpitations and Hallucinations   • Meloxicam Nausea Only   • Darvon [Propoxyphene] GI Intolerance   • Motrin [Ibuprofen] GI Intolerance   • Toradol [Ketorolac Tromethamine] GI Intolerance   • Tramadol GI Intolerance     Medications:  Prior to Admission medications    Medication Sig Start Date End Date Taking? Authorizing Provider   albuterol sulfate  (90 Base) MCG/ACT inhaler Inhale 2 puffs Every 4 (Four) Hours As Needed for Wheezing. 12/29/21  Yes Liza Hagen DO   ALPRAZolam (XANAX) 1 MG tablet Take 1 tablet by mouth 2 (Two) Times a Day As Needed for Anxiety. 1/25/22  Yes Liza Hagen DO   atorvastatin (LIPITOR) 40 MG tablet Take 1 tablet by mouth Daily. 12/29/21  Yes Liza Hagen DO   benzonatate (Tessalon Perles) 100 MG capsule Take 1 capsule by mouth 3 (Three) Times a Day As Needed for Cough. 1/25/22  Yes Liza Hagen DO   CBD (cannabidiol) oral oil Take  by mouth.   Yes Provider, MD Roxanne   citalopram (CeleXA) 20 MG tablet Take 1 tablet by mouth Daily. 12/29/21  Yes Liza Hagen DO   [facility]S Vitamin C 1000 MG tablet TAKE 1/2 TABLET BY MOUTH EVERY DAY 2/21/22  Yes Liza Hagen DO   doxepin (SINEquan) 10 MG capsule Take 1-3 tabs at hs 12/29/21  Yes  "Liza Hagen,    guaiFENesin (Mucinex) 600 MG 12 hr tablet Take 2 tablets by mouth 2 (Two) Times a Day. 12/29/21  Yes Liza Hgaen DO   ipratropium-albuterol (Combivent Respimat)  MCG/ACT inhaler Inhale 1 puff 4 (Four) Times a Day. 12/29/21  Yes Liza Hagen DO   metoprolol tartrate (LOPRESSOR) 50 MG tablet Take 1 tablet by mouth 2 (Two) Times a Day. 12/29/21  Yes Liza Hagen DO   Mometasone Furoate 100 MCG/ACT aerosol Inhale 2 puffs 2 (Two) Times a Day. 12/29/21  Yes Liza Hagen DO   mupirocin (Bactroban) 2 % ointment Apply 1 application topically to the appropriate area as directed 3 (Three) Times a Day. 12/29/21  Yes Liza Hagen DO   NON FORMULARY Hemp products   Yes Provider, MD Roxanne   ondansetron ODT (Zofran ODT) 4 MG disintegrating tablet Place 1 tablet on the tongue Every 8 (Eight) Hours As Needed for Nausea or Vomiting. 12/29/21  Yes iLza Hagen DO   oxyCODONE-acetaminophen (PERCOCET)  MG per tablet Take 1 tablet by mouth Every 6 (Six) Hours As Needed for Moderate Pain . 1/25/22  Yes Liza Hagen DO   QUEtiapine fumarate ER (SEROquel XR) 50 MG tablet sustained-release 24 hour tablet Take 1 tablet by mouth Every Night. 1/25/22  Yes Liza Hagen DO   risperiDONE (RisperDAL) 1 MG tablet Take 2 tablets by mouth Every Night. 12/29/21  Yes Liza Hagen DO   mometasone (Nasonex) 50 MCG/ACT nasal spray 2 sprays into the nostril(s) as directed by provider Daily. 12/29/21   Liza Hagen DO   doxycycline (MONODOX) 100 MG capsule Take 1 capsule by mouth 2 (Two) Times a Day. 1/25/22 2/23/22  Liza Hagen DO   zinc gluconate 50 MG tablet Take 1 tablet by mouth Daily. 12/29/21 2/23/22  Liza Hagen DO       Objective     Vital Signs: /96 (BP Location: Left arm, Patient Position: Sitting, Cuff Size: Adult)   Pulse 86   Temp 96.9 °F (36.1 °C) (Temporal)   Ht 185.4 cm (73\")   Wt 131 kg (288 " lb)   SpO2 98%   BMI 38.00 kg/m²   Physical Exam  Vitals and nursing note reviewed.   Constitutional:       Appearance: Normal appearance.   HENT:      Head: Normocephalic and atraumatic.      Right Ear: External ear normal.      Left Ear: External ear normal.      Nose: Nose normal.      Mouth/Throat:      Mouth: Mucous membranes are moist.      Comments:  Posterior oropharynx has a white triangular patch on the posterior aspect of the throat.   Eyes:      Extraocular Movements: Extraocular movements intact.      Conjunctiva/sclera: Conjunctivae normal.      Pupils: Pupils are equal, round, and reactive to light.   Cardiovascular:      Rate and Rhythm: Normal rate and regular rhythm.      Pulses: Normal pulses.      Heart sounds: No murmur heard.  No friction rub. No gallop.    Pulmonary:      Comments: Lungs have wheezing bilaterally, both inspiratory and expiratory.  Abdominal:      General: Bowel sounds are normal.      Palpations: Abdomen is soft.   Musculoskeletal:         General: Normal range of motion.      Cervical back: Normal range of motion and neck supple.   Skin:     General: Skin is warm and dry.      Capillary Refill: Capillary refill takes less than 2 seconds.   Neurological:      General: No focal deficit present.      Mental Status: He is alert and oriented to person, place, and time.      Cranial Nerves: No cranial nerve deficit.   Psychiatric:         Mood and Affect: Mood normal.         Behavior: Behavior normal.         Patient's Body mass index is 38 kg/m².       Results Reviewed:  Glucose   Date Value Ref Range Status   11/30/2021 96 65 - 99 mg/dL Final   06/09/2021 78 74 - 109 mg/dL Final     BUN   Date Value Ref Range Status   11/30/2021 13 6 - 24 mg/dL Final   06/09/2021 19 6 - 20 mg/dL Final     Creatinine   Date Value Ref Range Status   11/30/2021 0.83 0.76 - 1.27 mg/dL Final   06/09/2021 0.8 0.5 - 1.2 mg/dL Final     Sodium   Date Value Ref Range Status   11/30/2021 140 134 - 144  mmol/L Final   06/09/2021 136 136 - 145 mmol/L Final     Potassium   Date Value Ref Range Status   11/30/2021 4.2 3.5 - 5.2 mmol/L Final   06/09/2021 4.4 3.5 - 5.0 mmol/L Final     Chloride   Date Value Ref Range Status   11/30/2021 106 96 - 106 mmol/L Final   06/09/2021 102 98 - 111 mmol/L Final     CO2   Date Value Ref Range Status   06/09/2021 25 22 - 29 mmol/L Final     Total CO2   Date Value Ref Range Status   11/30/2021 19 (L) 20 - 29 mmol/L Final     Calcium   Date Value Ref Range Status   11/30/2021 9.8 8.7 - 10.2 mg/dL Final   06/09/2021 8.6 8.6 - 10.0 mg/dL Final     ALT (SGPT)   Date Value Ref Range Status   11/30/2021 23 0 - 44 IU/L Final   06/09/2021 20 5 - 41 U/L Final     AST (SGOT)   Date Value Ref Range Status   11/30/2021 17 0 - 40 IU/L Final   06/09/2021 25 5 - 40 U/L Final     WBC   Date Value Ref Range Status   11/30/2021 7.7 3.4 - 10.8 x10E3/uL Final   06/09/2021 6.1 4.8 - 10.8 K/uL Final     Hematocrit   Date Value Ref Range Status   11/30/2021 47.4 37.5 - 51.0 % Final   06/09/2021 41.3 (L) 42.0 - 52.0 % Final     Platelets   Date Value Ref Range Status   11/30/2021 268 150 - 450 x10E3/uL Final   06/09/2021 160 130 - 400 K/uL Final     Triglycerides   Date Value Ref Range Status   09/30/2021 208 (H) 0 - 149 mg/dL Final     HDL Cholesterol   Date Value Ref Range Status   09/30/2021 51 >39 mg/dL Final     LDL Chol Calc ([facility])   Date Value Ref Range Status   09/30/2021 116 (H) 0 - 99 mg/dL Final     Hemoglobin A1C   Date Value Ref Range Status   04/30/2021 5.6 % Final         Assessment / Plan     Assessment/Plan:  Diagnoses and all orders for this visit:    1. Long term use of drug (Primary)  -     673213 [id number] Drug-Unbund -    2. Anxiety  -     [id number] 7 Drug-Unbund -  -     ALPRAZolam (XANAX) 1 MG tablet; Take 1 tablet by mouth 2 (Two) Times a Day As Needed for Anxiety.  Dispense: 60 tablet; Refill: 0    3. Chronic pain due to trauma  -     [id number] 7 Drug-Unbund -  -     oxyCODONE-acetaminophen  (PERCOCET)  MG per tablet; Take 1 tablet by mouth Every 6 (Six) Hours As Needed for Moderate Pain .  Dispense: 120 tablet; Refill: 0    4. Essential hypertension  -     metoprolol tartrate (LOPRESSOR) 50 MG tablet; Take 1 tablet by mouth 2 (Two) Times a Day.  Dispense: 180 tablet; Refill: 1  -     Comprehensive metabolic panel; Future    5. Non-intractable vomiting with nausea, unspecified vomiting type  -     ondansetron ODT (Zofran ODT) 4 MG disintegrating tablet; Place 1 tablet on the tongue Every 8 (Eight) Hours As Needed for Nausea or Vomiting.  Dispense: 30 tablet; Refill: 3    6. Cough  -     benzonatate (Tessalon Perles) 100 MG capsule; Take 1 capsule by mouth 3 (Three) Times a Day As Needed for Cough.  Dispense: 90 capsule; Refill: 1    7. Night sweats  -     CBC w AUTO Differential; Future  -     TSH; Future    8. Mixed hyperlipidemia  Comments:  He will have a CMP, lipid panel, and CBC done at the [place] office, as it is more convenient for him.  Orders:  -     Lipid panel; Future    9. Hoarse  Comments:  We have put in a referral to Dr. Jv Siddiqui. We have done this before and patient was unfortunately unable to keep his appointment.  Orders:  -     Ambulatory Referral to ENT (Otolaryngology)    Other orders  -     atorvastatin (LIPITOR) 40 MG tablet; Take 1 tablet by mouth Daily.  Dispense: 90 tablet; Refill: 3  -     doxepin (SINEquan) 10 MG capsule; Take 1-3 tabs at hs  Dispense: 90 capsule; Refill: 3  -     ipratropium-albuterol (Combivent Respimat)  MCG/ACT inhaler; Inhale 1 puff 4 (Four) Times a Day.  Dispense: 1 each; Refill: 3  -     Mometasone Furoate 100 MCG/ACT aerosol; Inhale 2 puffs 2 (Two) Times a Day.  Dispense: 13 g; Refill: 3  -     QUEtiapine fumarate ER (SEROquel XR) 50 MG tablet sustained-release 24 hour tablet; Take 1 tablet by mouth Every Night.  Dispense: 90 each; Refill: 1  -     risperiDONE (RisperDAL) 1 MG tablet; Take 2 tablets by mouth Every Night.  Dispense: 180  tablet; Refill: 0  -     albuterol sulfate  (90 Base) MCG/ACT inhaler; Inhale 2 puffs Every 4 (Four) Hours As Needed for Wheezing.  Dispense: 18 g; Refill: 3  -     amLODIPine (NORVASC) 5 MG tablet; Take 1 tablet by mouth Daily.  Dispense: 30 tablet; Refill: 3        Return in about 4 weeks (around 3/23/2022). unless patient needs to be seen sooner or acute issues arise.      I have discussed the patient results/orders and and plan/recommendation with them at today's visit.      Transcribed from ambient dictation for Liza Hagen DO by Shaina Sutton.  02/24/22   08:47 CST    Patient verbalized consent to the visit recording.

## 2022-03-14 LAB
AMPHETAMINES UR QL SCN: NEGATIVE NG/ML
BARBITURATES UR QL SCN: NEGATIVE NG/ML
BENZODIAZ UR QL: NEGATIVE
BZE UR QL: NEGATIVE NG/ML
CANNABINOIDS UR CFM-MCNC: POSITIVE NG/ML
OPIATES UR QL: NEGATIVE NG/ML
PCP UR QL: NEGATIVE NG/ML

## 2022-03-22 RX ORDER — CITALOPRAM 20 MG/1
20 TABLET ORAL DAILY
Qty: 30 TABLET | Refills: 3 | Status: SHIPPED | OUTPATIENT
Start: 2022-03-22 | End: 2022-05-26 | Stop reason: SDUPTHER

## 2022-03-23 DIAGNOSIS — F41.9 ANXIETY: ICD-10-CM

## 2022-03-23 DIAGNOSIS — G89.21 CHRONIC PAIN DUE TO TRAUMA: ICD-10-CM

## 2022-03-23 RX ORDER — OXYCODONE AND ACETAMINOPHEN 10; 325 MG/1; MG/1
1 TABLET ORAL EVERY 6 HOURS PRN
Qty: 120 TABLET | Refills: 0 | Status: SHIPPED | OUTPATIENT
Start: 2022-03-23 | End: 2022-04-21 | Stop reason: SDUPTHER

## 2022-03-23 RX ORDER — ALPRAZOLAM 1 MG/1
1 TABLET ORAL 2 TIMES DAILY PRN
Qty: 60 TABLET | Refills: 0 | Status: SHIPPED | OUTPATIENT
Start: 2022-03-23 | End: 2022-04-21 | Stop reason: SDUPTHER

## 2022-03-23 NOTE — TELEPHONE ENCOUNTER
Last OV :  2/23/22 with Dr. Hagen  Cancelled OV:  3/29 due to not having transportation  Next OV :  4/13/22 with Dr. Hagen

## 2022-03-23 NOTE — TELEPHONE ENCOUNTER
Caller: Clyde Tamayo    Relationship: Self    Best call back number: [phone]    Requested Prescriptions:   Requested Prescriptions     Pending Prescriptions Disp Refills   • ALPRAZolam (XANAX) 1 MG tablet 60 tablet 0     Sig: Take 1 tablet by mouth 2 (Two) Times a Day As Needed for Anxiety.   • oxyCODONE-acetaminophen (PERCOCET)  MG per tablet 120 tablet 0     Sig: Take 1 tablet by mouth Every 6 (Six) Hours As Needed for Moderate Pain .        Pharmacy where request should be sent: [facility]/PHARMACY #[id number] - [place], [place] - [address] [facility] [phone] [facility] [phone]      Additional details provided by patient: PATIENT CANCELED FOLLOW UP APPT ON 3/29 DUE TO NOT HAVING TRANSPORTATION.     Does the patient have less than a 3 day supply:  [] Yes  [x] No    Shama Foote Rep   03/23/22 09:06 CDT

## 2022-04-21 DIAGNOSIS — F41.9 ANXIETY: ICD-10-CM

## 2022-04-21 DIAGNOSIS — G89.21 CHRONIC PAIN DUE TO TRAUMA: ICD-10-CM

## 2022-04-21 NOTE — TELEPHONE ENCOUNTER
Caller: Clyde Tamayo    Relationship: Self    Best call back number:[phone]    Requested Prescriptions:   Requested Prescriptions     Pending Prescriptions Disp Refills   • ALPRAZolam (XANAX) 1 MG tablet 60 tablet 0     Sig: Take 1 tablet by mouth 2 (Two) Times a Day As Needed for Anxiety.   • oxyCODONE-acetaminophen (PERCOCET)  MG per tablet 120 tablet 0     Sig: Take 1 tablet by mouth Every 6 (Six) Hours As Needed for Moderate Pain .        Pharmacy where request should be sent: [facility]/PHARMACY #[id number] - [place], [place] - [address] [facility] [facility] [phone] [facility] [phone] FX     Additional details provided by patient: PATIENT STATES HE ALSO NEEDS HIS CRAZY PILLS AND HIS BLOOD PRESSURE MEDICATION, PATIENT ALSO STATES HE MISSED HIS LAST APPOINTMENT DUE TO BEING HOMELESS AND NOW HAS A PLACE TO STAY AND WILL BE ATTENDING HIS UPCOMING APPOINTMENT BUT WILL RUN OUT OF HIS MEDICATION BEFORE IT   Does the patient have less than a 3 day supply:  [x] Yes  [] No    Shama Coleman Rep   04/21/22 10:38 CDT

## 2022-04-22 RX ORDER — OXYCODONE AND ACETAMINOPHEN 10; 325 MG/1; MG/1
1 TABLET ORAL EVERY 6 HOURS PRN
Qty: 120 TABLET | Refills: 0 | Status: SHIPPED | OUTPATIENT
Start: 2022-04-22 | End: 2022-05-27 | Stop reason: SDUPTHER

## 2022-04-22 RX ORDER — ALPRAZOLAM 1 MG/1
1 TABLET ORAL 2 TIMES DAILY PRN
Qty: 60 TABLET | Refills: 0 | Status: SHIPPED | OUTPATIENT
Start: 2022-04-22 | End: 2022-05-27 | Stop reason: SDUPTHER

## 2022-05-23 ENCOUNTER — TELEPHONE (OUTPATIENT)
Dept: INTERNAL MEDICINE | Facility: CLINIC | Age: 47
End: 2022-05-23

## 2022-05-23 NOTE — TELEPHONE ENCOUNTER
He can schedule with one of the N.P.'s to document use/need of the requested medication and can pend Rx to a provider who can sign.  He has to have an OV to even consider a refill.

## 2022-05-23 NOTE — TELEPHONE ENCOUNTER
Caller: Clyde Tamayo    Relationship: Self    Best call back number: [phone]        What was the call regarding: PATIENT STATES HE MISSED HIS LAST MEDICATION REVIEW APPT BECAUSE HE AND HIS SON WERE HOMELESS. THE EARLIEST APPT AVAILABLE WITH THE PATIENT'S PCP IS 6/16/22.  PATIENT STATES HE RUNS OUT OF HIS MEDICATION ON 5/27/22. HE STATES HE CAN GO SOMEWHERE IN [place] THAT IS CLOSER FOR A UA IF NEEDED. PATIENT IS WONDERING IF THE MEDICATIONS CAN BE CALLED IN ON TIME, SO HE IS NOT WITHOUT THEM. PLEASE ADVISE     Do you require a callback: YES

## 2022-05-24 DIAGNOSIS — R49.0 HOARSE: Primary | ICD-10-CM

## 2022-05-26 ENCOUNTER — NURSE TRIAGE (OUTPATIENT)
Dept: CALL CENTER | Facility: HOSPITAL | Age: 47
End: 2022-05-26

## 2022-05-26 ENCOUNTER — OFFICE VISIT (OUTPATIENT)
Dept: INTERNAL MEDICINE | Facility: CLINIC | Age: 47
End: 2022-05-26

## 2022-05-26 VITALS
OXYGEN SATURATION: 98 % | SYSTOLIC BLOOD PRESSURE: 150 MMHG | HEIGHT: 73 IN | WEIGHT: 291 LBS | BODY MASS INDEX: 38.57 KG/M2 | TEMPERATURE: 97.1 F | DIASTOLIC BLOOD PRESSURE: 82 MMHG | HEART RATE: 93 BPM

## 2022-05-26 DIAGNOSIS — R06.2 WHEEZING: ICD-10-CM

## 2022-05-26 DIAGNOSIS — G89.29 CHRONIC RIGHT-SIDED LOW BACK PAIN WITH RIGHT-SIDED SCIATICA: ICD-10-CM

## 2022-05-26 DIAGNOSIS — F41.9 ANXIETY: Primary | ICD-10-CM

## 2022-05-26 DIAGNOSIS — G47.00 INSOMNIA, UNSPECIFIED TYPE: ICD-10-CM

## 2022-05-26 DIAGNOSIS — M54.41 CHRONIC RIGHT-SIDED LOW BACK PAIN WITH RIGHT-SIDED SCIATICA: ICD-10-CM

## 2022-05-26 DIAGNOSIS — F25.0 SCHIZOAFFECTIVE DISORDER, BIPOLAR TYPE: ICD-10-CM

## 2022-05-26 DIAGNOSIS — Z79.899 ENCOUNTER FOR LONG-TERM (CURRENT) USE OF OTHER MEDICATIONS: ICD-10-CM

## 2022-05-26 DIAGNOSIS — R06.02 SHORTNESS OF BREATH: ICD-10-CM

## 2022-05-26 DIAGNOSIS — I10 ESSENTIAL HYPERTENSION: ICD-10-CM

## 2022-05-26 DIAGNOSIS — E78.5 HYPERLIPIDEMIA, UNSPECIFIED HYPERLIPIDEMIA TYPE: ICD-10-CM

## 2022-05-26 PROCEDURE — 99214 OFFICE O/P EST MOD 30 MIN: CPT

## 2022-05-26 RX ORDER — IPRATROPIUM/ALBUTEROL SULFATE 20-100 MCG
1 MIST INHALER (GRAM) INHALATION 4 TIMES DAILY
Qty: 1 EACH | Refills: 3 | Status: SHIPPED | OUTPATIENT
Start: 2022-05-26 | End: 2022-08-26 | Stop reason: SDUPTHER

## 2022-05-26 RX ORDER — ALBUTEROL SULFATE 90 UG/1
2 AEROSOL, METERED RESPIRATORY (INHALATION) EVERY 4 HOURS PRN
Qty: 18 G | Refills: 3 | Status: SHIPPED | OUTPATIENT
Start: 2022-05-26 | End: 2022-08-26 | Stop reason: SDUPTHER

## 2022-05-26 RX ORDER — CITALOPRAM 20 MG/1
20 TABLET ORAL DAILY
Qty: 30 TABLET | Refills: 3 | Status: SHIPPED | OUTPATIENT
Start: 2022-05-26 | End: 2022-08-26 | Stop reason: SDUPTHER

## 2022-05-26 RX ORDER — DOXEPIN HYDROCHLORIDE 10 MG/1
CAPSULE ORAL
Qty: 90 CAPSULE | Refills: 3 | Status: SHIPPED | OUTPATIENT
Start: 2022-05-26 | End: 2022-08-26 | Stop reason: SDUPTHER

## 2022-05-26 RX ORDER — RISPERIDONE 1 MG/1
2 TABLET ORAL NIGHTLY
Qty: 180 TABLET | Refills: 0 | Status: SHIPPED | OUTPATIENT
Start: 2022-05-26 | End: 2022-08-26 | Stop reason: SDUPTHER

## 2022-05-26 RX ORDER — QUETIAPINE FUMARATE 50 MG/1
50 TABLET, EXTENDED RELEASE ORAL NIGHTLY
Qty: 90 EACH | Refills: 1 | Status: SHIPPED | OUTPATIENT
Start: 2022-05-26 | End: 2022-08-26 | Stop reason: SDUPTHER

## 2022-05-26 RX ORDER — ATORVASTATIN CALCIUM 40 MG/1
40 TABLET, FILM COATED ORAL DAILY
Qty: 90 TABLET | Refills: 3 | Status: SHIPPED | OUTPATIENT
Start: 2022-05-26 | End: 2022-08-26 | Stop reason: SDUPTHER

## 2022-05-26 RX ORDER — METOPROLOL TARTRATE 50 MG/1
50 TABLET, FILM COATED ORAL 2 TIMES DAILY
Qty: 180 TABLET | Refills: 1 | Status: SHIPPED | OUTPATIENT
Start: 2022-05-26 | End: 2022-08-26 | Stop reason: SDUPTHER

## 2022-05-26 RX ORDER — AMLODIPINE BESYLATE 5 MG/1
5 TABLET ORAL DAILY
Qty: 30 TABLET | Refills: 3 | Status: SHIPPED | OUTPATIENT
Start: 2022-05-26 | End: 2022-08-26 | Stop reason: SDUPTHER

## 2022-05-26 NOTE — PROGRESS NOTES
Subjective     Chief Complaint   Patient presents with   • Med Refill     Patient here for Uds and he missed a few appts due to being evicted.    • Back Pain     Takes Percocet for pain       History of Present Illness  Patient is a 46 year old male who presents today for medication refill, states usually sees Dr. Hagen, has chronic back pain and takes percocet and xanax for pain and anxiety along with Celexa. Feels that it controls his pain and anxiety well.   Takes risperidone for sleep and psychotic episodes. Has history of schizoaffective disorder. Feels controlled on current medications  Takes amlodipine and Norvasc for BP. States has been out of medication and that is why BP is high today.   Was recently evicted from his apartment, and is currently living with his dad until he finds another place.   Utilized albuterol and respimat inhalers for shortness of breath and wheezing. Admits started smoking when he was 10, is now down to 4 cigarettes a day, working to stp smoking.   Takes Lipitor for hyperlipidemia. Recent lipid panel on 9/30/2022 showed improvement in cholesterol and triglycerides from previous.    Patient's PMR from outside medical facility reviewed and noted.    Review of Systems   Constitutional: Negative for activity change, fatigue and unexpected weight change.   HENT: Negative for congestion, ear pain, mouth sores, rhinorrhea, sore throat and trouble swallowing.    Eyes: Negative for discharge and visual disturbance.   Respiratory: Positive for shortness of breath and wheezing. Negative for cough.    Cardiovascular: Negative for chest pain and leg swelling.   Gastrointestinal: Negative for abdominal pain, constipation, diarrhea and nausea.   Genitourinary: Negative for decreased urine volume, difficulty urinating and hematuria.   Musculoskeletal: Positive for back pain and myalgias. Negative for gait problem.   Skin: Negative for color change and rash.   Allergic/Immunologic: Negative  for environmental allergies and immunocompromised state.   Neurological: Negative for speech difficulty, weakness and headaches.   Psychiatric/Behavioral: Positive for dysphoric mood and sleep disturbance. Negative for confusion, self-injury and suicidal ideas. The patient is nervous/anxious. The patient is not hyperactive.         Otherwise complete ROS reviewed and negative except as mentioned in the HPI.    Past Medical History:   Past Medical History:   Diagnosis Date   • Anxiety    • Arthritis    • Bipolar 1 disorder (HCC)    • Hypertension    • PTSD (post-traumatic stress disorder)      Past Surgical History:  Past Surgical History:   Procedure Laterality Date   • HIP SURGERY       Social History:  reports that he has been smoking cigarettes. He has a 15.00 pack-year smoking history. He has never used smokeless tobacco. He reports that he does not drink alcohol and does not use drugs.    Family History: family history includes Arthritis in his father; Diabetes in his father; Heart attack in his mother; Hypertension in his father.      Allergies:  Allergies   Allergen Reactions   • Adderall [Amphetamine-Dextroamphetamine] Shortness Of Breath, Swelling, Palpitations and Hallucinations   • Meloxicam Nausea Only   • Darvon [Propoxyphene] GI Intolerance   • Motrin [Ibuprofen] GI Intolerance   • Toradol [Ketorolac Tromethamine] GI Intolerance   • Tramadol GI Intolerance     Medications:  Prior to Admission medications    Medication Sig Start Date End Date Taking? Authorizing Provider   albuterol sulfate  (90 Base) MCG/ACT inhaler Inhale 2 puffs Every 4 (Four) Hours As Needed for Wheezing. 2/23/22  Yes Liza Hagen, DO   ALPRAZolam (XANAX) 1 MG tablet Take 1 tablet by mouth 2 (Two) Times a Day As Needed for Anxiety. 4/22/22  Yes Liza Hagen, DO   amLODIPine (NORVASC) 5 MG tablet Take 1 tablet by mouth Daily. 2/23/22  Yes Liza Hagen, DO   atorvastatin (LIPITOR) 40 MG tablet Take 1 tablet  by mouth Daily. 2/23/22  Yes Liza Hagen, DO   CBD (cannabidiol) oral oil Take  by mouth.   Yes Provider, MD Roxanne   citalopram (CeleXA) 20 MG tablet Take 1 tablet by mouth Daily. 3/22/22  Yes Liza Hagen, DO   doxepin (SINEquan) 10 MG capsule Take 1-3 tabs at hs 2/23/22  Yes Liza Hagen DO   guaiFENesin (Mucinex) 600 MG 12 hr tablet Take 2 tablets by mouth 2 (Two) Times a Day. 12/29/21  Yes Liza Hagen, DO   ipratropium-albuterol (Combivent Respimat)  MCG/ACT inhaler Inhale 1 puff 4 (Four) Times a Day. 2/23/22  Yes Liza Hagen, DO   metoprolol tartrate (LOPRESSOR) 50 MG tablet Take 1 tablet by mouth 2 (Two) Times a Day. 2/23/22  Yes Liza Hagen, DO   mometasone (Nasonex) 50 MCG/ACT nasal spray 2 sprays into the nostril(s) as directed by provider Daily. 12/29/21  Yes Liza Hagen, DO   NON FORMULARY Hemp products   Yes Provider, MD Roxanne   ondansetron ODT (Zofran ODT) 4 MG disintegrating tablet Place 1 tablet on the tongue Every 8 (Eight) Hours As Needed for Nausea or Vomiting. 2/23/22  Yes Liza Hagen,    oxyCODONE-acetaminophen (PERCOCET)  MG per tablet Take 1 tablet by mouth Every 6 (Six) Hours As Needed for Moderate Pain . 4/22/22  Yes Liza Hagen, DO   QUEtiapine fumarate ER (SEROquel XR) 50 MG tablet sustained-release 24 hour tablet Take 1 tablet by mouth Every Night. 2/23/22  Yes Liza Hagen, DO   risperiDONE (RisperDAL) 1 MG tablet Take 2 tablets by mouth Every Night. 2/23/22  Yes Liza Hagen, DO   benzonatate (Tessalon Perles) 100 MG capsule Take 1 capsule by mouth 3 (Three) Times a Day As Needed for Cough. 2/23/22   HornLiza DO [facility] Vitamin C 1000 MG tablet TAKE 1/2 TABLET BY MOUTH EVERY DAY 2/21/22   Liza Hagen DO   Mometasone Furoate 100 MCG/ACT aerosol Inhale 2 puffs 2 (Two) Times a Day. 2/23/22   Liza Hagen DO   mupirocin (Bactroban) 2 % ointment Apply 1  "application topically to the appropriate area as directed 3 (Three) Times a Day. 12/29/21   Liza Hagen DO       Objective     Vital Signs: /82 (BP Location: Left arm, Patient Position: Sitting, Cuff Size: Adult)   Pulse 93   Temp 97.1 °F (36.2 °C)   Ht 185.4 cm (73\")   Wt 132 kg (291 lb)   SpO2 98%   BMI 38.39 kg/m²   Physical Exam  Constitutional:       General: He is not in acute distress.     Appearance: Normal appearance. He is not ill-appearing.   HENT:      Head: Normocephalic and atraumatic.      Right Ear: External ear normal.      Left Ear: External ear normal.      Nose: Nose normal.      Mouth/Throat:      Mouth: Mucous membranes are moist.      Pharynx: No posterior oropharyngeal erythema.   Eyes:      General: No scleral icterus.     Extraocular Movements: Extraocular movements intact.      Conjunctiva/sclera: Conjunctivae normal.      Pupils: Pupils are equal, round, and reactive to light.   Cardiovascular:      Rate and Rhythm: Normal rate and regular rhythm.      Pulses: Normal pulses.      Heart sounds: Normal heart sounds.   Pulmonary:      Effort: Pulmonary effort is normal. No respiratory distress.      Breath sounds: Normal breath sounds. No stridor. No wheezing or rhonchi.   Abdominal:      General: Abdomen is flat. Bowel sounds are normal.      Palpations: Abdomen is soft.      Tenderness: There is no abdominal tenderness.   Musculoskeletal:      Cervical back: Normal range of motion.      Right lower leg: No edema.      Left lower leg: No edema.      Comments: Walks with a cane.   Skin:     General: Skin is warm and dry.      Capillary Refill: Capillary refill takes less than 2 seconds.      Findings: No erythema or rash.   Neurological:      General: No focal deficit present.      Mental Status: He is alert and oriented to person, place, and time. Mental status is at baseline.      Motor: No weakness.   Psychiatric:         Mood and Affect: Mood normal.         " Behavior: Behavior normal.         Thought Content: Thought content normal.         Judgment: Judgment normal.         Results Reviewed:  Glucose   Date Value Ref Range Status   11/30/2021 96 65 - 99 mg/dL Final   06/09/2021 78 74 - 109 mg/dL Final     BUN   Date Value Ref Range Status   11/30/2021 13 6 - 24 mg/dL Final   06/09/2021 19 6 - 20 mg/dL Final     Creatinine   Date Value Ref Range Status   11/30/2021 0.83 0.76 - 1.27 mg/dL Final   06/09/2021 0.8 0.5 - 1.2 mg/dL Final     Sodium   Date Value Ref Range Status   11/30/2021 140 134 - 144 mmol/L Final   06/09/2021 136 136 - 145 mmol/L Final     Potassium   Date Value Ref Range Status   11/30/2021 4.2 3.5 - 5.2 mmol/L Final   06/09/2021 4.4 3.5 - 5.0 mmol/L Final     Chloride   Date Value Ref Range Status   11/30/2021 106 96 - 106 mmol/L Final   06/09/2021 102 98 - 111 mmol/L Final     CO2   Date Value Ref Range Status   06/09/2021 25 22 - 29 mmol/L Final     Total CO2   Date Value Ref Range Status   11/30/2021 19 (L) 20 - 29 mmol/L Final     Calcium   Date Value Ref Range Status   11/30/2021 9.8 8.7 - 10.2 mg/dL Final   06/09/2021 8.6 8.6 - 10.0 mg/dL Final     ALT (SGPT)   Date Value Ref Range Status   11/30/2021 23 0 - 44 IU/L Final   06/09/2021 20 5 - 41 U/L Final     AST (SGOT)   Date Value Ref Range Status   11/30/2021 17 0 - 40 IU/L Final   06/09/2021 25 5 - 40 U/L Final     WBC   Date Value Ref Range Status   11/30/2021 7.7 3.4 - 10.8 x10E3/uL Final   06/09/2021 6.1 4.8 - 10.8 K/uL Final     Hematocrit   Date Value Ref Range Status   11/30/2021 47.4 37.5 - 51.0 % Final   06/09/2021 41.3 (L) 42.0 - 52.0 % Final     Platelets   Date Value Ref Range Status   11/30/2021 268 150 - 450 x10E3/uL Final   06/09/2021 160 130 - 400 K/uL Final     Triglycerides   Date Value Ref Range Status   09/30/2021 208 (H) 0 - 149 mg/dL Final     HDL Cholesterol   Date Value Ref Range Status   09/30/2021 51 >39 mg/dL Final     LDL Chol Calc (NIH)   Date Value Ref Range Status    09/30/2021 116 (H) 0 - 99 mg/dL Final     Hemoglobin A1C   Date Value Ref Range Status   04/30/2021 5.6 % Final         Assessment / Plan     Assessment/Plan:  1. Essential hypertension  - metoprolol tartrate (LOPRESSOR) 50 MG tablet; Take 1 tablet by mouth 2 (Two) Times a Day.  Dispense: 180 tablet; Refill: 1  - amLODIPine (NORVASC) 5 MG tablet; Take 1 tablet by mouth Daily.  Dispense: 30 tablet; Refill: 3  -feels controlled on current medications   2. Anxiety  - citalopram (CeleXA) 20 MG tablet; Take 1 tablet by mouth Daily.  Dispense: 30 tablet; Refill: 3  -updated UDS and CSA-will send xanax prescription to appropriate provider  3. Hyperlipidemia, unspecified hyperlipidemia type  - atorvastatin (LIPITOR) 40 MG tablet; Take 1 tablet by mouth Daily.  Dispense: 90 tablet; Refill: 3  - Lipid panel  -feels controlled on current medications   4. Insomnia, unspecified type  - doxepin (SINEquan) 10 MG capsule; Take 1-3 tabs at hs  Dispense: 90 capsule; Refill: 3  -feels controlled on current medications   5. Schizoaffective disorder, bipolar type (HCC)  - risperiDONE (RisperDAL) 1 MG tablet; Take 2 tablets by mouth Every Night.  Dispense: 180 tablet; Refill: 0  - QUEtiapine fumarate ER (SEROquel XR) 50 MG tablet sustained-release 24 hour tablet; Take 1 tablet by mouth Every Night.  Dispense: 90 each; Refill: 1  - doxepin (SINEquan) 10 MG capsule; Take 1-3 tabs at hs  Dispense: 90 capsule; Refill: 3  -feels controlled on current medications   6. Wheezing  - ipratropium-albuterol (Combivent Respimat)  MCG/ACT inhaler; Inhale 1 puff 4 (Four) Times a Day.  Dispense: 1 each; Refill: 3  - albuterol sulfate  (90 Base) MCG/ACT inhaler; Inhale 2 puffs Every 4 (Four) Hours As Needed for Wheezing.  Dispense: 18 g; Refill: 3  -feels controlled on current medications   7. Shortness of breath  - ipratropium-albuterol (Combivent Respimat)  MCG/ACT inhaler; Inhale 1 puff 4 (Four) Times a Day.  Dispense: 1 each;  Refill: 3  - albuterol sulfate  (90 Base) MCG/ACT inhaler; Inhale 2 puffs Every 4 (Four) Hours As Needed for Wheezing.  Dispense: 18 g; Refill: 3  -feels controlled on current medications   8. Chronic right-sided low back pain with right-sided sciatica  -UDS and CSA up to date  -will send percocet 10mg #120 to appropriate provider for refill.   -feels controlled on current medications         Return in about 3 months (around 8/26/2022). unless patient needs to be seen sooner or acute issues arise.      I have discussed the patient results/orders and and plan/recommendation with them at today's visit.      Rita Thomas, APRN   05/26/2022

## 2022-05-26 NOTE — TELEPHONE ENCOUNTER
He was seen today in the office. The following medications, he needs. Xanax and pain medication. Explained he will need to call back tomorrow.     Reason for Disposition  • Prescription refill request for a CONTROLLED substance (e.g., narcotics, ADHD medicines)    Additional Information  • Negative: [1] Intentional drug overdose AND [2] suicidal thoughts or ideas  • Negative: Drug overdose and triager unable to answer question  • Negative: Caller requesting information unrelated to medicine  • Negative: Caller requesting information about COVID-19 Vaccine  • Negative: Caller requesting information about Emergency Contraception  • Negative: Caller requesting information about Combined Birth Control Pills  • Negative: Caller requesting information about Progestin Birth Control Pills  • Negative: Caller requesting information about Post-Op pain or medicines  • Negative: Caller requesting a prescription antibiotic (such as Penicillin) for Strep throat and has a positive culture result  • Negative: Caller requesting a prescription anti-viral med (such as Tamiflu) and has influenza (flu)  symptoms  • Negative: Immunization reaction suspected  • Negative: Rash while taking a medicine or within 3 days of stopping it  • Negative: [1] Asthma and [2] having symptoms of asthma (cough, wheezing, etc.)  • Negative: [1] Symptom of illness (e.g., headache, abdominal pain, earache, vomiting) AND [2] more than mild  • Negative: Breastfeeding questions about mother's medicines and diet  • Negative: MORE THAN A DOUBLE DOSE of a prescription or over-the-counter (OTC) drug  • Negative: [1] DOUBLE DOSE (an extra dose or lesser amount) of prescription drug AND [2] any symptoms (e.g., dizziness, nausea, pain, sleepiness)  • Negative: [1] DOUBLE DOSE (an extra dose or lesser amount) of over-the-counter (OTC) drug AND [2] any symptoms (e.g., dizziness, nausea, pain, sleepiness)  • Negative: Took another person's prescription drug  • Negative:  "[1] DOUBLE DOSE (an extra dose or lesser amount) of prescription drug AND [2] NO symptoms (Exception: a double dose of antibiotics)  • Negative: Diabetes drug error or overdose (e.g., took wrong type of insulin or took extra dose)  • Negative: [1] Prescription refill request for ESSENTIAL medicine (i.e., likelihood of harm to patient if not taken) AND [2] triager unable to refill per department policy  • Negative: [1] Prescription not at pharmacy AND [2] was prescribed by PCP recently (Exception: triager has access to EMR and prescription is recorded there. Go to Home Care and confirm for pharmacy.)  • Negative: [1] Pharmacy calling with prescription question AND [2] triager unable to answer question  • Negative: [1] Caller has URGENT medicine question about med that PCP or specialist prescribed AND [2] triager unable to answer question  • Negative: Medicine patch causing local rash or itching  • Negative: [1] Caller has medicine question about med NOT prescribed by PCP AND [2] triager unable to answer question (e.g., compatibility with other med, storage)  • Negative: Prescription request for new medicine (not a refill)    Answer Assessment - Initial Assessment Questions  1. NAME of MEDICATION: \"What medicine are you calling about?\"      Xanax and pain medication.   2. QUESTION: \"What is your question?\" (e.g., medication refill, side effect)      Need refill.   3. PRESCRIBING HCP: \"Who prescribed it?\" Reason: if prescribed by specialist, call should be referred to that group.      Dr. Hagen   4. SYMPTOMS: \"Do you have any symptoms?\"      pain  5. SEVERITY: If symptoms are present, ask \"Are they mild, moderate or severe?\"      Moderate   6. PREGNANCY:  \"Is there any chance that you are pregnant?\" \"When was your last menstrual period?\"      n    Protocols used: MEDICATION QUESTION CALL-ADULT-      "

## 2022-05-27 DIAGNOSIS — G89.21 CHRONIC PAIN DUE TO TRAUMA: ICD-10-CM

## 2022-05-27 DIAGNOSIS — F41.9 ANXIETY: ICD-10-CM

## 2022-05-27 RX ORDER — OXYCODONE AND ACETAMINOPHEN 10; 325 MG/1; MG/1
1 TABLET ORAL EVERY 6 HOURS PRN
Qty: 120 TABLET | Refills: 0 | Status: SHIPPED | OUTPATIENT
Start: 2022-05-27 | End: 2022-06-27 | Stop reason: SDUPTHER

## 2022-05-27 RX ORDER — ALPRAZOLAM 1 MG/1
1 TABLET ORAL 2 TIMES DAILY PRN
Qty: 60 TABLET | Refills: 0 | Status: SHIPPED | OUTPATIENT
Start: 2022-05-27 | End: 2022-06-27 | Stop reason: SDUPTHER

## 2022-05-27 NOTE — TELEPHONE ENCOUNTER
Caller: Clyde Tamayo    Relationship: Self    Best call back number: [phone]    Requested Prescriptions:   Requested Prescriptions     Pending Prescriptions Disp Refills   • ALPRAZolam (XANAX) 1 MG tablet 60 tablet 0     Sig: Take 1 tablet by mouth 2 (Two) Times a Day As Needed for Anxiety.   • oxyCODONE-acetaminophen (PERCOCET)  MG per tablet 120 tablet 0     Sig: Take 1 tablet by mouth Every 6 (Six) Hours As Needed for Moderate Pain .        Pharmacy where request should be sent: [address] [phone] [facility] [phone] FX       Does the patient have less than a 3 day supply:  [x] Yes  [] No    Shama Foote Rep   05/27/22 09:09 CDT

## 2022-05-27 NOTE — TELEPHONE ENCOUNTER
What does Dr. Hagen normally do?  Does she wait? Because I know he is out of medication. If she does not normally wait then you can send it, but if she normally waits then hold off.

## 2022-06-03 LAB
CHOLEST SERPL-MCNC: 245 MG/DL (ref 100–199)
DRUGS UR: NORMAL
HDLC SERPL-MCNC: 34 MG/DL
LDLC SERPL CALC-MCNC: ABNORMAL MG/DL (ref 0–99)
TRIGL SERPL-MCNC: 872 MG/DL (ref 0–149)
VLDLC SERPL CALC-MCNC: ABNORMAL MG/DL (ref 5–40)

## 2022-06-27 DIAGNOSIS — F41.9 ANXIETY: ICD-10-CM

## 2022-06-27 DIAGNOSIS — G89.21 CHRONIC PAIN DUE TO TRAUMA: ICD-10-CM

## 2022-06-27 NOTE — TELEPHONE ENCOUNTER
Caller: YANN CHRISTIAN     Relationship: SELF     Best call back number: [phone] (H)    Requested Prescriptions:      ALPRAZolam (XANAX) 1 MG tablet  1 mg, 2 Times Daily PRN     oxyCODONE-acetaminophen (PERCOCET)  MG per tablet  1 tablet, Every 6 Hours PRN           Pharmacy where request should be sent:    [address] - [phone]  - [phone]   [phone]  Additional details provided by patient: STATES HE WILL NEED THE MEDICATION AS SOON AS POSSIBLE   Does the patient have less than a 3 day supply:  [x] Yes  [] No    Shama Okeefe Rep   06/27/22 09:52 CDT

## 2022-06-28 RX ORDER — ALPRAZOLAM 1 MG/1
1 TABLET ORAL 2 TIMES DAILY PRN
Qty: 60 TABLET | Refills: 0 | Status: SHIPPED | OUTPATIENT
Start: 2022-06-28 | End: 2022-07-21 | Stop reason: SDUPTHER

## 2022-06-28 RX ORDER — OXYCODONE AND ACETAMINOPHEN 10; 325 MG/1; MG/1
1 TABLET ORAL EVERY 6 HOURS PRN
Qty: 120 TABLET | Refills: 0 | Status: SHIPPED | OUTPATIENT
Start: 2022-06-28 | End: 2022-07-21 | Stop reason: SDUPTHER

## 2022-07-21 DIAGNOSIS — G89.21 CHRONIC PAIN DUE TO TRAUMA: ICD-10-CM

## 2022-07-21 DIAGNOSIS — F41.9 ANXIETY: ICD-10-CM

## 2022-07-21 RX ORDER — ALPRAZOLAM 1 MG/1
1 TABLET ORAL 2 TIMES DAILY PRN
Qty: 60 TABLET | Refills: 0 | Status: SHIPPED | OUTPATIENT
Start: 2022-07-28 | End: 2022-08-26 | Stop reason: SDUPTHER

## 2022-07-21 RX ORDER — OXYCODONE AND ACETAMINOPHEN 10; 325 MG/1; MG/1
1 TABLET ORAL EVERY 6 HOURS PRN
Qty: 120 TABLET | Refills: 0 | Status: SHIPPED | OUTPATIENT
Start: 2022-07-28 | End: 2022-08-26 | Stop reason: SDUPTHER

## 2022-07-21 NOTE — TELEPHONE ENCOUNTER
Caller: Jarek Tamayoyschary GREENE    Relationship: Self    Best call back number: [phone]      Requested Prescriptions:   Requested Prescriptions     Pending Prescriptions Disp Refills   • oxyCODONE-acetaminophen (PERCOCET)  MG per tablet 120 tablet 0     Sig: Take 1 tablet by mouth Every 6 (Six) Hours As Needed for Moderate Pain .   • ALPRAZolam (XANAX) 1 MG tablet 60 tablet 0     Sig: Take 1 tablet by mouth 2 (Two) Times a Day As Needed for Anxiety.        Pharmacy where request should be sent: [address] [phone] [facility] [phone]      Additional details provided by patient:     Does the patient have less than a 3 day supply:  [] Yes  [x] No    Shama QUACH Rep   07/21/22 10:12 CDT

## 2022-08-26 ENCOUNTER — OFFICE VISIT (OUTPATIENT)
Dept: INTERNAL MEDICINE | Facility: CLINIC | Age: 47
End: 2022-08-26

## 2022-08-26 VITALS
HEART RATE: 69 BPM | OXYGEN SATURATION: 96 % | HEIGHT: 73 IN | TEMPERATURE: 97.1 F | WEIGHT: 273.2 LBS | SYSTOLIC BLOOD PRESSURE: 100 MMHG | DIASTOLIC BLOOD PRESSURE: 74 MMHG | BODY MASS INDEX: 36.21 KG/M2

## 2022-08-26 DIAGNOSIS — J40 BRONCHITIS: ICD-10-CM

## 2022-08-26 DIAGNOSIS — G47.00 INSOMNIA, UNSPECIFIED TYPE: ICD-10-CM

## 2022-08-26 DIAGNOSIS — F25.0 SCHIZOAFFECTIVE DISORDER, BIPOLAR TYPE: ICD-10-CM

## 2022-08-26 DIAGNOSIS — R05.9 COUGH: ICD-10-CM

## 2022-08-26 DIAGNOSIS — R06.02 SHORTNESS OF BREATH: ICD-10-CM

## 2022-08-26 DIAGNOSIS — E78.5 HYPERLIPIDEMIA, UNSPECIFIED HYPERLIPIDEMIA TYPE: ICD-10-CM

## 2022-08-26 DIAGNOSIS — I10 ESSENTIAL HYPERTENSION: ICD-10-CM

## 2022-08-26 DIAGNOSIS — F41.9 ANXIETY: ICD-10-CM

## 2022-08-26 DIAGNOSIS — Z20.822 CONTACT WITH AND (SUSPECTED) EXPOSURE TO COVID-19: Primary | ICD-10-CM

## 2022-08-26 DIAGNOSIS — R06.2 WHEEZING: ICD-10-CM

## 2022-08-26 DIAGNOSIS — G89.21 CHRONIC PAIN DUE TO TRAUMA: ICD-10-CM

## 2022-08-26 PROCEDURE — 99214 OFFICE O/P EST MOD 30 MIN: CPT | Performed by: FAMILY MEDICINE

## 2022-08-26 RX ORDER — METOPROLOL TARTRATE 50 MG/1
50 TABLET, FILM COATED ORAL 2 TIMES DAILY
Qty: 180 TABLET | Refills: 1 | Status: SHIPPED | OUTPATIENT
Start: 2022-08-26

## 2022-08-26 RX ORDER — OXYCODONE AND ACETAMINOPHEN 10; 325 MG/1; MG/1
1 TABLET ORAL EVERY 6 HOURS PRN
Qty: 120 TABLET | Refills: 0 | Status: SHIPPED | OUTPATIENT
Start: 2022-08-26 | End: 2022-09-20 | Stop reason: SDUPTHER

## 2022-08-26 RX ORDER — RISPERIDONE 1 MG/1
2 TABLET ORAL NIGHTLY
Qty: 180 TABLET | Refills: 0 | Status: SHIPPED | OUTPATIENT
Start: 2022-08-26

## 2022-08-26 RX ORDER — ATORVASTATIN CALCIUM 40 MG/1
40 TABLET, FILM COATED ORAL DAILY
Qty: 90 TABLET | Refills: 3 | Status: SHIPPED | OUTPATIENT
Start: 2022-08-26 | End: 2023-02-22 | Stop reason: DRUGHIGH

## 2022-08-26 RX ORDER — CITALOPRAM 20 MG/1
20 TABLET ORAL DAILY
Qty: 30 TABLET | Refills: 3 | Status: SHIPPED | OUTPATIENT
Start: 2022-08-26

## 2022-08-26 RX ORDER — MOMETASONE FUROATE 50 UG/1
2 SPRAY, METERED NASAL DAILY
Qty: 1 EACH | Refills: 12 | Status: SHIPPED | OUTPATIENT
Start: 2022-08-26 | End: 2023-02-21

## 2022-08-26 RX ORDER — IPRATROPIUM/ALBUTEROL SULFATE 20-100 MCG
1 MIST INHALER (GRAM) INHALATION 4 TIMES DAILY
Qty: 1 EACH | Refills: 3 | Status: SHIPPED | OUTPATIENT
Start: 2022-08-26 | End: 2023-01-25 | Stop reason: SDUPTHER

## 2022-08-26 RX ORDER — AMLODIPINE BESYLATE 5 MG/1
5 TABLET ORAL DAILY
Qty: 30 TABLET | Refills: 3 | Status: SHIPPED | OUTPATIENT
Start: 2022-08-26

## 2022-08-26 RX ORDER — ALBUTEROL SULFATE 90 UG/1
2 AEROSOL, METERED RESPIRATORY (INHALATION) EVERY 4 HOURS PRN
Qty: 18 G | Refills: 3 | Status: SHIPPED | OUTPATIENT
Start: 2022-08-26

## 2022-08-26 RX ORDER — BENZONATATE 100 MG/1
100 CAPSULE ORAL 3 TIMES DAILY PRN
Qty: 90 CAPSULE | Refills: 1 | Status: SHIPPED | OUTPATIENT
Start: 2022-08-26 | End: 2023-02-21

## 2022-08-26 RX ORDER — DOXEPIN HYDROCHLORIDE 10 MG/1
CAPSULE ORAL
Qty: 90 CAPSULE | Refills: 3 | Status: SHIPPED | OUTPATIENT
Start: 2022-08-26

## 2022-08-26 RX ORDER — GUAIFENESIN 600 MG/1
1200 TABLET, EXTENDED RELEASE ORAL 2 TIMES DAILY
Qty: 28 TABLET | Refills: 0 | Status: SHIPPED | OUTPATIENT
Start: 2022-08-26 | End: 2023-02-21

## 2022-08-26 RX ORDER — QUETIAPINE FUMARATE 50 MG/1
50 TABLET, EXTENDED RELEASE ORAL NIGHTLY
Qty: 90 EACH | Refills: 1 | Status: SHIPPED | OUTPATIENT
Start: 2022-08-26 | End: 2023-01-23 | Stop reason: SDUPTHER

## 2022-08-26 RX ORDER — ALPRAZOLAM 1 MG/1
1 TABLET ORAL 2 TIMES DAILY PRN
Qty: 60 TABLET | Refills: 0 | Status: SHIPPED | OUTPATIENT
Start: 2022-08-26 | End: 2022-09-20 | Stop reason: SDUPTHER

## 2022-08-26 NOTE — PROGRESS NOTES
Subjective     Chief Complaint   Patient presents with   • Exposure To Known Illness     Coughing, body aches, sob, coughing up gray, h/a, sx's started 2 days ago, exposure 3 days ago   • Med Refill     States he needs refills on everything including percocets & xanax (refills pended), UTD on CSA/UDS       History of Present Illness       The patient states that he has been exposed to COVID-19. The patient states that both his mother-in-law and father have COVID-19 and that he lives with them. He states that he has been coughing and is not able to sleep. He mentions that he needs a refill on his inhalers. He states he only noticed a fever for 2 days. The patient expresses that coughing and breathing while having COVID-19 has been rough. He does admit to having body aches. He states that he has cut down on his cigarette usage.   He states he has slight pain in his abdomen. He states that he has fell a couple of times. He reports that when he walks his leg will occasionally go numb. He states that every couple of days his hip will give out on him. The patient states that he sleeps on the couch and when he stands up he will fall over.   The patient states he lost weight, dropping from 290 pounds to 273 pounds.     Patient's PMR from outside medical facility reviewed and noted.    Review of Systems     Constitutional: Negative for chills, fatigue, fever and unexpected weight change.  HENT: Negative for congestion, ear pain, sore throat and voice change.  Eyes: Negative for pain and visual disturbance.  Respiratory: Negative for cough and shortness of breath.  Cardiovascular: Negative for chest pain, palpitations and leg swelling.  Gastrointestinal: Negative for abdominal pain, constipation, diarrhea, nausea and vomiting.  Genitourinary: Negative for dysuria, frequency and hematuria.  Musculoskeletal: Negative for arthralgias, back pain, joint swelling and myalgias.  Skin: Negative for rash.  Neurological:  Negative for dizziness, light-headedness and headaches.  Hematological: Negative for adenopathy. Does not bruise/bleed easily.  Psychiatric/Behavioral: Negative for sleep disturbance and suicidal ideas.    Otherwise complete ROS reviewed and negative except as mentioned in the HPI.    Past Medical History:   Past Medical History:   Diagnosis Date   • Anxiety    • Arthritis    • Bipolar 1 disorder (HCC)    • Hypertension    • PTSD (post-traumatic stress disorder)      Past Surgical History:  Past Surgical History:   Procedure Laterality Date   • HIP SURGERY       Social History:  reports that he has been smoking cigarettes. He has a 15.00 pack-year smoking history. He has never used smokeless tobacco. He reports that he does not drink alcohol and does not use drugs.    Family History: family history includes Arthritis in his father; Diabetes in his father; Heart attack in his mother; Hypertension in his father.      Allergies:  Allergies   Allergen Reactions   • Adderall [Amphetamine-Dextroamphetamine] Shortness Of Breath, Swelling, Palpitations and Hallucinations   • Meloxicam Nausea Only   • Darvon [Propoxyphene] GI Intolerance   • Motrin [Ibuprofen] GI Intolerance   • Toradol [Ketorolac Tromethamine] GI Intolerance   • Tramadol GI Intolerance     Medications:  Prior to Admission medications    Medication Sig Start Date End Date Taking? Authorizing Provider   albuterol sulfate  (90 Base) MCG/ACT inhaler Inhale 2 puffs Every 4 (Four) Hours As Needed for Wheezing. 5/26/22  Yes Rita Thomas APRN   ALPRAZolam (XANAX) 1 MG tablet Take 1 tablet by mouth 2 (Two) Times a Day As Needed for Anxiety. 7/28/22  Yes Liza Hagen,    amLODIPine (NORVASC) 5 MG tablet Take 1 tablet by mouth Daily. 5/26/22  Yes Rita Thomas APRN   atorvastatin (LIPITOR) 40 MG tablet Take 1 tablet by mouth Daily. 5/26/22  Yes Rita Thomas APRN   benzonatate (Tessalon Perles) 100 MG capsule Take 1 capsule by mouth 3  "(Three) Times a Day As Needed for Cough. 2/23/22  Yes Liza Hagen DO   CBD (cannabidiol) oral oil Take  by mouth.   Yes Provider, MD Roxanne   citalopram (CeleXA) 20 MG tablet Take 1 tablet by mouth Daily. 5/26/22  Yes Rita Thomas APRN   doxepin (SINEquan) 10 MG capsule Take 1-3 tabs at hs 5/26/22  Yes Rita Thomas APRN   guaiFENesin (Mucinex) 600 MG 12 hr tablet Take 2 tablets by mouth 2 (Two) Times a Day. 12/29/21  Yes Liza Hagen DO   ipratropium-albuterol (Combivent Respimat)  MCG/ACT inhaler Inhale 1 puff 4 (Four) Times a Day. 5/26/22  Yes Rita Thomas APRN   metoprolol tartrate (LOPRESSOR) 50 MG tablet Take 1 tablet by mouth 2 (Two) Times a Day. 5/26/22  Yes Rita Thomas APRN   mometasone (Nasonex) 50 MCG/ACT nasal spray 2 sprays into the nostril(s) as directed by provider Daily. 12/29/21  Yes Liza Hagen DO   Mometasone Furoate 100 MCG/ACT aerosol Inhale 2 puffs 2 (Two) Times a Day. 2/23/22  Yes Liza Hagen DO   mupirocin (Bactroban) 2 % ointment Apply 1 application topically to the appropriate area as directed 3 (Three) Times a Day. 12/29/21  Yes Liza Hagen DO   NON FORMULARY Hemp products   Yes ProviderRoxanne MD   ondansetron ODT (Zofran ODT) 4 MG disintegrating tablet Place 1 tablet on the tongue Every 8 (Eight) Hours As Needed for Nausea or Vomiting. 2/23/22  Yes Liza Hagen DO   oxyCODONE-acetaminophen (PERCOCET)  MG per tablet Take 1 tablet by mouth Every 6 (Six) Hours As Needed for Moderate Pain . 7/28/22  Yes Liza Hagen DO   QUEtiapine fumarate ER (SEROquel XR) 50 MG tablet sustained-release 24 hour tablet Take 1 tablet by mouth Every Night. 5/26/22  Yes Rita Thomas APRN   risperiDONE (RisperDAL) 1 MG tablet Take 2 tablets by mouth Every Night. 5/26/22  Yes Rita Thomas APRN       Objective     Vital Signs: /74   Pulse 69   Temp 97.1 °F (36.2 °C)   Ht 185.4 cm (73\")   Wt " 124 kg (273 lb 3.2 oz)   SpO2 96%   BMI 36.04 kg/m²   Physical Exam  Vitals and nursing note reviewed.   Constitutional:       Appearance: Normal appearance.   HENT:      Head: Normocephalic and atraumatic.      Right Ear: External ear normal.      Left Ear: External ear normal.      Nose: Nose normal.      Mouth/Throat:      Mouth: Mucous membranes are moist.   Eyes:      Extraocular Movements: Extraocular movements intact.      Conjunctiva/sclera: Conjunctivae normal.      Pupils: Pupils are equal, round, and reactive to light.   Cardiovascular:      Rate and Rhythm: Normal rate and regular rhythm.      Pulses: Normal pulses.      Heart sounds: No murmur heard.    No friction rub. No gallop.   Pulmonary:      Effort: Pulmonary effort is normal.      Comments: Lung sounds are coarse  Abdominal:      General: Bowel sounds are normal.      Palpations: Abdomen is soft.   Musculoskeletal:         General: Normal range of motion.      Cervical back: Normal range of motion and neck supple.   Skin:     General: Skin is warm and dry.      Capillary Refill: Capillary refill takes less than 2 seconds.   Neurological:      General: No focal deficit present.      Mental Status: He is alert and oriented to person, place, and time.      Cranial Nerves: No cranial nerve deficit.   Psychiatric:         Mood and Affect: Mood normal.         Behavior: Behavior normal.         Class 2 Severe Obesity (BMI >=35 and <=39.9). Obesity-related health conditions include the following: hypertension. Obesity is improving with lifestyle modifications. BMI is is above average; BMI management plan is completed. We discussed portion control and increasing exercise.      Results Reviewed:  Glucose   Date Value Ref Range Status   11/30/2021 96 65 - 99 mg/dL Final   06/09/2021 78 74 - 109 mg/dL Final     BUN   Date Value Ref Range Status   11/30/2021 13 6 - 24 mg/dL Final   06/09/2021 19 6 - 20 mg/dL Final     Creatinine   Date Value Ref Range  Status   11/30/2021 0.83 0.76 - 1.27 mg/dL Final   06/09/2021 0.8 0.5 - 1.2 mg/dL Final     Sodium   Date Value Ref Range Status   11/30/2021 140 134 - 144 mmol/L Final   06/09/2021 136 136 - 145 mmol/L Final     Potassium   Date Value Ref Range Status   11/30/2021 4.2 3.5 - 5.2 mmol/L Final   06/09/2021 4.4 3.5 - 5.0 mmol/L Final     Chloride   Date Value Ref Range Status   11/30/2021 106 96 - 106 mmol/L Final   06/09/2021 102 98 - 111 mmol/L Final     CO2   Date Value Ref Range Status   06/09/2021 25 22 - 29 mmol/L Final     Total CO2   Date Value Ref Range Status   11/30/2021 19 (L) 20 - 29 mmol/L Final     Calcium   Date Value Ref Range Status   11/30/2021 9.8 8.7 - 10.2 mg/dL Final   06/09/2021 8.6 8.6 - 10.0 mg/dL Final     ALT (SGPT)   Date Value Ref Range Status   11/30/2021 23 0 - 44 IU/L Final   06/09/2021 20 5 - 41 U/L Final     AST (SGOT)   Date Value Ref Range Status   11/30/2021 17 0 - 40 IU/L Final   06/09/2021 25 5 - 40 U/L Final     WBC   Date Value Ref Range Status   11/30/2021 7.7 3.4 - 10.8 x10E3/uL Final   06/09/2021 6.1 4.8 - 10.8 K/uL Final     Hematocrit   Date Value Ref Range Status   11/30/2021 47.4 37.5 - 51.0 % Final   06/09/2021 41.3 (L) 42.0 - 52.0 % Final     Platelets   Date Value Ref Range Status   11/30/2021 268 150 - 450 x10E3/uL Final   06/09/2021 160 130 - 400 K/uL Final     Triglycerides   Date Value Ref Range Status   05/26/2022 872 (H) 0 - 149 mg/dL Final     HDL Cholesterol   Date Value Ref Range Status   05/26/2022 34 (L) >39 mg/dL Final     LDL Chol Calc (NIH)   Date Value Ref Range Status   05/26/2022 Comment (A) 0 - 99 mg/dL Final     Comment:     Triglyceride result indicated is too high for an accurate LDL  cholesterol estimation.       Hemoglobin A1C   Date Value Ref Range Status   04/30/2021 5.6 % Final         Assessment / Plan     Assessment/Plan:  1. Wheezing    - albuterol sulfate  (90 Base) MCG/ACT inhaler; Inhale 2 puffs Every 4 (Four) Hours As Needed for  Wheezing.  Dispense: 18 g; Refill: 3  - ipratropium-albuterol (Combivent Respimat)  MCG/ACT inhaler; Inhale 1 puff 4 (Four) Times a Day.  Dispense: 1 each; Refill: 3    2. Shortness of breath    - albuterol sulfate  (90 Base) MCG/ACT inhaler; Inhale 2 puffs Every 4 (Four) Hours As Needed for Wheezing.  Dispense: 18 g; Refill: 3  - ipratropium-albuterol (Combivent Respimat)  MCG/ACT inhaler; Inhale 1 puff 4 (Four) Times a Day.  Dispense: 1 each; Refill: 3    3. Anxiety    - ALPRAZolam (XANAX) 1 MG tablet; Take 1 tablet by mouth 2 (Two) Times a Day As Needed for Anxiety.  Dispense: 60 tablet; Refill: 0  - citalopram (CeleXA) 20 MG tablet; Take 1 tablet by mouth Daily.  Dispense: 30 tablet; Refill: 3    4. Essential hypertension    - amLODIPine (NORVASC) 5 MG tablet; Take 1 tablet by mouth Daily.  Dispense: 30 tablet; Refill: 3  - metoprolol tartrate (LOPRESSOR) 50 MG tablet; Take 1 tablet by mouth 2 (Two) Times a Day.  Dispense: 180 tablet; Refill: 1    5. Hyperlipidemia, unspecified hyperlipidemia type    - atorvastatin (LIPITOR) 40 MG tablet; Take 1 tablet by mouth Daily.  Dispense: 90 tablet; Refill: 3    6. Cough    - benzonatate (Tessalon Perles) 100 MG capsule; Take 1 capsule by mouth 3 (Three) Times a Day As Needed for Cough.  Dispense: 90 capsule; Refill: 1    7. Insomnia, unspecified type    - doxepin (SINEquan) 10 MG capsule; Take 1-3 tabs at hs  Dispense: 90 capsule; Refill: 3    8. Schizoaffective disorder, bipolar type (HCC)    - doxepin (SINEquan) 10 MG capsule; Take 1-3 tabs at hs  Dispense: 90 capsule; Refill: 3  - QUEtiapine fumarate ER (SEROquel XR) 50 MG tablet sustained-release 24 hour tablet; Take 1 tablet by mouth Every Night.  Dispense: 90 each; Refill: 1  - risperiDONE (RisperDAL) 1 MG tablet; Take 2 tablets by mouth Every Night.  Dispense: 180 tablet; Refill: 0    9. Bronchitis    - guaiFENesin (Mucinex) 600 MG 12 hr tablet; Take 2 tablets by mouth 2 (Two) Times a Day.   Dispense: 28 tablet; Refill: 0    10. Chronic pain due to trauma    - oxyCODONE-acetaminophen (PERCOCET)  MG per tablet; Take 1 tablet by mouth Every 6 (Six) Hours As Needed for Moderate Pain .  Dispense: 120 tablet; Refill: 0    11. Contact with and (suspected) exposure to covid-19    - COVID-19,[facility] ROUTINE, NP/OP SWAB IN TRANSPORT MEDIA OR ESWAB 72 HR TAT - Swab, Anterior nasal      The patient is going to continue his current medications. He has actually been watching his diet and has been losing weight. I have encouraged him to continue to do this. He has reduced the amount that he is smoking. His lungs do sound much better since he has done this. We will refill his medications. We will have him follow back in 3 months. Flu vaccine once available.    Return in about 3 months (around 11/26/2022). unless patient needs to be seen sooner or acute issues arise.      I have discussed the patient results/orders and and plan/recommendation with them at today's visit.      Liza Hagen DO   08/26/2022    Transcribed from ambient dictation for Liza Hagen DO by Edmond Blackburn.  08/26/22   15:53 CDT    Patient verbalized consent to the visit recording.

## 2022-08-27 LAB
LABCORP SARS-COV-2, NAA 2 DAY TAT: NORMAL
SARS-COV-2 RNA RESP QL NAA+PROBE: NOT DETECTED

## 2022-08-29 ENCOUNTER — TELEPHONE (OUTPATIENT)
Dept: INTERNAL MEDICINE | Facility: CLINIC | Age: 47
End: 2022-08-29

## 2022-08-29 NOTE — TELEPHONE ENCOUNTER
Asmanex HFA 100mcg is not covered by insurance with a PA.  Covered alternatives are Arnuity Ellipta, Breo Ellipta, Flovent Diskus, Flovent HFA, and Spiriva Respimat.  Would any of the covered alternative be an appropriate switch from the Asmanex?  ([facility] [place])

## 2022-08-31 RX ORDER — FLUTICASONE PROPIONATE 220 UG/1
1 AEROSOL, METERED RESPIRATORY (INHALATION)
Qty: 12 G | Refills: 11 | Status: SHIPPED | OUTPATIENT
Start: 2022-08-31 | End: 2022-09-01 | Stop reason: SDUPTHER

## 2022-09-01 RX ORDER — FLUTICASONE PROPIONATE 220 UG/1
1 AEROSOL, METERED RESPIRATORY (INHALATION)
Qty: 12 G | Refills: 11 | Status: SHIPPED | OUTPATIENT
Start: 2022-09-01 | End: 2023-01-25 | Stop reason: SDUPTHER

## 2022-09-20 DIAGNOSIS — G89.21 CHRONIC PAIN DUE TO TRAUMA: ICD-10-CM

## 2022-09-20 DIAGNOSIS — F41.9 ANXIETY: ICD-10-CM

## 2022-09-20 RX ORDER — ALPRAZOLAM 1 MG/1
1 TABLET ORAL 2 TIMES DAILY PRN
Qty: 60 TABLET | Refills: 0 | Status: SHIPPED | OUTPATIENT
Start: 2022-09-25 | End: 2022-10-19 | Stop reason: SDUPTHER

## 2022-09-20 RX ORDER — OXYCODONE AND ACETAMINOPHEN 10; 325 MG/1; MG/1
1 TABLET ORAL EVERY 6 HOURS PRN
Qty: 120 TABLET | Refills: 0 | Status: SHIPPED | OUTPATIENT
Start: 2022-09-25 | End: 2022-10-19 | Stop reason: SDUPTHER

## 2022-09-20 NOTE — TELEPHONE ENCOUNTER
Caller: Clyde Tamayo    Relationship: Self    Best call back number:  [phone]    Requested Prescriptions:   Requested Prescriptions     Pending Prescriptions Disp Refills   • oxyCODONE-acetaminophen (PERCOCET)  MG per tablet 120 tablet 0     Sig: Take 1 tablet by mouth Every 6 (Six) Hours As Needed for Moderate Pain.   • ALPRAZolam (XANAX) 1 MG tablet 60 tablet 0     Sig: Take 1 tablet by mouth 2 (Two) Times a Day As Needed for Anxiety.        Pharmacy where request should be sent: [address] [phone] [facility] [phone]      Additional details provided by patient: 5 days left, would like to have this sent to pharmacy to be ready on date that it is due.     Does the patient have less than a 3 day supply:  [] Yes  [x] No    Shama Juares Rep   09/20/22 14:05 CDT

## 2022-10-19 ENCOUNTER — TELEPHONE (OUTPATIENT)
Dept: INTERNAL MEDICINE | Facility: CLINIC | Age: 47
End: 2022-10-19

## 2022-10-19 DIAGNOSIS — G89.21 CHRONIC PAIN DUE TO TRAUMA: ICD-10-CM

## 2022-10-19 DIAGNOSIS — F41.9 ANXIETY: ICD-10-CM

## 2022-10-19 RX ORDER — OXYCODONE AND ACETAMINOPHEN 10; 325 MG/1; MG/1
1 TABLET ORAL EVERY 6 HOURS PRN
Qty: 120 TABLET | Refills: 0 | Status: SHIPPED | OUTPATIENT
Start: 2022-10-25 | End: 2022-11-22 | Stop reason: SDUPTHER

## 2022-10-19 RX ORDER — ALPRAZOLAM 1 MG/1
1 TABLET ORAL 2 TIMES DAILY PRN
Qty: 60 TABLET | Refills: 2 | Status: SHIPPED | OUTPATIENT
Start: 2022-10-23 | End: 2022-11-22 | Stop reason: SDUPTHER

## 2022-10-19 NOTE — TELEPHONE ENCOUNTER
Caller: Clyde Tamayo    Relationship: Self    Best call back number: [phone]    What is the best time to reach you: ANYTIME    Who are you requesting to speak with (clinical staff, provider,  specific staff member): CLINICAL      What was the call regarding: PATIENT HAD UPCOMING APPOINTMENT IN November FOR 3 MONTH FOLLOW UP. WE HAVE SCHEDULED PATIENT WITH [facility].    Do you require a callback: NO    PATIENT WOULD LIKE TO GO TO THE [facility] OFFICE NOW

## 2022-10-19 NOTE — TELEPHONE ENCOUNTER
PDMP data reviewed. UDS reviewed from 5/2022 with appropriate findings. He has appointment at [facility] in November, so will plan for transition at that time.

## 2022-10-19 NOTE — TELEPHONE ENCOUNTER
Caller: Jarek Tamayoyschary GREENE    Relationship: Self    Best call back number: [phone]    Requested Prescriptions:   Requested Prescriptions     Pending Prescriptions Disp Refills   • ALPRAZolam (XANAX) 1 MG tablet 60 tablet 0     Sig: Take 1 tablet by mouth 2 (Two) Times a Day As Needed for Anxiety.   • oxyCODONE-acetaminophen (PERCOCET)  MG per tablet 120 tablet 0     Sig: Take 1 tablet by mouth Every 6 (Six) Hours As Needed for Moderate Pain.        Pharmacy where request should be sent: [address] [phone] [facility] [phone]      Additional details provided by patient: HAS MORE THEN 3 DAY    Does the patient have less than a 3 day supply:  [] Yes  [x] No    Shama Avalos Rep   10/19/22 15:52 CDT

## 2022-11-18 ENCOUNTER — OFFICE VISIT (OUTPATIENT)
Dept: INTERNAL MEDICINE | Facility: CLINIC | Age: 47
End: 2022-11-18

## 2022-11-18 VITALS
HEIGHT: 73 IN | TEMPERATURE: 97.5 F | BODY MASS INDEX: 35.68 KG/M2 | WEIGHT: 269.2 LBS | OXYGEN SATURATION: 99 % | DIASTOLIC BLOOD PRESSURE: 78 MMHG | RESPIRATION RATE: 17 BRPM | SYSTOLIC BLOOD PRESSURE: 111 MMHG | HEART RATE: 70 BPM

## 2022-11-18 DIAGNOSIS — M25.561 CHRONIC PAIN OF BOTH KNEES: ICD-10-CM

## 2022-11-18 DIAGNOSIS — I10 PRIMARY HYPERTENSION: ICD-10-CM

## 2022-11-18 DIAGNOSIS — E78.2 MIXED HYPERLIPIDEMIA: ICD-10-CM

## 2022-11-18 DIAGNOSIS — G89.21 CHRONIC PAIN DUE TO TRAUMA: Primary | ICD-10-CM

## 2022-11-18 DIAGNOSIS — G89.21 CHRONIC PAIN DUE TO TRAUMA: ICD-10-CM

## 2022-11-18 DIAGNOSIS — M25.562 CHRONIC PAIN OF BOTH KNEES: ICD-10-CM

## 2022-11-18 DIAGNOSIS — Z11.59 NEED FOR HEPATITIS C SCREENING TEST: ICD-10-CM

## 2022-11-18 DIAGNOSIS — Z79.899 LONG TERM USE OF DRUG: ICD-10-CM

## 2022-11-18 DIAGNOSIS — F41.9 ANXIETY: ICD-10-CM

## 2022-11-18 DIAGNOSIS — G89.29 CHRONIC PAIN OF BOTH KNEES: ICD-10-CM

## 2022-11-18 PROCEDURE — 99214 OFFICE O/P EST MOD 30 MIN: CPT

## 2022-11-18 RX ORDER — OXYCODONE AND ACETAMINOPHEN 10; 325 MG/1; MG/1
1 TABLET ORAL EVERY 6 HOURS PRN
Qty: 60 TABLET | Refills: 0 | OUTPATIENT
Start: 2022-11-18

## 2022-11-18 RX ORDER — NALOXONE HYDROCHLORIDE 4 MG/.1ML
SPRAY NASAL
COMMUNITY
Start: 2022-08-27

## 2022-11-18 NOTE — PROGRESS NOTES
Subjective     Chief Complaint   Patient presents with   • Pain     Chronic pain       History of Present Illness  Patient presents today for chronic pain follow up. Previous PCP is no longer practicing and he is moving care back to this area. Currently takes percocet 10mg every 6 hours for chronic pain related to trauma. Discussed that patient would need to establish with pain management. He is agreeable to a script of 60 pills until able to get into pain mangmenet states he understands and is willing to go to pain management. He also takes xanax for anxiety, reports it is the only thing that allows him to calm down. 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    Patient's PMR from outside medical facility reviewed and noted.    Review of Systems   Constitutional: Negative for activity change, fatigue and unexpected weight change.   HENT: Negative for mouth sores and trouble swallowing.    Eyes: Negative for discharge and visual disturbance.   Respiratory: Negative for cough and shortness of breath.    Cardiovascular: Negative for chest pain and leg swelling.   Gastrointestinal: Negative for abdominal pain, constipation, diarrhea and nausea.   Genitourinary: Negative for decreased urine volume, difficulty urinating and hematuria.   Musculoskeletal: Positive for arthralgias, back pain and myalgias. Negative for gait problem.   Skin: Negative for color change and rash.   Allergic/Immunologic: Negative for environmental allergies and immunocompromised state.   Neurological: Negative for weakness and headaches.   Psychiatric/Behavioral: Negative for confusion and sleep disturbance.        Otherwise complete ROS reviewed and negative except as mentioned in the HPI.    Past Medical History:   Past Medical History:   Diagnosis Date   • Anxiety    • Arthritis    • Bipolar 1 disorder (HCC)    • Hypertension    • PTSD  (post-traumatic stress disorder)      Past Surgical History:  Past Surgical History:   Procedure Laterality Date   • HIP SURGERY       Social History:  reports that he has been smoking cigarettes. He has a 15.00 pack-year smoking history. He has never used smokeless tobacco. He reports that he does not drink alcohol and does not use drugs.    Family History: family history includes Arthritis in his father; Diabetes in his father; Heart attack in his mother; Hypertension in his father.      Allergies:  Allergies   Allergen Reactions   • Adderall [Amphetamine-Dextroamphetamine] Shortness Of Breath, Swelling, Palpitations and Hallucinations   • Meloxicam Nausea Only   • Darvon [Propoxyphene] GI Intolerance   • Motrin [Ibuprofen] GI Intolerance   • Toradol [Ketorolac Tromethamine] GI Intolerance   • Tramadol GI Intolerance     Medications:  Prior to Admission medications    Medication Sig Start Date End Date Taking? Authorizing Provider   albuterol sulfate  (90 Base) MCG/ACT inhaler Inhale 2 puffs Every 4 (Four) Hours As Needed for Wheezing. 8/26/22  Yes Liza Hagen DO   ALPRAZolam (XANAX) 1 MG tablet Take 1 tablet by mouth 2 (Two) Times a Day As Needed for Anxiety. 10/23/22  Yes Henry Simon, DO   amLODIPine (NORVASC) 5 MG tablet Take 1 tablet by mouth Daily. 8/26/22  Yes Liza Hagen DO   atorvastatin (LIPITOR) 40 MG tablet Take 1 tablet by mouth Daily. 8/26/22  Yes Liza Hagen DO   benzonatate (Tessalon Perles) 100 MG capsule Take 1 capsule by mouth 3 (Three) Times a Day As Needed for Cough. 8/26/22  Yes Liza Hagen DO   CBD (cannabidiol) oral oil Take  by mouth.   Yes Provider, MD Roxanne   citalopram (CeleXA) 20 MG tablet Take 1 tablet by mouth Daily. 8/26/22  Yes Liza Hagen DO   doxepin (SINEquan) 10 MG capsule Take 1-3 tabs at hs 8/26/22  Yes Liza Hagen,    fluticasone (Flovent HFA) 220 MCG/ACT inhaler Inhale 1 puff 2 (Two) Times a Day.  "9/1/22  Yes Liza Hagen DO   guaiFENesin (Mucinex) 600 MG 12 hr tablet Take 2 tablets by mouth 2 (Two) Times a Day. 8/26/22  Yes Liza Hagen DO   ipratropium-albuterol (Combivent Respimat)  MCG/ACT inhaler Inhale 1 puff 4 (Four) Times a Day. 8/26/22  Yes Liza Hagen DO   metoprolol tartrate (LOPRESSOR) 50 MG tablet Take 1 tablet by mouth 2 (Two) Times a Day. 8/26/22  Yes Liza Hagen DO   mometasone (Nasonex) 50 MCG/ACT nasal spray 2 sprays into the nostril(s) as directed by provider Daily. 8/26/22  Yes Liza Hagen DO   mupirocin (Bactroban) 2 % ointment Apply 1 application topically to the appropriate area as directed 3 (Three) Times a Day. 12/29/21  Yes Liza Hagen DO   naloxone (NARCAN) 4 MG/0.1ML nasal spray  8/27/22  Yes Provider, MD Roxanne   NON FORMULARY Hemp products   Yes Provider, MD Roxanne   ondansetron ODT (Zofran ODT) 4 MG disintegrating tablet Place 1 tablet on the tongue Every 8 (Eight) Hours As Needed for Nausea or Vomiting. 2/23/22  Yes Liza Hagen DO   oxyCODONE-acetaminophen (PERCOCET)  MG per tablet Take 1 tablet by mouth Every 6 (Six) Hours As Needed for Moderate Pain. 10/25/22  Yes Henry Simon,    QUEtiapine fumarate ER (SEROquel XR) 50 MG tablet sustained-release 24 hour tablet Take 1 tablet by mouth Every Night. 8/26/22  Yes Liza Hagen DO   risperiDONE (RisperDAL) 1 MG tablet Take 2 tablets by mouth Every Night. 8/26/22  Yes Liza Hagen DO       Objective     Vital Signs: /78 (BP Location: Right arm, Patient Position: Sitting, Cuff Size: Adult)   Pulse 70   Temp 97.5 °F (36.4 °C) (Skin)   Resp 17   Ht 185.4 cm (73\")   Wt 122 kg (269 lb 3.2 oz)   SpO2 99%   BMI 35.52 kg/m²   Physical Exam  Constitutional:       General: He is not in acute distress.     Appearance: Normal appearance. He is not ill-appearing.   HENT:      Head: Normocephalic and atraumatic.      Right " Ear: External ear normal.      Left Ear: External ear normal.      Nose: Nose normal.      Mouth/Throat:      Mouth: Mucous membranes are moist.      Pharynx: No posterior oropharyngeal erythema.   Eyes:      General: No scleral icterus.     Extraocular Movements: Extraocular movements intact.      Conjunctiva/sclera: Conjunctivae normal.      Pupils: Pupils are equal, round, and reactive to light.   Cardiovascular:      Rate and Rhythm: Normal rate and regular rhythm.      Pulses: Normal pulses.      Heart sounds: Normal heart sounds.   Pulmonary:      Effort: Pulmonary effort is normal. No respiratory distress.      Breath sounds: Normal breath sounds. No wheezing.   Abdominal:      General: Abdomen is flat. Bowel sounds are normal.      Palpations: Abdomen is soft.      Tenderness: There is no abdominal tenderness.   Musculoskeletal:         General: Normal range of motion.      Cervical back: Normal range of motion.      Right lower leg: No edema.      Left lower leg: No edema.   Skin:     General: Skin is warm and dry.      Findings: No erythema or rash.   Neurological:      General: No focal deficit present.      Mental Status: He is alert and oriented to person, place, and time. Mental status is at baseline.      Motor: No weakness.      Gait: Gait abnormal.   Psychiatric:         Mood and Affect: Mood normal.         Behavior: Behavior normal.         Thought Content: Thought content normal.         Judgment: Judgment normal.           Results Reviewed:  Glucose   Date Value Ref Range Status   11/18/2022 86 70 - 99 mg/dL Final   06/09/2021 78 74 - 109 mg/dL Final     BUN   Date Value Ref Range Status   11/18/2022 15 6 - 24 mg/dL Final   06/09/2021 19 6 - 20 mg/dL Final     Creatinine   Date Value Ref Range Status   11/18/2022 0.79 0.76 - 1.27 mg/dL Final   06/09/2021 0.8 0.5 - 1.2 mg/dL Final     Sodium   Date Value Ref Range Status   11/18/2022 139 134 - 144 mmol/L Final   06/09/2021 136 136 - 145 mmol/L  Final     Potassium   Date Value Ref Range Status   11/18/2022 4.6 3.5 - 5.2 mmol/L Final   06/09/2021 4.4 3.5 - 5.0 mmol/L Final     Chloride   Date Value Ref Range Status   11/18/2022 99 96 - 106 mmol/L Final   06/09/2021 102 98 - 111 mmol/L Final     CO2   Date Value Ref Range Status   06/09/2021 25 22 - 29 mmol/L Final     Total CO2   Date Value Ref Range Status   11/18/2022 23 20 - 29 mmol/L Final     Calcium   Date Value Ref Range Status   11/18/2022 10.0 8.7 - 10.2 mg/dL Final   06/09/2021 8.6 8.6 - 10.0 mg/dL Final     ALT (SGPT)   Date Value Ref Range Status   11/18/2022 32 0 - 44 IU/L Final   06/09/2021 20 5 - 41 U/L Final     AST (SGOT)   Date Value Ref Range Status   11/18/2022 21 0 - 40 IU/L Final   06/09/2021 25 5 - 40 U/L Final     WBC   Date Value Ref Range Status   11/18/2022 4.7 3.4 - 10.8 x10E3/uL Final   06/09/2021 6.1 4.8 - 10.8 K/uL Final     Hematocrit   Date Value Ref Range Status   11/18/2022 50.6 37.5 - 51.0 % Final   06/09/2021 41.3 (L) 42.0 - 52.0 % Final     Platelets   Date Value Ref Range Status   11/18/2022 232 150 - 450 x10E3/uL Final   06/09/2021 160 130 - 400 K/uL Final     Triglycerides   Date Value Ref Range Status   11/18/2022 560 (H) 0 - 149 mg/dL Final     HDL Cholesterol   Date Value Ref Range Status   11/18/2022 41 >39 mg/dL Final     LDL Chol Calc (NIH)   Date Value Ref Range Status   11/18/2022 106 (H) 0 - 99 mg/dL Final     Hemoglobin A1C   Date Value Ref Range Status   04/30/2021 5.6 % Final         Assessment / Plan     Assessment/Plan:  1. Chronic pain due to trauma  - Ambulatory Referral to Pain Management  - Pain Management Profile (13 Drugs) Urine - Urine, Clean Catch    2. Chronic pain of both knees  - Ambulatory Referral to Pain Management  - Pain Management Profile (13 Drugs) Urine - Urine, Clean Catch    3. Anxiety  currently on xanax 1mg BID, feels controlled on current medication regimen  -updated UDS and CSA today in office     4. Long term use of drug  -  Ambulatory Referral to Pain Management  - Pain Management Profile (13 Drugs) Urine - Urine, Clean Catch    5. Primary hypertension  - Comprehensive metabolic panel  - CBC & Differential    6. Mixed hyperlipidemia  - Lipid panel    7. Need for hepatitis C screening test  - Hepatitis C antibody        Return for Medicare Wellness due. unless patient needs to be seen sooner or acute issues arise.      I have discussed the patient results/orders and and plan/recommendation with them at today's visit.      Rita Thomas, APRN   11/18/2022

## 2022-11-18 NOTE — TELEPHONE ENCOUNTER
Rx Refill Note  Requested Prescriptions     Pending Prescriptions Disp Refills   • oxyCODONE-acetaminophen (PERCOCET)  MG per tablet 60 tablet 0     Sig: Take 1 tablet by mouth Every 6 (Six) Hours As Needed for Moderate Pain.      Last office visit with prescribing clinician: 11/18/2022      Next office visit with prescribing clinician: Visit date not found     UDS: Pain Management Profile (13 Drugs) Urine - Urine, Clean Catch (11/18/2022 15:52)      DATE OF LAST REFILL: 10/25/2022             Tennille Howard MA  11/18/22, 15:56 CST

## 2022-11-20 LAB
ALBUMIN SERPL-MCNC: 4.8 G/DL (ref 4–5)
ALBUMIN/GLOB SERPL: 2.1 {RATIO} (ref 1.2–2.2)
ALP SERPL-CCNC: 74 IU/L (ref 44–121)
ALT SERPL-CCNC: 32 IU/L (ref 0–44)
AST SERPL-CCNC: 21 IU/L (ref 0–40)
BASOPHILS # BLD AUTO: 0.1 X10E3/UL (ref 0–0.2)
BASOPHILS NFR BLD AUTO: 2 %
BILIRUB SERPL-MCNC: 0.3 MG/DL (ref 0–1.2)
BUN SERPL-MCNC: 15 MG/DL (ref 6–24)
BUN/CREAT SERPL: 19 (ref 9–20)
CALCIUM SERPL-MCNC: 10 MG/DL (ref 8.7–10.2)
CHLORIDE SERPL-SCNC: 99 MMOL/L (ref 96–106)
CHOLEST SERPL-MCNC: 243 MG/DL (ref 100–199)
CO2 SERPL-SCNC: 23 MMOL/L (ref 20–29)
CREAT SERPL-MCNC: 0.79 MG/DL (ref 0.76–1.27)
EGFRCR SERPLBLD CKD-EPI 2021: 110 ML/MIN/1.73
EOSINOPHIL # BLD AUTO: 0.2 X10E3/UL (ref 0–0.4)
EOSINOPHIL NFR BLD AUTO: 5 %
ERYTHROCYTE [DISTWIDTH] IN BLOOD BY AUTOMATED COUNT: 12.7 % (ref 11.6–15.4)
GLOBULIN SER CALC-MCNC: 2.3 G/DL (ref 1.5–4.5)
GLUCOSE SERPL-MCNC: 86 MG/DL (ref 70–99)
HCT VFR BLD AUTO: 50.6 % (ref 37.5–51)
HCV AB S/CO SERPL IA: <0.1 S/CO RATIO (ref 0–0.9)
HDLC SERPL-MCNC: 41 MG/DL
HGB BLD-MCNC: 16.7 G/DL (ref 13–17.7)
IMM GRANULOCYTES # BLD AUTO: 0 X10E3/UL (ref 0–0.1)
IMM GRANULOCYTES NFR BLD AUTO: 1 %
LDLC SERPL CALC-MCNC: 106 MG/DL (ref 0–99)
LYMPHOCYTES # BLD AUTO: 2.4 X10E3/UL (ref 0.7–3.1)
LYMPHOCYTES NFR BLD AUTO: 49 %
MCH RBC QN AUTO: 27.7 PG (ref 26.6–33)
MCHC RBC AUTO-ENTMCNC: 33 G/DL (ref 31.5–35.7)
MCV RBC AUTO: 84 FL (ref 79–97)
MONOCYTES # BLD AUTO: 0.4 X10E3/UL (ref 0.1–0.9)
MONOCYTES NFR BLD AUTO: 9 %
NEUTROPHILS # BLD AUTO: 1.6 X10E3/UL (ref 1.4–7)
NEUTROPHILS NFR BLD AUTO: 34 %
PLATELET # BLD AUTO: 232 X10E3/UL (ref 150–450)
POTASSIUM SERPL-SCNC: 4.6 MMOL/L (ref 3.5–5.2)
PROT SERPL-MCNC: 7.1 G/DL (ref 6–8.5)
RBC # BLD AUTO: 6.02 X10E6/UL (ref 4.14–5.8)
SODIUM SERPL-SCNC: 139 MMOL/L (ref 134–144)
TRIGL SERPL-MCNC: 560 MG/DL (ref 0–149)
VLDLC SERPL CALC-MCNC: 96 MG/DL (ref 5–40)
WBC # BLD AUTO: 4.7 X10E3/UL (ref 3.4–10.8)

## 2022-11-22 DIAGNOSIS — G89.21 CHRONIC PAIN DUE TO TRAUMA: ICD-10-CM

## 2022-11-22 DIAGNOSIS — F41.9 ANXIETY: ICD-10-CM

## 2022-11-22 RX ORDER — ALPRAZOLAM 1 MG/1
1 TABLET ORAL 2 TIMES DAILY PRN
Qty: 60 TABLET | Refills: 0 | Status: SHIPPED | OUTPATIENT
Start: 2022-11-22 | End: 2022-12-20 | Stop reason: SDUPTHER

## 2022-11-22 RX ORDER — OXYCODONE AND ACETAMINOPHEN 10; 325 MG/1; MG/1
1 TABLET ORAL EVERY 4 HOURS PRN
Qty: 18 TABLET | Refills: 0 | Status: SHIPPED | OUTPATIENT
Start: 2022-11-22 | End: 2022-12-02 | Stop reason: SDUPTHER

## 2022-11-26 LAB
AMPHETAMINES UR QL SCN: NEGATIVE NG/ML
BARBITURATES UR QL SCN: NEGATIVE NG/ML
BENZODIAZ UR QL: NEGATIVE NG/ML
BZE UR QL SCN: NEGATIVE NG/ML
CANNABINOIDS UR QL SCN: NEGATIVE NG/ML
CREAT UR-MCNC: 143.8 MG/DL (ref 20–300)
FENTANYL UR-MCNC: NEGATIVE PG/ML
LABORATORY COMMENT REPORT: ABNORMAL
MEPERIDINE UR QL: NEGATIVE NG/ML
METHADONE UR QL SCN: NEGATIVE NG/ML
OPIATES UR QL SCN: NEGATIVE NG/ML
OXYCODONE+OXYMORPHONE UR QL SCN: POSITIVE
PCP UR QL: NEGATIVE NG/ML
PH UR: 4.8 [PH] (ref 4.5–8.9)
PROPOXYPH UR QL SCN: NEGATIVE NG/ML
SP GR UR: 1.03
TRAMADOL UR QL SCN: NEGATIVE NG/ML

## 2022-12-02 DIAGNOSIS — G89.21 CHRONIC PAIN DUE TO TRAUMA: ICD-10-CM

## 2022-12-02 RX ORDER — OXYCODONE AND ACETAMINOPHEN 10; 325 MG/1; MG/1
1 TABLET ORAL EVERY 4 HOURS PRN
Qty: 60 TABLET | Refills: 0 | Status: SHIPPED | OUTPATIENT
Start: 2022-12-02 | End: 2022-12-20 | Stop reason: SDUPTHER

## 2022-12-02 NOTE — TELEPHONE ENCOUNTER
Rx Refill Note  Requested Prescriptions     Pending Prescriptions Disp Refills   • oxyCODONE-acetaminophen (PERCOCET)  MG per tablet 18 tablet 0     Sig: Take 1 tablet by mouth Every 4 (Four) Hours As Needed for Moderate Pain.      Last office visit with prescribing clinician: 11/18/2022      Next office visit with prescribing clinician: 12/20/2022     UDS: Pain Management Profile (13 Drugs) Urine - Urine, Clean Catch (11/18/2022 14:52)      DATE OF LAST REFILL: 10/25/2022             Xuan Rodriguez CMA  12/02/22, 09:49 CST

## 2022-12-02 NOTE — TELEPHONE ENCOUNTER
Caller: Clyde Tamayo    Relationship: Self    Best call back number: [phone]    Requested Prescriptions:   Requested Prescriptions     Pending Prescriptions Disp Refills   • oxyCODONE-acetaminophen (PERCOCET)  MG per tablet 18 tablet 0     Sig: Take 1 tablet by mouth Every 4 (Four) Hours As Needed for Moderate Pain.        Pharmacy where request should be sent: [address] [phone] [facility] [phone] FX       Does the patient have less than a 3 day supply:  [x] Yes  [] No    Would you like a call back once the refill request has been completed: [x] Yes [] No    If the office needs to give you a call back, can they leave a voicemail: [x] Yes [] No    Shama Foote Rep   12/02/22 09:13 CST          Hemostasis: [facility]

## 2022-12-19 DIAGNOSIS — G89.21 CHRONIC PAIN DUE TO TRAUMA: ICD-10-CM

## 2022-12-19 DIAGNOSIS — I10 ESSENTIAL HYPERTENSION: ICD-10-CM

## 2022-12-19 DIAGNOSIS — F41.9 ANXIETY: ICD-10-CM

## 2022-12-19 RX ORDER — ALPRAZOLAM 1 MG/1
1 TABLET ORAL 2 TIMES DAILY PRN
Qty: 60 TABLET | Refills: 0 | OUTPATIENT
Start: 2022-12-19

## 2022-12-19 RX ORDER — OXYCODONE AND ACETAMINOPHEN 10; 325 MG/1; MG/1
1 TABLET ORAL EVERY 4 HOURS PRN
Qty: 60 TABLET | Refills: 0 | OUTPATIENT
Start: 2022-12-19

## 2022-12-19 NOTE — TELEPHONE ENCOUNTER
Caller: Clyde Tamayo    Relationship: Self    Best call back number: [phone]    Requested Prescriptions:   Requested Prescriptions     Pending Prescriptions Disp Refills   • oxyCODONE-acetaminophen (PERCOCET)  MG per tablet 60 tablet 0     Sig: Take 1 tablet by mouth Every 4 (Four) Hours As Needed for Moderate Pain.   • ALPRAZolam (XANAX) 1 MG tablet 60 tablet 0     Sig: Take 1 tablet by mouth 2 (Two) Times a Day As Needed for Anxiety.        Pharmacy where request should be sent: [address] [phone] [facility] [phone]      Additional details provided by patient: FOUR DAYS LEFT    Does the patient have less than a 3 day supply:  [] Yes  [x] No    Would you like a call back once the refill request has been completed: [] Yes [x] No    If the office needs to give you a call back, can they leave a voicemail: [] Yes [x] No    Shama Daen Rep   12/19/22 09:54 CST

## 2022-12-19 NOTE — TELEPHONE ENCOUNTER
Rx Refill Note  Requested Prescriptions     Pending Prescriptions Disp Refills   • oxyCODONE-acetaminophen (PERCOCET)  MG per tablet 60 tablet 0     Sig: Take 1 tablet by mouth Every 4 (Four) Hours As Needed for Moderate Pain.   • ALPRAZolam (XANAX) 1 MG tablet 60 tablet 0     Sig: Take 1 tablet by mouth 2 (Two) Times a Day As Needed for Anxiety.      Last office visit with prescribing clinician: 11/18/2022      Next office visit with prescribing clinician: Visit date not found 12/20/2022    UDS: Pain Management Profile (13 Drugs) Urine - Urine, Clean Catch (11/18/2022 14:52)      DATE OF LAST REFILL: 11/22/2022    {TIP  Encounters:23}         Xuan Rodriguez CMA  12/19/22, 09:56 CST

## 2022-12-20 DIAGNOSIS — G89.21 CHRONIC PAIN DUE TO TRAUMA: ICD-10-CM

## 2022-12-20 RX ORDER — ALPRAZOLAM 1 MG/1
1 TABLET ORAL 2 TIMES DAILY PRN
Qty: 60 TABLET | Refills: 0 | Status: SHIPPED | OUTPATIENT
Start: 2022-12-20 | End: 2023-01-23 | Stop reason: SDUPTHER

## 2022-12-20 NOTE — TELEPHONE ENCOUNTER
Caller: MaureenkristiJarek lawtonClyde S    Relationship: Self    Best call back number: [phone]    Requested Prescriptions:   Requested Prescriptions     Pending Prescriptions Disp Refills   • oxyCODONE-acetaminophen (PERCOCET)  MG per tablet 60 tablet 0     Sig: Take 1 tablet by mouth Every 4 (Four) Hours As Needed for Moderate Pain.        Pharmacy where request should be sent: [facility] [address] [place] [address] [phone] [facility] [phone]      Additional details provided by patient: pt said that he will be out on 12/24 - asking for this to be sent so that it is ready for  when it is due.     Does the patient have less than a 3 day supply:  [] Yes  [] No    Would you like a call back once the refill request has been completed: [] Yes [x] No    If the office needs to give you a call back, can they leave a voicemail: [] Yes [] No    Shama Juares Rep   12/20/22 11:19 CST

## 2022-12-20 NOTE — TELEPHONE ENCOUNTER
Rx Refill Note  Requested Prescriptions     Refused Prescriptions Disp Refills   • oxyCODONE-acetaminophen (PERCOCET)  MG per tablet 60 tablet 0     Sig: Take 1 tablet by mouth Every 4 (Four) Hours As Needed for Moderate Pain.     Refused By: ERICH NEAL     Reason for Refusal: Patient has requested refill too soon   • ALPRAZolam (XANAX) 1 MG tablet 60 tablet 0     Sig: Take 1 tablet by mouth 2 (Two) Times a Day As Needed for Anxiety.     Refused By: ERICH NEAL     Reason for Refusal: Patient has requested refill too soon      Last office visit with prescribing clinician: 11/18/2022      Next office visit with prescribing clinician: 2/1/2023     UDS: Pain Management Profile (13 Drugs) Urine - Urine, Clean Catch (11/18/2022 14:52)      DATE OF LAST REFILL: 11-             Xuan Rodriguez [facility]  12/20/22, 07:51 CST

## 2022-12-24 ENCOUNTER — NURSE TRIAGE (OUTPATIENT)
Dept: CALL CENTER | Facility: HOSPITAL | Age: 47
End: 2022-12-24

## 2022-12-24 NOTE — TELEPHONE ENCOUNTER
"Needs Percocet refilled. Will contact FRANCHESKA Thomas and update patient    Called William on call-    Not eligible for refill until  as patient refilled 60 tabs for 2x/day on     Patient updated     Reason for Disposition  • [1] Prescription refill request for ESSENTIAL medicine (i.e., likelihood of harm to patient if not taken) AND [2] triager unable to refill per department policy    Additional Information  • Negative: New-onset or worsening symptoms, see that guideline  (e.g., diarrhea, runny nose, sore throat)  • Negative: Medicine question not related to refill or renewal  • Negative: Caller requesting information unrelated to medicine    Answer Assessment - Initial Assessment Questions  1. DRUG NAME: \"What medicine do you need to have refilled?\"      Percocet  2. REFILLS REMAINING: \"How many refills are remaining?\" (Note: The label on the medicine or pill bottle will show how many refills are remaining. If there are no refills remaining, then a renewal may be needed.)      0  3. EXPIRATION DATE: \"What is the expiration date?\" (Note: The label states when the prescription will , and thus can no longer be refilled.)      0  4. PRESCRIBING HCP: \"Who prescribed it?\" Reason: If prescribed by specialist, call should be referred to that group.      [facility]  5. SYMPTOMS: \"Do you have any symptoms?\"      pain  6. PREGNANCY: \"Is there any chance that you are pregnant?\" \"When was your last menstrual period?\"      no    Protocols used: MEDICATION REFILL AND RENEWAL CALL-ADULT-    "

## 2022-12-28 RX ORDER — OXYCODONE AND ACETAMINOPHEN 10; 325 MG/1; MG/1
1 TABLET ORAL EVERY 4 HOURS PRN
Qty: 60 TABLET | Refills: 0 | Status: SHIPPED | OUTPATIENT
Start: 2022-12-28 | End: 2023-01-24 | Stop reason: SDUPTHER

## 2022-12-28 NOTE — TELEPHONE ENCOUNTER
Rx Refill Note  Requested Prescriptions     Pending Prescriptions Disp Refills   • oxyCODONE-acetaminophen (PERCOCET)  MG per tablet 60 tablet 0     Sig: Take 1 tablet by mouth Every 4 (Four) Hours As Needed for Moderate Pain.      Last office visit with prescribing clinician: 11/18/2022      Next office visit with prescribing clinician: 2/1/2023     UDS: Pain Management Profile (13 Drugs) Urine - Urine, Clean Catch (11/18/2022 14:52)      DATE OF LAST REFILL: 12/02/2022                 Tennille Howard MA  12/28/22, 07:05 CST

## 2023-01-23 ENCOUNTER — TELEPHONE (OUTPATIENT)
Dept: INTERNAL MEDICINE | Facility: CLINIC | Age: 48
End: 2023-01-23
Payer: MEDICARE

## 2023-01-23 DIAGNOSIS — F25.0 SCHIZOAFFECTIVE DISORDER, BIPOLAR TYPE: ICD-10-CM

## 2023-01-23 DIAGNOSIS — G89.21 CHRONIC PAIN DUE TO TRAUMA: ICD-10-CM

## 2023-01-23 DIAGNOSIS — F41.9 ANXIETY: ICD-10-CM

## 2023-01-23 RX ORDER — OXYCODONE AND ACETAMINOPHEN 10; 325 MG/1; MG/1
1 TABLET ORAL EVERY 4 HOURS PRN
Qty: 60 TABLET | Refills: 0 | Status: CANCELLED | OUTPATIENT
Start: 2023-01-23

## 2023-01-24 DIAGNOSIS — G89.21 CHRONIC PAIN DUE TO TRAUMA: ICD-10-CM

## 2023-01-24 RX ORDER — ALPRAZOLAM 1 MG/1
1 TABLET ORAL 2 TIMES DAILY PRN
Qty: 60 TABLET | Refills: 0 | Status: SHIPPED | OUTPATIENT
Start: 2023-01-24 | End: 2023-02-21 | Stop reason: SDUPTHER

## 2023-01-24 RX ORDER — QUETIAPINE FUMARATE 50 MG/1
50 TABLET, EXTENDED RELEASE ORAL NIGHTLY
Qty: 90 EACH | Refills: 1 | Status: SHIPPED | OUTPATIENT
Start: 2023-01-24 | End: 2023-03-21 | Stop reason: SDUPTHER

## 2023-01-24 NOTE — TELEPHONE ENCOUNTER
Caller: YonatanJarek lawtonClyde S    Relationship: Self    Best call back number: [phone]    Requested Prescriptions:   Requested Prescriptions     Pending Prescriptions Disp Refills   • ALPRAZolam (XANAX) 1 MG tablet 60 tablet 0     Sig: Take 1 tablet by mouth 2 (Two) Times a Day As Needed for Anxiety.   • oxyCODONE-acetaminophen (PERCOCET)  MG per tablet 60 tablet 0     Sig: Take 1 tablet by mouth Every 4 (Four) Hours As Needed for Moderate Pain.   • QUEtiapine fumarate ER (SEROquel XR) 50 MG tablet sustained-release 24 hour tablet 90 each 1     Sig: Take 1 tablet by mouth Every Night.        Pharmacy where request should be sent: [address] [phone] [facility] [phone] FX     Does the patient have less than a 3 day supply:  [x] Yes  [] No    Would you like a call back once the refill request has been completed: [x] Yes [] No    If the office needs to give you a call back, can they leave a voicemail: [x] Yes [] No    Shama Johnson Rep   01/23/23 09:18 CST   
Can we pend Percocet to Dr. Yang please. I will refill xanax and seroquel
Rx Refill Note  Requested Prescriptions     Pending Prescriptions Disp Refills   • ALPRAZolam (XANAX) 1 MG tablet 60 tablet 0     Sig: Take 1 tablet by mouth 2 (Two) Times a Day As Needed for Anxiety.   • oxyCODONE-acetaminophen (PERCOCET)  MG per tablet 60 tablet 0     Sig: Take 1 tablet by mouth Every 4 (Four) Hours As Needed for Moderate Pain.   • QUEtiapine fumarate ER (SEROquel XR) 50 MG tablet sustained-release 24 hour tablet 90 each 1     Sig: Take 1 tablet by mouth Every Night.   Med last filled:   xanax: 12/20/22   percocet: 12/28/22   seroquel:  8/26/22  Last office visit with prescribing clinician: 11/18/2022   Next office visit with prescribing clinician: 2/1/2023     Pain Management Profile (13 Drugs) Urine - Urine, Clean Catch (11/18/2022 14:52)                            Would you like a call back once the refill request has been completed: [] Yes [] No    If the office needs to give you a call back, can they leave a voicemail: [] Yes [] No    Lesley Morris RN  01/23/23, 09:49 CST    
done
Fall Risk

## 2023-01-24 NOTE — TELEPHONE ENCOUNTER
Rita patient     Rx Refill Note  Requested Prescriptions     Pending Prescriptions Disp Refills   • oxyCODONE-acetaminophen (PERCOCET)  MG per tablet 60 tablet 0     Sig: Take 1 tablet by mouth Every 4 (Four) Hours As Needed for Moderate Pain.   Med last filled:  12/28/22  Last office visit with prescribing clinician: 11/18/22  Next office visit with prescribing clinician: 2/1/23    Pain Management Profile (13 Drugs) Urine - Urine, Clean Catch (11/18/2022 14:52)                             Would you like a call back once the refill request has been completed: [] Yes [] No    If the office needs to give you a call back, can they leave a voicemail: [] Yes [] No    Lesley Morris RN  01/24/23, 10:49 CST

## 2023-01-25 DIAGNOSIS — G89.21 CHRONIC PAIN DUE TO TRAUMA: ICD-10-CM

## 2023-01-25 DIAGNOSIS — R06.02 SHORTNESS OF BREATH: ICD-10-CM

## 2023-01-25 DIAGNOSIS — R06.2 WHEEZING: ICD-10-CM

## 2023-01-25 RX ORDER — OXYCODONE AND ACETAMINOPHEN 10; 325 MG/1; MG/1
1 TABLET ORAL EVERY 4 HOURS PRN
Qty: 60 TABLET | Refills: 0 | OUTPATIENT
Start: 2023-01-25

## 2023-01-25 RX ORDER — OXYCODONE AND ACETAMINOPHEN 10; 325 MG/1; MG/1
1 TABLET ORAL EVERY 4 HOURS PRN
Qty: 60 TABLET | Refills: 0 | Status: SHIPPED | OUTPATIENT
Start: 2023-01-25 | End: 2023-02-21 | Stop reason: SDUPTHER

## 2023-01-25 RX ORDER — FLUTICASONE PROPIONATE 220 UG/1
1 AEROSOL, METERED RESPIRATORY (INHALATION)
Qty: 12 G | Refills: 11 | Status: SHIPPED | OUTPATIENT
Start: 2023-01-25

## 2023-01-25 RX ORDER — IPRATROPIUM/ALBUTEROL SULFATE 20-100 MCG
1 MIST INHALER (GRAM) INHALATION 4 TIMES DAILY
Qty: 1 EACH | Refills: 3 | Status: SHIPPED | OUTPATIENT
Start: 2023-01-25 | End: 2023-02-22 | Stop reason: SDUPTHER

## 2023-01-25 NOTE — TELEPHONE ENCOUNTER
Rx Refill Note  Requested Prescriptions     Pending Prescriptions Disp Refills   • fluticasone (Flovent HFA) 220 MCG/ACT inhaler 12 g 11     Sig: Inhale 1 puff 2 (Two) Times a Day.   • ipratropium-albuterol (Combivent Respimat)  MCG/ACT inhaler 1 each 3     Sig: Inhale 1 puff 4 (Four) Times a Day.     Refused Prescriptions Disp Refills   • oxyCODONE-acetaminophen (PERCOCET)  MG per tablet 60 tablet 0     Sig: Take 1 tablet by mouth Every 4 (Four) Hours As Needed for Moderate Pain.      Last office visit with prescribing clinician: 11/18/2022   Last telemedicine visit with prescribing clinician: 1/31/2023   Next office visit with prescribing clinician: 1/31/2023       Duplicated request for oxycodone                    Would you like a call back once the refill request has been completed: [] Yes [] No    If the office needs to give you a call back, can they leave a voicemail: [] Yes [] No    Tennille Howard MA  01/25/23, 10:58 CST

## 2023-01-25 NOTE — TELEPHONE ENCOUNTER
Caller: Clyde Tamayo    Relationship: Self    Best call back number: [phone]    Requested Prescriptions:   Requested Prescriptions     Pending Prescriptions Disp Refills   • oxyCODONE-acetaminophen (PERCOCET)  MG per tablet 60 tablet 0     Sig: Take 1 tablet by mouth Every 4 (Four) Hours As Needed for Moderate Pain.   • fluticasone (Flovent HFA) 220 MCG/ACT inhaler 12 g 11     Sig: Inhale 1 puff 2 (Two) Times a Day.   • ipratropium-albuterol (Combivent Respimat)  MCG/ACT inhaler 1 each 3     Sig: Inhale 1 puff 4 (Four) Times a Day.        Pharmacy where request should be sent: [facility] [address] [place] [address] [phone] [facility] [phone] FX     Additional details provided by patient: PATIENT IS OUT OF INHALERS AND PERCOCET IS DUE 01/28/23    Does the patient have less than a 3 day supply:  [x] Yes  [] No    Would you like a call back once the refill request has been completed: [x] Yes [] No    If the office needs to give you a call back, can they leave a voicemail: [x] Yes [] No    Shama Avalos Rep   01/25/23 10:35 CST

## 2023-02-21 ENCOUNTER — OFFICE VISIT (OUTPATIENT)
Dept: INTERNAL MEDICINE | Facility: CLINIC | Age: 48
End: 2023-02-21
Payer: MEDICARE

## 2023-02-21 VITALS
HEART RATE: 99 BPM | OXYGEN SATURATION: 99 % | DIASTOLIC BLOOD PRESSURE: 92 MMHG | RESPIRATION RATE: 18 BRPM | WEIGHT: 269.7 LBS | TEMPERATURE: 97.3 F | BODY MASS INDEX: 35.74 KG/M2 | SYSTOLIC BLOOD PRESSURE: 120 MMHG | HEIGHT: 73 IN

## 2023-02-21 DIAGNOSIS — F41.9 ANXIETY: ICD-10-CM

## 2023-02-21 DIAGNOSIS — M25.561 CHRONIC PAIN OF BOTH KNEES: ICD-10-CM

## 2023-02-21 DIAGNOSIS — Z79.899 LONG TERM USE OF DRUG: ICD-10-CM

## 2023-02-21 DIAGNOSIS — R25.2 LEG CRAMPS: ICD-10-CM

## 2023-02-21 DIAGNOSIS — Z79.899 LONG TERM USE OF DRUG: Primary | ICD-10-CM

## 2023-02-21 DIAGNOSIS — M25.562 CHRONIC PAIN OF BOTH KNEES: ICD-10-CM

## 2023-02-21 DIAGNOSIS — F41.0 PANIC ATTACKS: ICD-10-CM

## 2023-02-21 DIAGNOSIS — G89.21 CHRONIC PAIN DUE TO TRAUMA: ICD-10-CM

## 2023-02-21 DIAGNOSIS — E78.2 MIXED HYPERLIPIDEMIA: ICD-10-CM

## 2023-02-21 DIAGNOSIS — G89.29 CHRONIC PAIN OF BOTH KNEES: ICD-10-CM

## 2023-02-21 PROCEDURE — 99214 OFFICE O/P EST MOD 30 MIN: CPT

## 2023-02-21 RX ORDER — ALPRAZOLAM 1 MG/1
1 TABLET ORAL 2 TIMES DAILY PRN
Qty: 60 TABLET | Refills: 0 | Status: SHIPPED | OUTPATIENT
Start: 2023-02-21 | End: 2023-03-21 | Stop reason: SDUPTHER

## 2023-02-21 RX ORDER — HYDROXYZINE HYDROCHLORIDE 10 MG/1
10 TABLET, FILM COATED ORAL 3 TIMES DAILY PRN
Qty: 90 TABLET | Refills: 0 | Status: SHIPPED | OUTPATIENT
Start: 2023-02-21

## 2023-02-21 RX ORDER — OXYCODONE AND ACETAMINOPHEN 10; 325 MG/1; MG/1
1 TABLET ORAL EVERY 4 HOURS PRN
Qty: 60 TABLET | Refills: 0 | Status: SHIPPED | OUTPATIENT
Start: 2023-02-21 | End: 2023-03-21 | Stop reason: SDUPTHER

## 2023-02-21 RX ORDER — CINCHONA OFFICINALIS BARK, ACONITUM NAPELLUS, PSEUDOGNAPHALIUM OBTUSIFOLIUM, LEDUM PALUSTRE TWIG, MAGNESIUM PHOSPHATE, DIBASIC TRIHYDRATE, TOXICODENDRON PUBESCENS LEAF, AND VISCUM ALBUM FRUITING TOP 3; 6; 3; 6; 6; 6; 3 [HP_X]/1; [HP_X]/1; [HP_X]/1; [HP_X]/1; [HP_X]/1; [HP_X]/1; [HP_X]/1
3 TABLET, SOLUBLE ORAL EVERY 4 HOURS PRN
Qty: 100 TABLET | Refills: 0 | Status: SHIPPED | OUTPATIENT
Start: 2023-02-21
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
## 2023-02-21 NOTE — TELEPHONE ENCOUNTER
Rx Refill Note  Requested Prescriptions     Pending Prescriptions Disp Refills   • oxyCODONE-acetaminophen (PERCOCET)  MG per tablet 60 tablet 0     Sig: Take 1 tablet by mouth Every 4 (Four) Hours As Needed for Moderate Pain.      Last office visit with prescribing clinician: 2/21/2023      Next office visit with prescribing clinician: Visit date not found     UDS:Urine Drug Screen - Urine, Clean Catch (02/21/2023 10:26)      DATE OF LAST REFILL: 1/25/2023             Tennille Howard MA  02/21/23, 11:12 CST

## 2023-02-22 DIAGNOSIS — R06.2 WHEEZING: ICD-10-CM

## 2023-02-22 DIAGNOSIS — E78.2 MIXED HYPERLIPIDEMIA: Primary | ICD-10-CM

## 2023-02-22 DIAGNOSIS — R06.02 SHORTNESS OF BREATH: ICD-10-CM

## 2023-02-22 DIAGNOSIS — R25.2 LEG CRAMPS: Primary | ICD-10-CM

## 2023-02-22 LAB
ALBUMIN SERPL-MCNC: 5 G/DL (ref 4–5)
ALBUMIN/GLOB SERPL: 2.2 {RATIO} (ref 1.2–2.2)
ALP SERPL-CCNC: 76 IU/L (ref 44–121)
ALT SERPL-CCNC: 20 IU/L (ref 0–44)
AMPHETAMINES UR QL SCN: NEGATIVE NG/ML
AST SERPL-CCNC: 19 IU/L (ref 0–40)
BARBITURATES UR QL SCN: NEGATIVE NG/ML
BASOPHILS # BLD AUTO: 0 X10E3/UL (ref 0–0.2)
BASOPHILS NFR BLD AUTO: 1 %
BENZODIAZ UR QL SCN: POSITIVE NG/ML
BILIRUB SERPL-MCNC: 0.5 MG/DL (ref 0–1.2)
BUN SERPL-MCNC: 20 MG/DL (ref 6–24)
BUN/CREAT SERPL: 22 (ref 9–20)
BZE UR QL SCN: NEGATIVE NG/ML
CALCIUM SERPL-MCNC: 10.1 MG/DL (ref 8.7–10.2)
CANNABINOIDS UR QL SCN: NEGATIVE NG/ML
CHLORIDE SERPL-SCNC: 101 MMOL/L (ref 96–106)
CHOLEST SERPL-MCNC: 264 MG/DL (ref 100–199)
CO2 SERPL-SCNC: 19 MMOL/L (ref 20–29)
CREAT SERPL-MCNC: 0.93 MG/DL (ref 0.76–1.27)
CREAT UR-MCNC: 224.2 MG/DL (ref 20–300)
EGFRCR SERPLBLD CKD-EPI 2021: 101 ML/MIN/1.73
EOSINOPHIL # BLD AUTO: 0.2 X10E3/UL (ref 0–0.4)
EOSINOPHIL NFR BLD AUTO: 2 %
ERYTHROCYTE [DISTWIDTH] IN BLOOD BY AUTOMATED COUNT: 13.6 % (ref 11.6–15.4)
GLOBULIN SER CALC-MCNC: 2.3 G/DL (ref 1.5–4.5)
GLUCOSE SERPL-MCNC: 98 MG/DL (ref 70–99)
HCT VFR BLD AUTO: 51.5 % (ref 37.5–51)
HDLC SERPL-MCNC: 43 MG/DL
HGB BLD-MCNC: 17.6 G/DL (ref 13–17.7)
IMM GRANULOCYTES # BLD AUTO: 0 X10E3/UL (ref 0–0.1)
IMM GRANULOCYTES NFR BLD AUTO: 0 %
LABORATORY COMMENT REPORT: ABNORMAL
LDLC SERPL CALC-MCNC: 163 MG/DL (ref 0–99)
LYMPHOCYTES # BLD AUTO: 1.8 X10E3/UL (ref 0.7–3.1)
LYMPHOCYTES NFR BLD AUTO: 29 %
MCH RBC QN AUTO: 28.9 PG (ref 26.6–33)
MCHC RBC AUTO-ENTMCNC: 34.2 G/DL (ref 31.5–35.7)
MCV RBC AUTO: 85 FL (ref 79–97)
METHADONE UR QL SCN: NEGATIVE NG/ML
MONOCYTES # BLD AUTO: 0.4 X10E3/UL (ref 0.1–0.9)
MONOCYTES NFR BLD AUTO: 6 %
NEUTROPHILS # BLD AUTO: 3.9 X10E3/UL (ref 1.4–7)
NEUTROPHILS NFR BLD AUTO: 62 %
OPIATES UR QL SCN: NEGATIVE NG/ML
OXYCODONE+OXYMORPHONE UR QL SCN: POSITIVE NG/ML
PCP UR QL: NEGATIVE NG/ML
PH UR: 5.1 [PH] (ref 4.5–8.9)
PLATELET # BLD AUTO: 272 X10E3/UL (ref 150–450)
POTASSIUM SERPL-SCNC: 4.7 MMOL/L (ref 3.5–5.2)
PROPOXYPH UR QL SCN: NEGATIVE NG/ML
PROT SERPL-MCNC: 7.3 G/DL (ref 6–8.5)
RBC # BLD AUTO: 6.08 X10E6/UL (ref 4.14–5.8)
SODIUM SERPL-SCNC: 138 MMOL/L (ref 134–144)
TRIGL SERPL-MCNC: 309 MG/DL (ref 0–149)
VLDLC SERPL CALC-MCNC: 58 MG/DL (ref 5–40)
WBC # BLD AUTO: 6.3 X10E3/UL (ref 3.4–10.8)

## 2023-02-22 RX ORDER — METHOCARBAMOL 500 MG/1
500 TABLET, FILM COATED ORAL 3 TIMES DAILY
Qty: 90 TABLET | Refills: 0 | Status: SHIPPED | OUTPATIENT
Start: 2023-02-22

## 2023-02-22 RX ORDER — ATORVASTATIN CALCIUM 40 MG/1
60 TABLET, FILM COATED ORAL DAILY
Qty: 90 TABLET | Refills: 3 | Status: SHIPPED | OUTPATIENT
Start: 2023-02-22

## 2023-02-22 RX ORDER — IPRATROPIUM/ALBUTEROL SULFATE 20-100 MCG
1 MIST INHALER (GRAM) INHALATION 4 TIMES DAILY
Qty: 1 EACH | Refills: 3 | Status: SHIPPED | OUTPATIENT
Start: 2023-02-22

## 2023-02-22 NOTE — PROGRESS NOTES
Labs are ok aside from lipid panel. LDL, cholesterol and triglycerides are still significantly elevated I want to increase Lipitor, will call in new dose prescription.

## 2023-02-22 NOTE — PROGRESS NOTES
Called pt with results of labs and recommendations. Patient voiced understanding.     Patient wanted me to let you know his insurance would not cover Homeopathic Products (Leg Cramps) tablet, but said it would cover Soma if you wanted to call that in.

## 2023-02-22 NOTE — TELEPHONE ENCOUNTER
Rx Refill Note  Requested Prescriptions     Pending Prescriptions Disp Refills   • ipratropium-albuterol (Combivent Respimat)  MCG/ACT inhaler 1 each 3     Sig: Inhale 1 puff 4 (Four) Times a Day.      Last office visit with prescribing clinician: 2/21/2023   Last telemedicine visit with prescribing clinician: 5/16/2023   Next office visit with prescribing clinician: 5/16/2023                         Would you like a call back once the refill request has been completed: [] Yes [] No    If the office needs to give you a call back, can they leave a voicemail: [] Yes [] No    Tennille Howard MA  02/22/23, 16:58 CST

## 2023-03-21 ENCOUNTER — TELEPHONE (OUTPATIENT)
Dept: INTERNAL MEDICINE | Facility: CLINIC | Age: 48
End: 2023-03-21
Payer: MEDICARE

## 2023-03-21 DIAGNOSIS — F41.9 ANXIETY: ICD-10-CM

## 2023-03-21 DIAGNOSIS — G89.21 CHRONIC PAIN DUE TO TRAUMA: ICD-10-CM

## 2023-03-21 DIAGNOSIS — Z79.899 LONG TERM USE OF DRUG: ICD-10-CM

## 2023-03-21 DIAGNOSIS — F25.0 SCHIZOAFFECTIVE DISORDER, BIPOLAR TYPE: ICD-10-CM

## 2023-03-21 NOTE — TELEPHONE ENCOUNTER
Caller: Clyde Tamayo    Relationship: Self    Best call back number: [phone]    Requested Prescriptions:   Requested Prescriptions     Pending Prescriptions Disp Refills   • oxyCODONE-acetaminophen (PERCOCET)  MG per tablet 60 tablet 0     Sig: Take 1 tablet by mouth Every 4 (Four) Hours As Needed for Moderate Pain.   • ALPRAZolam (XANAX) 1 MG tablet 60 tablet 0     Sig: Take 1 tablet by mouth 2 (Two) Times a Day As Needed for Anxiety.   • QUEtiapine fumarate ER (SEROquel XR) 50 MG tablet sustained-release 24 hour tablet 90 each 1     Sig: Take 1 tablet by mouth Every Night.        Pharmacy where request should be sent: [address] [phone] [facility] [phone] [facility]     Last office visit with prescribing clinician: 2/21/2023   Last telemedicine visit with prescribing clinician: 5/16/2023   Next office visit with prescribing clinician: 5/16/2023     Additional details provided by patient:     Does the patient have less than a 3 day supply:  [x] Yes  [] No    Would you like a call back once the refill request has been completed: [x] Yes [] No    If the office needs to give you a call back, can they leave a voicemail: [x] Yes [] No    Shama Goodrich   03/21/23 08:22 CDT

## 2023-03-21 NOTE — TELEPHONE ENCOUNTER
Rx Refill Note  Requested Prescriptions     Pending Prescriptions Disp Refills   • oxyCODONE-acetaminophen (PERCOCET)  MG per tablet 60 tablet 0     Sig: Take 1 tablet by mouth Every 4 (Four) Hours As Needed for Moderate Pain.   • ALPRAZolam (XANAX) 1 MG tablet 60 tablet 0     Sig: Take 1 tablet by mouth 2 (Two) Times a Day As Needed for Anxiety.   • QUEtiapine fumarate ER (SEROquel XR) 50 MG tablet sustained-release 24 hour tablet 90 each 1     Sig: Take 1 tablet by mouth Every Night.      Last office visit with prescribing clinician: 2/21/2023      Next office visit with prescribing clinician: 5/16/2023     UDS: Urine Drug Screen - Urine, Clean Catch (02/21/2023 10:06)      DATE OF LAST REFILL: 02/21/2023             Tennille Howard MA  03/21/23, 08:43 CDT

## 2023-03-22 RX ORDER — QUETIAPINE FUMARATE 50 MG/1
50 TABLET, EXTENDED RELEASE ORAL NIGHTLY
Qty: 90 EACH | Refills: 1 | Status: SHIPPED | OUTPATIENT
Start: 2023-03-22

## 2023-03-22 RX ORDER — OXYCODONE AND ACETAMINOPHEN 10; 325 MG/1; MG/1
1 TABLET ORAL EVERY 4 HOURS PRN
Qty: 60 TABLET | Refills: 0 | Status: SHIPPED | OUTPATIENT
Start: 2023-03-22

## 2023-03-22 RX ORDER — ALPRAZOLAM 1 MG/1
1 TABLET ORAL 2 TIMES DAILY PRN
Qty: 60 TABLET | Refills: 0 | Status: SHIPPED | OUTPATIENT
Start: 2023-03-22

## 2023-04-11 ENCOUNTER — TELEPHONE (OUTPATIENT)
Dept: INTERNAL MEDICINE | Facility: CLINIC | Age: 48
End: 2023-04-11
Payer: MEDICARE

## 2023-04-11 NOTE — TELEPHONE ENCOUNTER
Caller: Clyde Tamayo    Relationship: Self    Best call back number: [phone]    What form or medical record are you requesting: WANTS TO VERIFY WE HAVE GOTTEN LETTER FROM [employer]    Who is requesting this form or medical record from you: NA    How would you like to receive the form or medical records (pick-up, mail, fax):   If fax, what is the fax number:   If mail, what is the address:   If pick-up, provide patient with address and location details    Timeframe paperwork needed: NA    Additional notes: LETTER THAT WAS STATING THAT PATIENT IS ABLE TO TAKE CARE OF HIS OWN AFFAIRS/COMPETENT.

## 2023-04-11 NOTE — TELEPHONE ENCOUNTER
Patient notified social security paperwork was received and he will be contacted when it is completed. He voiced understanding.

## 2023-04-18 DIAGNOSIS — R06.02 SHORTNESS OF BREATH: ICD-10-CM

## 2023-04-18 DIAGNOSIS — Z79.899 LONG TERM USE OF DRUG: ICD-10-CM

## 2023-04-18 DIAGNOSIS — F41.9 ANXIETY: ICD-10-CM

## 2023-04-18 DIAGNOSIS — E78.2 MIXED HYPERLIPIDEMIA: ICD-10-CM

## 2023-04-18 DIAGNOSIS — F41.0 PANIC ATTACKS: ICD-10-CM

## 2023-04-18 DIAGNOSIS — G47.00 INSOMNIA, UNSPECIFIED TYPE: ICD-10-CM

## 2023-04-18 DIAGNOSIS — R06.2 WHEEZING: ICD-10-CM

## 2023-04-18 DIAGNOSIS — I10 ESSENTIAL HYPERTENSION: ICD-10-CM

## 2023-04-18 DIAGNOSIS — F25.0 SCHIZOAFFECTIVE DISORDER, BIPOLAR TYPE: ICD-10-CM

## 2023-04-18 DIAGNOSIS — R25.2 LEG CRAMPS: ICD-10-CM

## 2023-04-18 DIAGNOSIS — G89.21 CHRONIC PAIN DUE TO TRAUMA: ICD-10-CM

## 2023-04-18 RX ORDER — METHOCARBAMOL 500 MG/1
500 TABLET, FILM COATED ORAL 3 TIMES DAILY
Qty: 90 TABLET | Refills: 0 | Status: SHIPPED | OUTPATIENT
Start: 2023-04-18

## 2023-04-18 RX ORDER — AMLODIPINE BESYLATE 5 MG/1
5 TABLET ORAL DAILY
Qty: 30 TABLET | Refills: 3 | Status: SHIPPED | OUTPATIENT
Start: 2023-04-18

## 2023-04-18 RX ORDER — IPRATROPIUM/ALBUTEROL SULFATE 20-100 MCG
1 MIST INHALER (GRAM) INHALATION 4 TIMES DAILY
Qty: 1 EACH | Refills: 3 | Status: SHIPPED | OUTPATIENT
Start: 2023-04-18

## 2023-04-18 RX ORDER — QUETIAPINE FUMARATE 50 MG/1
50 TABLET, EXTENDED RELEASE ORAL NIGHTLY
Qty: 90 EACH | Refills: 1 | Status: SHIPPED | OUTPATIENT
Start: 2023-04-18

## 2023-04-18 RX ORDER — METOPROLOL TARTRATE 50 MG/1
50 TABLET, FILM COATED ORAL 2 TIMES DAILY
Qty: 180 TABLET | Refills: 1 | Status: SHIPPED | OUTPATIENT
Start: 2023-04-18

## 2023-04-18 RX ORDER — ALBUTEROL SULFATE 90 UG/1
2 AEROSOL, METERED RESPIRATORY (INHALATION) EVERY 4 HOURS PRN
Qty: 18 G | Refills: 3 | Status: SHIPPED | OUTPATIENT
Start: 2023-04-18

## 2023-04-18 RX ORDER — FLUTICASONE PROPIONATE 220 UG/1
1 AEROSOL, METERED RESPIRATORY (INHALATION)
Qty: 12 G | Refills: 11 | Status: SHIPPED | OUTPATIENT
Start: 2023-04-18

## 2023-04-18 RX ORDER — CINCHONA OFFICINALIS BARK, ACONITUM NAPELLUS, PSEUDOGNAPHALIUM OBTUSIFOLIUM, LEDUM PALUSTRE TWIG, MAGNESIUM PHOSPHATE, DIBASIC TRIHYDRATE, TOXICODENDRON PUBESCENS LEAF, AND VISCUM ALBUM FRUITING TOP 3; 6; 3; 6; 6; 6; 3 [HP_X]/1; [HP_X]/1; [HP_X]/1; [HP_X]/1; [HP_X]/1; [HP_X]/1; [HP_X]/1
3 TABLET, SOLUBLE ORAL EVERY 4 HOURS PRN
Qty: 100 TABLET | Refills: 0 | Status: SHIPPED | OUTPATIENT
Start: 2023-04-18

## 2023-04-18 RX ORDER — CITALOPRAM 20 MG/1
20 TABLET ORAL DAILY
Qty: 30 TABLET | Refills: 3 | Status: SHIPPED | OUTPATIENT
Start: 2023-04-18

## 2023-04-18 RX ORDER — HYDROXYZINE HYDROCHLORIDE 10 MG/1
10 TABLET, FILM COATED ORAL 3 TIMES DAILY PRN
Qty: 90 TABLET | Refills: 0 | Status: SHIPPED | OUTPATIENT
Start: 2023-04-18

## 2023-04-18 RX ORDER — ATORVASTATIN CALCIUM 40 MG/1
60 TABLET, FILM COATED ORAL DAILY
Qty: 90 TABLET | Refills: 3 | Status: SHIPPED | OUTPATIENT
Start: 2023-04-18

## 2023-04-18 RX ORDER — DOXEPIN HYDROCHLORIDE 10 MG/1
CAPSULE ORAL
Qty: 90 CAPSULE | Refills: 3 | Status: SHIPPED | OUTPATIENT
Start: 2023-04-18

## 2023-04-18 RX ORDER — NALOXONE HYDROCHLORIDE 4 MG/.1ML
SPRAY NASAL
OUTPATIENT
Start: 2023-04-18

## 2023-04-18 RX ORDER — OXYCODONE AND ACETAMINOPHEN 10; 325 MG/1; MG/1
1 TABLET ORAL EVERY 4 HOURS PRN
Qty: 60 TABLET | Refills: 0 | Status: CANCELLED | OUTPATIENT
Start: 2023-04-18

## 2023-04-18 RX ORDER — ALPRAZOLAM 1 MG/1
1 TABLET ORAL 2 TIMES DAILY PRN
Qty: 60 TABLET | Refills: 0 | Status: CANCELLED | OUTPATIENT
Start: 2023-04-18

## 2023-04-18 NOTE — TELEPHONE ENCOUNTER
Caller: Clyde Tamayo    Relationship: Self    Best call back number: [phone]    Requested Prescriptions:   Requested Prescriptions     Pending Prescriptions Disp Refills   • oxyCODONE-acetaminophen (PERCOCET)  MG per tablet 60 tablet 0     Sig: Take 1 tablet by mouth Every 4 (Four) Hours As Needed for Moderate Pain.   • ALPRAZolam (XANAX) 1 MG tablet 60 tablet 0     Sig: Take 1 tablet by mouth 2 (Two) Times a Day As Needed for Anxiety.   • methocarbamol (Robaxin) 500 MG tablet 90 tablet 0     Sig: Take 1 tablet by mouth 3 (Three) Times a Day.   • hydrOXYzine (ATARAX) 10 MG tablet 90 tablet 0     Sig: Take 1 tablet by mouth 3 (Three) Times a Day As Needed for Anxiety.   • Homeopathic Products (Leg Cramps) tablet 100 tablet 0     Sig: Place 3 tablets under the tongue Every 4 (Four) Hours As Needed (leg cramps).   • QUEtiapine fumarate ER (SEROquel XR) 50 MG tablet sustained-release 24 hour tablet 90 each 1     Sig: Take 1 tablet by mouth Every Night.   • metoprolol tartrate (LOPRESSOR) 50 MG tablet 180 tablet 1     Sig: Take 1 tablet by mouth 2 (Two) Times a Day.   • ipratropium-albuterol (Combivent Respimat)  MCG/ACT inhaler 1 each 3     Sig: Inhale 1 puff 4 (Four) Times a Day.   • doxepin (SINEquan) 10 MG capsule 90 capsule 3     Sig: Take 1-3 tabs at hs   • citalopram (CeleXA) 20 MG tablet 30 tablet 3     Sig: Take 1 tablet by mouth Daily.   • atorvastatin (Lipitor) 40 MG tablet 90 tablet 3     Sig: Take 1.5 tablets by mouth Daily.   • amLODIPine (NORVASC) 5 MG tablet 30 tablet 3     Sig: Take 1 tablet by mouth Daily.   • albuterol sulfate  (90 Base) MCG/ACT inhaler 18 g 3     Sig: Inhale 2 puffs Every 4 (Four) Hours As Needed for Wheezing.   • fluticasone (Flovent HFA) 220 MCG/ACT inhaler 12 g 11     Sig: Inhale 1 puff 2 (Two) Times a Day.   • naloxone (NARCAN) 4 MG/0.1ML nasal spray     • CBD (cannabidiol) oral oil       Sig: Take  by mouth.        Pharmacy where request should be  sent: [facility] [address] [place], [place] [address], [place] - [address] [phone]  - [phone] FX     Last office visit with prescribing clinician: 2/21/2023   Last telemedicine visit with prescribing clinician: 5/19/2023   Next office visit with prescribing clinician: 5/19/2023     Additional details provided by patient: NEEDING ALL MEDICATIONS    Does the patient have less than a 3 day supply:  [] Yes  [x] No    Would you like a call back once the refill request has been completed: [] Yes [x] No    If the office needs to give you a call back, can they leave a voicemail: [] Yes [x] No    Shama Avalos Rep   04/18/23 16:47 CDT

## 2023-04-18 NOTE — TELEPHONE ENCOUNTER
Rx Refill Note  Requested Prescriptions     Pending Prescriptions Disp Refills   • methocarbamol (Robaxin) 500 MG tablet 90 tablet 0     Sig: Take 1 tablet by mouth 3 (Three) Times a Day.   • hydrOXYzine (ATARAX) 10 MG tablet 90 tablet 0     Sig: Take 1 tablet by mouth 3 (Three) Times a Day As Needed for Anxiety.   • Homeopathic Products (Leg Cramps) tablet 100 tablet 0     Sig: Place 3 tablets under the tongue Every 4 (Four) Hours As Needed (leg cramps).   • QUEtiapine fumarate ER (SEROquel XR) 50 MG tablet sustained-release 24 hour tablet 90 each 1     Sig: Take 1 tablet by mouth Every Night.   • metoprolol tartrate (LOPRESSOR) 50 MG tablet 180 tablet 1     Sig: Take 1 tablet by mouth 2 (Two) Times a Day.   • ipratropium-albuterol (Combivent Respimat)  MCG/ACT inhaler 1 each 3     Sig: Inhale 1 puff 4 (Four) Times a Day.   • doxepin (SINEquan) 10 MG capsule 90 capsule 3     Sig: Take 1-3 tabs at hs   • citalopram (CeleXA) 20 MG tablet 30 tablet 3     Sig: Take 1 tablet by mouth Daily.   • atorvastatin (Lipitor) 40 MG tablet 90 tablet 3     Sig: Take 1.5 tablets by mouth Daily.   • amLODIPine (NORVASC) 5 MG tablet 30 tablet 3     Sig: Take 1 tablet by mouth Daily.   • albuterol sulfate  (90 Base) MCG/ACT inhaler 18 g 3     Sig: Inhale 2 puffs Every 4 (Four) Hours As Needed for Wheezing.   • fluticasone (Flovent HFA) 220 MCG/ACT inhaler 12 g 11     Sig: Inhale 1 puff 2 (Two) Times a Day.   • naloxone (NARCAN) 4 MG/0.1ML nasal spray     • CBD (cannabidiol) oral oil       Sig: Take  by mouth.      Last office visit with prescribing clinician: 2/21/2023   Next office visit with prescribing clinician: 5/19/2023                         Would you like a call back once the refill request has been completed: [] Yes [] No    If the office needs to give you a call back, can they leave a voicemail: [] Yes [] No    Lesley Morris RN  04/18/23, 17:10 CDT

## 2023-04-19 DIAGNOSIS — F41.9 ANXIETY: ICD-10-CM

## 2023-04-19 DIAGNOSIS — G89.21 CHRONIC PAIN DUE TO TRAUMA: ICD-10-CM

## 2023-04-19 DIAGNOSIS — Z79.899 LONG TERM USE OF DRUG: ICD-10-CM

## 2023-04-19 RX ORDER — OXYCODONE AND ACETAMINOPHEN 10; 325 MG/1; MG/1
1 TABLET ORAL EVERY 4 HOURS PRN
Qty: 60 TABLET | Refills: 0 | Status: SHIPPED | OUTPATIENT
Start: 2023-04-19

## 2023-04-19 RX ORDER — ALPRAZOLAM 1 MG/1
1 TABLET ORAL 2 TIMES DAILY PRN
Qty: 60 TABLET | Refills: 0 | Status: SHIPPED | OUTPATIENT
Start: 2023-04-19

## 2023-04-19 NOTE — TELEPHONE ENCOUNTER
Can we talk to patient about narcan refill? I am not opposed to refilling, but would like to know if this is a prescription he recently had to use.

## 2023-04-19 NOTE — TELEPHONE ENCOUNTER
Rita patient     Rx Refill Note  Requested Prescriptions     Pending Prescriptions Disp Refills   • ALPRAZolam (XANAX) 1 MG tablet 60 tablet 0     Sig: Take 1 tablet by mouth 2 (Two) Times a Day As Needed for Anxiety.   • oxyCODONE-acetaminophen (PERCOCET)  MG per tablet 60 tablet 0     Sig: Take 1 tablet by mouth Every 4 (Four) Hours As Needed for Moderate Pain.   Med last filled: 3/22/23   Last office visit with prescribing clinician: 2/21/2023   Next office visit with prescribing clinician: 5/19/2023     Urine Drug Screen - Urine, Clean Catch (02/21/2023 10:06)                            Would you like a call back once the refill request has been completed: [] Yes [] No    If the office needs to give you a call back, can they leave a voicemail: [] Yes [] No    Lesley Morris RN  04/19/23, 09:03 CDT

## 2023-05-16 DIAGNOSIS — F41.0 PANIC ATTACKS: ICD-10-CM

## 2023-05-16 DIAGNOSIS — F41.9 ANXIETY: ICD-10-CM

## 2023-05-16 RX ORDER — HYDROXYZINE HYDROCHLORIDE 10 MG/1
10 TABLET, FILM COATED ORAL 3 TIMES DAILY PRN
Qty: 90 TABLET | Refills: 0 | Status: SHIPPED | OUTPATIENT
Start: 2023-05-16

## 2023-05-16 NOTE — TELEPHONE ENCOUNTER
Rx Refill Note  Requested Prescriptions     Pending Prescriptions Disp Refills    hydrOXYzine (ATARAX) 10 MG tablet 90 tablet 0     Sig: Take 1 tablet by mouth 3 (Three) Times a Day As Needed for Anxiety.      Last office visit with prescribing clinician: 2/21/2023   Last telemedicine visit with prescribing clinician: 4/19/2023   Next office visit with prescribing clinician: 5/19/2023                         Would you like a call back once the refill request has been completed: [] Yes [] No    If the office needs to give you a call back, can they leave a voicemail: [] Yes [] No    Tennille Howard MA  05/16/23, 16:22 CDT

## 2023-05-19 DIAGNOSIS — G89.21 CHRONIC PAIN DUE TO TRAUMA: ICD-10-CM

## 2023-05-19 DIAGNOSIS — Z79.899 LONG TERM USE OF DRUG: ICD-10-CM

## 2023-05-19 DIAGNOSIS — F41.9 ANXIETY: ICD-10-CM

## 2023-05-19 RX ORDER — ALPRAZOLAM 1 MG/1
1 TABLET ORAL 2 TIMES DAILY PRN
Qty: 60 TABLET | Refills: 0 | Status: SHIPPED | OUTPATIENT
Start: 2023-05-19

## 2023-05-19 RX ORDER — OXYCODONE AND ACETAMINOPHEN 10; 325 MG/1; MG/1
1 TABLET ORAL EVERY 4 HOURS PRN
Qty: 60 TABLET | Refills: 0 | Status: SHIPPED | OUTPATIENT
Start: 2023-05-19

## 2023-05-19 RX ORDER — ALPRAZOLAM 1 MG/1
TABLET ORAL
Qty: 60 TABLET | Refills: 0 | OUTPATIENT
Start: 2023-05-19

## 2023-05-19 NOTE — TELEPHONE ENCOUNTER
Rx Refill Note  Requested Prescriptions     Refused Prescriptions Disp Refills    ALPRAZolam (XANAX) 1 MG tablet [Pharmacy Med Name: ALPRAZolam 1 MG Oral Tablet] 60 tablet 0     Sig: Take 1 tablet by mouth twice daily as needed for anxiety      Last office visit with prescribing clinician: Visit date not found   Last telemedicine visit with prescribing clinician: 5/19/2023   Next office visit with prescribing clinician: Visit date not found       DUPLICATE                       Would you like a call back once the refill request has been completed: [] Yes [] No    If the office needs to give you a call back, can they leave a voicemail: [] Yes [] No    Tennille Howard MA  05/19/23, 14:29 CDT

## 2023-05-19 NOTE — TELEPHONE ENCOUNTER
Rx Refill Note  Requested Prescriptions     Pending Prescriptions Disp Refills    ALPRAZolam (XANAX) 1 MG tablet 60 tablet 0     Sig: Take 1 tablet by mouth 2 (Two) Times a Day As Needed for Anxiety.    oxyCODONE-acetaminophen (PERCOCET)  MG per tablet 60 tablet 0     Sig: Take 1 tablet by mouth Every 4 (Four) Hours As Needed for Moderate Pain.      Last office visit with prescribing clinician: 2/21/2023      Next office visit with prescribing clinician: Visit date not found     UDS: Urine Drug Screen - Urine, Clean Catch (02/21/2023 10:06)     DATE OF LAST REFILL: 04/19/2023             Tennille Howard MA  05/19/23, 11:28 CDT

## 2023-05-19 NOTE — TELEPHONE ENCOUNTER
Caller: Clyde Tamayo    Relationship: Self    Best call back number: [phone]    Requested Prescriptions:   Requested Prescriptions     Pending Prescriptions Disp Refills   • ALPRAZolam (XANAX) 1 MG tablet 60 tablet 0     Sig: Take 1 tablet by mouth 2 (Two) Times a Day As Needed for Anxiety.   • oxyCODONE-acetaminophen (PERCOCET)  MG per tablet 60 tablet 0     Sig: Take 1 tablet by mouth Every 4 (Four) Hours As Needed for Moderate Pain.        Pharmacy where request should be sent: [address] [phone] [facility] [phone] [facility]     Last office visit with prescribing clinician: 2/21/2023   Last telemedicine visit with prescribing clinician: 5/19/2023   Next office visit with prescribing clinician: Visit date not found       Shama Grey Rep   05/19/23 11:26 CDT

## 2023-07-25 ENCOUNTER — OFFICE VISIT (OUTPATIENT)
Dept: INTERNAL MEDICINE | Facility: CLINIC | Age: 48
End: 2023-07-25
Payer: MEDICARE

## 2023-07-25 VITALS
HEIGHT: 73 IN | OXYGEN SATURATION: 95 % | BODY MASS INDEX: 37.64 KG/M2 | SYSTOLIC BLOOD PRESSURE: 149 MMHG | WEIGHT: 284 LBS | HEART RATE: 110 BPM | DIASTOLIC BLOOD PRESSURE: 85 MMHG | TEMPERATURE: 98.2 F | RESPIRATION RATE: 17 BRPM

## 2023-07-25 DIAGNOSIS — E78.5 HYPERLIPIDEMIA, UNSPECIFIED HYPERLIPIDEMIA TYPE: ICD-10-CM

## 2023-07-25 DIAGNOSIS — G89.21 CHRONIC PAIN DUE TO TRAUMA: ICD-10-CM

## 2023-07-25 DIAGNOSIS — F41.0 PANIC ATTACKS: ICD-10-CM

## 2023-07-25 DIAGNOSIS — F33.1 MODERATE EPISODE OF RECURRENT MAJOR DEPRESSIVE DISORDER: Primary | ICD-10-CM

## 2023-07-25 DIAGNOSIS — I10 ESSENTIAL HYPERTENSION: ICD-10-CM

## 2023-07-25 PROCEDURE — 1160F RVW MEDS BY RX/DR IN RCRD: CPT

## 2023-07-25 PROCEDURE — 1159F MED LIST DOCD IN RCRD: CPT

## 2023-07-25 PROCEDURE — 3079F DIAST BP 80-89 MM HG: CPT

## 2023-07-25 PROCEDURE — 3077F SYST BP >= 140 MM HG: CPT

## 2023-07-25 PROCEDURE — 99214 OFFICE O/P EST MOD 30 MIN: CPT

## 2023-07-25 NOTE — PROGRESS NOTES
"        Subjective     Chief Complaint   Patient presents with    Pain     Follow up. Patient out of medication. No pharmacy has meds in stock.     Depression       Pain  Associated symptoms include arthralgias. Pertinent negatives include no abdominal pain, chest pain, congestion, coughing, fatigue, headaches, nausea, rash or weakness.   DepressionPatient presents with the following symptoms: nervousness/anxiety.  Patient is not experiencing: confusion and shortness of breath.      Patient presents today for pain medication follow up. States that his fiance diet 3 months ago, broke femur and had blood clot and . States brother got killed in a gang shooting. Would like to see a therapist, denies SI or HI. Admits the xanax does help with his panic attacks but the increase in stressors has been making it more difficult for him.   States that he has not been able to get percocet filled. Admits he saw pain management in 2017, but was discharged because he took sudafed for a cold and they told him that it would make him have a \"dirty drug screen.\" States he has been without his medication for \"several days\" and that he has called every pharmacy in the area and they are all out of percocet's, would be willing do switch to norcos instead. Admits his pain is \"out of control.\"     Discussed elevation in BP, states he is going through a lot at this time. Has not yet taken BP medication. He reports compliace with hyperlipidemia therapy as well as hypertension medication.   Patient's PMR from outside medical facility reviewed and noted.    Review of Systems   Constitutional:  Negative for activity change, fatigue and unexpected weight change.   HENT:  Negative for congestion, ear pain, mouth sores and trouble swallowing.    Eyes:  Negative for discharge and visual disturbance.   Respiratory:  Negative for cough and shortness of breath.    Cardiovascular:  Negative for chest pain and leg swelling.   Gastrointestinal:  " Negative for abdominal pain, constipation, diarrhea and nausea.   Genitourinary:  Negative for decreased urine volume, difficulty urinating and hematuria.   Musculoskeletal:  Positive for arthralgias, back pain and gait problem.   Skin:  Negative for color change and rash.   Allergic/Immunologic: Negative for environmental allergies and immunocompromised state.   Neurological:  Negative for speech difficulty, weakness and headaches.   Psychiatric/Behavioral:  Positive for dysphoric mood. Negative for confusion and sleep disturbance. The patient is nervous/anxious.       Otherwise complete ROS reviewed and negative except as mentioned in the HPI.    Past Medical History:   Past Medical History:   Diagnosis Date    Anxiety     Arthritis     Bipolar 1 disorder     Hypertension     PTSD (post-traumatic stress disorder)      Past Surgical History:  Past Surgical History:   Procedure Laterality Date    HIP SURGERY       Social History:  reports that he has been smoking cigarettes. He has a 15.00 pack-year smoking history. He has never used smokeless tobacco. He reports that he does not drink alcohol and does not use drugs.    Family History: family history includes Arthritis in his father; Diabetes in his father; Heart attack in his mother; Hypertension in his father.      Allergies:  Allergies   Allergen Reactions    Adderall [Amphetamine-Dextroamphetamine] Shortness Of Breath, Swelling, Palpitations and Hallucinations    Meloxicam Nausea Only    Darvon [Propoxyphene] GI Intolerance    Motrin [Ibuprofen] GI Intolerance    Toradol [Ketorolac Tromethamine] GI Intolerance    Tramadol GI Intolerance     Medications:  Prior to Admission medications    Medication Sig Start Date End Date Taking? Authorizing Provider   albuterol sulfate  (90 Base) MCG/ACT inhaler Inhale 2 puffs Every 4 (Four) Hours As Needed for Wheezing. 4/18/23  Yes Rita Thomas APRN   ALPRAZolam (XANAX) 1 MG tablet Take 1 tablet by mouth 2 (Two)  Times a Day As Needed for Anxiety. 7/17/23  Yes Cj Cortez,    amLODIPine (NORVASC) 5 MG tablet Take 1 tablet by mouth Daily. 4/18/23  Yes Rita Thomas APRN   atorvastatin (Lipitor) 40 MG tablet Take 1.5 tablets by mouth Daily. 4/18/23  Yes Rita Thomas APRN   CBD (cannabidiol) oral oil Take  by mouth.   Yes ProviderRoxanne MD   citalopram (CeleXA) 20 MG tablet Take 1 tablet by mouth Daily. 7/17/23  Yes Cj Cortez,    doxepin (SINEquan) 10 MG capsule Take 1-3 tabs at hs 4/18/23  Yes Rita Thomas APRN   fluticasone (Flovent HFA) 220 MCG/ACT inhaler Inhale 1 puff 2 (Two) Times a Day. 4/18/23  Yes Rita Thomas APRN   Homeopathic Products (Leg Cramps) tablet Place 3 tablets under the tongue Every 4 (Four) Hours As Needed (leg cramps). 4/18/23  Yes Rita Thomas APRN   hydrOXYzine (ATARAX) 10 MG tablet Take 1 tablet by mouth 3 (Three) Times a Day As Needed for Anxiety. 5/16/23  Yes Rita Thomas APRN   ipratropium-albuterol (Combivent Respimat)  MCG/ACT inhaler Inhale 1 puff 4 (Four) Times a Day. 4/18/23  Yes Rita Thomas APRN   methocarbamol (Robaxin) 500 MG tablet Take 1 tablet by mouth 3 (Three) Times a Day. 4/18/23  Yes Rita Thomas APRN   metoprolol tartrate (LOPRESSOR) 50 MG tablet Take 1 tablet by mouth 2 (Two) Times a Day. 4/18/23  Yes Rita Thomas APRN   naloxone (NARCAN) 4 MG/0.1ML nasal spray  8/27/22  Yes ProviderRoxanne MD   NON FORMULARY Hemp products   Yes ProviderRoxanne MD   QUEtiapine fumarate ER (SEROquel XR) 50 MG tablet sustained-release 24 hour tablet Take 1 tablet by mouth Every Night. 4/18/23  Yes Rita Thomas APRN   risperiDONE (RisperDAL) 1 MG tablet Take 2 tablets by mouth Every Night. 8/26/22  Yes Liza Hagen   oxyCODONE-acetaminophen (PERCOCET)  MG per tablet Take 1 tablet by mouth Every 4 (Four) Hours As Needed for Moderate Pain.  Patient not taking: Reported on 7/25/2023 7/17/23    "Cj Cortez, DO         Objective     Vital Signs: /85 (BP Location: Right arm, Patient Position: Sitting, Cuff Size: Adult)   Pulse 110   Temp 98.2 °F (36.8 °C) (Skin)   Resp 17   Ht 185.4 cm (73\")   Wt 129 kg (284 lb)   SpO2 95%   BMI 37.47 kg/m²   Physical Exam  Vitals and nursing note reviewed.   Constitutional:       General: He is not in acute distress.     Appearance: Normal appearance. He is not ill-appearing.   HENT:      Head: Normocephalic and atraumatic.      Right Ear: External ear normal.      Left Ear: External ear normal.      Nose: Nose normal.      Mouth/Throat:      Mouth: Mucous membranes are moist.      Pharynx: No posterior oropharyngeal erythema.   Eyes:      General: No scleral icterus.     Extraocular Movements: Extraocular movements intact.      Conjunctiva/sclera: Conjunctivae normal.      Pupils: Pupils are equal, round, and reactive to light.   Cardiovascular:      Rate and Rhythm: Normal rate and regular rhythm.      Pulses: Normal pulses.      Heart sounds: Normal heart sounds.   Pulmonary:      Effort: Pulmonary effort is normal. No respiratory distress.      Breath sounds: Normal breath sounds. No wheezing.   Abdominal:      General: Abdomen is flat. Bowel sounds are normal.      Palpations: Abdomen is soft.      Tenderness: There is no abdominal tenderness.   Musculoskeletal:         General: Normal range of motion.      Cervical back: Normal range of motion.      Right lower leg: No edema.      Left lower leg: No edema.   Skin:     General: Skin is warm and dry.      Findings: No erythema or rash.   Neurological:      General: No focal deficit present.      Mental Status: He is alert and oriented to person, place, and time. Mental status is at baseline.      Motor: Weakness present.      Gait: Gait abnormal.   Psychiatric:         Mood and Affect: Mood normal.         Behavior: Behavior normal.         Thought Content: Thought content normal.         Judgment: " Judgment normal.         Results Reviewed:  Glucose   Date Value Ref Range Status   02/21/2023 98 70 - 99 mg/dL Final   06/09/2021 78 74 - 109 mg/dL Final     BUN   Date Value Ref Range Status   02/21/2023 20 6 - 24 mg/dL Final   06/09/2021 19 6 - 20 mg/dL Final     Creatinine   Date Value Ref Range Status   02/21/2023 0.93 0.76 - 1.27 mg/dL Final   06/09/2021 0.8 0.5 - 1.2 mg/dL Final     Sodium   Date Value Ref Range Status   02/21/2023 138 134 - 144 mmol/L Final   06/09/2021 136 136 - 145 mmol/L Final     Potassium   Date Value Ref Range Status   02/21/2023 4.7 3.5 - 5.2 mmol/L Final   06/09/2021 4.4 3.5 - 5.0 mmol/L Final     Chloride   Date Value Ref Range Status   02/21/2023 101 96 - 106 mmol/L Final   06/09/2021 102 98 - 111 mmol/L Final     CO2   Date Value Ref Range Status   06/09/2021 25 22 - 29 mmol/L Final     Total CO2   Date Value Ref Range Status   02/21/2023 19 (L) 20 - 29 mmol/L Final     Calcium   Date Value Ref Range Status   02/21/2023 10.1 8.7 - 10.2 mg/dL Final   06/09/2021 8.6 8.6 - 10.0 mg/dL Final     ALT (SGPT)   Date Value Ref Range Status   02/21/2023 20 0 - 44 IU/L Final   06/09/2021 20 5 - 41 U/L Final     AST (SGOT)   Date Value Ref Range Status   02/21/2023 19 0 - 40 IU/L Final   06/09/2021 25 5 - 40 U/L Final     WBC   Date Value Ref Range Status   02/21/2023 6.3 3.4 - 10.8 x10E3/uL Final   06/09/2021 6.1 4.8 - 10.8 K/uL Final     Hematocrit   Date Value Ref Range Status   02/21/2023 51.5 (H) 37.5 - 51.0 % Final   06/09/2021 41.3 (L) 42.0 - 52.0 % Final     Platelets   Date Value Ref Range Status   02/21/2023 272 150 - 450 x10E3/uL Final   06/09/2021 160 130 - 400 K/uL Final     Triglycerides   Date Value Ref Range Status   02/21/2023 309 (H) 0 - 149 mg/dL Final     HDL Cholesterol   Date Value Ref Range Status   02/21/2023 43 >39 mg/dL Final     LDL Chol Calc (NIH)   Date Value Ref Range Status   02/21/2023 163 (H) 0 - 99 mg/dL Final     Hemoglobin A1C   Date Value Ref Range Status    04/30/2021 5.6 % Final         Assessment / Plan     Assessment/Plan:  1. Moderate episode of recurrent major depressive disorder  - Ambulatory Referral to Behavioral Health    2. Chronic pain due to trauma  - Ambulatory Referral to Pain Management  -Advised will call [facility] to discuss when percocet may be available. If long term would send refill request to Dr. Simon advising of situation to request a prescription fill of norco 10mg until patient able to get into pain management.   -UDS and CSA UTD    3. Essential hypertension  - CBC w AUTO Differential  - Comprehensive metabolic panel  -encourage to monitor BP daily.     4. Hyperlipidemia, unspecified hyperlipidemia type  - Lipid panel    5. Panic attacks  - Ambulatory Referral to Behavioral Health      Return in about 3 months (around 10/25/2023) for Recheck. unless patient needs to be seen sooner or acute issues arise.      I have discussed the patient results/orders and and plan/recommendation with them at today's visit.      Rita Thomas, APRN   07/25/2023

## 2023-07-26 LAB
ALBUMIN SERPL-MCNC: 5.3 G/DL (ref 4.1–5.1)
ALBUMIN/GLOB SERPL: 2.3 {RATIO} (ref 1.2–2.2)
ALP SERPL-CCNC: 71 IU/L (ref 44–121)
ALT SERPL-CCNC: 18 IU/L (ref 0–44)
AST SERPL-CCNC: 25 IU/L (ref 0–40)
BASOPHILS # BLD AUTO: 0 X10E3/UL (ref 0–0.2)
BASOPHILS NFR BLD AUTO: 1 %
BILIRUB SERPL-MCNC: 1.1 MG/DL (ref 0–1.2)
BUN SERPL-MCNC: 26 MG/DL (ref 6–24)
BUN/CREAT SERPL: 21 (ref 9–20)
CALCIUM SERPL-MCNC: 9.9 MG/DL (ref 8.7–10.2)
CHLORIDE SERPL-SCNC: 106 MMOL/L (ref 96–106)
CHOLEST SERPL-MCNC: 235 MG/DL (ref 100–199)
CO2 SERPL-SCNC: 19 MMOL/L (ref 20–29)
CREAT SERPL-MCNC: 1.23 MG/DL (ref 0.76–1.27)
EGFRCR SERPLBLD CKD-EPI 2021: 72 ML/MIN/1.73
EOSINOPHIL # BLD AUTO: 0.1 X10E3/UL (ref 0–0.4)
EOSINOPHIL NFR BLD AUTO: 1 %
ERYTHROCYTE [DISTWIDTH] IN BLOOD BY AUTOMATED COUNT: 13.2 % (ref 11.6–15.4)
GLOBULIN SER CALC-MCNC: 2.3 G/DL (ref 1.5–4.5)
GLUCOSE SERPL-MCNC: 98 MG/DL (ref 70–99)
HCT VFR BLD AUTO: 45.5 % (ref 37.5–51)
HDLC SERPL-MCNC: 48 MG/DL
HGB BLD-MCNC: 15 G/DL (ref 13–17.7)
IMM GRANULOCYTES # BLD AUTO: 0 X10E3/UL (ref 0–0.1)
IMM GRANULOCYTES NFR BLD AUTO: 0 %
LDLC SERPL CALC-MCNC: 162 MG/DL (ref 0–99)
LYMPHOCYTES # BLD AUTO: 1.6 X10E3/UL (ref 0.7–3.1)
LYMPHOCYTES NFR BLD AUTO: 22 %
MCH RBC QN AUTO: 28.5 PG (ref 26.6–33)
MCHC RBC AUTO-ENTMCNC: 33 G/DL (ref 31.5–35.7)
MCV RBC AUTO: 87 FL (ref 79–97)
MONOCYTES # BLD AUTO: 0.5 X10E3/UL (ref 0.1–0.9)
MONOCYTES NFR BLD AUTO: 7 %
NEUTROPHILS # BLD AUTO: 5.2 X10E3/UL (ref 1.4–7)
NEUTROPHILS NFR BLD AUTO: 69 %
PLATELET # BLD AUTO: 208 X10E3/UL (ref 150–450)
POTASSIUM SERPL-SCNC: 4.3 MMOL/L (ref 3.5–5.2)
PROT SERPL-MCNC: 7.6 G/DL (ref 6–8.5)
RBC # BLD AUTO: 5.26 X10E6/UL (ref 4.14–5.8)
SODIUM SERPL-SCNC: 144 MMOL/L (ref 134–144)
TRIGL SERPL-MCNC: 138 MG/DL (ref 0–149)
VLDLC SERPL CALC-MCNC: 25 MG/DL (ref 5–40)
WBC # BLD AUTO: 7.5 X10E3/UL (ref 3.4–10.8)

## 2023-07-27 DIAGNOSIS — G89.21 CHRONIC PAIN DUE TO TRAUMA: Primary | ICD-10-CM

## 2023-07-27 RX ORDER — HYDROCODONE BITARTRATE AND ACETAMINOPHEN 10; 325 MG/1; MG/1
1 TABLET ORAL EVERY 6 HOURS PRN
Qty: 30 TABLET | Refills: 0 | Status: SHIPPED | OUTPATIENT
Start: 2023-07-27

## 2023-07-27 NOTE — TELEPHONE ENCOUNTER
Rx Refill Note  Requested Prescriptions     Pending Prescriptions Disp Refills    HYDROcodone-acetaminophen (NORCO)  MG per tablet 30 tablet 0     Sig: Take 1 tablet by mouth Every 6 (Six) Hours As Needed for Moderate Pain.      Last office visit with office: 07/25/2023  Next office visit with office: 08/09/2023    UDS: [employer] (MW) - Urine, Clean Catch (06/21/2023 09:40)     DATE OF LAST REFILL: Initial prescription      Controlled Substance Agreement: up to date               Tennille Howard MA  07/27/23, 07:13 CDT

## 2023-07-28 ENCOUNTER — DOCUMENTATION (OUTPATIENT)
Dept: INTERNAL MEDICINE | Facility: CLINIC | Age: 48
End: 2023-07-28
Payer: MEDICARE

## 2023-07-28 RX ORDER — ATORVASTATIN CALCIUM 80 MG/1
80 TABLET, FILM COATED ORAL DAILY
Qty: 90 TABLET | Refills: 1 | Status: SHIPPED | OUTPATIENT
Start: 2023-07-28

## 2023-08-01 DIAGNOSIS — R06.02 SHORTNESS OF BREATH: Primary | ICD-10-CM

## 2023-08-03 PROBLEM — Z87.891 PERSONAL HISTORY OF NICOTINE DEPENDENCE: Status: ACTIVE | Noted: 2023-08-03

## 2023-08-03 PROBLEM — G89.21 CHRONIC PAIN DUE TO TRAUMA: Chronic | Status: ACTIVE | Noted: 2019-10-23

## 2023-08-03 PROBLEM — Z87.891 PERSONAL HISTORY OF NICOTINE DEPENDENCE: Chronic | Status: ACTIVE | Noted: 2023-08-03

## 2023-08-03 PROBLEM — E66.01 MORBIDLY OBESE: Chronic | Status: ACTIVE | Noted: 2020-06-23

## 2023-08-22 ENCOUNTER — OFFICE VISIT (OUTPATIENT)
Dept: INTERNAL MEDICINE | Facility: CLINIC | Age: 48
End: 2023-08-22
Payer: MEDICARE

## 2023-08-22 VITALS
BODY MASS INDEX: 36.71 KG/M2 | OXYGEN SATURATION: 97 % | HEIGHT: 73 IN | TEMPERATURE: 97.7 F | DIASTOLIC BLOOD PRESSURE: 79 MMHG | HEART RATE: 73 BPM | WEIGHT: 277 LBS | SYSTOLIC BLOOD PRESSURE: 148 MMHG

## 2023-08-22 DIAGNOSIS — Z79.899 LONG TERM USE OF DRUG: ICD-10-CM

## 2023-08-22 DIAGNOSIS — Z12.11 COLON CANCER SCREENING: ICD-10-CM

## 2023-08-22 DIAGNOSIS — E66.01 CLASS 2 SEVERE OBESITY WITH SERIOUS COMORBIDITY AND BODY MASS INDEX (BMI) OF 36.0 TO 36.9 IN ADULT, UNSPECIFIED OBESITY TYPE: ICD-10-CM

## 2023-08-22 DIAGNOSIS — I10 ESSENTIAL HYPERTENSION: ICD-10-CM

## 2023-08-22 DIAGNOSIS — Z00.00 MEDICARE ANNUAL WELLNESS VISIT, SUBSEQUENT: Primary | ICD-10-CM

## 2023-08-22 DIAGNOSIS — Z87.891 PERSONAL HISTORY OF NICOTINE DEPENDENCE: Chronic | ICD-10-CM

## 2023-08-22 DIAGNOSIS — G89.21 CHRONIC PAIN DUE TO TRAUMA: ICD-10-CM

## 2023-08-22 DIAGNOSIS — R79.89 LOW TESTOSTERONE: ICD-10-CM

## 2023-08-22 DIAGNOSIS — F41.9 ANXIETY: ICD-10-CM

## 2023-08-22 DIAGNOSIS — R35.0 URINARY FREQUENCY: ICD-10-CM

## 2023-08-22 PROBLEM — E78.1 HYPERTRIGLYCERIDEMIA: Status: ACTIVE | Noted: 2023-08-22

## 2023-08-22 PROBLEM — R05.9 COUGH: Status: ACTIVE | Noted: 2023-08-22

## 2023-08-22 PROBLEM — E66.9 OBESITY: Status: ACTIVE | Noted: 2023-08-22

## 2023-08-22 PROBLEM — L03.90 CELLULITIS: Status: ACTIVE | Noted: 2023-08-22

## 2023-08-22 PROBLEM — J30.2 SEASONAL ALLERGIES: Status: ACTIVE | Noted: 2023-08-22

## 2023-08-22 PROBLEM — R53.83 FATIGUE: Status: ACTIVE | Noted: 2023-08-22

## 2023-08-22 PROBLEM — G89.29 CHRONIC BACK PAIN: Status: ACTIVE | Noted: 2023-08-22

## 2023-08-22 PROBLEM — F19.20 OTHER PSYCHOACTIVE SUBSTANCE DEPENDENCE, UNCOMPLICATED: Chronic | Status: ACTIVE | Noted: 2017-05-23

## 2023-08-22 PROBLEM — M54.9 CHRONIC BACK PAIN: Status: ACTIVE | Noted: 2023-08-22

## 2023-08-22 PROBLEM — B35.6 TINEA CRURIS: Status: ACTIVE | Noted: 2023-08-22

## 2023-08-22 RX ORDER — NICOTINE 21 MG/24HR
1 PATCH, TRANSDERMAL 24 HOURS TRANSDERMAL EVERY 24 HOURS
Qty: 14 EACH | Refills: 0 | Status: SHIPPED | OUTPATIENT
Start: 2023-08-22

## 2023-08-22 NOTE — PROGRESS NOTES
The ABCs of the Annual Wellness Visit  Subsequent Medicare Wellness Visit    Subjective    Clyde Tamayo is a 47 y.o. male who presents for a Subsequent Medicare Wellness Visit.    The following portions of the patient's history were reviewed and   updated as appropriate: allergies, current medications, past family history, past medical history, past social history, past surgical history, and problem list.    Allergies   Allergen Reactions    Adderall [Amphetamine-Dextroamphetamine] Shortness Of Breath, Swelling, Palpitations and Hallucinations    Meloxicam Nausea Only    Darvon [Propoxyphene] GI Intolerance    Motrin [Ibuprofen] GI Intolerance    Toradol [Ketorolac Tromethamine] GI Intolerance    Tramadol GI Intolerance     Past Medical History:   Diagnosis Date    Anxiety     Arthritis     Bipolar 1 disorder     Hypertension     Personal history of nicotine dependence 08/03/2023    PTSD (post-traumatic stress disorder)      Past Surgical History:   Procedure Laterality Date    HIP SURGERY       Family History   Problem Relation Age of Onset    Heart attack Mother     Arthritis Father     Diabetes Father     Hypertension Father        Compared to one year ago, the patient feels his physical   health is better.    Compared to one year ago, the patient feels his mental   health is better.    Recent Hospitalizations:  He was not admitted to the hospital during the last year.       Current Medical Providers:  Patient Care Team:  Rita Thomas APRN as PCP - General (Nurse Practitioner)  Salvador Morris APRN as Nurse Practitioner (Nurse Practitioner)  Marilynn Tang APRN as Nurse Practitioner (Pulmonary Disease)    Outpatient Medications Prior to Visit   Medication Sig Dispense Refill    albuterol sulfate  (90 Base) MCG/ACT inhaler Inhale 2 puffs Every 4 (Four) Hours As Needed for Wheezing. 18 g 3    amLODIPine (NORVASC) 5 MG tablet Take 1 tablet by mouth Daily. 30 tablet 3    atorvastatin (Lipitor) 80  MG tablet Take 1 tablet by mouth Daily. 90 tablet 1    CBD (cannabidiol) oral oil Take  by mouth.      citalopram (CeleXA) 20 MG tablet Take 1 tablet by mouth Daily. 30 tablet 3    doxepin (SINEquan) 10 MG capsule Take 1-3 tabs at hs 90 capsule 3    fluticasone (Flovent HFA) 220 MCG/ACT inhaler Inhale 1 puff 2 (Two) Times a Day. 12 g 11    Homeopathic Products (Leg Cramps) tablet Place 3 tablets under the tongue Every 4 (Four) Hours As Needed (leg cramps). 100 tablet 0    HYDROcodone-acetaminophen (NORCO)  MG per tablet Take 1 tablet by mouth Every 6 (Six) Hours As Needed for Moderate Pain. 30 tablet 0    hydrOXYzine (ATARAX) 10 MG tablet Take 1 tablet by mouth 3 (Three) Times a Day As Needed for Anxiety. 90 tablet 0    ipratropium-albuterol (Combivent Respimat)  MCG/ACT inhaler Inhale 1 puff 4 (Four) Times a Day. 1 each 3    methocarbamol (Robaxin) 500 MG tablet Take 1 tablet by mouth 3 (Three) Times a Day. 90 tablet 0    metoprolol tartrate (LOPRESSOR) 50 MG tablet Take 1 tablet by mouth 2 (Two) Times a Day. 180 tablet 1    naloxone (NARCAN) 4 MG/0.1ML nasal spray       NON FORMULARY Hemp products      QUEtiapine fumarate ER (SEROquel XR) 50 MG tablet sustained-release 24 hour tablet Take 1 tablet by mouth Every Night. 90 each 1    risperiDONE (RisperDAL) 1 MG tablet Take 2 tablets by mouth Every Night. 180 tablet 0    ALPRAZolam (XANAX) 1 MG tablet Take 1 tablet by mouth 2 (Two) Times a Day As Needed for Anxiety. 60 tablet 0    oxyCODONE-acetaminophen (PERCOCET)  MG per tablet Take 1 tablet by mouth Every 4 (Four) Hours As Needed for Moderate Pain. 60 tablet 0     No facility-administered medications prior to visit.       Opioid medication/s are on active medication list.  and I have evaluated his active treatment plan and pain score trends (see table).  There were no vitals filed for this visit.  I have reviewed the chart for potential of high risk medication and harmful drug interactions in  the elderly.        The 10-year ASCVD risk score (Mercedes RDZ, et al., 2019) is: 13.2%    Values used to calculate the score:      Age: 47 years      Sex: Male      Is Non- : No      Diabetic: No      Tobacco smoker: Yes      Systolic Blood Pressure: 148 mmHg      Is BP treated: Yes      HDL Cholesterol: 48 mg/dL      Total Cholesterol: 235 mg/dL    Aspirin is not on active medication list.  Aspirin use is indicated based on review of current medical condition/s. Pros and cons of this therapy have been discussed with this patient. Benefits of this medication outweigh potential harm.  Patient has been instructed to start taking this medication..    Patient Active Problem List   Diagnosis    Chronic pain due to trauma    Wheezing    Essential hypertension    Long term use of drug    Family history of heart attack    Shortness of breath    Bipolar 1 disorder    Anxiety    Immunization due    Muscle spasm    Left knee pain    Pain, lumbar region    Pain of right hip joint    Chronic pain of both knees    Narcotic use agreement exists    Acute non-recurrent sinusitis    Carpal tunnel syndrome of left wrist    Morbidly obese    Swelling of left parotid gland    High risk medication use    High cholesterol    Low testosterone    Influenza vaccine administered    Schizoaffective disorder, bipolar type    Hallucination, visual    Auditory hallucination    Personal history of nicotine dependence    Cellulitis    Cough    Fatigue    Other psychoactive substance dependence, uncomplicated    Seasonal allergies    Tinea cruris    Chronic back pain    Hypertriglyceridemia    Decreased testosterone level    Obesity     Advance Care Planning   Advance Care Planning     Advance Directive is not on file.  ACP discussion was held with the patient during this visit. Patient does not have an advance directive, information provided.     Objective    Vitals:    08/22/23 1034 08/22/23 1038   BP: 150/81 148/79   BP  "Location: Left arm Left arm   Patient Position: Sitting Sitting   Cuff Size: Large Adult Large Adult   Pulse: 70 73   Temp: 97.7 øF (36.5 øC)    TempSrc: Infrared    SpO2: 97%    Weight: 126 kg (277 lb)    Height: 185.4 cm (73\")      Estimated body mass index is 36.55 kg/mý as calculated from the following:    Height as of this encounter: 185.4 cm (73\").    Weight as of this encounter: 126 kg (277 lb).    Class 2 Severe Obesity (BMI >=35 and <=39.9). Obesity-related health conditions include the following: hypertension and dyslipidemias. Obesity is unchanged. BMI is is above average; BMI management plan is completed. We discussed portion control and increasing exercise.      Does the patient have evidence of cognitive impairment? No    Lab Results   Component Value Date    CHLPL 235 (H) 2023    TRIG 138 2023    HDL 48 2023     (H) 2023    VLDL 25 2023        HEALTH RISK ASSESSMENT    Smoking Status:  Social History     Tobacco Use   Smoking Status Every Day    Packs/day: 0.50    Years: 30.00    Pack years: 15.00    Types: Cigarettes   Smokeless Tobacco Never   Tobacco Comments    4 cigs a day     Alcohol Consumption:  Social History     Substance and Sexual Activity   Alcohol Use No     Fall Risk Screen:    PARISH Fall Risk Assessment was completed, and patient is at MODERATE risk for falls. Assessment completed on:2023    Depression Screenin/22/2023    10:40 AM   PHQ-2/PHQ-9 Depression Screening   Little Interest or Pleasure in Doing Things 1-->several days   Feeling Down, Depressed or Hopeless 1-->several days   Trouble Falling or Staying Asleep, or Sleeping Too Much 3-->nearly every day   Feeling Tired or Having Little Energy 3-->nearly every day   Poor Appetite or Overeating 3-->nearly every day   Feeling Bad about Yourself - or that You are a Failure or Have Let Yourself or Your Family Down 1-->several days   Trouble Concentrating on Things, Such as Reading the " Newspaper or Watching Television 0-->not at all   Moving or Speaking So Slowly that Other People Could Have Noticed? Or the Opposite - Being So Fidgety 3-->nearly every day   Thoughts that You Would be Better Off Dead or of Hurting Yourself in Some Way 0-->not at all   PHQ-9: Brief Depression Severity Measure Score 15   If You Checked Off Any Problems, How Difficult Have These Problems Made It For You to Do Your Work, Take Care of Things at Home, or Get Along with Other People? somewhat difficult       Health Habits and Functional and Cognitive Screenin/22/2023    10:38 AM   Functional & Cognitive Status   Do you have difficulty preparing food and eating? No   Do you have difficulty bathing yourself, getting dressed or grooming yourself? No   Do you have difficulty using the toilet? No   Do you have difficulty moving around from place to place? No   Do you have trouble with steps or getting out of a bed or a chair? No   Current Diet Well Balanced Diet   Dental Exam Up to date   Eye Exam Up to date   Exercise (times per week) 5 times per week   Current Exercises Include Walking   Do you need help using the phone?  No   Are you deaf or do you have serious difficulty hearing?  No   Do you need help to go to places out of walking distance? Yes   Do you need help shopping? No   Do you need help preparing meals?  No   Do you need help with housework?  No   Do you need help with laundry? No   Do you need help taking your medications? No   Do you need help managing money? No   Do you ever drive or ride in a car without wearing a seat belt? No   Who do you live with? Other   If you need help, do you have trouble finding someone available to you? Yes   Do you have difficulty concentrating, remembering or making decisions? No       Age-appropriate Screening Schedule:  Refer to the list below for future screening recommendations based on patient's age, sex and/or medical conditions. Orders for these recommended tests  are listed in the plan section. The patient has been provided with a written plan.    Health Maintenance   Topic Date Due    COLORECTAL CANCER SCREENING  Never done    COVID-19 Vaccine (2 - Pfizer series) 12/31/2023 (Originally 3/22/2022)    Pneumococcal Vaccine 0-64 (2 - PPSV23 or PCV20) 07/25/2024 (Originally 1/15/2020)    INFLUENZA VACCINE  10/01/2023    LIPID PANEL  07/25/2024    ANNUAL WELLNESS VISIT  08/22/2024    TDAP/TD VACCINES (2 - Td or Tdap) 06/28/2029    HEPATITIS C SCREENING  Completed                  CMS Preventative Services Quick Reference  Risk Factors Identified During Encounter  Chronic Pain: Pain Management Referral Ordered    Depression/Dysphoria: Current medication is effective, no change recommended    Fall Risk-High or Moderate: Discussed Fall Prevention in the home    Immunizations Discussed/Encouraged: Influenza and Prevnar 20 (Pneumococcal 20-valent conjugate)    Tobacco Use/Dependance Risk Clyde Tamayo  reports that he has been smoking cigarettes. He has a 15.00 pack-year smoking history. He has never used smokeless tobacco.. I have educated him on the risk of diseases from using tobacco products such as cancer, COPD, and heart disease.     I advised him to quit and he is willing to quit. We have discussed the following method/s for tobacco cessation:  OTC Cessation Products.  Together we have set a quit date for 1 week from today.  He will follow up with me in 3 month or sooner to check on his progress.    I spent 5 minutes counseling the patient.           The above risks/problems have been discussed with the patient.  Pertinent information has been shared with the patient in the After Visit Summary.  An After Visit Summary and PPPS were made available to the patient.    Follow Up:   Next Medicare Wellness visit to be scheduled in 1 year.       Additional E&M Note during same encounter follows:  Patient has multiple medical problems which are significant and separately  "identifiable that require additional work above and beyond the Medicare Wellness Visit.      Chief Complaint  Medicare Wellness-subsequent and Cough (Coughs up blood,  )    Subjective        HPI  Clyde Tamayo is also being seen today for follow up concerning his anxiety and chronic pain. He is out of xanax at this time. Has been taking this twice a day with relief of anxiety and preventing panic attacks. Last refill of 60 percocet was not able to be filled due to the pharmacy being out. Due to being out of percocet he was sent in 30 hydrocodone. Patient is now reporting the pharmacy has informed him they have the percocet in stock but will need a new prescription sent in. The hydrocodone did not work as well for his pain and has not been able to sleep.     Continues to have a cough with production of bloody sputum. Waiting to get in with pulmonology. Has had issues completing referrals with his insurance not listing us as his primary provider.    Has a history of low testosterone. Had taken testosterone injection in the past. Experiencing fatigue, low libido, and erectile dysfunction. Reports not being able to get an erection. When taking testosterone in the past he did not have any issues with ED. Also been having complaints of urinary frequency and a decreased urine stream.     He is found to be hypertensive at this visit. Has been dealing with a lot between losing his fiance and his father having health problems.        Objective   Vital Signs:  /79 (BP Location: Left arm, Patient Position: Sitting, Cuff Size: Large Adult)   Pulse 73   Temp 97.7 øF (36.5 øC) (Infrared)   Ht 185.4 cm (73\")   Wt 126 kg (277 lb)   SpO2 97%   BMI 36.55 kg/mý     Physical Exam  Vitals and nursing note reviewed.   Constitutional:       General: He is not in acute distress.     Appearance: Normal appearance. He is obese. He is not ill-appearing.   HENT:      Head: Normocephalic.      Nose: Nose normal. No congestion.     "  Mouth/Throat:      Mouth: Mucous membranes are moist.      Pharynx: Oropharynx is clear.   Eyes:      Conjunctiva/sclera: Conjunctivae normal.      Pupils: Pupils are equal, round, and reactive to light.   Cardiovascular:      Rate and Rhythm: Normal rate and regular rhythm.      Pulses: Normal pulses.      Heart sounds: Normal heart sounds. No murmur heard.  Pulmonary:      Effort: Pulmonary effort is normal. No respiratory distress.      Breath sounds: Normal breath sounds.   Abdominal:      General: Bowel sounds are normal.      Palpations: Abdomen is soft.   Musculoskeletal:         General: Normal range of motion.      Cervical back: Normal range of motion.   Skin:     General: Skin is warm and dry.      Capillary Refill: Capillary refill takes less than 2 seconds.   Neurological:      General: No focal deficit present.      Mental Status: He is alert.   Psychiatric:         Mood and Affect: Mood is anxious.         Thought Content: Thought content does not include suicidal ideation.                       Assessment and Plan   Diagnoses and all orders for this visit:    1. Medicare annual wellness visit, subsequent (Primary)    2. Chronic pain due to trauma    3. Anxiety    4. Long term use of drug  -     ToxASSURE Select 13 (MW) - Urine, Clean Catch    5. Essential hypertension    6. Low testosterone  -     Testosterone  -     FSH & LH  -     PSA DIAGNOSTIC ONLY    7. Urinary frequency  -     PSA DIAGNOSTIC ONLY    8. Class 2 severe obesity with serious comorbidity and body mass index (BMI) of 36.0 to 36.9 in adult, unspecified obesity type    9. Colon cancer screening  -     Ambulatory Referral For Screening Colonoscopy    10. Personal history of nicotine dependence  -     nicotine (NICODERM CQ) 21 MG/24HR patch; Place 1 patch on the skin as directed by provider Daily.  Dispense: 14 each; Refill: 0             Follow Up   Return in about 3 months (around 11/22/2023) for Recheck.  Patient was given  instructions and counseling regarding his condition or for health maintenance advice. Please see specific information pulled into the AVS if appropriate.

## 2023-08-23 LAB
FSH SERPL-ACNC: 4.5 MIU/ML (ref 1.5–12.4)
LH SERPL-ACNC: 5.4 MIU/ML (ref 1.7–8.6)
PSA SERPL-MCNC: 0.4 NG/ML (ref 0–4)
TESTOST SERPL-MCNC: 211 NG/DL (ref 264–916)

## 2023-08-24 DIAGNOSIS — G89.21 CHRONIC PAIN DUE TO TRAUMA: ICD-10-CM

## 2023-08-24 DIAGNOSIS — F41.9 ANXIETY: ICD-10-CM

## 2023-08-24 DIAGNOSIS — Z79.899 LONG TERM USE OF DRUG: ICD-10-CM

## 2023-08-24 RX ORDER — ALPRAZOLAM 1 MG/1
1 TABLET ORAL 2 TIMES DAILY PRN
Qty: 60 TABLET | Refills: 0 | Status: SHIPPED | OUTPATIENT
Start: 2023-08-24

## 2023-08-24 RX ORDER — OXYCODONE AND ACETAMINOPHEN 10; 325 MG/1; MG/1
1 TABLET ORAL EVERY 4 HOURS PRN
Qty: 60 TABLET | Refills: 0 | Status: SHIPPED | OUTPATIENT
Start: 2023-08-26

## 2023-08-24 NOTE — TELEPHONE ENCOUNTER
Rx Refill Note  Requested Prescriptions     Pending Prescriptions Disp Refills    ALPRAZolam (XANAX) 1 MG tablet 60 tablet 0     Sig: Take 1 tablet by mouth 2 (Two) Times a Day As Needed for Anxiety.    oxyCODONE-acetaminophen (PERCOCET)  MG per tablet 60 tablet 0     Sig: Take 1 tablet by mouth Every 4 (Four) Hours As Needed for Moderate Pain.      Last office visit with office: 08-  Next office visit with office: 11-    UDS: ToxASSURE Select 13 (MW) - Urine, Clean Catch (06/21/2023 09:40)     DATE OF LAST REFILL:   Alprazolam 1MG 7-  oxyCODONE Acetaminophen  - 07-             Controlled Substance Agreement: up to date    You will see Norco on the med list. This was done because [facility] was out of Percocet. [facility] called the office to state that they had the Percocet 3 days later and asked if we wanted to D/C the Norco. After confirming that patient did fill the Percocet, we cancelled the RX at [facility] for the [place].                Xuan Rodriguez, CMA  08/24/23, 11:06 CDT

## 2023-08-24 NOTE — TELEPHONE ENCOUNTER
Caller: MaureenkristiJarek lawtonClyde S    Relationship: Self    Best call back number: [phone]    Requested Prescriptions:   Requested Prescriptions     Pending Prescriptions Disp Refills    ALPRAZolam (XANAX) 1 MG tablet 60 tablet 0     Sig: Take 1 tablet by mouth 2 (Two) Times a Day As Needed for Anxiety.    oxyCODONE-acetaminophen (PERCOCET)  MG per tablet 60 tablet 0     Sig: Take 1 tablet by mouth Every 4 (Four) Hours As Needed for Moderate Pain.        Pharmacy where request should be sent: [address] [phone] [facility] [phone]      Last office visit with prescribing clinician: 7/25/2023   Last telemedicine visit with prescribing clinician: Visit date not found   Next office visit with prescribing clinician: 11/21/2023    Does the patient have less than a 3 day supply:  [x] Yes  [] No    Would you like a call back once the refill request has been completed: [x] Yes [] No    If the office needs to give you a call back, can they leave a voicemail: [x] Yes [] No    Shama Johnson Rep   08/24/23 10:09 CDT

## 2023-08-29 ENCOUNTER — PRIOR AUTHORIZATION (OUTPATIENT)
Dept: INTERNAL MEDICINE | Facility: CLINIC | Age: 48
End: 2023-08-29
Payer: MEDICARE

## 2023-08-29 NOTE — TELEPHONE ENCOUNTER
Clyde Tamayo Key: [id number] - PA Case ID: [id number] - Rx #: [id number] help? Call us at [phone]  Status  Sent to [facility]  Drug  Nicotine 21MG/24HR 24 hr patches  Form  [employer] Medicare Electronic PA Form (2017 NCPDP)  Original Claim Info  70,A5 NON-FORMULARY DRUG, CONTACT PRESCRIBER    Clyde Tamayo Key: [id number] - PA Case ID: [id number] - Rx #: [id number] help? Call us at [phone]  Outcome  Deniedon August 29  PA Case: [id number], Status: Denied. Notification: Completed.  Drug  Nicotine 21MG/24HR 24 hr patches  Form  [employer] Medicare Electronic PA Form (2017 NCPDP)  Original Claim Info  70,A5 NON-FORMULARY DRUG, CONTACT PRESCRIBER

## 2023-08-30 LAB — DRUGS UR: NORMAL

## 2023-09-07 ENCOUNTER — TELEPHONE (OUTPATIENT)
Dept: PULMONOLOGY | Facility: CLINIC | Age: 48
End: 2023-09-07
Payer: MEDICARE

## 2023-09-07 NOTE — TELEPHONE ENCOUNTER
Called patient to reschedule their no show appointment that was originally scheduled on 08/28/2023 .      Unable to contact patient after trying all available phone numbers. No Show letter was mailed, which includes [employer] No Show Policy.

## 2023-09-20 ENCOUNTER — OFFICE VISIT (OUTPATIENT)
Dept: INTERNAL MEDICINE | Facility: CLINIC | Age: 48
End: 2023-09-20
Payer: MEDICARE

## 2023-09-20 VITALS
DIASTOLIC BLOOD PRESSURE: 77 MMHG | SYSTOLIC BLOOD PRESSURE: 131 MMHG | HEART RATE: 94 BPM | BODY MASS INDEX: 37.77 KG/M2 | TEMPERATURE: 97.6 F | WEIGHT: 285 LBS | HEIGHT: 73 IN | OXYGEN SATURATION: 98 %

## 2023-09-20 DIAGNOSIS — F41.0 PANIC ATTACKS: ICD-10-CM

## 2023-09-20 DIAGNOSIS — G89.21 CHRONIC PAIN DUE TO TRAUMA: ICD-10-CM

## 2023-09-20 DIAGNOSIS — F25.0 SCHIZOAFFECTIVE DISORDER, BIPOLAR TYPE: ICD-10-CM

## 2023-09-20 DIAGNOSIS — R25.2 LEG CRAMPS: ICD-10-CM

## 2023-09-20 DIAGNOSIS — Z79.899 LONG TERM USE OF DRUG: ICD-10-CM

## 2023-09-20 DIAGNOSIS — E29.1 MALE HYPOGONADISM: Primary | ICD-10-CM

## 2023-09-20 DIAGNOSIS — F41.9 ANXIETY: ICD-10-CM

## 2023-09-20 RX ORDER — QUETIAPINE 150 MG/1
150 TABLET, FILM COATED, EXTENDED RELEASE ORAL NIGHTLY
Qty: 30 TABLET | Refills: 11 | Status: SHIPPED | OUTPATIENT
Start: 2023-09-20

## 2023-09-20 RX ORDER — HYDROXYZINE HYDROCHLORIDE 25 MG/1
25 TABLET, FILM COATED ORAL 3 TIMES DAILY PRN
Qty: 90 TABLET | Refills: 11 | Status: SHIPPED | OUTPATIENT
Start: 2023-09-20

## 2023-09-20 RX ORDER — ALPRAZOLAM 0.5 MG/1
0.5 TABLET ORAL 2 TIMES DAILY PRN
Qty: 30 TABLET | Refills: 0 | Status: SHIPPED | OUTPATIENT
Start: 2023-09-20

## 2023-09-20 RX ORDER — METHOCARBAMOL 500 MG/1
500 TABLET, FILM COATED ORAL 3 TIMES DAILY
Qty: 90 TABLET | Refills: 11 | Status: SHIPPED | OUTPATIENT
Start: 2023-09-20

## 2023-09-20 RX ORDER — HYDROCODONE BITARTRATE AND ACETAMINOPHEN 10; 325 MG/1; MG/1
1 TABLET ORAL EVERY 8 HOURS PRN
Qty: 90 TABLET | Refills: 0 | Status: SHIPPED | OUTPATIENT
Start: 2023-09-20

## 2023-09-20 NOTE — PROGRESS NOTES
Chief Complaint  Anxiety, Depression, and Leg Pain (Tanvir legs/)    Subjective        Clyde Tamayo presents to [facility]  Anxiety      His past medical history is significant for depression.   Depression  Leg Pain       Clyde Tamayo is a 47 y.o. male who returns for follow-up of chronic pain related to his midline low back. his pain is stable and well controlled.  Pain described as numbness, sharp, stabbing, and burning. does radiate and occurs continuously. Patient also experiences the following associated symptoms bladder weakness.    Current pain level is a 9/10 .  Patient states that durring the past week that his pain has interfered with his enjoyment of life 9/10  with 10 bieng complete interference. The patient also states that during the past week that that the pain has interfered with his general activity 9/10  with 10 bieng complete interference. Patient reports that the pain improves with TENS unit, and gets worse with going up and down stairs and walking .  This pain limits the patient from ambulate long distance    The treatment plan and the above PEG score are reviewed with the patient.  Patient feels that he is doing well with the pain and improvement provided to him by the medication.  Patient states he can do more activities of daily living while taking the medication that it significantly impacts the quality of his life.  The patient reports no side effects of his medication, and have no evidence of oversedation, confusion, slurred speech, or abnormal gait at this time.  Patient reports he is compliant with his regimen and not modifying his own dosage and/or timing.  The patient reports that he is not taking any illicit substances, or alcohol.  Patient continues to do well with the current treatment plan and states that he would like to continue with opioid therapy at this time.  The patient has never had an overdose and needed a rescue medication at this  time.    Final Peg score: 9    Controlled Meds  Pain Medications               citalopram (CeleXA) 20 MG tablet Take 1 tablet by mouth Daily.    doxepin (SINEquan) 10 MG capsule Take 1-3 tabs at hs    HYDROcodone-acetaminophen (NORCO)  MG per tablet Take 1 tablet by mouth Every 8 (Eight) Hours As Needed for Moderate Pain.    methocarbamol (Robaxin) 500 MG tablet Take 1 tablet by mouth 3 (Three) Times a Day.    QUEtiapine fumarate ER (SEROquel XR) 150 MG 24 hr tablet Take 1 tablet by mouth Every Night.           The FDA has issued a strong warning regarding concurrent opioid therapy used in conjunction with certain benzodiazepine, sedative medications, sleeping medications, and nerve pain medication which I discussed with the patient in significant detail.  Patient advised that these medicines used together can cause increased respiratory depression that can lead to overdose and death.  I informed the patient that I can no longer prescribe pain medication if the patient wishes to continue with these medications although, thus far, they have reported no issues/adverse effects with concurrent therapy.  The Patient was offered a referral to pain management as the specialist that could write these combinations as the higher level of care, but also advised them that they also may chose to change their regimen.  Patient advised that where appropriate tapers and will be offered as well as alternative medications that do not fall under current warnings.  The patient made the decision to continue with the treatment plan as outlined below.     Will wean of xanaxax and keep his pain medicine.  Will do a taper for this at this time.  Patient also has a history of hypogonadism would like to recheck testosterone at this time.  Will get a second level to verify at this time.      Clyde GREENE Belkis  reports that he has been smoking cigarettes. He has a 15.00 pack-year smoking history. He has never used smokeless tobacco.. I  "have educated him on the risk of diseases from using tobacco products such as cancer, COPD, and heart disease.     I advised him to quit and he is not willing to quit.    I spent 5 minutes counseling the patient.    Otherwise no other problems, complaints, or concerns.     Current MME total: 30    Objective Radiological Findings: moderate degenerative change on lumbar xray    Trearment goals: reduced pain, Increased activities of daily living    History:   Duration of the patient's pain: since before 1990   Legal issues? NO   Current controlled medication and doses: norco   Medications that have failed: percocet, nsaids, tylenol, tramadol, morphine, soma, darvocet   Side effects of current medications: no   Satisfaction with treatment: yes    Functional Status:   Able to feed self: YES   Able to bathe self: YES   Able to use the toilet independently: YES   Able to dress self: YES   Able to get up from bed or chair without assistance: yes    Adverse Events:   Constipation: yes   Lethargy: NO     Aberrant Behavior:   Over dose: NO   Run out of medications early: NO   Last UDS consistent: yes   Taking medications as prescribed: YES           Objective   Vital Signs:  /77 (BP Location: Left arm, Patient Position: Sitting, Cuff Size: Large Adult)   Pulse 94   Temp 97.6 °F (36.4 °C) (Infrared)   Ht 185.4 cm (73\")   Wt 129 kg (285 lb)   SpO2 98%   BMI 37.60 kg/m²   Estimated body mass index is 37.6 kg/m² as calculated from the following:    Height as of this encounter: 185.4 cm (73\").    Weight as of this encounter: 129 kg (285 lb).               Physical Exam  Vitals and nursing note reviewed.   Constitutional:       General: He is not in acute distress.     Appearance: Normal appearance.   HENT:      Head: Normocephalic.   Eyes:      Extraocular Movements: Extraocular movements intact.      Pupils: Pupils are equal, round, and reactive to light.   Cardiovascular:      Rate and Rhythm: Normal rate and regular " rhythm.      Heart sounds: Normal heart sounds. No murmur heard.  Pulmonary:      Effort: Pulmonary effort is normal. No respiratory distress.      Breath sounds: Normal breath sounds. No rhonchi or rales.   Abdominal:      General: Abdomen is flat. Bowel sounds are normal.      Palpations: Abdomen is soft.   Neurological:      General: No focal deficit present.      Mental Status: He is alert.      Result Review :                   Assessment and Plan   Diagnoses and all orders for this visit:    1. Male hypogonadism (Primary)  -     Testosterone    2. Schizoaffective disorder, bipolar type  -     QUEtiapine fumarate ER (SEROquel XR) 150 MG 24 hr tablet; Take 1 tablet by mouth Every Night.  Dispense: 30 tablet; Refill: 11    3. Anxiety  -     ALPRAZolam (XANAX) 0.5 MG tablet; Take 1 tablet by mouth 2 (Two) Times a Day As Needed for Anxiety.  Dispense: 30 tablet; Refill: 0  -     hydrOXYzine (ATARAX) 25 MG tablet; Take 1 tablet by mouth 3 (Three) Times a Day As Needed for Anxiety.  Dispense: 90 tablet; Refill: 11    4. Long term use of drug  -     ALPRAZolam (XANAX) 0.5 MG tablet; Take 1 tablet by mouth 2 (Two) Times a Day As Needed for Anxiety.  Dispense: 30 tablet; Refill: 0    5. Chronic pain due to trauma  -     HYDROcodone-acetaminophen (NORCO)  MG per tablet; Take 1 tablet by mouth Every 8 (Eight) Hours As Needed for Moderate Pain.  Dispense: 90 tablet; Refill: 0    6. Leg cramps  -     methocarbamol (Robaxin) 500 MG tablet; Take 1 tablet by mouth 3 (Three) Times a Day.  Dispense: 90 tablet; Refill: 11    7. Panic attacks  -     hydrOXYzine (ATARAX) 25 MG tablet; Take 1 tablet by mouth 3 (Three) Times a Day As Needed for Anxiety.  Dispense: 90 tablet; Refill: 11        As part of this patient's treatment plan, I am prescribing controlled substances. The patient has been made aware of appropriate use of such medications, including potential risk of somnolence, limited ability to drive and /or work safely,  and potential for dependence or overdose. It has also been made clear that these medications are for use by this patient only, without concomitant use of alcohol or other substances unless prescribed.  Patient has been advised that PRN or as needed pain medication allows the patient to skip doses if not needed, but that the medication is not to be taken more often or at higher doses than prescribed.    Patient has completed prescribing agreement detailing terms of continued prescribing of controlled substances, including monitoring LAMEBRT reports, urine drug screening, and pill counts if necessary. The patient is aware that inappropriate use will result in cessation of prescribing such medications, and possible termination from this practice.    History and physical exam exhibit continued safe and appropriate use of controlled substances.    1.  Controlled substance abuse agreement discussed and copy in record: YES  2.  Discussed:   Risk of addiction: YES   Specific risk of medications:YES   Side effects of medications: YES   Reasonable expectations of the medication: YES  3.  Treatment Objectives:   Discussed management of constipation: YES   Discussed management of other side effects: YES  4.  eKASPER:     LAMBERT report has been reviewed by: Javier Raygoza MD on 09/20/23 in the PDMD in the electronic medical record.  [unfilled]   Results/Date of last LAMBERT:  appropriate  5.  Results/Date of last drug screen:  appropriate  6.  Follow up plan:    Follow Up in One Months Time              Continue on current medications as directed    EMR Dictation/Transcription disclaimer:   Some of this note may be an electronic transcription/translation of spoken language to printed text. The electronic translation of spoken language may permit erroneous, or at times, nonsensical words or phrases to be inadvertently transcribed; Although I have reviewed the note for such errors, some may still exist.           Follow Up   Return in about 4 weeks (around 10/18/2023).  Patient was given instructions and counseling regarding his condition or for health maintenance advice. Please see specific information pulled into the AVS if appropriate.

## 2023-09-21 ENCOUNTER — PRIOR AUTHORIZATION (OUTPATIENT)
Dept: INTERNAL MEDICINE | Facility: CLINIC | Age: 48
End: 2023-09-21
Payer: MEDICARE

## 2023-09-21 DIAGNOSIS — E29.1 HYPOGONADISM MALE: Primary | ICD-10-CM

## 2023-09-21 LAB — TESTOST SERPL-MCNC: 305 NG/DL (ref 264–916)

## 2023-09-21 RX ORDER — TESTOSTERONE CYPIONATE 200 MG/ML
200 INJECTION, SOLUTION INTRAMUSCULAR
Qty: 2 ML | Refills: 2 | Status: SHIPPED | OUTPATIENT
Start: 2023-09-21

## 2023-09-21 NOTE — TELEPHONE ENCOUNTER
Clyde Belkis Key: [id number] - Rx #: [id number] help? Call us at [phone]  Status  Additional Information Required  Drug  Testosterone Cypionate 200MG/ML intramuscular solution  Form  [employer] Electronic PA Form (2017 NCPDP)  Original Claim Info  83,022      Clyde Tamayo Key: [id number] - PA Case ID: [id number] - Rx #: [id number] help? Call us at [phone]  Outcome  Approvedtoday  PA Case: [id number], Status: Approved, Coverage Starts on: 6/22/2023 12:00:00 AM, Coverage Ends on: 9/20/2024 12:00:00 AM.  Drug  Testosterone Cypionate 200MG/ML intramuscular solution  Form  [employer] Electronic PA Form (2017 NCPDP)  Original Claim Info  29,044

## 2023-10-11 DIAGNOSIS — I10 ESSENTIAL HYPERTENSION: ICD-10-CM

## 2023-10-11 RX ORDER — METOPROLOL TARTRATE 50 MG/1
50 TABLET, FILM COATED ORAL 2 TIMES DAILY
Qty: 180 TABLET | Refills: 1 | Status: SHIPPED | OUTPATIENT
Start: 2023-10-11

## 2023-10-11 NOTE — TELEPHONE ENCOUNTER
Rx Refill Note  Requested Prescriptions     Pending Prescriptions Disp Refills    metoprolol tartrate (LOPRESSOR) 50 MG tablet 180 tablet 1     Sig: Take 1 tablet by mouth 2 (Two) Times a Day.      Last office visit with prescribing clinician: 9/20/2023   Last telemedicine visit with prescribing clinician: Visit date not found   Next office visit with prescribing clinician: 10/18/2023                         Would you like a call back once the refill request has been completed: [] Yes [] No    If the office needs to give you a call back, can they leave a voicemail: [] Yes [] No    Tennille Howard MA  10/11/23, 10:41 CDT

## 2023-10-16 DIAGNOSIS — E29.1 HYPOGONADISM MALE: ICD-10-CM

## 2023-10-16 RX ORDER — TESTOSTERONE CYPIONATE 200 MG/ML
200 INJECTION, SOLUTION INTRAMUSCULAR
Qty: 2 ML | Refills: 2 | OUTPATIENT
Start: 2023-10-16

## 2023-10-16 NOTE — TELEPHONE ENCOUNTER
Caller: Clyde Tamayo    Relationship: Self    Best call back number: [phone]     Requested Prescriptions:   Requested Prescriptions     Pending Prescriptions Disp Refills    Testosterone Cypionate (Depo-Testosterone) 200 MG/ML injection 2 mL 2     Sig: Inject 1 mL into the appropriate muscle as directed by prescriber Every 14 (Fourteen) Days.        Pharmacy where request should be sent: [address] [phone] [facility] [phone]      Last office visit with prescribing clinician: 9/20/2023   Last telemedicine visit with prescribing clinician: Visit date not found   Next office visit with prescribing clinician: 10/18/2023     Additional details provided by patient: LESS THEN 3. DUE TO TAKE 10/19/23    Does the patient have less than a 3 day supply:  [x] Yes  [] No    Would you like a call back once the refill request has been completed: [] Yes [x] No    If the office needs to give you a call back, can they leave a voicemail: [] Yes [x] No    PATIENT HAD TO CANCEL 10/18/23 APPOINTMENT BUT HAS 1 SCHEDULE FOR IN NOVEMBER    Shama Avalos Rep   10/16/23 08:52 CDT

## 2023-10-18 ENCOUNTER — OFFICE VISIT (OUTPATIENT)
Dept: INTERNAL MEDICINE | Facility: CLINIC | Age: 48
End: 2023-10-18
Payer: MEDICARE

## 2023-10-18 VITALS
DIASTOLIC BLOOD PRESSURE: 79 MMHG | BODY MASS INDEX: 37.24 KG/M2 | OXYGEN SATURATION: 97 % | TEMPERATURE: 98.6 F | HEART RATE: 81 BPM | WEIGHT: 281 LBS | SYSTOLIC BLOOD PRESSURE: 143 MMHG | HEIGHT: 73 IN

## 2023-10-18 DIAGNOSIS — Z13.0 SCREENING FOR ENDOCRINE, METABOLIC AND IMMUNITY DISORDER: Primary | ICD-10-CM

## 2023-10-18 DIAGNOSIS — Z23 ENCOUNTER FOR IMMUNIZATION: ICD-10-CM

## 2023-10-18 DIAGNOSIS — Z12.11 COLON CANCER SCREENING: ICD-10-CM

## 2023-10-18 DIAGNOSIS — Z13.228 SCREENING FOR ENDOCRINE, METABOLIC AND IMMUNITY DISORDER: Primary | ICD-10-CM

## 2023-10-18 DIAGNOSIS — Z79.899 LONG TERM USE OF DRUG: ICD-10-CM

## 2023-10-18 DIAGNOSIS — Z13.29 SCREENING FOR ENDOCRINE, METABOLIC AND IMMUNITY DISORDER: Primary | ICD-10-CM

## 2023-10-18 DIAGNOSIS — G89.21 CHRONIC PAIN DUE TO TRAUMA: ICD-10-CM

## 2023-10-18 DIAGNOSIS — F41.9 ANXIETY: ICD-10-CM

## 2023-10-18 RX ORDER — HYDROCODONE BITARTRATE AND ACETAMINOPHEN 10; 325 MG/1; MG/1
1 TABLET ORAL EVERY 8 HOURS PRN
Qty: 90 TABLET | Refills: 0 | Status: SHIPPED | OUTPATIENT
Start: 2023-10-18

## 2023-10-18 RX ORDER — CITALOPRAM 40 MG/1
40 TABLET ORAL DAILY
Qty: 30 TABLET | Refills: 11 | Status: SHIPPED | OUTPATIENT
Start: 2023-10-18

## 2023-10-18 RX ORDER — ALPRAZOLAM 0.5 MG/1
0.5 TABLET ORAL 2 TIMES DAILY PRN
Qty: 30 TABLET | Refills: 0 | Status: SHIPPED | OUTPATIENT
Start: 2023-10-18

## 2023-10-18 NOTE — PROGRESS NOTES
Chief Complaint  Anxiety (Follow up) and Pain (Does take CBD oils /)    Subjective        Clyde Tamayo presents to [facility]  Anxiety      His past medical history is significant for depression.   Depression  Leg Pain     Pain        Clyde Tamayo is a 47 y.o. male who returns for follow-up of chronic pain related to his midline low back. his pain is stable and well controlled.    The current pain level reported by patient is 6/10.  The patient also states that during the past week that that the pain has interfered with his general activity 6/10  with 10 bieng complete interference.  Patient states that durring the past week that his pain has interfered with his enjoyment of life 6/10  with 10 bieng complete interference.  Pain on average over the Last week 6/10.  Patient states the pain is continuously .  Patient describes the pain as  sharp, shooting, and stabbing. does radiate.  Patient states that they have the following alleviating factors pain medication, and the following aggravating factorswalking and weather .  Patient reports the following associated symptoms with their pain signification leg weakness .       This patient relates that he can do more activities of daily living and is much more functional on his current dose of pain medicine than he could be otherwise.  We discussed his treatment plan at this time and he continues to be compliant with this and the controlled substance contract.  The patient relates that the pain medicine continues to help significantly with his pain, and  states that he continues to benefit from the medication.  Alternative therapies including anti inflammatories have been tried in the past and he has found these to be ineffective, will continue him on opiate therapy for their chronic pain at this time. The patient states that he has had no side effects of his medication.  They likewise do not appear to have evidence of oversedation,  confusion, slurred speech, or abnormal gait at this time.  The patients risk factors for increased risk of harm from opiates has been discussed and reviewed with him.   The patient states that he is not modifying his own regimen, and that he is not taking any other illicit substances not prescribed by this office.  The patient has never had an overdose and needed a rescue medication at this time.    Final Peg score:6    Controlled Meds  Pain Medications               citalopram (CeleXA) 40 MG tablet Take 1 tablet by mouth Daily.    doxepin (SINEquan) 10 MG capsule Take 1-3 tabs at hs    HYDROcodone-acetaminophen (NORCO)  MG per tablet Take 1 tablet by mouth Every 8 (Eight) Hours As Needed for Moderate Pain.    methocarbamol (Robaxin) 500 MG tablet Take 1 tablet by mouth 3 (Three) Times a Day.    QUEtiapine fumarate ER (SEROquel XR) 150 MG 24 hr tablet Take 1 tablet by mouth Every Night.          Has done ok, with decreasing his xanax but would like to leave it at current doseage at this time.   Will increase his celexa to help with this time.      Would like to get his flu shot and get set up for colon cancer screening at this time.      Patient otherwise states that he has had no new problems, complaints, or concerns at this time.        Current MME total: 30    Objective Radiological Findings: moderate degenerative change on lumbar xray    Trearment goals: reduced pain, Increased activities of daily living    History:   Duration of the patient's pain: since before 1990   Legal issues? NO   Current controlled medication and doses: norco   Medications that have failed: percocet, nsaids, tylenol, tramadol, morphine, soma, darvocet   Side effects of current medications: no   Satisfaction with treatment: yes    Functional Status:   Able to feed self: YES   Able to bathe self: YES   Able to use the toilet independently: YES   Able to dress self: YES   Able to get up from bed or chair without assistance:  "yes    Adverse Events:   Constipation: yes   Lethargy: NO     Aberrant Behavior:   Over dose: NO   Run out of medications early: NO   Last UDS consistent: yes   Taking medications as prescribed: YES           Objective   Vital Signs:  /79 (BP Location: Left arm, Patient Position: Sitting, Cuff Size: Adult)   Pulse 81   Temp 98.6 °F (37 °C) (Infrared)   Ht 185.4 cm (73\")   Wt 127 kg (281 lb)   SpO2 97%   BMI 37.07 kg/m²   Estimated body mass index is 37.07 kg/m² as calculated from the following:    Height as of this encounter: 185.4 cm (73\").    Weight as of this encounter: 127 kg (281 lb).       Past Medical History:   Diagnosis Date    Anxiety     Arthritis     Bipolar 1 disorder     Hypertension     Personal history of nicotine dependence 08/03/2023    PTSD (post-traumatic stress disorder)        Past Surgical History:   Procedure Laterality Date    HIP SURGERY         Social History     Tobacco Use    Smoking status: Every Day     Packs/day: 0.50     Years: 30.00     Additional pack years: 0.00     Total pack years: 15.00     Types: Cigarettes    Smokeless tobacco: Never    Tobacco comments:     4 cigs a day   Vaping Use    Vaping Use: Never used   Substance Use Topics    Alcohol use: No    Drug use: No       Family History   Problem Relation Age of Onset    Heart attack Mother     Arthritis Father     Diabetes Father     Hypertension Father        Allergies as of 10/18/2023 - Reviewed 10/18/2023   Allergen Reaction Noted    Adderall [amphetamine-dextroamphetamine] Shortness Of Breath, Swelling, Palpitations, and Hallucinations 04/14/2020    Meloxicam Nausea Only     Darvon [propoxyphene] GI Intolerance 06/28/2019    Motrin [ibuprofen] GI Intolerance 06/28/2019    Toradol [ketorolac tromethamine] GI Intolerance 06/28/2019    Tramadol GI Intolerance 06/28/2019         Current Outpatient Medications:     albuterol sulfate  (90 Base) MCG/ACT inhaler, Inhale 2 puffs Every 4 (Four) Hours As Needed " "for Wheezing., Disp: 18 g, Rfl: 3    ALPRAZolam (XANAX) 0.5 MG tablet, Take 1 tablet by mouth 2 (Two) Times a Day As Needed for Anxiety., Disp: 30 tablet, Rfl: 0    amLODIPine (NORVASC) 5 MG tablet, Take 1 tablet by mouth Daily., Disp: 30 tablet, Rfl: 3    atorvastatin (Lipitor) 80 MG tablet, Take 1 tablet by mouth Daily., Disp: 90 tablet, Rfl: 1    CBD (cannabidiol) oral oil, Take  by mouth., Disp: , Rfl:     citalopram (CeleXA) 40 MG tablet, Take 1 tablet by mouth Daily., Disp: 30 tablet, Rfl: 11    doxepin (SINEquan) 10 MG capsule, Take 1-3 tabs at hs, Disp: 90 capsule, Rfl: 3    fluticasone (Flovent HFA) 220 MCG/ACT inhaler, Inhale 1 puff 2 (Two) Times a Day., Disp: 12 g, Rfl: 11    Homeopathic Products (Leg Cramps) tablet, Place 3 tablets under the tongue Every 4 (Four) Hours As Needed (leg cramps)., Disp: 100 tablet, Rfl: 0    HYDROcodone-acetaminophen (NORCO)  MG per tablet, Take 1 tablet by mouth Every 8 (Eight) Hours As Needed for Moderate Pain., Disp: 90 tablet, Rfl: 0    hydrOXYzine (ATARAX) 25 MG tablet, Take 1 tablet by mouth 3 (Three) Times a Day As Needed for Anxiety., Disp: 90 tablet, Rfl: 11    ipratropium-albuterol (Combivent Respimat)  MCG/ACT inhaler, Inhale 1 puff 4 (Four) Times a Day., Disp: 1 each, Rfl: 3    methocarbamol (Robaxin) 500 MG tablet, Take 1 tablet by mouth 3 (Three) Times a Day., Disp: 90 tablet, Rfl: 11    metoprolol tartrate (LOPRESSOR) 50 MG tablet, Take 1 tablet by mouth 2 (Two) Times a Day., Disp: 180 tablet, Rfl: 1    naloxone (NARCAN) 4 MG/0.1ML nasal spray, , Disp: , Rfl:     nicotine (NICODERM CQ) 21 MG/24HR patch, Place 1 patch on the skin as directed by provider Daily., Disp: 14 each, Rfl: 0    NON FORMULARY, Hemp products, Disp: , Rfl:     QUEtiapine fumarate ER (SEROquel XR) 150 MG 24 hr tablet, Take 1 tablet by mouth Every Night., Disp: 30 tablet, Rfl: 11    Syringe/Needle, Disp, (B-D SYRINGE/NEEDLE 3CC/22GX1.5) 22G X 1-1/2\" 3 ML misc, 2 syringes to use " myles testosterone, Disp: 2 each, Rfl: 11    Testosterone Cypionate (Depo-Testosterone) 200 MG/ML injection, Inject 1 mL into the appropriate muscle as directed by prescriber Every 14 (Fourteen) Days., Disp: 2 mL, Rfl: 2         Physical Exam  Vitals and nursing note reviewed.   Constitutional:       General: He is not in acute distress.     Appearance: Normal appearance.   HENT:      Head: Normocephalic.   Eyes:      Extraocular Movements: Extraocular movements intact.      Pupils: Pupils are equal, round, and reactive to light.   Cardiovascular:      Rate and Rhythm: Normal rate and regular rhythm.      Heart sounds: Normal heart sounds. No murmur heard.  Pulmonary:      Effort: Pulmonary effort is normal. No respiratory distress.      Breath sounds: Normal breath sounds. No rhonchi or rales.   Abdominal:      General: Abdomen is flat. Bowel sounds are normal.      Palpations: Abdomen is soft.   Neurological:      General: No focal deficit present.      Mental Status: He is alert.        Result Review :                   Assessment and Plan   Diagnoses and all orders for this visit:    1. Screening for endocrine, metabolic and immunity disorder (Primary)    2. Encounter for immunization  -     Fluzone (or Fluarix & Flulaval for VFC) >6mos    3. Chronic pain due to trauma  -     HYDROcodone-acetaminophen (NORCO)  MG per tablet; Take 1 tablet by mouth Every 8 (Eight) Hours As Needed for Moderate Pain.  Dispense: 90 tablet; Refill: 0    4. Anxiety  -     citalopram (CeleXA) 40 MG tablet; Take 1 tablet by mouth Daily.  Dispense: 30 tablet; Refill: 11  -     ALPRAZolam (XANAX) 0.5 MG tablet; Take 1 tablet by mouth 2 (Two) Times a Day As Needed for Anxiety.  Dispense: 30 tablet; Refill: 0    5. Long term use of drug  -     ALPRAZolam (XANAX) 0.5 MG tablet; Take 1 tablet by mouth 2 (Two) Times a Day As Needed for Anxiety.  Dispense: 30 tablet; Refill: 0    6. Colon cancer screening  -     Cologuard - Stool, Per  Rectum; Future        As part of this patient's treatment plan, I am prescribing controlled substances. The patient has been made aware of appropriate use of such medications, including potential risk of somnolence, limited ability to drive and /or work safely, and potential for dependence or overdose. It has also been made clear that these medications are for use by this patient only, without concomitant use of alcohol or other substances unless prescribed.  Patient has been advised that PRN or as needed pain medication allows the patient to skip doses if not needed, but that the medication is not to be taken more often or at higher doses than prescribed.    Patient has completed prescribing agreement detailing terms of continued prescribing of controlled substances, including monitoring LAMBERT reports, urine drug screening, and pill counts if necessary. The patient is aware that inappropriate use will result in cessation of prescribing such medications, and possible termination from this practice.    History and physical exam exhibit continued safe and appropriate use of controlled substances.    1.  Controlled substance abuse agreement discussed and copy in record: YES  2.  Discussed:   Risk of addiction: YES   Specific risk of medications:YES   Side effects of medications: YES   Reasonable expectations of the medication: YES  3.  Treatment Objectives:   Discussed management of constipation: YES   Discussed management of other side effects: YES  4.  eKASPER:     LAMBERT report has been reviewed by: Javier Raygoza MD on 10/18/23 in the PDMD in the electronic medical record.  [unfilled]   Results/Date of last LAMBERT:  appropriate  5.  Results/Date of last drug screen:  appropriate  6.  Follow up plan:    Follow Up in One Months Time              Continue on current medications as directed    EMR Dictation/Transcription disclaimer:   Some of this note may be an electronic transcription/translation of  spoken language to printed text. The electronic translation of spoken language may permit erroneous, or at times, nonsensical words or phrases to be inadvertently transcribed; Although I have reviewed the note for such errors, some may still exist.          Follow Up   No follow-ups on file.  Patient was given instructions and counseling regarding his condition or for health maintenance advice. Please see specific information pulled into the AVS if appropriate.

## 2023-10-18 NOTE — LETTER
[facility]  Vaccine Consent Form    Patient Name:  Clyde Tamayo  Patient :  1975     Vaccine(s) Ordered    Fluzone (or Fluarix & Flulaval for VFC) >6mos        Screening Checklist  The following questions should be completed prior to vaccination. If you answer “yes” to any question, it does not necessarily mean you should not be vaccinated. It just means we may need to clarify or ask more questions. If a question is unclear, please ask your healthcare provider to explain it.    Yes No   Any fever or moderate to severe illness today (mild illness and/or antibiotic treatment are not contraindications)?     Do you have a history of a serious reaction to any previous vaccinations, such as anaphylaxis, encephalopathy within 7 days, Guillain-[place] syndrome within 6 weeks, seizure?     Have you received any live vaccine(s) in the past month (MMR, PRATEEK)?     Do you have an anaphylactic allergy to latex (DTaP, DTaP-IPV, Hep A, Hep B, MenB, RV, Td, Tdap), baker’s yeast (Hep B, HPV), or gelatin (PRATEEK, MMR)?     Do you have an anaphylactic allergy to neomycin (Rabies, PRATEEK, MMR, IPV, Hep A), polymyxin B (IPV), or streptomycin (IPV)?      Any cancer, leukemia, AIDS, or other immune system disorder? (PRATEEK, MMR, RV)     Do you have a parent, brother, or sister with an immune system problem (if immune competence of vaccine recipient clinically verified, can proceed)? (MMR, PRATEEK)     Any recent steroid treatments for >2 weeks, chemotherapy, or radiation treatment? (PRATEEK, MMR)     Have you received antibody-containing blood transfusions or IVIG in the past 11 months (recommended interval is dependent on product)? (MMR, PRATEEK)     Have you taken antiviral drugs (acyclovir, famciclovir, valacyclovir) in the last 24 or 48 hours, respectively (PRATEEK)?      Are you pregnant or planning to become pregnant within 1 month? (PRATEEK, MMR, HPV, IPV, MenB; For hep B- refer to Engerix-B)     For infants, have you ever been told your child has  had intussusception or a medical emergency involving obstruction of the intestine (RV)? If not for an infant, can skip this question.         *Ordering Physician/APC should be consulted if “yes” is checked by the patient or guardian above.      I have received, read, and understand the Vaccine Information Statement (VIS) for each vaccine ordered above.  I have considered my health status as well as the health status of my close contacts.  I have taken the opportunity to discuss my vaccine questions with my health care provider.   I have requested that the ordered vaccine(s) be given to me.  I understand the benefits and risks of the vaccines.  I understand that I should remain in the clinic for 15 minutes after receiving the vaccine(s).  _________________________________________________________  Signature of Patient or Parent/Legal Guardian ____________________  Date

## 2023-11-16 ENCOUNTER — OFFICE VISIT (OUTPATIENT)
Dept: INTERNAL MEDICINE | Facility: CLINIC | Age: 48
End: 2023-11-16
Payer: MEDICARE

## 2023-11-16 VITALS
WEIGHT: 286 LBS | BODY MASS INDEX: 37.91 KG/M2 | TEMPERATURE: 96.9 F | DIASTOLIC BLOOD PRESSURE: 82 MMHG | HEART RATE: 160 BPM | SYSTOLIC BLOOD PRESSURE: 159 MMHG | HEIGHT: 73 IN | OXYGEN SATURATION: 97 %

## 2023-11-16 DIAGNOSIS — Z79.899 LONG TERM USE OF DRUG: ICD-10-CM

## 2023-11-16 DIAGNOSIS — F41.9 ANXIETY: ICD-10-CM

## 2023-11-16 DIAGNOSIS — G89.21 CHRONIC PAIN DUE TO TRAUMA: ICD-10-CM

## 2023-11-16 PROBLEM — Z82.49 FAMILY HISTORY OF HEART ATTACK: Status: RESOLVED | Noted: 2019-10-23 | Resolved: 2023-11-16

## 2023-11-16 PROBLEM — E66.9 OBESITY: Status: RESOLVED | Noted: 2023-08-22 | Resolved: 2023-11-16

## 2023-11-16 RX ORDER — QUETIAPINE FUMARATE 200 MG/1
200 TABLET, FILM COATED ORAL NIGHTLY
Qty: 30 TABLET | Refills: 11 | Status: SHIPPED | OUTPATIENT
Start: 2023-11-16

## 2023-11-16 RX ORDER — HYDROCODONE BITARTRATE AND ACETAMINOPHEN 10; 325 MG/1; MG/1
1 TABLET ORAL EVERY 8 HOURS PRN
Qty: 90 TABLET | Refills: 0 | Status: SHIPPED | OUTPATIENT
Start: 2023-11-16

## 2023-11-16 RX ORDER — ALPRAZOLAM 0.5 MG/1
0.5 TABLET ORAL DAILY
Qty: 30 TABLET | Refills: 0 | Status: SHIPPED | OUTPATIENT
Start: 2023-11-16

## 2023-11-16 NOTE — PROGRESS NOTES
Chief Complaint  Anxiety and Pain    Subjective        Clyde Tamayo presents to [facility]  Anxiety      His past medical history is significant for depression.   Depression  Leg Pain     Pain        Clyde Tamayo is a 47 y.o. male who returns for follow-up of chronic pain related to his midline low back.  his pain is stable and well controlled.    I have discussed with him the possibility of addiction, overdose, as well as the limited benefits and other risks of opioid use.  Patient reports a greater than 30% continuous improvement in functional capability on the 3 item PEG assessment scale since his initial assessment, his [place] has been reviewed, and risk for diversion or abuse appears low.  The patient states that he has no side effects of his medication, does not appear oversedated, states that he is not modifying his own regimen, and that he is not taking any other illicit substances.  The patient has never had an overdose and needed a rescue medication at this time.    Pain characteristics:  Current pain level : 7/10 as reported by patient.    Pain Description :  numbness, dull, ache, throbbing, burning, and stinging. does radiate and occurs continuously.   Alleviating factors :   pain medication  Aggravating factors : standing and walking  Associated Symptoms:  lower extremity incoordination    Limitations: This pain limits the patient from  ambulating, needs an assitive device    PEG scale:  1. Pain on average over the Last week 7/10  2. Patient states that durring the past week that thier pain has interfered with his enjoyment of life 7/10  with 10 bieng complete interference.  3. The patient also states that during the past week that that his pain has interfered with his general activity 7/10  with 10 bieng complete interference      Final Peg Score: 7    Has been having troubler sleeping and with his anxiety will switch his seroquel xr to a immefiate relase at a  "higher dosage at night.  Will continue to wean his alprazolam at this time. States his celexa is working well for his anxiety at this time.    He states hi testosterone working well.      No other problems, complaints. or concerns noted.      Current MME total: 30    Objective Radiological Findings: moderate degenerative change on lumbar xray    Trearment goals: reduced pain, Increased activities of daily living    History:   Duration of the patient's pain: since before 1990   Legal issues? NO   Current controlled medication and doses: norco   Medications that have failed: percocet, nsaids, tylenol, tramadol, morphine, soma, darvocet   Side effects of current medications: no   Satisfaction with treatment: yes    Functional Status:   Able to feed self: YES   Able to bathe self: YES   Able to use the toilet independently: YES   Able to dress self: YES   Able to get up from bed or chair without assistance: yes    Adverse Events:   Constipation: yes   Lethargy: NO     Aberrant Behavior:   Over dose: NO   Run out of medications early: NO   Last UDS consistent: yes   Taking medications as prescribed: YES           Objective   Vital Signs:  /82 (BP Location: Left arm, Patient Position: Sitting, Cuff Size: Large Adult)   Pulse (!) 160   Temp 96.9 °F (36.1 °C) (Infrared)   Ht 185.4 cm (73\")   Wt 130 kg (286 lb)   SpO2 97%   BMI 37.73 kg/m²   Estimated body mass index is 37.73 kg/m² as calculated from the following:    Height as of this encounter: 185.4 cm (73\").    Weight as of this encounter: 130 kg (286 lb).       Past Medical History:   Diagnosis Date    Anxiety     Arthritis     Bipolar 1 disorder     Hypertension     Personal history of nicotine dependence 08/03/2023    PTSD (post-traumatic stress disorder)        Past Surgical History:   Procedure Laterality Date    HIP SURGERY         Social History     Tobacco Use    Smoking status: Every Day     Packs/day: 0.50     Years: 30.00     Additional pack years: " 0.00     Total pack years: 15.00     Types: Cigarettes    Smokeless tobacco: Never    Tobacco comments:     4 cigs a day   Vaping Use    Vaping Use: Never used   Substance Use Topics    Alcohol use: No    Drug use: No       Family History   Problem Relation Age of Onset    Heart attack Mother     Arthritis Father     Diabetes Father     Hypertension Father        Allergies as of 11/16/2023 - Reviewed 11/16/2023   Allergen Reaction Noted    Adderall [amphetamine-dextroamphetamine] Shortness Of Breath, Swelling, Palpitations, and Hallucinations 04/14/2020    Meloxicam Nausea Only     Darvon [propoxyphene] GI Intolerance 06/28/2019    Motrin [ibuprofen] GI Intolerance 06/28/2019    Toradol [ketorolac tromethamine] GI Intolerance 06/28/2019    Tramadol GI Intolerance 06/28/2019         Current Outpatient Medications:     albuterol sulfate  (90 Base) MCG/ACT inhaler, Inhale 2 puffs Every 4 (Four) Hours As Needed for Wheezing., Disp: 18 g, Rfl: 3    ALPRAZolam (XANAX) 0.5 MG tablet, Take 1 tablet by mouth Daily., Disp: 30 tablet, Rfl: 0    amLODIPine (NORVASC) 5 MG tablet, Take 1 tablet by mouth Daily., Disp: 30 tablet, Rfl: 3    atorvastatin (Lipitor) 80 MG tablet, Take 1 tablet by mouth Daily., Disp: 90 tablet, Rfl: 1    CBD (cannabidiol) oral oil, Take  by mouth., Disp: , Rfl:     citalopram (CeleXA) 40 MG tablet, Take 1 tablet by mouth Daily., Disp: 30 tablet, Rfl: 11    doxepin (SINEquan) 10 MG capsule, Take 1-3 tabs at hs, Disp: 90 capsule, Rfl: 3    fluticasone (Flovent HFA) 220 MCG/ACT inhaler, Inhale 1 puff 2 (Two) Times a Day., Disp: 12 g, Rfl: 11    Homeopathic Products (Leg Cramps) tablet, Place 3 tablets under the tongue Every 4 (Four) Hours As Needed (leg cramps)., Disp: 100 tablet, Rfl: 0    HYDROcodone-acetaminophen (NORCO)  MG per tablet, Take 1 tablet by mouth Every 8 (Eight) Hours As Needed for Moderate Pain., Disp: 90 tablet, Rfl: 0    hydrOXYzine (ATARAX) 25 MG tablet, Take 1 tablet by  "mouth 3 (Three) Times a Day As Needed for Anxiety., Disp: 90 tablet, Rfl: 11    ipratropium-albuterol (Combivent Respimat)  MCG/ACT inhaler, Inhale 1 puff 4 (Four) Times a Day., Disp: 1 each, Rfl: 3    methocarbamol (Robaxin) 500 MG tablet, Take 1 tablet by mouth 3 (Three) Times a Day., Disp: 90 tablet, Rfl: 11    metoprolol tartrate (LOPRESSOR) 50 MG tablet, Take 1 tablet by mouth 2 (Two) Times a Day., Disp: 180 tablet, Rfl: 1    naloxone (NARCAN) 4 MG/0.1ML nasal spray, , Disp: , Rfl:     nicotine (NICODERM CQ) 21 MG/24HR patch, Place 1 patch on the skin as directed by provider Daily., Disp: 14 each, Rfl: 0    NON FORMULARY, Hemp products, Disp: , Rfl:     Syringe/Needle, Disp, (B-D SYRINGE/NEEDLE 3CC/22GX1.5) 22G X 1-1/2\" 3 ML misc, 2 syringes to use wwith testosterone, Disp: 2 each, Rfl: 11    Testosterone Cypionate (Depo-Testosterone) 200 MG/ML injection, Inject 1 mL into the appropriate muscle as directed by prescriber Every 14 (Fourteen) Days., Disp: 2 mL, Rfl: 2    QUEtiapine (SEROquel) 200 MG tablet, Take 1 tablet by mouth Every Night., Disp: 30 tablet, Rfl: 11         Physical Exam  Vitals and nursing note reviewed.   Constitutional:       General: He is not in acute distress.     Appearance: Normal appearance.   HENT:      Head: Normocephalic.   Eyes:      Extraocular Movements: Extraocular movements intact.      Pupils: Pupils are equal, round, and reactive to light.   Cardiovascular:      Rate and Rhythm: Normal rate and regular rhythm.      Heart sounds: Normal heart sounds. No murmur heard.  Pulmonary:      Effort: Pulmonary effort is normal. No respiratory distress.      Breath sounds: Normal breath sounds. No rhonchi or rales.   Abdominal:      General: Abdomen is flat. Bowel sounds are normal.      Palpations: Abdomen is soft.   Neurological:      General: No focal deficit present.      Mental Status: He is alert.        Result Review :                   Assessment and Plan   Diagnoses and " all orders for this visit:    1. Chronic pain due to trauma  -     HYDROcodone-acetaminophen (NORCO)  MG per tablet; Take 1 tablet by mouth Every 8 (Eight) Hours As Needed for Moderate Pain.  Dispense: 90 tablet; Refill: 0    2. Anxiety  -     ALPRAZolam (XANAX) 0.5 MG tablet; Take 1 tablet by mouth Daily.  Dispense: 30 tablet; Refill: 0  -     QUEtiapine (SEROquel) 200 MG tablet; Take 1 tablet by mouth Every Night.  Dispense: 30 tablet; Refill: 11    3. Long term use of drug  -     ALPRAZolam (XANAX) 0.5 MG tablet; Take 1 tablet by mouth Daily.  Dispense: 30 tablet; Refill: 0          As part of this patient's treatment plan, I am prescribing controlled substances. The patient has been made aware of appropriate use of such medications, including potential risk of somnolence, limited ability to drive and /or work safely, and potential for dependence or overdose. It has also been made clear that these medications are for use by this patient only, without concomitant use of alcohol or other substances unless prescribed.  Patient has been advised that PRN or as needed pain medication allows the patient to skip doses if not needed, but that the medication is not to be taken more often or at higher doses than prescribed.    Patient has completed prescribing agreement detailing terms of continued prescribing of controlled substances, including monitoring LAMBERT reports, urine drug screening, and pill counts if necessary. The patient is aware that inappropriate use will result in cessation of prescribing such medications, and possible termination from this practice.    History and physical exam exhibit continued safe and appropriate use of controlled substances.    1.  Controlled substance abuse agreement discussed and copy in record: YES  2.  Discussed:   Risk of addiction: YES   Specific risk of medications:YES   Side effects of medications: YES   Reasonable expectations of the medication: YES  3.  Treatment  Objectives:   Discussed management of constipation: YES   Discussed management of other side effects: YES  4.  eKASPER:     LAMBERT report has been reviewed by: Javier Raygoza MD on 11/16/23 in the PDMD in the electronic medical record.  [unfilled]   Results/Date of last LAMBERT:  appropriate  5.  Results/Date of last drug screen:  appropriate  6.  Follow up plan:    Follow Up in One Months Time              Continue on current medications as directed    EMR Dictation/Transcription disclaimer:   Some of this note may be an electronic transcription/translation of spoken language to printed text. The electronic translation of spoken language may permit erroneous, or at times, nonsensical words or phrases to be inadvertently transcribed; Although I have reviewed the note for such errors, some may still exist.          Follow Up   No follow-ups on file.  Patient was given instructions and counseling regarding his condition or for health maintenance advice. Please see specific information pulled into the AVS if appropriate.

## 2023-12-13 ENCOUNTER — OFFICE VISIT (OUTPATIENT)
Dept: INTERNAL MEDICINE | Facility: CLINIC | Age: 48
End: 2023-12-13
Payer: MEDICARE

## 2023-12-13 ENCOUNTER — TELEPHONE (OUTPATIENT)
Dept: INTERNAL MEDICINE | Facility: CLINIC | Age: 48
End: 2023-12-13

## 2023-12-13 VITALS
OXYGEN SATURATION: 96 % | SYSTOLIC BLOOD PRESSURE: 160 MMHG | BODY MASS INDEX: 38.57 KG/M2 | TEMPERATURE: 100.2 F | DIASTOLIC BLOOD PRESSURE: 83 MMHG | RESPIRATION RATE: 16 BRPM | WEIGHT: 291 LBS | HEART RATE: 87 BPM | HEIGHT: 73 IN

## 2023-12-13 DIAGNOSIS — F41.9 ANXIETY: ICD-10-CM

## 2023-12-13 DIAGNOSIS — R50.9 FEVER, UNSPECIFIED FEVER CAUSE: Primary | ICD-10-CM

## 2023-12-13 DIAGNOSIS — Z79.899 LONG TERM USE OF DRUG: ICD-10-CM

## 2023-12-13 DIAGNOSIS — J01.00 ACUTE MAXILLARY SINUSITIS, RECURRENCE NOT SPECIFIED: ICD-10-CM

## 2023-12-13 DIAGNOSIS — F41.0 PANIC ATTACKS: ICD-10-CM

## 2023-12-13 DIAGNOSIS — G89.21 CHRONIC PAIN DUE TO TRAUMA: ICD-10-CM

## 2023-12-13 LAB
EXPIRATION DATE: NORMAL
FLUAV AG UPPER RESP QL IA.RAPID: NOT DETECTED
FLUBV AG UPPER RESP QL IA.RAPID: NOT DETECTED
INTERNAL CONTROL: NORMAL
Lab: NORMAL
SARS-COV-2 AG UPPER RESP QL IA.RAPID: NOT DETECTED

## 2023-12-13 RX ORDER — HYDROCODONE BITARTRATE AND ACETAMINOPHEN 10; 325 MG/1; MG/1
1 TABLET ORAL EVERY 6 HOURS PRN
Qty: 120 TABLET | Refills: 0 | Status: SHIPPED | OUTPATIENT
Start: 2023-12-13

## 2023-12-13 RX ORDER — GUAIFENESIN 600 MG/1
600 TABLET, EXTENDED RELEASE ORAL 2 TIMES DAILY
Qty: 20 TABLET | Refills: 0 | Status: SHIPPED | OUTPATIENT
Start: 2023-12-13 | End: 2023-12-23

## 2023-12-13 RX ORDER — ALPRAZOLAM 0.5 MG/1
TABLET ORAL
Qty: 10 TABLET | Refills: 0 | Status: SHIPPED | OUTPATIENT
Start: 2023-12-13

## 2023-12-13 RX ORDER — QUETIAPINE FUMARATE 300 MG/1
300 TABLET, FILM COATED ORAL NIGHTLY
Qty: 30 TABLET | Refills: 11 | Status: SHIPPED | OUTPATIENT
Start: 2023-12-13

## 2023-12-13 RX ORDER — AMOXICILLIN 500 MG/1
500 CAPSULE ORAL 2 TIMES DAILY
Qty: 20 CAPSULE | Refills: 0 | Status: SHIPPED | OUTPATIENT
Start: 2023-12-13 | End: 2023-12-23

## 2023-12-13 RX ORDER — HYDROXYZINE 50 MG/1
50 TABLET, FILM COATED ORAL 3 TIMES DAILY PRN
Qty: 90 TABLET | Refills: 11 | Status: SHIPPED | OUTPATIENT
Start: 2023-12-13

## 2023-12-13 RX ORDER — METHYLPREDNISOLONE 4 MG/1
TABLET ORAL
Qty: 21 TABLET | Refills: 0 | Status: SHIPPED | OUTPATIENT
Start: 2023-12-13 | End: 2023-12-27

## 2023-12-13 NOTE — TELEPHONE ENCOUNTER
Caller: [facility] - [place], [place] - [address] - [phone] [facility] [phone] FX    Relationship: Pharmacy  SPOKE WITH THEODORE Kwan call back number: [phone]     What is the best time to reach you: ANYTIME    Who are you requesting to speak with (clinical staff, provider,  specific staff member): CLINICAL      What was the call regarding: REGARDING XANAX

## 2023-12-13 NOTE — PROGRESS NOTES
Chief Complaint  Anxiety, Pain, and Fever    Subjective        Clyde Tamayo presents to [facility]  History of Present Illness    Clyde Tamayo is a 47 y.o. male who returns for follow-up of chronic pain related to his midline low back.   his pain is stable and well controlled.    The current pain level reported by patient is 9/10.  The patient also states that during the past week that that the pain has interfered with his general activity 9/10  with 10 bieng complete interference.  Patient states that durring the past week that his pain has interfered with his enjoyment of life 9/10  with 10 bieng complete interference.  Pain on average over the Last week 9/10.  Patient states the pain is continuously .  Patient describes the pain as  numbness, sharp, and stabbing. does radiate.  Patient states that they have the following alleviating factors pain medication, and the following aggravating factorsinactivity.  Patient reports the following associated symptoms with their pain patient denies any problems .  Patient would like to slightly increase his Norco at this time states he believes it would help with that as insomnia as he wakes up in pain.     This patient relates that he can do more activities of daily living and is much more functional on his current dose of pain medicine than he could be otherwise.  We discussed his treatment plan at this time and he continues to be compliant with this and the controlled substance contract.  The patient relates that the pain medicine continues to help significantly with his pain, and  states that he continues to benefit from the medication.  Alternative therapies including anti inflammatories have been tried in the past and he has found these to be ineffective, will continue him on opiate therapy for their chronic pain at this time. The patient states that he has had no side effects of his medication.  They likewise do not appear to have  evidence of oversedation, confusion, slurred speech, or abnormal gait at this time.  The patients risk factors for increased risk of harm from opiates has been discussed and reviewed with him.   The patient states that he is not modifying his own regimen, and that he is not taking any other illicit substances not prescribed by this office.  The patient has never had an overdose and needed a rescue medication at this time.    Final Peg score:9    Has been having troubler sleeping and with his anxiety will switch his seroquel immediate relase at a higher dosage at night.  Will continue to wean his alprazolam at this time to dc.  Patient states that overall his anxiety has been doing okay even with the discontinuation of his alprazolam he would however like to increase his hydroxyzine and Seroquel states his insomnia is not doing as well as he would like.    The patient states he has significant head congestion with mild associated cough and congestion.   Drainage has been yellowish green in color, as well as any coughed up sputum..  There has been some facial pain and pressure.  Patient reports myalgias, fatige and sore throat at this time.  patient reports no fever, nausea, vomiting or diarrhea associated with this at this time.  Patient reports that this has been going on for 6 days at this point, and would like something to help with their sinusitis at this time.  Patient's flu/covid swab was negative at this time.       No other problems, complaints. or concerns noted.      Current MME total: 40    Objective Radiological Findings: moderate degenerative change on lumbar xray    Trearment goals: reduced pain, Increased activities of daily living    History:   Duration of the patient's pain: since before 1990   Legal issues? NO   Current controlled medication and doses: norco   Medications that have failed: percocet, nsaids, tylenol, tramadol, morphine, soma, darvocet   Side effects of current medications:  "no   Satisfaction with treatment: yes    Functional Status:   Able to feed self: YES   Able to bathe self: YES   Able to use the toilet independently: YES   Able to dress self: YES   Able to get up from bed or chair without assistance: yes    Adverse Events:   Constipation: yes   Lethargy: NO     Aberrant Behavior:   Over dose: NO   Run out of medications early: NO   Last UDS consistent: yes   Taking medications as prescribed: YES           Objective   Vital Signs:  /83 (BP Location: Right arm, Patient Position: Sitting, Cuff Size: Large Adult)   Pulse 87   Temp 100.2 °F (37.9 °C) (Infrared)   Resp 16   Ht 185.4 cm (72.99\")   Wt 132 kg (291 lb)   SpO2 96%   BMI 38.40 kg/m²   Estimated body mass index is 38.4 kg/m² as calculated from the following:    Height as of this encounter: 185.4 cm (72.99\").    Weight as of this encounter: 132 kg (291 lb).       Past Medical History:   Diagnosis Date    Anxiety     Arthritis     Bipolar 1 disorder     Hypertension     Personal history of nicotine dependence 08/03/2023    PTSD (post-traumatic stress disorder)        Past Surgical History:   Procedure Laterality Date    HIP SURGERY         Social History     Tobacco Use    Smoking status: Every Day     Packs/day: 0.50     Years: 30.00     Additional pack years: 0.00     Total pack years: 15.00     Types: Cigarettes    Smokeless tobacco: Never    Tobacco comments:     4 cigs a day   Vaping Use    Vaping Use: Never used   Substance Use Topics    Alcohol use: No    Drug use: No       Family History   Problem Relation Age of Onset    Heart attack Mother     Arthritis Father     Diabetes Father     Hypertension Father        Allergies as of 12/13/2023 - Reviewed 12/13/2023   Allergen Reaction Noted    Adderall [amphetamine-dextroamphetamine] Shortness Of Breath, Swelling, Palpitations, and Hallucinations 04/14/2020    Meloxicam Nausea Only     Darvon [propoxyphene] GI Intolerance 06/28/2019    Motrin [ibuprofen] GI " Intolerance 06/28/2019    Toradol [ketorolac tromethamine] GI Intolerance 06/28/2019    Tramadol GI Intolerance 06/28/2019         Current Outpatient Medications:     ALPRAZolam (XANAX) 0.5 MG tablet, 1 tab every other day x 10 days, 1 tab every 3rd day x 15, Disp: 10 tablet, Rfl: 0    HYDROcodone-acetaminophen (NORCO)  MG per tablet, Take 1 tablet by mouth Every 6 (Six) Hours As Needed for Moderate Pain., Disp: 120 tablet, Rfl: 0    hydrOXYzine (ATARAX) 50 MG tablet, Take 1 tablet by mouth 3 (Three) Times a Day As Needed for Anxiety., Disp: 90 tablet, Rfl: 11    QUEtiapine (SEROquel) 300 MG tablet, Take 1 tablet by mouth Every Night., Disp: 30 tablet, Rfl: 11    albuterol sulfate  (90 Base) MCG/ACT inhaler, Inhale 2 puffs Every 4 (Four) Hours As Needed for Wheezing., Disp: 18 g, Rfl: 3    amLODIPine (NORVASC) 5 MG tablet, Take 1 tablet by mouth Daily., Disp: 30 tablet, Rfl: 3    amoxicillin (AMOXIL) 500 MG capsule, Take 1 capsule by mouth 2 (Two) Times a Day for 10 days., Disp: 20 capsule, Rfl: 0    atorvastatin (Lipitor) 80 MG tablet, Take 1 tablet by mouth Daily., Disp: 90 tablet, Rfl: 1    CBD (cannabidiol) oral oil, Take  by mouth., Disp: , Rfl:     citalopram (CeleXA) 40 MG tablet, Take 1 tablet by mouth Daily., Disp: 30 tablet, Rfl: 11    doxepin (SINEquan) 10 MG capsule, Take 1-3 tabs at hs, Disp: 90 capsule, Rfl: 3    fluticasone (Flovent HFA) 220 MCG/ACT inhaler, Inhale 1 puff 2 (Two) Times a Day., Disp: 12 g, Rfl: 11    guaiFENesin (Mucinex) 600 MG 12 hr tablet, Take 1 tablet by mouth 2 (Two) Times a Day for 10 days., Disp: 20 tablet, Rfl: 0    Homeopathic Products (Leg Cramps) tablet, Place 3 tablets under the tongue Every 4 (Four) Hours As Needed (leg cramps)., Disp: 100 tablet, Rfl: 0    ipratropium-albuterol (Combivent Respimat)  MCG/ACT inhaler, Inhale 1 puff 4 (Four) Times a Day., Disp: 1 each, Rfl: 3    methocarbamol (Robaxin) 500 MG tablet, Take 1 tablet by mouth 3 (Three) Times  "a Day., Disp: 90 tablet, Rfl: 11    methylPREDNISolone (MEDROL) 4 MG dose pack, Take as directed on package instructions., Disp: 21 tablet, Rfl: 0    metoprolol tartrate (LOPRESSOR) 50 MG tablet, Take 1 tablet by mouth 2 (Two) Times a Day., Disp: 180 tablet, Rfl: 1    naloxone (NARCAN) 4 MG/0.1ML nasal spray, , Disp: , Rfl:     nicotine (NICODERM CQ) 21 MG/24HR patch, Place 1 patch on the skin as directed by provider Daily., Disp: 14 each, Rfl: 0    NON FORMULARY, Hemp products, Disp: , Rfl:     Syringe/Needle, Disp, (B-D SYRINGE/NEEDLE 3CC/22GX1.5) 22G X 1-1/2\" 3 ML misc, 2 syringes to use wwith testosterone, Disp: 2 each, Rfl: 11    Testosterone Cypionate (Depo-Testosterone) 200 MG/ML injection, Inject 1 mL into the appropriate muscle as directed by prescriber Every 14 (Fourteen) Days., Disp: 2 mL, Rfl: 2         Physical Exam  Vitals and nursing note reviewed.   Constitutional:       General: He is not in acute distress.     Appearance: Normal appearance.   HENT:      Head: Normocephalic.   Eyes:      Extraocular Movements: Extraocular movements intact.      Pupils: Pupils are equal, round, and reactive to light.   Cardiovascular:      Rate and Rhythm: Normal rate and regular rhythm.      Heart sounds: Normal heart sounds. No murmur heard.  Pulmonary:      Effort: Pulmonary effort is normal. No respiratory distress.      Breath sounds: Normal breath sounds. No rhonchi or rales.   Abdominal:      General: Abdomen is flat. Bowel sounds are normal.      Palpations: Abdomen is soft.   Neurological:      General: No focal deficit present.      Mental Status: He is alert.        Result Review :                   Assessment and Plan   Diagnoses and all orders for this visit:    1. Fever, unspecified fever cause (Primary)  -     POCT SARS-CoV-2 Antigen RACHEL + Flu    2. Chronic pain due to trauma  -     HYDROcodone-acetaminophen (NORCO)  MG per tablet; Take 1 tablet by mouth Every 6 (Six) Hours As Needed for Moderate " Pain.  Dispense: 120 tablet; Refill: 0    3. Anxiety  -     ALPRAZolam (XANAX) 0.5 MG tablet; 1 tab every other day x 10 days, 1 tab every 3rd day x 15  Dispense: 10 tablet; Refill: 0  -     hydrOXYzine (ATARAX) 50 MG tablet; Take 1 tablet by mouth 3 (Three) Times a Day As Needed for Anxiety.  Dispense: 90 tablet; Refill: 11  -     QUEtiapine (SEROquel) 300 MG tablet; Take 1 tablet by mouth Every Night.  Dispense: 30 tablet; Refill: 11    4. Long term use of drug  -     ALPRAZolam (XANAX) 0.5 MG tablet; 1 tab every other day x 10 days, 1 tab every 3rd day x 15  Dispense: 10 tablet; Refill: 0    5. Panic attacks  -     hydrOXYzine (ATARAX) 50 MG tablet; Take 1 tablet by mouth 3 (Three) Times a Day As Needed for Anxiety.  Dispense: 90 tablet; Refill: 11    6. Acute maxillary sinusitis, recurrence not specified  -     amoxicillin (AMOXIL) 500 MG capsule; Take 1 capsule by mouth 2 (Two) Times a Day for 10 days.  Dispense: 20 capsule; Refill: 0  -     methylPREDNISolone (MEDROL) 4 MG dose pack; Take as directed on package instructions.  Dispense: 21 tablet; Refill: 0  -     guaiFENesin (Mucinex) 600 MG 12 hr tablet; Take 1 tablet by mouth 2 (Two) Times a Day for 10 days.  Dispense: 20 tablet; Refill: 0          As part of this patient's treatment plan, I am prescribing controlled substances. The patient has been made aware of appropriate use of such medications, including potential risk of somnolence, limited ability to drive and /or work safely, and potential for dependence or overdose. It has also been made clear that these medications are for use by this patient only, without concomitant use of alcohol or other substances unless prescribed.  Patient has been advised that PRN or as needed pain medication allows the patient to skip doses if not needed, but that the medication is not to be taken more often or at higher doses than prescribed.    Patient has completed prescribing agreement detailing terms of continued  prescribing of controlled substances, including monitoring LAMBERT reports, urine drug screening, and pill counts if necessary. The patient is aware that inappropriate use will result in cessation of prescribing such medications, and possible termination from this practice.    History and physical exam exhibit continued safe and appropriate use of controlled substances.    1.  Controlled substance abuse agreement discussed and copy in record: YES  2.  Discussed:   Risk of addiction: YES   Specific risk of medications:YES   Side effects of medications: YES   Reasonable expectations of the medication: YES  3.  Treatment Objectives:   Discussed management of constipation: YES   Discussed management of other side effects: YES  4.  eKASPER:     LAMBERT report has been reviewed by: Javier Raygoza MD on 12/13/23 in the PDMD in the electronic medical record.  [unfilled]   Results/Date of last LAMBERT:  appropriate  5.  Results/Date of last drug screen:  appropriate  6.  Follow up plan:    Follow Up in One Months Time              Continue on current medications as directed    EMR Dictation/Transcription disclaimer:   Some of this note may be an electronic transcription/translation of spoken language to printed text. The electronic translation of spoken language may permit erroneous, or at times, nonsensical words or phrases to be inadvertently transcribed; Although I have reviewed the note for such errors, some may still exist.          Follow Up   No follow-ups on file.  Patient was given instructions and counseling regarding his condition or for health maintenance advice. Please see specific information pulled into the AVS if appropriate.

## 2024-01-09 ENCOUNTER — OFFICE VISIT (OUTPATIENT)
Dept: INTERNAL MEDICINE | Facility: CLINIC | Age: 49
End: 2024-01-09
Payer: MEDICARE

## 2024-01-09 VITALS
SYSTOLIC BLOOD PRESSURE: 135 MMHG | HEART RATE: 82 BPM | WEIGHT: 276 LBS | TEMPERATURE: 97.3 F | HEIGHT: 73 IN | OXYGEN SATURATION: 97 % | DIASTOLIC BLOOD PRESSURE: 83 MMHG | BODY MASS INDEX: 36.58 KG/M2

## 2024-01-09 DIAGNOSIS — F51.01 PRIMARY INSOMNIA: Primary | ICD-10-CM

## 2024-01-09 DIAGNOSIS — Z79.899 LONG-TERM USE OF HIGH-RISK MEDICATION: ICD-10-CM

## 2024-01-09 DIAGNOSIS — G89.21 CHRONIC PAIN DUE TO TRAUMA: ICD-10-CM

## 2024-01-09 DIAGNOSIS — E29.1 HYPOGONADISM MALE: ICD-10-CM

## 2024-01-09 PROCEDURE — 1160F RVW MEDS BY RX/DR IN RCRD: CPT | Performed by: FAMILY MEDICINE

## 2024-01-09 PROCEDURE — 3079F DIAST BP 80-89 MM HG: CPT | Performed by: FAMILY MEDICINE

## 2024-01-09 PROCEDURE — 99213 OFFICE O/P EST LOW 20 MIN: CPT | Performed by: FAMILY MEDICINE

## 2024-01-09 PROCEDURE — 3075F SYST BP GE 130 - 139MM HG: CPT | Performed by: FAMILY MEDICINE

## 2024-01-09 PROCEDURE — 1159F MED LIST DOCD IN RCRD: CPT | Performed by: FAMILY MEDICINE

## 2024-01-09 RX ORDER — TRAZODONE HYDROCHLORIDE 50 MG/1
50 TABLET ORAL NIGHTLY
Qty: 30 TABLET | Refills: 11 | Status: SHIPPED | OUTPATIENT
Start: 2024-01-09

## 2024-01-09 RX ORDER — OXYCODONE AND ACETAMINOPHEN 10; 325 MG/1; MG/1
1 TABLET ORAL EVERY 8 HOURS PRN
Qty: 90 TABLET | Refills: 0 | Status: SHIPPED | OUTPATIENT
Start: 2024-01-09

## 2024-01-09 RX ORDER — HYDROCODONE BITARTRATE AND ACETAMINOPHEN 10; 325 MG/1; MG/1
1 TABLET ORAL EVERY 6 HOURS PRN
Qty: 120 TABLET | Refills: 0 | Status: CANCELLED | OUTPATIENT
Start: 2024-01-09

## 2024-01-09 RX ORDER — TESTOSTERONE CYPIONATE 200 MG/ML
200 INJECTION, SOLUTION INTRAMUSCULAR
Qty: 2 ML | Refills: 2 | Status: SHIPPED | OUTPATIENT
Start: 2024-01-09

## 2024-01-09 NOTE — PROGRESS NOTES
Chief Complaint  Pain and Anxiety    Subjective        Clyde Tamayo presents to [facility]  History of Present Illness    Clyde Tamayo is a 48 y.o. male who returns for follow-up of chronic pain related to his midline low back.   his pain is stable and well controlled.  Wopuld like to go back to percocet as he states it works better than the norco    The patients possible risk factors for increased risk of harm from opiates has been discussed and reviewed with him.  Patient has tried multiple other therapies including anti-inflammatories in the past which have been ineffective in controlling his chronic pain, I will continue this patient on opioid therapy at this time.  The patient states that he has had no side effects of his medication, and shows no evidence of oversedation, confusion, slurred speech, or abnormal gait at this time.   The patient states that he is not modifying his own regimen, and that he is not taking any other illicit substances not prescribed by this office.  Patient states that he continues to benefit from the medication as far as his daily Functional Capacity and Abilities.  Treatment plan is reviewed in he continues to be compliant at this time.  Prior PEG scale reviewed at this time.  The patient has never had an overdose and needed a rescue medication at this time.    Pain characteristics:  Current pain level : 8/10 as reported by patient.    Pain Description :  dull, ache, sharp, shooting, and throbbing. does radiate and occurs continuously.   Alleviating factors :   pain medication  Aggravating factors :  weather  Associated Symptoms:  patient denies any problems        PEG scale:  1. Pain on average over the Last week 8/10  2. Patient states that durring the past week that thier pain has interfered with his enjoyment of life 8/10  with 10 bieng complete interference.  3. The patient also states that during the past week that that his pain has  "interfered with his general activity 8/10  with 10 bieng complete interference      Final Peg Score: 8    Will add some trazadone to help with sleep and dc the doxepin at this time.  Has worked of of alprazolam at this time.      His testosterone continues to work very well for him.  He states he is happy with his current dose of testosterone states it helped significantly with his energy levels as well as his wellbeing.  He has reports no side effects with taking his current level of testosterone blood pressure much better controlled at this visit    No other problems, complaints. or concerns noted.     Current MME total: 45    Objective Radiological Findings: moderate degenerative change on lumbar xray    Trearment goals: reduced pain, Increased activities of daily living    History:   Duration of the patient's pain: since before 1990   Legal issues? NO   Current controlled medication and doses: norco   Medications that have failed: percocet, nsaids, tylenol, tramadol, morphine, soma, darvocet   Side effects of current medications: no   Satisfaction with treatment: yes    Functional Status:   Able to feed self: YES   Able to bathe self: YES   Able to use the toilet independently: YES   Able to dress self: YES   Able to get up from bed or chair without assistance: yes    Adverse Events:   Constipation: yes   Lethargy: NO     Aberrant Behavior:   Over dose: NO   Run out of medications early: NO   Last UDS consistent: yes   Taking medications as prescribed: YES           Objective   Vital Signs:  /83 (BP Location: Left arm, Patient Position: Sitting, Cuff Size: Adult)   Pulse 82   Temp 97.3 °F (36.3 °C) (Infrared)   Ht 185.4 cm (73\")   Wt 125 kg (276 lb)   SpO2 97%   BMI 36.41 kg/m²   Estimated body mass index is 36.41 kg/m² as calculated from the following:    Height as of this encounter: 185.4 cm (73\").    Weight as of this encounter: 125 kg (276 lb).       Past Medical History:   Diagnosis Date    " Anxiety     Arthritis     Bipolar 1 disorder     Hypertension     Personal history of nicotine dependence 08/03/2023    PTSD (post-traumatic stress disorder)        Past Surgical History:   Procedure Laterality Date    HIP SURGERY         Social History     Tobacco Use    Smoking status: Every Day     Packs/day: 0.50     Years: 30.00     Additional pack years: 0.00     Total pack years: 15.00     Types: Cigarettes    Smokeless tobacco: Never    Tobacco comments:     4 cigs a day   Vaping Use    Vaping Use: Never used   Substance Use Topics    Alcohol use: No    Drug use: No       Family History   Problem Relation Age of Onset    Heart attack Mother     Arthritis Father     Diabetes Father     Hypertension Father        Allergies as of 01/09/2024 - Reviewed 01/09/2024   Allergen Reaction Noted    Adderall [amphetamine-dextroamphetamine] Shortness Of Breath, Swelling, Palpitations, and Hallucinations 04/14/2020    Meloxicam Nausea Only     Darvon [propoxyphene] GI Intolerance 06/28/2019    Motrin [ibuprofen] GI Intolerance 06/28/2019    Toradol [ketorolac tromethamine] GI Intolerance 06/28/2019    Tramadol GI Intolerance 06/28/2019         Current Outpatient Medications:     albuterol sulfate  (90 Base) MCG/ACT inhaler, Inhale 2 puffs Every 4 (Four) Hours As Needed for Wheezing., Disp: 18 g, Rfl: 3    amLODIPine (NORVASC) 5 MG tablet, Take 1 tablet by mouth Daily., Disp: 30 tablet, Rfl: 3    atorvastatin (Lipitor) 80 MG tablet, Take 1 tablet by mouth Daily., Disp: 90 tablet, Rfl: 1    CBD (cannabidiol) oral oil, Take  by mouth., Disp: , Rfl:     citalopram (CeleXA) 40 MG tablet, Take 1 tablet by mouth Daily., Disp: 30 tablet, Rfl: 11    fluticasone (Flovent HFA) 220 MCG/ACT inhaler, Inhale 1 puff 2 (Two) Times a Day., Disp: 12 g, Rfl: 11    Homeopathic Products (Leg Cramps) tablet, Place 3 tablets under the tongue Every 4 (Four) Hours As Needed (leg cramps)., Disp: 100 tablet, Rfl: 0    hydrOXYzine (ATARAX) 50  "MG tablet, Take 1 tablet by mouth 3 (Three) Times a Day As Needed for Anxiety., Disp: 90 tablet, Rfl: 11    ipratropium-albuterol (Combivent Respimat)  MCG/ACT inhaler, Inhale 1 puff 4 (Four) Times a Day., Disp: 1 each, Rfl: 3    methocarbamol (Robaxin) 500 MG tablet, Take 1 tablet by mouth 3 (Three) Times a Day., Disp: 90 tablet, Rfl: 11    metoprolol tartrate (LOPRESSOR) 50 MG tablet, Take 1 tablet by mouth 2 (Two) Times a Day., Disp: 180 tablet, Rfl: 1    naloxone (NARCAN) 4 MG/0.1ML nasal spray, , Disp: , Rfl:     nicotine (NICODERM CQ) 21 MG/24HR patch, Place 1 patch on the skin as directed by provider Daily., Disp: 14 each, Rfl: 0    NON FORMULARY, Hemp products, Disp: , Rfl:     QUEtiapine (SEROquel) 300 MG tablet, Take 1 tablet by mouth Every Night., Disp: 30 tablet, Rfl: 11    Syringe/Needle, Disp, (B-D SYRINGE/NEEDLE 3CC/22GX1.5) 22G X 1-1/2\" 3 ML misc, 2 syringes to use wwith testosterone, Disp: 2 each, Rfl: 11    Testosterone Cypionate (Depo-Testosterone) 200 MG/ML injection, Inject 1 mL into the appropriate muscle as directed by prescriber Every 14 (Fourteen) Days., Disp: 2 mL, Rfl: 2    oxyCODONE-acetaminophen (Percocet)  MG per tablet, Take 1 tablet by mouth Every 8 (Eight) Hours As Needed for Moderate Pain., Disp: 90 tablet, Rfl: 0    traZODone (DESYREL) 50 MG tablet, Take 1 tablet by mouth Every Night., Disp: 30 tablet, Rfl: 11       Review of systems   negative unless otherwise specified above in HPI      Physical Exam  Vitals and nursing note reviewed.   Constitutional:       General: He is not in acute distress.     Appearance: Normal appearance.   HENT:      Head: Normocephalic.   Eyes:      Extraocular Movements: Extraocular movements intact.      Pupils: Pupils are equal, round, and reactive to light.   Cardiovascular:      Rate and Rhythm: Normal rate and regular rhythm.      Heart sounds: Normal heart sounds. No murmur heard.  Pulmonary:      Effort: Pulmonary effort is normal. " No respiratory distress.      Breath sounds: Normal breath sounds. No rhonchi or rales.   Abdominal:      General: Abdomen is flat. Bowel sounds are normal.      Palpations: Abdomen is soft.   Neurological:      General: No focal deficit present.      Mental Status: He is alert.        Result Review :                   Assessment and Plan   Diagnoses and all orders for this visit:    1. Primary insomnia (Primary)  -     traZODone (DESYREL) 50 MG tablet; Take 1 tablet by mouth Every Night.  Dispense: 30 tablet; Refill: 11    2. Chronic pain due to trauma  -     oxyCODONE-acetaminophen (Percocet)  MG per tablet; Take 1 tablet by mouth Every 8 (Eight) Hours As Needed for Moderate Pain.  Dispense: 90 tablet; Refill: 0    3. Hypogonadism male  -     Testosterone Cypionate (Depo-Testosterone) 200 MG/ML injection; Inject 1 mL into the appropriate muscle as directed by prescriber Every 14 (Fourteen) Days.  Dispense: 2 mL; Refill: 2    4. Long-term use of high-risk medication  -     oxyCODONE-acetaminophen (Percocet)  MG per tablet; Take 1 tablet by mouth Every 8 (Eight) Hours As Needed for Moderate Pain.  Dispense: 90 tablet; Refill: 0  -     Pain Management Profile (13 Drugs) Urine - Urine, Clean Catch            As part of this patient's treatment plan, I am prescribing controlled substances. The patient has been made aware of appropriate use of such medications, including potential risk of somnolence, limited ability to drive and /or work safely, and potential for dependence or overdose. It has also been made clear that these medications are for use by this patient only, without concomitant use of alcohol or other substances unless prescribed.  Patient has been advised that PRN or as needed pain medication allows the patient to skip doses if not needed, but that the medication is not to be taken more often or at higher doses than prescribed.    Patient has completed prescribing agreement detailing terms of  continued prescribing of controlled substances, including monitoring LAMBERT reports, urine drug screening, and pill counts if necessary. The patient is aware that inappropriate use will result in cessation of prescribing such medications, and possible termination from this practice.    History and physical exam exhibit continued safe and appropriate use of controlled substances.    1.  Controlled substance abuse agreement discussed and copy in record: YES  2.  Discussed:   Risk of addiction: YES   Specific risk of medications:YES   Side effects of medications: YES   Reasonable expectations of the medication: YES  3.  Treatment Objectives:   Discussed management of constipation: YES   Discussed management of other side effects: YES  4.  eKASPER:     LAMBERT report has been reviewed by: Javier Raygoza MD on 01/09/24 in the PDMD in the electronic medical record.  [unfilled]   Results/Date of last LAMBERT:  appropriate  5.  Results/Date of last drug screen:  appropriate  6.  Follow up plan:    Follow Up in One Months Time              Continue on current medications as directed    EMR Dictation/Transcription disclaimer:   Some of this note may be an electronic transcription/translation of spoken language to printed text. The electronic translation of spoken language may permit erroneous, or at times, nonsensical words or phrases to be inadvertently transcribed; Although I have reviewed the note for such errors, some may still exist.          Follow Up   No follow-ups on file.  Patient was given instructions and counseling regarding his condition or for health maintenance advice. Please see specific information pulled into the AVS if appropriate.

## 2024-01-14 LAB
AMPHETAMINES UR QL SCN: NEGATIVE NG/ML
BARBITURATES UR QL SCN: NEGATIVE NG/ML
BENZODIAZ UR QL SCN: NEGATIVE NG/ML
BZE UR QL SCN: NEGATIVE NG/ML
CANNABINOIDS UR QL SCN: NEGATIVE NG/ML
CREAT UR-MCNC: 165 MG/DL (ref 20–300)
FENTANYL UR-MCNC: NEGATIVE PG/ML
LABORATORY COMMENT REPORT: NORMAL
MEPERIDINE UR QL: NEGATIVE NG/ML
METHADONE UR QL SCN: NEGATIVE NG/ML
OPIATES UR QL SCN: NEGATIVE NG/ML
OXYCODONE+OXYMORPHONE UR QL SCN: NEGATIVE NG/ML
PCP UR QL: NEGATIVE
PH UR: 5.2 [PH] (ref 4.5–8.9)
PROPOXYPH UR QL SCN: NEGATIVE NG/ML
SP GR UR: 1.02
TRAMADOL UR QL SCN: NEGATIVE NG/ML

## 2024-02-05 ENCOUNTER — OFFICE VISIT (OUTPATIENT)
Dept: INTERNAL MEDICINE | Facility: CLINIC | Age: 49
End: 2024-02-05
Payer: MEDICARE

## 2024-02-05 VITALS
DIASTOLIC BLOOD PRESSURE: 84 MMHG | HEIGHT: 73 IN | HEART RATE: 79 BPM | OXYGEN SATURATION: 98 % | WEIGHT: 276 LBS | SYSTOLIC BLOOD PRESSURE: 136 MMHG | TEMPERATURE: 97.7 F | BODY MASS INDEX: 36.58 KG/M2

## 2024-02-05 DIAGNOSIS — R79.89 LOW TESTOSTERONE: Primary | ICD-10-CM

## 2024-02-05 DIAGNOSIS — F41.9 ANXIETY: ICD-10-CM

## 2024-02-05 DIAGNOSIS — Z79.899 LONG-TERM USE OF HIGH-RISK MEDICATION: ICD-10-CM

## 2024-02-05 DIAGNOSIS — J01.00 ACUTE MAXILLARY SINUSITIS, RECURRENCE NOT SPECIFIED: ICD-10-CM

## 2024-02-05 DIAGNOSIS — G89.21 CHRONIC PAIN DUE TO TRAUMA: ICD-10-CM

## 2024-02-05 DIAGNOSIS — R50.9 FEVER, UNSPECIFIED FEVER CAUSE: ICD-10-CM

## 2024-02-05 PROCEDURE — 3075F SYST BP GE 130 - 139MM HG: CPT | Performed by: FAMILY MEDICINE

## 2024-02-05 PROCEDURE — 1159F MED LIST DOCD IN RCRD: CPT | Performed by: FAMILY MEDICINE

## 2024-02-05 PROCEDURE — 3079F DIAST BP 80-89 MM HG: CPT | Performed by: FAMILY MEDICINE

## 2024-02-05 PROCEDURE — 1160F RVW MEDS BY RX/DR IN RCRD: CPT | Performed by: FAMILY MEDICINE

## 2024-02-05 PROCEDURE — 87428 SARSCOV & INF VIR A&B AG IA: CPT | Performed by: FAMILY MEDICINE

## 2024-02-05 PROCEDURE — 99213 OFFICE O/P EST LOW 20 MIN: CPT | Performed by: FAMILY MEDICINE

## 2024-02-05 RX ORDER — METHYLPREDNISOLONE 4 MG/1
TABLET ORAL
Qty: 21 TABLET | Refills: 0 | Status: SHIPPED | OUTPATIENT
Start: 2024-02-05 | End: 2024-02-19

## 2024-02-05 RX ORDER — OXYCODONE AND ACETAMINOPHEN 10; 325 MG/1; MG/1
1 TABLET ORAL EVERY 8 HOURS PRN
Qty: 90 TABLET | Refills: 0 | Status: SHIPPED | OUTPATIENT
Start: 2024-02-05

## 2024-02-05 RX ORDER — QUETIAPINE FUMARATE 300 MG/1
300 TABLET, FILM COATED ORAL NIGHTLY
Qty: 30 TABLET | Refills: 11 | Status: SHIPPED | OUTPATIENT
Start: 2024-02-05

## 2024-02-05 RX ORDER — AMOXICILLIN 500 MG/1
500 CAPSULE ORAL 2 TIMES DAILY
Qty: 20 CAPSULE | Refills: 0 | Status: SHIPPED | OUTPATIENT
Start: 2024-02-05 | End: 2024-02-15

## 2024-02-05 RX ORDER — DIVALPROEX SODIUM 125 MG/1
125 TABLET, DELAYED RELEASE ORAL 2 TIMES DAILY
Qty: 60 TABLET | Refills: 11 | Status: SHIPPED | OUTPATIENT
Start: 2024-02-05

## 2024-02-05 RX ORDER — GUAIFENESIN 600 MG/1
600 TABLET, EXTENDED RELEASE ORAL 2 TIMES DAILY
Qty: 20 TABLET | Refills: 0 | Status: SHIPPED | OUTPATIENT
Start: 2024-02-05 | End: 2024-02-15

## 2024-02-05 NOTE — PROGRESS NOTES
Chief Complaint  Pain, Insomnia, Fever (/), and Nasal Congestion    Tomas Tamayo presents to [facility]  Fever         Clyde Tamayo is a 48 y.o. male who returns for follow-up of chronic pain related to his midline low back.   his pain is stable and well controlled.      Pain described as sharp, shooting, and stabbing. does radiate and occurs continuously. Patient also experiences the following associated symptoms patient denies any problems .    Current pain level is a 9/10 .  Patient states that durring the past week that his pain has interfered with his enjoyment of life 9/10  with 10 bieng complete interference. The patient also states that during the past week that that the pain has interfered with his general activity 9/10  with 10 bieng complete interference. Patient reports that the pain improves with pain medication, and gets worse with standing and walking .      The treatment plan and the above PEG score are reviewed with the patient.  Patient feels that he is doing well with the pain and improvement provided to him by the medication.  Patient states he can do more activities of daily living while taking the medication that it significantly impacts the quality of his life.  The patient reports no side effects of his medication, and have no evidence of oversedation, confusion, slurred speech, or abnormal gait at this time.  Patient reports he is compliant with his regimen and not modifying his own dosage and/or timing.  The patient reports that he is not taking any illicit substances, or alcohol.  Patient continues to do well with the current treatment plan and states that he would like to continue with opioid therapy at this time.  The patient has never had an overdose and needed a rescue medication at this time.    Final Peg score: 9    The patient states he has significant head congestion with mild associated cough and congestion.   Drainage has  "been yellowish green in color, as well as any coughed up sputum..  There has been some facial pain and pressure.  Patient reports no fever, myalgias, nausea, vomiting or diarrhea associated with this at this time.  Patient reports that this has been going on for 3 days at this point, and would like something to help with their sinusitis at this time.     Otherwise no other problems, complaints, or concerns.       Current MME total: 45    Objective Radiological Findings: moderate degenerative change on lumbar xray    Trearment goals: reduced pain, Increased activities of daily living    History:   Duration of the patient's pain: since before 1990   Legal issues? NO   Current controlled medication and doses: norco   Medications that have failed: percocet, nsaids, tylenol, tramadol, morphine, soma, darvocet   Side effects of current medications: no   Satisfaction with treatment: yes    Functional Status:   Able to feed self: YES   Able to bathe self: YES   Able to use the toilet independently: YES   Able to dress self: YES   Able to get up from bed or chair without assistance: yes    Adverse Events:   Constipation: yes   Lethargy: NO     Aberrant Behavior:   Over dose: NO   Run out of medications early: NO   Last UDS consistent: yes   Taking medications as prescribed: YES           Objective   Vital Signs:  /84 (BP Location: Right arm, Patient Position: Sitting, Cuff Size: Adult)   Pulse 79   Temp 97.7 °F (36.5 °C) (Skin)   Ht 185.4 cm (73\")   Wt 125 kg (276 lb)   SpO2 98%   BMI 36.41 kg/m²   Estimated body mass index is 36.41 kg/m² as calculated from the following:    Height as of this encounter: 185.4 cm (73\").    Weight as of this encounter: 125 kg (276 lb).       Past Medical History:   Diagnosis Date    Anxiety     Arthritis     Bipolar 1 disorder     Hypertension     Personal history of nicotine dependence 08/03/2023    PTSD (post-traumatic stress disorder)        Past Surgical History:   Procedure " Laterality Date    HIP SURGERY         Social History     Tobacco Use    Smoking status: Every Day     Packs/day: 0.50     Years: 30.00     Additional pack years: 0.00     Total pack years: 15.00     Types: Cigarettes    Smokeless tobacco: Never    Tobacco comments:     4 cigs a day   Vaping Use    Vaping Use: Never used   Substance Use Topics    Alcohol use: No    Drug use: No       Family History   Problem Relation Age of Onset    Heart attack Mother     Arthritis Father     Diabetes Father     Hypertension Father        Allergies as of 02/05/2024 - Reviewed 02/05/2024   Allergen Reaction Noted    Adderall [amphetamine-dextroamphetamine] Shortness Of Breath, Swelling, Palpitations, and Hallucinations 04/14/2020    Meloxicam Nausea Only     Darvon [propoxyphene] GI Intolerance 06/28/2019    Motrin [ibuprofen] GI Intolerance 06/28/2019    Toradol [ketorolac tromethamine] GI Intolerance 06/28/2019    Tramadol GI Intolerance 06/28/2019         Current Outpatient Medications:     oxyCODONE-acetaminophen (Percocet)  MG per tablet, Take 1 tablet by mouth Every 8 (Eight) Hours As Needed for Moderate Pain., Disp: 90 tablet, Rfl: 0    QUEtiapine (SEROquel) 300 MG tablet, Take 1 tablet by mouth Every Night., Disp: 30 tablet, Rfl: 11    albuterol sulfate  (90 Base) MCG/ACT inhaler, Inhale 2 puffs Every 4 (Four) Hours As Needed for Wheezing., Disp: 18 g, Rfl: 3    amLODIPine (NORVASC) 5 MG tablet, Take 1 tablet by mouth Daily., Disp: 30 tablet, Rfl: 3    amoxicillin (AMOXIL) 500 MG capsule, Take 1 capsule by mouth 2 (Two) Times a Day for 10 days., Disp: 20 capsule, Rfl: 0    atorvastatin (Lipitor) 80 MG tablet, Take 1 tablet by mouth Daily., Disp: 90 tablet, Rfl: 1    CBD (cannabidiol) oral oil, Take  by mouth., Disp: , Rfl:     citalopram (CeleXA) 40 MG tablet, Take 1 tablet by mouth Daily., Disp: 30 tablet, Rfl: 11    divalproex (Depakote) 125 MG DR tablet, Take 1 tablet by mouth 2 (Two) Times a Day., Disp: 60  "tablet, Rfl: 11    fluticasone (Flovent HFA) 220 MCG/ACT inhaler, Inhale 1 puff 2 (Two) Times a Day., Disp: 12 g, Rfl: 11    guaiFENesin (Mucinex) 600 MG 12 hr tablet, Take 1 tablet by mouth 2 (Two) Times a Day for 10 days., Disp: 20 tablet, Rfl: 0    Homeopathic Products (Leg Cramps) tablet, Place 3 tablets under the tongue Every 4 (Four) Hours As Needed (leg cramps)., Disp: 100 tablet, Rfl: 0    hydrOXYzine (ATARAX) 50 MG tablet, Take 1 tablet by mouth 3 (Three) Times a Day As Needed for Anxiety., Disp: 90 tablet, Rfl: 11    ipratropium-albuterol (Combivent Respimat)  MCG/ACT inhaler, Inhale 1 puff 4 (Four) Times a Day., Disp: 1 each, Rfl: 3    methocarbamol (Robaxin) 500 MG tablet, Take 1 tablet by mouth 3 (Three) Times a Day., Disp: 90 tablet, Rfl: 11    methylPREDNISolone (MEDROL) 4 MG dose pack, Take as directed on package instructions., Disp: 21 tablet, Rfl: 0    metoprolol tartrate (LOPRESSOR) 50 MG tablet, Take 1 tablet by mouth 2 (Two) Times a Day., Disp: 180 tablet, Rfl: 1    naloxone (NARCAN) 4 MG/0.1ML nasal spray, , Disp: , Rfl:     nicotine (NICODERM CQ) 21 MG/24HR patch, Place 1 patch on the skin as directed by provider Daily., Disp: 14 each, Rfl: 0    NON FORMULARY, Hemp products, Disp: , Rfl:     Syringe/Needle, Disp, (B-D SYRINGE/NEEDLE 3CC/22GX1.5) 22G X 1-1/2\" 3 ML misc, 2 syringes to use wwith testosterone, Disp: 2 each, Rfl: 11    Testosterone Cypionate (Depo-Testosterone) 200 MG/ML injection, Inject 1 mL into the appropriate muscle as directed by prescriber Every 14 (Fourteen) Days., Disp: 2 mL, Rfl: 2       Review of systems   negative unless otherwise specified above in HPI      Physical Exam  Vitals and nursing note reviewed.   Constitutional:       General: He is not in acute distress.     Appearance: Normal appearance.   HENT:      Head: Normocephalic.   Eyes:      Extraocular Movements: Extraocular movements intact.      Pupils: Pupils are equal, round, and reactive to light. "   Cardiovascular:      Rate and Rhythm: Normal rate and regular rhythm.      Heart sounds: Normal heart sounds. No murmur heard.  Pulmonary:      Effort: Pulmonary effort is normal. No respiratory distress.      Breath sounds: Normal breath sounds. No rhonchi or rales.   Abdominal:      General: Abdomen is flat. Bowel sounds are normal.      Palpations: Abdomen is soft.   Neurological:      General: No focal deficit present.      Mental Status: He is alert.        Result Review :                   Assessment and Plan   Diagnoses and all orders for this visit:    1. Low testosterone (Primary)    2. Chronic pain due to trauma  -     oxyCODONE-acetaminophen (Percocet)  MG per tablet; Take 1 tablet by mouth Every 8 (Eight) Hours As Needed for Moderate Pain.  Dispense: 90 tablet; Refill: 0    3. Long-term use of high-risk medication  -     oxyCODONE-acetaminophen (Percocet)  MG per tablet; Take 1 tablet by mouth Every 8 (Eight) Hours As Needed for Moderate Pain.  Dispense: 90 tablet; Refill: 0    4. Anxiety  -     QUEtiapine (SEROquel) 300 MG tablet; Take 1 tablet by mouth Every Night.  Dispense: 30 tablet; Refill: 11  -     divalproex (Depakote) 125 MG DR tablet; Take 1 tablet by mouth 2 (Two) Times a Day.  Dispense: 60 tablet; Refill: 11    5. Acute maxillary sinusitis, recurrence not specified  -     amoxicillin (AMOXIL) 500 MG capsule; Take 1 capsule by mouth 2 (Two) Times a Day for 10 days.  Dispense: 20 capsule; Refill: 0  -     methylPREDNISolone (MEDROL) 4 MG dose pack; Take as directed on package instructions.  Dispense: 21 tablet; Refill: 0  -     guaiFENesin (Mucinex) 600 MG 12 hr tablet; Take 1 tablet by mouth 2 (Two) Times a Day for 10 days.  Dispense: 20 tablet; Refill: 0              As part of this patient's treatment plan, I am prescribing controlled substances. The patient has been made aware of appropriate use of such medications, including potential risk of somnolence, limited ability to  drive and /or work safely, and potential for dependence or overdose. It has also been made clear that these medications are for use by this patient only, without concomitant use of alcohol or other substances unless prescribed.  Patient has been advised that PRN or as needed pain medication allows the patient to skip doses if not needed, but that the medication is not to be taken more often or at higher doses than prescribed.    Patient has completed prescribing agreement detailing terms of continued prescribing of controlled substances, including monitoring LAMBERT reports, urine drug screening, and pill counts if necessary. The patient is aware that inappropriate use will result in cessation of prescribing such medications, and possible termination from this practice.    History and physical exam exhibit continued safe and appropriate use of controlled substances.    1.  Controlled substance abuse agreement discussed and copy in record: YES  2.  Discussed:   Risk of addiction: YES   Specific risk of medications:YES   Side effects of medications: YES   Reasonable expectations of the medication: YES  3.  Treatment Objectives:   Discussed management of constipation: YES   Discussed management of other side effects: YES  4.  eKASPER:     LAMBERT report has been reviewed by: Javier Raygoza MD on 02/05/24 in the PDMD in the electronic medical record.  [unfilled]   Results/Date of last LAMBERT:  appropriate  5.  Results/Date of last drug screen:  appropriate  6.  Follow up plan:    Follow Up in One Months Time              Continue on current medications as directed    EMR Dictation/Transcription disclaimer:   Some of this note may be an electronic transcription/translation of spoken language to printed text. The electronic translation of spoken language may permit erroneous, or at times, nonsensical words or phrases to be inadvertently transcribed; Although I have reviewed the note for such errors, some may  still exist.          Follow Up   Return in about 4 weeks (around 3/4/2024).  Patient was given instructions and counseling regarding his condition or for health maintenance advice. Please see specific information pulled into the AVS if appropriate.     Review of systems   negative unless otherwise specified above in HPI

## 2024-02-19 ENCOUNTER — TELEPHONE (OUTPATIENT)
Dept: INTERNAL MEDICINE | Facility: CLINIC | Age: 49
End: 2024-02-19
Payer: MEDICARE

## 2024-02-19 NOTE — TELEPHONE ENCOUNTER
Pharmacy Name:    [facility] IN [place]       Pharmacy representative name: THEODORE    Pharmacy representative phone number: [phone]     What medication are you calling in regards to: FLOVENT    What question does the pharmacy have: JEANINE OF 1 IS HOW PROVIDER WROTE.  CAN'T GET THE BRAND NAME, IS IT OK TO DISPENSE GENERIC?

## 2024-02-20 RX ORDER — FLUTICASONE PROPIONATE 220 UG/1
1 AEROSOL, METERED RESPIRATORY (INHALATION)
Qty: 12 G | Refills: 11 | Status: SHIPPED | OUTPATIENT
Start: 2024-02-20

## 2024-02-20 NOTE — TELEPHONE ENCOUNTER
Rx Refill Note  Requested Prescriptions     Pending Prescriptions Disp Refills    fluticasone (Flovent HFA) 220 MCG/ACT inhaler 12 g 11     Sig: Inhale 1 puff 2 (Two) Times a Day.      Last office visit with prescribing clinician: 2/5/2024   Last telemedicine visit with prescribing clinician: Visit date not found   Next office visit with prescribing clinician: 3/4/2024                         Would you like a call back once the refill request has been completed: [] Yes [] No    If the office needs to give you a call back, can they leave a voicemail: [] Yes [] No    Tennille Howard MA  02/20/24, 07:45 CST

## 2024-03-04 ENCOUNTER — TELEMEDICINE (OUTPATIENT)
Dept: INTERNAL MEDICINE | Facility: CLINIC | Age: 49
End: 2024-03-04
Payer: MEDICARE

## 2024-03-04 DIAGNOSIS — G89.21 CHRONIC PAIN DUE TO TRAUMA: ICD-10-CM

## 2024-03-04 DIAGNOSIS — Z79.899 LONG-TERM USE OF HIGH-RISK MEDICATION: ICD-10-CM

## 2024-03-04 PROCEDURE — 1159F MED LIST DOCD IN RCRD: CPT | Performed by: FAMILY MEDICINE

## 2024-03-04 PROCEDURE — 1160F RVW MEDS BY RX/DR IN RCRD: CPT | Performed by: FAMILY MEDICINE

## 2024-03-04 PROCEDURE — 99213 OFFICE O/P EST LOW 20 MIN: CPT | Performed by: FAMILY MEDICINE

## 2024-03-04 RX ORDER — OXYCODONE AND ACETAMINOPHEN 10; 325 MG/1; MG/1
1 TABLET ORAL EVERY 8 HOURS PRN
Qty: 90 TABLET | Refills: 0 | Status: SHIPPED | OUTPATIENT
Start: 2024-03-04

## 2024-03-04 NOTE — PROGRESS NOTES
Subjective     TeleHealth Visit Statement:    This visit was conducted using the audio and video through my chart implementation.     I have asked the patient where he is physically located today during this visit and they confirmed they are currently located in the [facility].      The patient verbalized understanding of the following:   - The visit would be conducted using audio/video and he/she consented to treatment.   - There is a possibility that the visit could disconnect or fail to connect and in that instance, it is possible that security protocols could fail causing a breach of privacy of his/her personal medical information.    - That if I determine the visit is not giving me sufficient information to make an appropriate clinical decision, I will require him/her to do a face to face visit in the clinic.    - That there would be a charge associated with the visit and agreed to pay any deductible or copay.    - That he could withdraw from the visit and request a face to face visit at any time.     I spent 20 minutes in the care of this patient during this telehealth encounter (including review of pertinent medical records, discussion with the patient, evaluation of patient problems and a coordination of a care plan as part of this visit).      I discussed with the patient the nature of our telemedicine visits, that:   - I would evaluate the patient and recommend diagnostics and treatments based on my assessment   - Our sessions are not being recorded and that personal health information is protected   - Our team would provide follow-up care and in person if/when the patient needs it     Physical examination was limited with exceptions as noted. In person physical examination was deferred    Two Factor Identifier was obtained (patient's name and )   The visit included audio and video interaction. No technical issues occurred during this visit.      No chief complaint on file.    Clyde GREENE  Belkis is a 48 y.o. male who returns for follow-up of chronic pain related to his midline low back.   his pain is stable and well controlled.       Pain described as dull, ache, and sharp. does radiate and occurs continuously. Patient also experiences the following associated symptoms patient denies any problems .    Current pain level is a 9/10 .  Patient states that durring the past week that his pain has interfered with his enjoyment of life 9/10  with 10 bieng complete interference. The patient also states that during the past week that that the pain has interfered with his general activity 9/10  with 10 bieng complete interference. Patient reports that the pain improves with pain medication, ice, and hot bath, and gets worse with standing and walking .      The treatment plan and the above PEG score are reviewed with the patient.  Patient feels that he is doing well with the pain and improvement provided to him by the medication.  Patient states he can do more activities of daily living while taking the medication that it significantly impacts the quality of his life.  The patient reports no side effects of his medication, and have no evidence of oversedation, confusion, slurred speech, or abnormal gait at this time.  Patient reports he is compliant with his regimen and not modifying his own dosage and/or timing.  The patient reports that he is not taking any illicit substances, or alcohol.  Patient continues to do well with the current treatment plan and states that he would like to continue with opioid therapy at this time.  The patient has never had an overdose and needed a rescue medication at this time.    Final Peg score: 9    Otherwise no other problems, complaints, or concerns.     Final Peg score: 9     The patient states he has significant head congestion with mild associated cough and congestion.   Drainage has been yellowish green in color, as well as any coughed up sputum..  There has been some  facial pain and pressure.  Patient reports no fever, myalgias, nausea, vomiting or diarrhea associated with this at this time.  Patient reports that this has been going on for 3 days at this point, and would like something to help with their sinusitis at this time.      Otherwise no other problems, complaints, or concerns.        Current MME total: 45     Objective Radiological Findings: moderate degenerative change on lumbar xray     Trearment goals: reduced pain, Increased activities of daily living     History:              Duration of the patient's pain: since before 1990              Legal issues? NO              Current controlled medication and doses: norco              Medications that have failed: percocet, nsaids, tylenol, tramadol, morphine, soma, darvocet              Side effects of current medications: no              Satisfaction with treatment: yes     Functional Status:              Able to feed self: YES              Able to bathe self: YES              Able to use the toilet independently: YES              Able to dress self: YES              Able to get up from bed or chair without assistance: yes     Adverse Events:              Constipation: yes              Lethargy: NO                Aberrant Behavior:              Over dose: NO              Run out of medications early: NO              Last UDS consistent: yes              Taking medications as prescribed: YES     History of Present Illness    Patient's PMR from outside medical facility reviewed and noted.    Review of systems   negative unless otherwise specified above in HPI    Past Medical History:   Past Medical History:   Diagnosis Date    Anxiety     Arthritis     Bipolar 1 disorder     Hypertension     Personal history of nicotine dependence 08/03/2023    PTSD (post-traumatic stress disorder)      Past Surgical History:  Past Surgical History:   Procedure Laterality Date    HIP SURGERY       Social History:  reports that he has been  smoking cigarettes. He has a 15 pack-year smoking history. He has never used smokeless tobacco. He reports that he does not drink alcohol and does not use drugs.    Family History: family history includes Arthritis in his father; Diabetes in his father; Heart attack in his mother; Hypertension in his father.      Allergies:  Allergies   Allergen Reactions    Adderall [Amphetamine-Dextroamphetamine] Shortness Of Breath, Swelling, Palpitations and Hallucinations    Meloxicam Nausea Only    Darvon [Propoxyphene] GI Intolerance    Motrin [Ibuprofen] GI Intolerance    Toradol [Ketorolac Tromethamine] GI Intolerance    Tramadol GI Intolerance     Medications:  Prior to Admission medications    Medication Sig Start Date End Date Taking? Authorizing Provider   albuterol sulfate  (90 Base) MCG/ACT inhaler Inhale 2 puffs Every 4 (Four) Hours As Needed for Wheezing. 4/18/23   Rita Thomas APRN   amLODIPine (NORVASC) 5 MG tablet Take 1 tablet by mouth Daily. 4/18/23   Rtia Thomas APRN   atorvastatin (Lipitor) 80 MG tablet Take 1 tablet by mouth Daily. 7/28/23   Rita Thomas APRN   CBD (cannabidiol) oral oil Take  by mouth.    Provider, MD Roxanne   citalopram (CeleXA) 40 MG tablet Take 1 tablet by mouth Daily. 10/18/23   Javier Raygoza MD   divalproex (Depakote) 125 MG DR tablet Take 1 tablet by mouth 2 (Two) Times a Day. 2/5/24   Javier Raygoza MD   fluticasone (Flovent HFA) 220 MCG/ACT inhaler Inhale 1 puff 2 (Two) Times a Day. 2/20/24   Javier Raygoza MD   Homeopathic Products (Leg Cramps) tablet Place 3 tablets under the tongue Every 4 (Four) Hours As Needed (leg cramps). 4/18/23   Rita Thomas APRN   hydrOXYzine (ATARAX) 50 MG tablet Take 1 tablet by mouth 3 (Three) Times a Day As Needed for Anxiety. 12/13/23   Javier Raygoza MD   ipratropium-albuterol (Combivent Respimat)  MCG/ACT inhaler Inhale 1 puff 4 (Four) Times a Day. 4/18/23   William  "JOELLE Salinas   methocarbamol (Robaxin) 500 MG tablet Take 1 tablet by mouth 3 (Three) Times a Day. 9/20/23   Javier Raygoza MD   metoprolol tartrate (LOPRESSOR) 50 MG tablet Take 1 tablet by mouth 2 (Two) Times a Day. 10/11/23   Javier Raygoza MD   naloxone (NARCAN) 4 MG/0.1ML nasal spray  8/27/22   ProviderRoxanne MD   nicotine (NICODERM CQ) 21 MG/24HR patch Place 1 patch on the skin as directed by provider Daily. 8/22/23   Salvador Morris APRN   NON FORMULARY Hemp products    Provider, MD Roxanne   oxyCODONE-acetaminophen (Percocet)  MG per tablet Take 1 tablet by mouth Every 8 (Eight) Hours As Needed for Moderate Pain. 2/5/24   Javier Raygoza MD   QUEtiapine (SEROquel) 300 MG tablet Take 1 tablet by mouth Every Night. 2/5/24   Javier Raygoza MD   Syringe/Needle, Disp, (B-D SYRINGE/NEEDLE 3CC/22GX1.5) 22G X 1-1/2\" 3 ML misc 2 syringes to use wwith testosterone 9/21/23   Javier Raygoza MD   Testosterone Cypionate (Depo-Testosterone) 200 MG/ML injection Inject 1 mL into the appropriate muscle as directed by prescriber Every 14 (Fourteen) Days. 1/9/24   Javier Raygoza MD           Objective     Telehealth Physical Exam Statement: In person physical examination was deferred        Results Reviewed:  Glucose   Date Value Ref Range Status   07/25/2023 98 70 - 99 mg/dL Final   06/09/2021 78 74 - 109 mg/dL Final     BUN   Date Value Ref Range Status   07/25/2023 26 (H) 6 - 24 mg/dL Final   06/09/2021 19 6 - 20 mg/dL Final     Creatinine   Date Value Ref Range Status   07/25/2023 1.23 0.76 - 1.27 mg/dL Final   06/09/2021 0.8 0.5 - 1.2 mg/dL Final     Sodium   Date Value Ref Range Status   07/25/2023 144 134 - 144 mmol/L Final   06/09/2021 136 136 - 145 mmol/L Final     Potassium   Date Value Ref Range Status   07/25/2023 4.3 3.5 - 5.2 mmol/L Final   06/09/2021 4.4 3.5 - 5.0 mmol/L Final     Chloride   Date Value Ref Range Status "   07/25/2023 106 96 - 106 mmol/L Final   06/09/2021 102 98 - 111 mmol/L Final     CO2   Date Value Ref Range Status   06/09/2021 25 22 - 29 mmol/L Final     Total CO2   Date Value Ref Range Status   07/25/2023 19 (L) 20 - 29 mmol/L Final     Calcium   Date Value Ref Range Status   07/25/2023 9.9 8.7 - 10.2 mg/dL Final   06/09/2021 8.6 8.6 - 10.0 mg/dL Final     ALT (SGPT)   Date Value Ref Range Status   07/25/2023 18 0 - 44 IU/L Final   06/09/2021 20 5 - 41 U/L Final     AST (SGOT)   Date Value Ref Range Status   07/25/2023 25 0 - 40 IU/L Final   06/09/2021 25 5 - 40 U/L Final     WBC   Date Value Ref Range Status   07/25/2023 7.5 3.4 - 10.8 x10E3/uL Final   06/09/2021 6.1 4.8 - 10.8 K/uL Final     Hematocrit   Date Value Ref Range Status   07/25/2023 45.5 37.5 - 51.0 % Final   06/09/2021 41.3 (L) 42.0 - 52.0 % Final     Platelets   Date Value Ref Range Status   07/25/2023 208 150 - 450 x10E3/uL Final   06/09/2021 160 130 - 400 K/uL Final     Triglycerides   Date Value Ref Range Status   07/25/2023 138 0 - 149 mg/dL Final     HDL Cholesterol   Date Value Ref Range Status   07/25/2023 48 >39 mg/dL Final     LDL Chol Calc (NIH)   Date Value Ref Range Status   07/25/2023 162 (H) 0 - 99 mg/dL Final     Hemoglobin A1C   Date Value Ref Range Status   04/30/2021 5.6 % Final             Assessment / Plan     Assessment/Plan:   Diagnosis Plan   1. Chronic pain due to trauma  oxyCODONE-acetaminophen (Percocet)  MG per tablet      2. Long-term use of high-risk medication  oxyCODONE-acetaminophen (Percocet)  MG per tablet            Return in about 4 weeks (around 4/1/2024). unless patient needs to be seen sooner or acute issues arise.    As part of this patient's treatment plan, I am prescribing controlled substances. The patient has been made aware of appropriate use of such medications, including potential risk of somnolence, limited ability to drive and /or work safely, and potential for dependence or overdose.  It has also been made clear that these medications are for use by this patient only, without concomitant use of alcohol or other substances unless prescribed.  Patient has been advised that PRN or as needed pain medication allows the patient to skip doses if not needed, but that the medication is not to be taken more often or at higher doses than prescribed.    Patient has completed prescribing agreement detailing terms of continued prescribing of controlled substances, including monitoring LAMBERT reports, urine drug screening, and pill counts if necessary. The patient is aware that inappropriate use will result in cessation of prescribing such medications, and possible termination from this practice.    History and physical exam exhibit continued safe and appropriate use of controlled substances.    1.  Controlled substance abuse agreement discussed and copy in record: YES  2.  Discussed:   Risk of addiction: YES   Specific risk of medications:YES   Side effects of medications: YES   Reasonable expectations of the medication: YES  3.  Treatment Objectives:   Discussed management of constipation: YES   Discussed management of other side effects: YES  4.  eKASPER:     LAMBERT report has been reviewed by: Javier Raygoza MD on 03/04/24 in the PDMD in the electronic medical record.  [unfilled]   Results/Date of last LAMBERT:  appropriate  5.  Results/Date of last drug screen:  appropriate  6.  Follow up plan:    Follow Up in One Months Time              Continue on current medications as directed    EMR Dictation/Transcription disclaimer:   Some of this note may be an electronic transcription/translation of spoken language to printed text. The electronic translation of spoken language may permit erroneous, or at times, nonsensical words or phrases to be inadvertently transcribed; Although I have reviewed the note for such errors, some may still exist.     I have discussed the patient results/orders and and  plan/recommendation with them at today's visit.      EMR Dictation/Transcription disclaimer:   Some of this note may be an electronic transcription/translation of spoken language to printed text. The electronic translation of spoken language may permit erroneous, or at times, nonsensical words or phrases to be inadvertently transcribed; Although I have reviewed the note for such errors, some may still exist.     Signed by:    Javier Raygoza MD Date: 03/04/24

## 2024-03-28 ENCOUNTER — OFFICE VISIT (OUTPATIENT)
Dept: INTERNAL MEDICINE | Facility: CLINIC | Age: 49
End: 2024-03-28
Payer: MEDICARE

## 2024-03-28 ENCOUNTER — TELEPHONE (OUTPATIENT)
Dept: INTERNAL MEDICINE | Facility: CLINIC | Age: 49
End: 2024-03-28

## 2024-03-28 VITALS
SYSTOLIC BLOOD PRESSURE: 130 MMHG | BODY MASS INDEX: 37.11 KG/M2 | HEIGHT: 73 IN | HEART RATE: 66 BPM | OXYGEN SATURATION: 97 % | WEIGHT: 280 LBS | TEMPERATURE: 97.8 F | DIASTOLIC BLOOD PRESSURE: 80 MMHG

## 2024-03-28 DIAGNOSIS — Z79.899 LONG-TERM USE OF HIGH-RISK MEDICATION: ICD-10-CM

## 2024-03-28 DIAGNOSIS — R10.9 ACUTE ABDOMINAL PAIN: Primary | ICD-10-CM

## 2024-03-28 DIAGNOSIS — G89.21 CHRONIC PAIN DUE TO TRAUMA: ICD-10-CM

## 2024-03-28 RX ORDER — OXYCODONE AND ACETAMINOPHEN 10; 325 MG/1; MG/1
1 TABLET ORAL EVERY 6 HOURS PRN
Qty: 120 TABLET | Refills: 0 | Status: SHIPPED | OUTPATIENT
Start: 2024-03-28

## 2024-03-28 RX ORDER — LEVOFLOXACIN 500 MG/1
500 TABLET, FILM COATED ORAL DAILY
Qty: 7 TABLET | Refills: 0 | Status: SHIPPED | OUTPATIENT
Start: 2024-03-28

## 2024-03-28 NOTE — PROGRESS NOTES
Chief Complaint  Pain and Insomnia    Subjective        Clyde Tamayo presents to [facility]  History of Present Illness    Clyde Tamayo is a 48 y.o. male who returns for follow-up of chronic pain related to his midline low back.   his pain is stable and well controlled.      Patient has been having a lot of abdominal pain.  He states that he belives he has a hernia.  He does have a small area that is very hard next to an umbilical hernia which is very tender to the touch with garding.  Will get him set up with a ct at this time to make sure its not incarceration.  He will not let me try to recdue it at this time.  Will increase his pain medicine at least at this time.    I have discussed with him the possibility of addiction, overdose, as well as the limited benefits and other risks of opioid use.  Patient reports a greater than 30% continuous improvement in functional capability on the 3 item PEG assessment scale since his initial assessment, his [place] has been reviewed, and risk for diversion or abuse appears low.  The patient states that he has no side effects of his medication, does not appear oversedated, states that he is not modifying his own regimen, and that he is not taking any other illicit substances.  The patient has never had an overdose and needed a rescue medication at this time.    Pain characteristics:  Current pain level : 8/10 as reported by patient.    Pain Description :  dull, ache, sharp, shooting, and stabbing. does radiate and occurs continuously.   Alleviating factors :   relaxation and pain medication  Aggravating factors : squatting, standing, and walking  Associated Symptoms:  patient denies any problems        PEG scale:  1. Pain on average over the Last week 8/10  2. Patient states that durring the past week that thier pain has interfered with his enjoyment of life 8/10  with 10 bieng complete interference.  3. The patient also states that during  "the past week that that his pain has interfered with his general activity 8/10  with 10 bieng complete interference      Final Peg Score:8    Patient is advised that I am very concerned about their current state.  Should his medical condition worsen he should follow up at the er for emergency evaluation.      Longitudinal care Statement:  Patient to continue with continuous, comprehensive, coordinated primary care that serves as the continuing focal point for all needed health care services related to his complex medical conditions.  I discussed preventative health care, vaccines, depression, and cancer screening with him.  Reviewed his complex diagnosis, comorbid conditions, and history at this time as well as associated labs and medication list for possible interactions.      No other problems, complaints. or concerns noted.        Current MME total: 45    Objective Radiological Findings: moderate degenerative change on lumbar xray    Trearment goals: reduced pain, Increased activities of daily living    History:   Duration of the patient's pain: since before 1990   Legal issues? NO   Current controlled medication and doses: norco   Medications that have failed: percocet, nsaids, tylenol, tramadol, morphine, soma, darvocet   Side effects of current medications: no   Satisfaction with treatment: yes    Functional Status:   Able to feed self: YES   Able to bathe self: YES   Able to use the toilet independently: YES   Able to dress self: YES   Able to get up from bed or chair without assistance: yes    Adverse Events:   Constipation: yes   Lethargy: NO     Aberrant Behavior:   Over dose: NO   Run out of medications early: NO   Last UDS consistent: yes   Taking medications as prescribed: YES           Objective   Vital Signs:  /80 (BP Location: Left arm, Patient Position: Sitting, Cuff Size: Adult)   Pulse 66   Temp 97.8 °F (36.6 °C) (Infrared)   Ht 185.4 cm (73\")   Wt 127 kg (280 lb)   SpO2 97%   BMI " "36.94 kg/m²   Estimated body mass index is 36.94 kg/m² as calculated from the following:    Height as of this encounter: 185.4 cm (73\").    Weight as of this encounter: 127 kg (280 lb).       Past Medical History:   Diagnosis Date    Anxiety     Arthritis     Bipolar 1 disorder     Hypertension     Personal history of nicotine dependence 08/03/2023    PTSD (post-traumatic stress disorder)        Past Surgical History:   Procedure Laterality Date    HIP SURGERY         Social History     Tobacco Use    Smoking status: Every Day     Current packs/day: 0.50     Average packs/day: 0.5 packs/day for 30.0 years (15.0 ttl pk-yrs)     Types: Cigarettes    Smokeless tobacco: Never    Tobacco comments:     4 cigs a day   Vaping Use    Vaping status: Never Used   Substance Use Topics    Alcohol use: No    Drug use: No       Family History   Problem Relation Age of Onset    Heart attack Mother     Arthritis Father     Diabetes Father     Hypertension Father        Allergies as of 03/28/2024 - Reviewed 03/28/2024   Allergen Reaction Noted    Adderall [amphetamine-dextroamphetamine] Shortness Of Breath, Swelling, Palpitations, and Hallucinations 04/14/2020    Meloxicam Nausea Only     Darvon [propoxyphene] GI Intolerance 06/28/2019    Motrin [ibuprofen] GI Intolerance 06/28/2019    Toradol [ketorolac tromethamine] GI Intolerance 06/28/2019    Tramadol GI Intolerance 06/28/2019         Current Outpatient Medications:     albuterol sulfate  (90 Base) MCG/ACT inhaler, Inhale 2 puffs Every 4 (Four) Hours As Needed for Wheezing., Disp: 18 g, Rfl: 3    amLODIPine (NORVASC) 5 MG tablet, Take 1 tablet by mouth Daily., Disp: 30 tablet, Rfl: 3    atorvastatin (Lipitor) 80 MG tablet, Take 1 tablet by mouth Daily., Disp: 90 tablet, Rfl: 1    citalopram (CeleXA) 40 MG tablet, Take 1 tablet by mouth Daily., Disp: 30 tablet, Rfl: 11    divalproex (Depakote) 125 MG DR tablet, Take 1 tablet by mouth 2 (Two) Times a Day., Disp: 60 tablet, " "Rfl: 11    fluticasone (Flovent HFA) 220 MCG/ACT inhaler, Inhale 1 puff 2 (Two) Times a Day., Disp: 12 g, Rfl: 11    Homeopathic Products (Leg Cramps) tablet, Place 3 tablets under the tongue Every 4 (Four) Hours As Needed (leg cramps)., Disp: 100 tablet, Rfl: 0    hydrOXYzine (ATARAX) 50 MG tablet, Take 1 tablet by mouth 3 (Three) Times a Day As Needed for Anxiety., Disp: 90 tablet, Rfl: 11    ipratropium-albuterol (Combivent Respimat)  MCG/ACT inhaler, Inhale 1 puff 4 (Four) Times a Day., Disp: 1 each, Rfl: 3    methocarbamol (Robaxin) 500 MG tablet, Take 1 tablet by mouth 3 (Three) Times a Day., Disp: 90 tablet, Rfl: 11    metoprolol tartrate (LOPRESSOR) 50 MG tablet, Take 1 tablet by mouth 2 (Two) Times a Day., Disp: 180 tablet, Rfl: 1    naloxone (NARCAN) 4 MG/0.1ML nasal spray, , Disp: , Rfl:     nicotine (NICODERM CQ) 21 MG/24HR patch, Place 1 patch on the skin as directed by provider Daily., Disp: 14 each, Rfl: 0    NON FORMULARY, Hemp products, Disp: , Rfl:     oxyCODONE-acetaminophen (Percocet)  MG per tablet, Take 1 tablet by mouth Every 6 (Six) Hours As Needed for Moderate Pain., Disp: 120 tablet, Rfl: 0    QUEtiapine (SEROquel) 300 MG tablet, Take 1 tablet by mouth Every Night., Disp: 30 tablet, Rfl: 11    Syringe/Needle, Disp, (B-D SYRINGE/NEEDLE 3CC/22GX1.5) 22G X 1-1/2\" 3 ML misc, 2 syringes to use wwith testosterone, Disp: 2 each, Rfl: 11    Testosterone Cypionate (Depo-Testosterone) 200 MG/ML injection, Inject 1 mL into the appropriate muscle as directed by prescriber Every 14 (Fourteen) Days., Disp: 2 mL, Rfl: 2    levoFLOXacin (Levaquin) 500 MG tablet, Take 1 tablet by mouth Daily., Disp: 7 tablet, Rfl: 0       Review of systems   negative unless otherwise specified above in HPI      Physical Exam  Vitals and nursing note reviewed.   Constitutional:       General: He is not in acute distress.     Appearance: Normal appearance.   HENT:      Head: Normocephalic.   Eyes:      Extraocular " Movements: Extraocular movements intact.      Pupils: Pupils are equal, round, and reactive to light.   Cardiovascular:      Rate and Rhythm: Normal rate and regular rhythm.      Heart sounds: Normal heart sounds. No murmur heard.  Pulmonary:      Effort: Pulmonary effort is normal. No respiratory distress.      Breath sounds: Normal breath sounds. No rhonchi or rales.   Abdominal:      General: Abdomen is flat. Bowel sounds are normal.      Palpations: Abdomen is soft. There is mass.      Tenderness: There is abdominal tenderness. There is guarding.   Neurological:      General: No focal deficit present.      Mental Status: He is alert.        Result Review :                   Assessment and Plan   Diagnoses and all orders for this visit:    1. Acute abdominal pain (Primary)  -     levoFLOXacin (Levaquin) 500 MG tablet; Take 1 tablet by mouth Daily.  Dispense: 7 tablet; Refill: 0  -     CT Abdomen Pelvis Without Contrast; Future  -     CBC & Differential  -     Comprehensive Metabolic Panel    2. Chronic pain due to trauma  -     oxyCODONE-acetaminophen (Percocet)  MG per tablet; Take 1 tablet by mouth Every 6 (Six) Hours As Needed for Moderate Pain.  Dispense: 120 tablet; Refill: 0    3. Long-term use of high-risk medication  -     oxyCODONE-acetaminophen (Percocet)  MG per tablet; Take 1 tablet by mouth Every 6 (Six) Hours As Needed for Moderate Pain.  Dispense: 120 tablet; Refill: 0                As part of this patient's treatment plan, I am prescribing controlled substances. The patient has been made aware of appropriate use of such medications, including potential risk of somnolence, limited ability to drive and /or work safely, and potential for dependence or overdose. It has also been made clear that these medications are for use by this patient only, without concomitant use of alcohol or other substances unless prescribed.  Patient has been advised that PRN or as needed pain medication allows  the patient to skip doses if not needed, but that the medication is not to be taken more often or at higher doses than prescribed.    Patient has completed prescribing agreement detailing terms of continued prescribing of controlled substances, including monitoring LAMBERT reports, urine drug screening, and pill counts if necessary. The patient is aware that inappropriate use will result in cessation of prescribing such medications, and possible termination from this practice.    History and physical exam exhibit continued safe and appropriate use of controlled substances.    1.  Controlled substance abuse agreement discussed and copy in record: YES  2.  Discussed:   Risk of addiction: YES   Specific risk of medications:YES   Side effects of medications: YES   Reasonable expectations of the medication: YES  3.  Treatment Objectives:   Discussed management of constipation: YES   Discussed management of other side effects: YES  4.  eKASPER:     LAMBERT report has been reviewed by: Javier Raygoza MD on 03/28/24 in the PDMD in the electronic medical record.  [unfilled]   Results/Date of last LAMBERT:  appropriate  5.  Results/Date of last drug screen:  appropriate  6.  Follow up plan:    Follow Up in One Months Time              Continue on current medications as directed    EMR Dictation/Transcription disclaimer:   Some of this note may be an electronic transcription/translation of spoken language to printed text. The electronic translation of spoken language may permit erroneous, or at times, nonsensical words or phrases to be inadvertently transcribed; Although I have reviewed the note for such errors, some may still exist.          Follow Up   No follow-ups on file.  Patient was given instructions and counseling regarding his condition or for health maintenance advice. Please see specific information pulled into the AVS if appropriate.     Review of systems   negative unless otherwise specified above in  HPI

## 2024-03-28 NOTE — TELEPHONE ENCOUNTER
Pharmacy Name: [facility]    Pharmacy representative name: SHEYLA    Pharmacy representative phone number: [phone]     What medication are you calling in regards to: PERCOCET    What question does the pharmacy have: NEED TO KNOW WHAT FILL DATE IS NEEDED. THERE WAS A DOSE INCREASE, BUT NO FILL DATE    Who is the provider that prescribed the medication: MIOSÉS

## 2024-03-30 LAB
ALBUMIN SERPL-MCNC: 5 G/DL (ref 4.1–5.1)
ALBUMIN/GLOB SERPL: 2.1 {RATIO} (ref 1.2–2.2)
ALP SERPL-CCNC: 81 IU/L (ref 44–121)
ALT SERPL-CCNC: 41 IU/L (ref 0–44)
AST SERPL-CCNC: 31 IU/L (ref 0–40)
BASOPHILS # BLD AUTO: 0.1 X10E3/UL (ref 0–0.2)
BASOPHILS NFR BLD AUTO: 1 %
BILIRUB SERPL-MCNC: 0.2 MG/DL (ref 0–1.2)
BUN SERPL-MCNC: 16 MG/DL (ref 6–24)
BUN/CREAT SERPL: 17 (ref 9–20)
CALCIUM SERPL-MCNC: 9.5 MG/DL (ref 8.7–10.2)
CHLORIDE SERPL-SCNC: 101 MMOL/L (ref 96–106)
CO2 SERPL-SCNC: 18 MMOL/L (ref 20–29)
CREAT SERPL-MCNC: 0.95 MG/DL (ref 0.76–1.27)
EGFRCR SERPLBLD CKD-EPI 2021: 98 ML/MIN/1.73
EOSINOPHIL # BLD AUTO: 0.3 X10E3/UL (ref 0–0.4)
EOSINOPHIL NFR BLD AUTO: 3 %
ERYTHROCYTE [DISTWIDTH] IN BLOOD BY AUTOMATED COUNT: 14 % (ref 11.6–15.4)
GLOBULIN SER CALC-MCNC: 2.4 G/DL (ref 1.5–4.5)
GLUCOSE SERPL-MCNC: 92 MG/DL (ref 70–99)
HCT VFR BLD AUTO: 53.8 % (ref 37.5–51)
HGB BLD-MCNC: 16.8 G/DL (ref 13–17.7)
IMM GRANULOCYTES # BLD AUTO: 0.1 X10E3/UL (ref 0–0.1)
IMM GRANULOCYTES NFR BLD AUTO: 1 %
LYMPHOCYTES # BLD AUTO: 2.5 X10E3/UL (ref 0.7–3.1)
LYMPHOCYTES NFR BLD AUTO: 31 %
Lab: NORMAL
MCH RBC QN AUTO: 27.5 PG (ref 26.6–33)
MCHC RBC AUTO-ENTMCNC: 31.2 G/DL (ref 31.5–35.7)
MCV RBC AUTO: 88 FL (ref 79–97)
MONOCYTES # BLD AUTO: 0.5 X10E3/UL (ref 0.1–0.9)
MONOCYTES NFR BLD AUTO: 6 %
NEUTROPHILS # BLD AUTO: 4.6 X10E3/UL (ref 1.4–7)
NEUTROPHILS NFR BLD AUTO: 58 %
PLATELET # BLD AUTO: 342 X10E3/UL (ref 150–450)
POTASSIUM SERPL-SCNC: 5 MMOL/L (ref 3.5–5.2)
PROT SERPL-MCNC: 7.4 G/DL (ref 6–8.5)
RBC # BLD AUTO: 6.12 X10E6/UL (ref 4.14–5.8)
SODIUM SERPL-SCNC: 139 MMOL/L (ref 134–144)
WBC # BLD AUTO: 8 X10E3/UL (ref 3.4–10.8)

## 2024-04-04 DIAGNOSIS — E29.1 HYPOGONADISM MALE: ICD-10-CM

## 2024-04-04 RX ORDER — TESTOSTERONE CYPIONATE 200 MG/ML
200 INJECTION, SOLUTION INTRAMUSCULAR
Qty: 2 ML | Refills: 2 | Status: SHIPPED | OUTPATIENT
Start: 2024-04-04

## 2024-04-08 ENCOUNTER — TELEPHONE (OUTPATIENT)
Dept: INTERNAL MEDICINE | Facility: CLINIC | Age: 49
End: 2024-04-08
Payer: MEDICARE

## 2024-04-22 DIAGNOSIS — I10 ESSENTIAL HYPERTENSION: ICD-10-CM

## 2024-04-22 RX ORDER — METOPROLOL TARTRATE 50 MG/1
50 TABLET, FILM COATED ORAL 2 TIMES DAILY
Qty: 180 TABLET | Refills: 3 | Status: SHIPPED | OUTPATIENT
Start: 2024-04-22

## 2024-04-25 ENCOUNTER — TELEPHONE (OUTPATIENT)
Dept: INTERNAL MEDICINE | Facility: CLINIC | Age: 49
End: 2024-04-25

## 2024-04-25 NOTE — TELEPHONE ENCOUNTER
Spoke to patient concerning CT. We discussed that neither [facility] or [facility] accepts his insurance but he is getting new insurance that will be effective May 1st. He is willing to wait until that time to schedule CT. Otherwise the referral location will need to be changed to [facility].

## 2024-04-25 NOTE — TELEPHONE ENCOUNTER
Caller: Clyde Tamayo    Relationship: Self    Best call back number: [phone]     What orders are you requesting (i.e. lab or imaging): CT SCAN OF STOMACH    In what timeframe would the patient need to come in: ASAP    Where will you receive your lab/imaging services: [facility]     Additional notes:

## 2024-05-02 ENCOUNTER — OFFICE VISIT (OUTPATIENT)
Dept: INTERNAL MEDICINE | Facility: CLINIC | Age: 49
End: 2024-05-02
Payer: MEDICARE

## 2024-05-02 VITALS
TEMPERATURE: 97.5 F | BODY MASS INDEX: 38.17 KG/M2 | HEART RATE: 97 BPM | SYSTOLIC BLOOD PRESSURE: 161 MMHG | OXYGEN SATURATION: 95 % | WEIGHT: 288 LBS | HEIGHT: 73 IN | DIASTOLIC BLOOD PRESSURE: 100 MMHG

## 2024-05-02 DIAGNOSIS — F25.0 SCHIZOAFFECTIVE DISORDER, BIPOLAR TYPE: Primary | ICD-10-CM

## 2024-05-02 DIAGNOSIS — G89.29 CHRONIC MIDLINE LOW BACK PAIN WITH BILATERAL SCIATICA: ICD-10-CM

## 2024-05-02 DIAGNOSIS — M54.41 CHRONIC MIDLINE LOW BACK PAIN WITH BILATERAL SCIATICA: ICD-10-CM

## 2024-05-02 DIAGNOSIS — I10 ESSENTIAL HYPERTENSION: ICD-10-CM

## 2024-05-02 DIAGNOSIS — G89.21 CHRONIC PAIN DUE TO TRAUMA: ICD-10-CM

## 2024-05-02 DIAGNOSIS — R25.2 LEG CRAMPS: ICD-10-CM

## 2024-05-02 DIAGNOSIS — Z79.899 LONG-TERM USE OF HIGH-RISK MEDICATION: ICD-10-CM

## 2024-05-02 DIAGNOSIS — M54.42 CHRONIC MIDLINE LOW BACK PAIN WITH BILATERAL SCIATICA: ICD-10-CM

## 2024-05-02 DIAGNOSIS — F41.9 ANXIETY: ICD-10-CM

## 2024-05-02 DIAGNOSIS — K42.9 UMBILICAL HERNIA WITHOUT OBSTRUCTION AND WITHOUT GANGRENE: ICD-10-CM

## 2024-05-02 PROBLEM — R06.02 SHORTNESS OF BREATH: Status: RESOLVED | Noted: 2019-10-23 | Resolved: 2024-05-02

## 2024-05-02 PROBLEM — R06.2 WHEEZING: Status: RESOLVED | Noted: 2019-10-23 | Resolved: 2024-05-02

## 2024-05-02 PROBLEM — E78.2 MIXED HYPERLIPIDEMIA: Status: ACTIVE | Noted: 2020-10-02

## 2024-05-02 PROBLEM — R53.83 FATIGUE: Status: RESOLVED | Noted: 2023-08-22 | Resolved: 2024-05-02

## 2024-05-02 PROBLEM — Z23 IMMUNIZATION DUE: Status: RESOLVED | Noted: 2019-11-20 | Resolved: 2024-05-02

## 2024-05-02 PROBLEM — R05.9 COUGH: Status: RESOLVED | Noted: 2023-08-22 | Resolved: 2024-05-02

## 2024-05-02 PROBLEM — Z23 INFLUENZA VACCINE ADMINISTERED: Status: RESOLVED | Noted: 2020-10-02 | Resolved: 2024-05-02

## 2024-05-02 PROBLEM — R79.89 DECREASED TESTOSTERONE LEVEL: Status: RESOLVED | Noted: 2023-08-22 | Resolved: 2024-05-02

## 2024-05-02 PROBLEM — B35.6 TINEA CRURIS: Status: RESOLVED | Noted: 2023-08-22 | Resolved: 2024-05-02

## 2024-05-02 PROBLEM — Z87.891 PERSONAL HISTORY OF NICOTINE DEPENDENCE: Chronic | Status: RESOLVED | Noted: 2023-08-03 | Resolved: 2024-05-02

## 2024-05-02 PROBLEM — L03.90 CELLULITIS: Status: RESOLVED | Noted: 2023-08-22 | Resolved: 2024-05-02

## 2024-05-02 PROBLEM — E78.1 HYPERTRIGLYCERIDEMIA: Status: RESOLVED | Noted: 2023-08-22 | Resolved: 2024-05-02

## 2024-05-02 PROBLEM — J01.90 ACUTE NON-RECURRENT SINUSITIS: Status: RESOLVED | Noted: 2020-02-12 | Resolved: 2024-05-02

## 2024-05-02 PROBLEM — R60.0 SWELLING OF LEFT PAROTID GLAND: Status: RESOLVED | Noted: 2020-09-04 | Resolved: 2024-05-02

## 2024-05-02 PROCEDURE — 3080F DIAST BP >= 90 MM HG: CPT | Performed by: FAMILY MEDICINE

## 2024-05-02 PROCEDURE — 3077F SYST BP >= 140 MM HG: CPT | Performed by: FAMILY MEDICINE

## 2024-05-02 PROCEDURE — 99214 OFFICE O/P EST MOD 30 MIN: CPT | Performed by: FAMILY MEDICINE

## 2024-05-02 RX ORDER — TRAZODONE HYDROCHLORIDE 50 MG/1
50 TABLET ORAL NIGHTLY
COMMUNITY
Start: 2024-04-03 | End: 2024-05-02 | Stop reason: SDUPTHER

## 2024-05-02 RX ORDER — METHOCARBAMOL 500 MG/1
500 TABLET, FILM COATED ORAL 3 TIMES DAILY
Qty: 90 TABLET | Refills: 11 | Status: SHIPPED | OUTPATIENT
Start: 2024-05-02

## 2024-05-02 RX ORDER — AMLODIPINE BESYLATE 10 MG/1
10 TABLET ORAL DAILY
Qty: 90 TABLET | Refills: 3 | Status: SHIPPED | OUTPATIENT
Start: 2024-05-02

## 2024-05-02 RX ORDER — OXYCODONE AND ACETAMINOPHEN 10; 325 MG/1; MG/1
1 TABLET ORAL EVERY 6 HOURS PRN
Qty: 120 TABLET | Refills: 0 | Status: SHIPPED | OUTPATIENT
Start: 2024-05-02

## 2024-05-02 RX ORDER — DIVALPROEX SODIUM 250 MG/1
250 TABLET, DELAYED RELEASE ORAL 2 TIMES DAILY
Qty: 60 TABLET | Refills: 11 | Status: SHIPPED | OUTPATIENT
Start: 2024-05-02

## 2024-05-02 RX ORDER — TRAZODONE HYDROCHLORIDE 50 MG/1
50 TABLET ORAL NIGHTLY
Qty: 90 TABLET | Refills: 3 | Status: SHIPPED | OUTPATIENT
Start: 2024-05-02

## 2024-05-02 NOTE — PROGRESS NOTES
Chief Complaint  Pain and Insomnia    Subjective        Clyde Tamayo presents to [facility] PRIMARY CARE  History of Present Illness    Clyde Tamayo is a 48 y.o. male who returns for follow-up of chronic pain related to his midline low back.   his pain is stable and well controlled.      I have discussed with him the possibility of addiction, overdose, as well as the limited benefits and other risks of opioid use.  Patient reports a greater than 30% continuous improvement in functional capability on the 3 item PEG assessment scale since his initial assessment, his [place] has been reviewed, and risk for diversion or abuse appears low.  The patient states that he has no side effects of his medication, does not appear oversedated, states that he is not modifying his own regimen, and that he is not taking any other illicit substances.  The patient has never had an overdose and needed a rescue medication at this time.    Pain characteristics:  Current pain level : 8/10 as reported by patient.    Pain Description :  sharp, shooting, stabbing, and stinging. does radiate and occurs continuously.   Alleviating factors :   pain medication  Aggravating factors : squatting, standing, and walking  Associated Symptoms:  patient denies any problems        PEG scale:  1. Pain on average over the Last week 8/10  2. Patient states that durring the past week that thier pain has interfered with his enjoyment of life 8/10  with 10 bieng complete interference.  3. The patient also states that during the past week that that his pain has interfered with his general activity 8/10  with 10 bieng complete interference      Final Peg Score: 8    Patient is having more spasms going down his back and his bilateral legs would like to get a new x-ray at this of this at this time would also like to go back on his muscle relaxer to see if this would help.  Will increase his Depakote to see if it does not help slightly with  nerve pain.  Patient would also like to renew his trazodone for his insomnia.    Blood pressure has been moderately elevated we will go ahead and add some Norvasc to see if this will help.    Patient also has a umbilical hernia and would like to get into general surgery to get this taken care of at this time.  It has been moderately tender and comes and goes since last visit.    No other problems, complaints. or concerns noted.          Longitudinal care Statement:  Patient to continue with continuous, comprehensive, coordinated primary care that serves as the continuing focal point for all needed health care services related to his complex medical conditions.  I discussed preventative health care, vaccines, depression, and cancer screening with him.  Reviewed his complex diagnosis, comorbid conditions, and history at this time as well as associated labs and medication list for possible interactions.      No other problems, complaints. or concerns noted.        Current MME total: 45    Objective Radiological Findings: moderate degenerative change on lumbar xray    Trearment goals: reduced pain, Increased activities of daily living    History:   Duration of the patient's pain: since before 1990   Legal issues? NO   Current controlled medication and doses: norco   Medications that have failed: percocet, nsaids, tylenol, tramadol, morphine, soma, darvocet   Side effects of current medications: no   Satisfaction with treatment: yes    Functional Status:   Able to feed self: YES   Able to bathe self: YES   Able to use the toilet independently: YES   Able to dress self: YES   Able to get up from bed or chair without assistance: yes    Adverse Events:   Constipation: yes   Lethargy: NO     Aberrant Behavior:   Over dose: NO   Run out of medications early: NO   Last UDS consistent: yes   Taking medications as prescribed: YES           Objective   Vital Signs:  /100 (BP Location: Left arm, Patient Position: Sitting,  "Cuff Size: Large Adult)   Pulse 97   Temp 97.5 °F (36.4 °C) (Infrared)   Ht 185.4 cm (73\")   Wt 131 kg (288 lb)   SpO2 95%   BMI 38.00 kg/m²   Estimated body mass index is 38 kg/m² as calculated from the following:    Height as of this encounter: 185.4 cm (73\").    Weight as of this encounter: 131 kg (288 lb).       Past Medical History:   Diagnosis Date    Anxiety     Arthritis     Bipolar 1 disorder     Hypertension     Personal history of nicotine dependence 08/03/2023    PTSD (post-traumatic stress disorder)        Past Surgical History:   Procedure Laterality Date    HIP SURGERY         Social History     Tobacco Use    Smoking status: Every Day     Current packs/day: 0.50     Average packs/day: 0.5 packs/day for 30.0 years (15.0 ttl pk-yrs)     Types: Cigarettes    Smokeless tobacco: Never    Tobacco comments:     4 cigs a day   Vaping Use    Vaping status: Never Used   Substance Use Topics    Alcohol use: No    Drug use: No       Family History   Problem Relation Age of Onset    Heart attack Mother     Arthritis Father     Diabetes Father     Hypertension Father        Allergies as of 05/02/2024 - Reviewed 05/02/2024   Allergen Reaction Noted    Adderall [amphetamine-dextroamphetamine] Shortness Of Breath, Swelling, Palpitations, and Hallucinations 04/14/2020    Meloxicam Nausea Only     Darvon [propoxyphene] GI Intolerance 06/28/2019    Motrin [ibuprofen] GI Intolerance 06/28/2019    Toradol [ketorolac tromethamine] GI Intolerance 06/28/2019    Tramadol GI Intolerance 06/28/2019         Current Outpatient Medications:     albuterol sulfate  (90 Base) MCG/ACT inhaler, Inhale 2 puffs Every 4 (Four) Hours As Needed for Wheezing., Disp: 18 g, Rfl: 3    amLODIPine (NORVASC) 10 MG tablet, Take 1 tablet by mouth Daily., Disp: 90 tablet, Rfl: 3    atorvastatin (Lipitor) 80 MG tablet, Take 1 tablet by mouth Daily., Disp: 90 tablet, Rfl: 1    citalopram (CeleXA) 40 MG tablet, Take 1 tablet by mouth " "Daily., Disp: 30 tablet, Rfl: 11    divalproex (Depakote) 250 MG DR tablet, Take 1 tablet by mouth 2 (Two) Times a Day., Disp: 60 tablet, Rfl: 11    fluticasone (Flovent HFA) 220 MCG/ACT inhaler, Inhale 1 puff 2 (Two) Times a Day., Disp: 12 g, Rfl: 11    Homeopathic Products (Leg Cramps) tablet, Place 3 tablets under the tongue Every 4 (Four) Hours As Needed (leg cramps)., Disp: 100 tablet, Rfl: 0    hydrOXYzine (ATARAX) 50 MG tablet, Take 1 tablet by mouth 3 (Three) Times a Day As Needed for Anxiety., Disp: 90 tablet, Rfl: 11    ipratropium-albuterol (Combivent Respimat)  MCG/ACT inhaler, Inhale 1 puff 4 (Four) Times a Day., Disp: 1 each, Rfl: 3    methocarbamol (Robaxin) 500 MG tablet, Take 1 tablet by mouth 3 (Three) Times a Day., Disp: 90 tablet, Rfl: 11    metoprolol tartrate (LOPRESSOR) 50 MG tablet, Take 1 tablet by mouth 2 (Two) Times a Day., Disp: 180 tablet, Rfl: 3    naloxone (NARCAN) 4 MG/0.1ML nasal spray, , Disp: , Rfl:     nicotine (NICODERM CQ) 21 MG/24HR patch, Place 1 patch on the skin as directed by provider Daily., Disp: 14 each, Rfl: 0    NON FORMULARY, Hemp products, Disp: , Rfl:     oxyCODONE-acetaminophen (Percocet)  MG per tablet, Take 1 tablet by mouth Every 6 (Six) Hours As Needed for Moderate Pain., Disp: 120 tablet, Rfl: 0    QUEtiapine (SEROquel) 300 MG tablet, Take 1 tablet by mouth Every Night., Disp: 30 tablet, Rfl: 11    Syringe/Needle, Disp, (B-D SYRINGE/NEEDLE 3CC/22GX1.5) 22G X 1-1/2\" 3 ML misc, 2 syringes to use wwith testosterone, Disp: 2 each, Rfl: 11    Testosterone Cypionate (Depo-Testosterone) 200 MG/ML injection, Inject 1 mL into the appropriate muscle as directed by prescriber Every 14 (Fourteen) Days., Disp: 2 mL, Rfl: 2    traZODone (DESYREL) 50 MG tablet, Take 1 tablet by mouth Every Night., Disp: 90 tablet, Rfl: 3       Review of systems   negative unless otherwise specified above in HPI      Physical Exam  Vitals and nursing note reviewed. "   Constitutional:       General: He is not in acute distress.     Appearance: Normal appearance.   HENT:      Head: Normocephalic.   Eyes:      Extraocular Movements: Extraocular movements intact.      Pupils: Pupils are equal, round, and reactive to light.   Cardiovascular:      Rate and Rhythm: Normal rate and regular rhythm.      Heart sounds: Normal heart sounds. No murmur heard.  Pulmonary:      Effort: Pulmonary effort is normal. No respiratory distress.      Breath sounds: Normal breath sounds. No rhonchi or rales.   Abdominal:      General: Abdomen is flat. Bowel sounds are normal.      Palpations: Abdomen is soft. There is mass.      Tenderness: There is abdominal tenderness. There is guarding.   Neurological:      General: No focal deficit present.      Mental Status: He is alert.        Result Review :                   Assessment and Plan   Diagnoses and all orders for this visit:    1. Schizoaffective disorder, bipolar type (Primary)  -     traZODone (DESYREL) 50 MG tablet; Take 1 tablet by mouth Every Night.  Dispense: 90 tablet; Refill: 3    2. Chronic pain due to trauma  -     oxyCODONE-acetaminophen (Percocet)  MG per tablet; Take 1 tablet by mouth Every 6 (Six) Hours As Needed for Moderate Pain.  Dispense: 120 tablet; Refill: 0    3. Long-term use of high-risk medication  -     oxyCODONE-acetaminophen (Percocet)  MG per tablet; Take 1 tablet by mouth Every 6 (Six) Hours As Needed for Moderate Pain.  Dispense: 120 tablet; Refill: 0    4. Essential hypertension  -     amLODIPine (NORVASC) 10 MG tablet; Take 1 tablet by mouth Daily.  Dispense: 90 tablet; Refill: 3    5. Leg cramps  -     methocarbamol (Robaxin) 500 MG tablet; Take 1 tablet by mouth 3 (Three) Times a Day.  Dispense: 90 tablet; Refill: 11  -     Comprehensive metabolic panel  -     Magnesium  -     CBC w AUTO Differential    6. Anxiety  -     divalproex (Depakote) 250 MG DR tablet; Take 1 tablet by mouth 2 (Two) Times a Day.   Dispense: 60 tablet; Refill: 11    7. Chronic midline low back pain with bilateral sciatica  -     XR Spine Lumbar 4+ View (In Office)    8. Umbilical hernia without obstruction and without gangrene  -     Ambulatory Referral to General Surgery                As part of this patient's treatment plan, I am prescribing controlled substances. The patient has been made aware of appropriate use of such medications, including potential risk of somnolence, limited ability to drive and /or work safely, and potential for dependence or overdose. It has also been made clear that these medications are for use by this patient only, without concomitant use of alcohol or other substances unless prescribed.  Patient has been advised that PRN or as needed pain medication allows the patient to skip doses if not needed, but that the medication is not to be taken more often or at higher doses than prescribed.    Patient has completed prescribing agreement detailing terms of continued prescribing of controlled substances, including monitoring LAMBERT reports, urine drug screening, and pill counts if necessary. The patient is aware that inappropriate use will result in cessation of prescribing such medications, and possible termination from this practice.    History and physical exam exhibit continued safe and appropriate use of controlled substances.    1.  Controlled substance abuse agreement discussed and copy in record: YES  2.  Discussed:   Risk of addiction: YES   Specific risk of medications:YES   Side effects of medications: YES   Reasonable expectations of the medication: YES  3.  Treatment Objectives:   Discussed management of constipation: YES   Discussed management of other side effects: YES  4.  eKASPER:     LAMBERT report has been reviewed by: Javier Raygoza MD on 05/02/24 in the PDMD in the electronic medical record.  [unfilled]   Results/Date of last LAMBERT:  appropriate  5.  Results/Date of last drug  screen:  appropriate  6.  Follow up plan:    Follow Up in One Months Time              Continue on current medications as directed    EMR Dictation/Transcription disclaimer:   Some of this note may be an electronic transcription/translation of spoken language to printed text. The electronic translation of spoken language may permit erroneous, or at times, nonsensical words or phrases to be inadvertently transcribed; Although I have reviewed the note for such errors, some may still exist.          Follow Up   No follow-ups on file.  Patient was given instructions and counseling regarding his condition or for health maintenance advice. Please see specific information pulled into the AVS if appropriate.     Review of systems   negative unless otherwise specified above in HPI

## 2024-05-03 DIAGNOSIS — M54.40 CHRONIC MIDLINE LOW BACK PAIN WITH SCIATICA, SCIATICA LATERALITY UNSPECIFIED: ICD-10-CM

## 2024-05-03 DIAGNOSIS — G89.29 CHRONIC MIDLINE LOW BACK PAIN WITH SCIATICA, SCIATICA LATERALITY UNSPECIFIED: ICD-10-CM

## 2024-05-03 DIAGNOSIS — R73.9 ELEVATED BLOOD SUGAR: Primary | ICD-10-CM

## 2024-05-03 LAB
ALBUMIN SERPL-MCNC: 5 G/DL (ref 4.1–5.1)
ALBUMIN/GLOB SERPL: 2.1 {RATIO} (ref 1.2–2.2)
ALP SERPL-CCNC: 86 IU/L (ref 44–121)
ALT SERPL-CCNC: 19 IU/L (ref 0–44)
AST SERPL-CCNC: 17 IU/L (ref 0–40)
BASOPHILS # BLD AUTO: 0.1 X10E3/UL (ref 0–0.2)
BASOPHILS NFR BLD AUTO: 1 %
BILIRUB SERPL-MCNC: 0.3 MG/DL (ref 0–1.2)
BUN SERPL-MCNC: 17 MG/DL (ref 6–24)
BUN/CREAT SERPL: 18 (ref 9–20)
CALCIUM SERPL-MCNC: 10.5 MG/DL (ref 8.7–10.2)
CHLORIDE SERPL-SCNC: 100 MMOL/L (ref 96–106)
CO2 SERPL-SCNC: 19 MMOL/L (ref 20–29)
CREAT SERPL-MCNC: 0.92 MG/DL (ref 0.76–1.27)
EGFRCR SERPLBLD CKD-EPI 2021: 102 ML/MIN/1.73
EOSINOPHIL # BLD AUTO: 0.2 X10E3/UL (ref 0–0.4)
EOSINOPHIL NFR BLD AUTO: 2 %
ERYTHROCYTE [DISTWIDTH] IN BLOOD BY AUTOMATED COUNT: 13.9 % (ref 11.6–15.4)
GLOBULIN SER CALC-MCNC: 2.4 G/DL (ref 1.5–4.5)
GLUCOSE SERPL-MCNC: 126 MG/DL (ref 70–99)
HCT VFR BLD AUTO: 53.7 % (ref 37.5–51)
HGB BLD-MCNC: 17.3 G/DL (ref 13–17.7)
IMM GRANULOCYTES # BLD AUTO: 0 X10E3/UL (ref 0–0.1)
IMM GRANULOCYTES NFR BLD AUTO: 0 %
LYMPHOCYTES # BLD AUTO: 2.6 X10E3/UL (ref 0.7–3.1)
LYMPHOCYTES NFR BLD AUTO: 33 %
MAGNESIUM SERPL-MCNC: 2 MG/DL (ref 1.6–2.3)
MCH RBC QN AUTO: 27.5 PG (ref 26.6–33)
MCHC RBC AUTO-ENTMCNC: 32.2 G/DL (ref 31.5–35.7)
MCV RBC AUTO: 85 FL (ref 79–97)
MONOCYTES # BLD AUTO: 0.5 X10E3/UL (ref 0.1–0.9)
MONOCYTES NFR BLD AUTO: 6 %
NEUTROPHILS # BLD AUTO: 4.7 X10E3/UL (ref 1.4–7)
NEUTROPHILS NFR BLD AUTO: 58 %
PLATELET # BLD AUTO: 296 X10E3/UL (ref 150–450)
POTASSIUM SERPL-SCNC: 4.5 MMOL/L (ref 3.5–5.2)
PROT SERPL-MCNC: 7.4 G/DL (ref 6–8.5)
RBC # BLD AUTO: 6.29 X10E6/UL (ref 4.14–5.8)
SODIUM SERPL-SCNC: 138 MMOL/L (ref 134–144)
WBC # BLD AUTO: 8.1 X10E3/UL (ref 3.4–10.8)

## 2024-05-30 ENCOUNTER — OFFICE VISIT (OUTPATIENT)
Dept: INTERNAL MEDICINE | Facility: CLINIC | Age: 49
End: 2024-05-30
Payer: MEDICARE

## 2024-05-30 VITALS
TEMPERATURE: 96.2 F | BODY MASS INDEX: 40.69 KG/M2 | DIASTOLIC BLOOD PRESSURE: 80 MMHG | SYSTOLIC BLOOD PRESSURE: 145 MMHG | HEIGHT: 73 IN | WEIGHT: 307 LBS | OXYGEN SATURATION: 97 % | HEART RATE: 106 BPM

## 2024-05-30 DIAGNOSIS — Z79.899 LONG-TERM USE OF HIGH-RISK MEDICATION: ICD-10-CM

## 2024-05-30 DIAGNOSIS — Z12.11 COLON CANCER SCREENING: ICD-10-CM

## 2024-05-30 DIAGNOSIS — G89.21 CHRONIC PAIN DUE TO TRAUMA: ICD-10-CM

## 2024-05-30 DIAGNOSIS — J44.9 COPD, MILD: Primary | ICD-10-CM

## 2024-05-30 PROCEDURE — 1160F RVW MEDS BY RX/DR IN RCRD: CPT | Performed by: FAMILY MEDICINE

## 2024-05-30 PROCEDURE — G2211 COMPLEX E/M VISIT ADD ON: HCPCS | Performed by: FAMILY MEDICINE

## 2024-05-30 PROCEDURE — 3078F DIAST BP <80 MM HG: CPT | Performed by: FAMILY MEDICINE

## 2024-05-30 PROCEDURE — 3077F SYST BP >= 140 MM HG: CPT | Performed by: FAMILY MEDICINE

## 2024-05-30 PROCEDURE — 1159F MED LIST DOCD IN RCRD: CPT | Performed by: FAMILY MEDICINE

## 2024-05-30 PROCEDURE — 1125F AMNT PAIN NOTED PAIN PRSNT: CPT | Performed by: FAMILY MEDICINE

## 2024-05-30 PROCEDURE — 99214 OFFICE O/P EST MOD 30 MIN: CPT | Performed by: FAMILY MEDICINE

## 2024-05-30 RX ORDER — BUDESONIDE, GLYCOPYRROLATE, AND FORMOTEROL FUMARATE 160; 9; 4.8 UG/1; UG/1; UG/1
2 AEROSOL, METERED RESPIRATORY (INHALATION) 2 TIMES DAILY
Qty: 1 EACH | Refills: 11 | Status: SHIPPED | OUTPATIENT
Start: 2024-05-30

## 2024-05-30 RX ORDER — OXYCODONE AND ACETAMINOPHEN 10; 325 MG/1; MG/1
1 TABLET ORAL EVERY 6 HOURS PRN
Qty: 120 TABLET | Refills: 0 | Status: SHIPPED | OUTPATIENT
Start: 2024-05-30

## 2024-05-30 NOTE — PROGRESS NOTES
Chief Complaint  Pain, Anxiety, and Hypertension    Subjective        Clyde Tamayo presents to [facility] PRIMARY CARE  History of Present Illness    Clyde Tamayo is a 48 y.o. male who returns for follow-up of chronic pain related to his midline low back.   his pain is stable and well controlled.      I have discussed with him the possibility of addiction, overdose, as well as the limited benefits and other risks of opioid use.  Patient reports a greater than 30% continuous improvement in functional capability on the 3 item PEG assessment scale since his initial assessment, his [place] has been reviewed, and risk for diversion or abuse appears low.  The patient states that he has no side effects of his medication, does not appear oversedated, states that he is not modifying his own regimen, and that he is not taking any other illicit substances.  The patient has never had an overdose and needed a rescue medication at this time.    Pain characteristics:  Current pain level : 9/10 as reported by patient.    Pain Description :  sharp, shooting, throbbing, and soreness. does radiate and occurs continuously.   Alleviating factors :   pain medication  Aggravating factors : standing and walking  Associated Symptoms:  patient denies any problems    Limitations: This pain limits the patient from ambulate    PEG scale:  1. Pain on average over the Last week 9/10  2. Patient states that durring the past week that thier pain has interfered with his enjoyment of life 9/10  with 10 bieng complete interference.  3. The patient also states that during the past week that that his pain has interfered with his general activity 9/10  with 10 bieng complete interference      Final Peg Score: 9    Patient reports that his COPD is not as well-controlled as he would like we will go ahead and try some Breztri and see if this is more effective.  Patient likewise would like to go ahead and get set up with his colon  "cancer screening at this time.    No other problems, complaints. or concerns noted.          Longitudinal care Statement:  Patient to continue with continuous, comprehensive, coordinated primary care that serves as the continuing focal point for all needed health care services related to his complex medical conditions.  I discussed preventative health care, vaccines, depression, and cancer screening with him.  Reviewed his complex diagnosis, comorbid conditions, and history at this time as well as associated labs and medication list for possible interactions.      No other problems, complaints. or concerns noted.        Current MME total: 45    Objective Radiological Findings: moderate degenerative change on lumbar xray    Trearment goals: reduced pain, Increased activities of daily living    History:   Duration of the patient's pain: since before 1990   Legal issues? NO   Current controlled medication and doses: norco   Medications that have failed: percocet, nsaids, tylenol, tramadol, morphine, soma, darvocet   Side effects of current medications: no   Satisfaction with treatment: yes    Functional Status:   Able to feed self: YES   Able to bathe self: YES   Able to use the toilet independently: YES   Able to dress self: YES   Able to get up from bed or chair without assistance: yes    Adverse Events:   Constipation: yes   Lethargy: NO     Aberrant Behavior:   Over dose: NO   Run out of medications early: NO   Last UDS consistent: yes   Taking medications as prescribed: YES           Objective   Vital Signs:  /80 (BP Location: Left arm, Patient Position: Sitting, Cuff Size: Adult)   Pulse 106   Temp 96.2 °F (35.7 °C) (Infrared)   Ht 185.4 cm (73\")   Wt (!) 139 kg (307 lb)   SpO2 97%   BMI 40.50 kg/m²   Estimated body mass index is 40.5 kg/m² as calculated from the following:    Height as of this encounter: 185.4 cm (73\").    Weight as of this encounter: 139 kg (307 lb).       Past Medical History: "   Diagnosis Date    Anxiety     Arthritis     Bipolar 1 disorder     Hypertension     Personal history of nicotine dependence 08/03/2023    PTSD (post-traumatic stress disorder)        Past Surgical History:   Procedure Laterality Date    HIP SURGERY         Social History     Tobacco Use    Smoking status: Every Day     Current packs/day: 0.50     Average packs/day: 0.5 packs/day for 30.0 years (15.0 ttl pk-yrs)     Types: Cigarettes    Smokeless tobacco: Never    Tobacco comments:     4 cigs a day   Vaping Use    Vaping status: Never Used   Substance Use Topics    Alcohol use: No    Drug use: No       Family History   Problem Relation Age of Onset    Heart attack Mother     Arthritis Father     Diabetes Father     Hypertension Father        Allergies as of 05/30/2024 - Reviewed 05/30/2024   Allergen Reaction Noted    Adderall [amphetamine-dextroamphetamine] Shortness Of Breath, Swelling, Palpitations, and Hallucinations 04/14/2020    Meloxicam Nausea Only     Darvon [propoxyphene] GI Intolerance 06/28/2019    Motrin [ibuprofen] GI Intolerance 06/28/2019    Toradol [ketorolac tromethamine] GI Intolerance 06/28/2019    Tramadol GI Intolerance 06/28/2019         Current Outpatient Medications:     albuterol sulfate  (90 Base) MCG/ACT inhaler, Inhale 2 puffs Every 4 (Four) Hours As Needed for Wheezing., Disp: 18 g, Rfl: 3    amLODIPine (NORVASC) 10 MG tablet, Take 1 tablet by mouth Daily., Disp: 90 tablet, Rfl: 3    atorvastatin (Lipitor) 80 MG tablet, Take 1 tablet by mouth Daily., Disp: 90 tablet, Rfl: 1    citalopram (CeleXA) 40 MG tablet, Take 1 tablet by mouth Daily., Disp: 30 tablet, Rfl: 11    divalproex (Depakote) 250 MG DR tablet, Take 1 tablet by mouth 2 (Two) Times a Day., Disp: 60 tablet, Rfl: 11    fluticasone (Flovent HFA) 220 MCG/ACT inhaler, Inhale 1 puff 2 (Two) Times a Day., Disp: 12 g, Rfl: 11    Homeopathic Products (Leg Cramps) tablet, Place 3 tablets under the tongue Every 4 (Four) Hours  "As Needed (leg cramps)., Disp: 100 tablet, Rfl: 0    hydrOXYzine (ATARAX) 50 MG tablet, Take 1 tablet by mouth 3 (Three) Times a Day As Needed for Anxiety., Disp: 90 tablet, Rfl: 11    ipratropium-albuterol (Combivent Respimat)  MCG/ACT inhaler, Inhale 1 puff 4 (Four) Times a Day., Disp: 1 each, Rfl: 3    methocarbamol (Robaxin) 500 MG tablet, Take 1 tablet by mouth 3 (Three) Times a Day., Disp: 90 tablet, Rfl: 11    metoprolol tartrate (LOPRESSOR) 50 MG tablet, Take 1 tablet by mouth 2 (Two) Times a Day., Disp: 180 tablet, Rfl: 3    naloxone (NARCAN) 4 MG/0.1ML nasal spray, , Disp: , Rfl:     nicotine (NICODERM CQ) 21 MG/24HR patch, Place 1 patch on the skin as directed by provider Daily., Disp: 14 each, Rfl: 0    NON FORMULARY, Hemp products, Disp: , Rfl:     oxyCODONE-acetaminophen (Percocet)  MG per tablet, Take 1 tablet by mouth Every 6 (Six) Hours As Needed for Moderate Pain., Disp: 120 tablet, Rfl: 0    QUEtiapine (SEROquel) 300 MG tablet, Take 1 tablet by mouth Every Night., Disp: 30 tablet, Rfl: 11    Syringe/Needle, Disp, (B-D SYRINGE/NEEDLE 3CC/22GX1.5) 22G X 1-1/2\" 3 ML misc, 2 syringes to use wwith testosterone, Disp: 2 each, Rfl: 11    Testosterone Cypionate (Depo-Testosterone) 200 MG/ML injection, Inject 1 mL into the appropriate muscle as directed by prescriber Every 14 (Fourteen) Days., Disp: 2 mL, Rfl: 2    traZODone (DESYREL) 50 MG tablet, Take 1 tablet by mouth Every Night., Disp: 90 tablet, Rfl: 3    Budeson-Glycopyrrol-Formoterol (Breztri Aerosphere) 160-9-4.8 MCG/ACT aerosol inhaler, Inhale 2 puffs 2 (Two) Times a Day., Disp: 1 each, Rfl: 11       Review of systems   negative unless otherwise specified above in HPI      Physical Exam  Vitals and nursing note reviewed.   Constitutional:       General: He is not in acute distress.     Appearance: Normal appearance.   HENT:      Head: Normocephalic.   Eyes:      Extraocular Movements: Extraocular movements intact.      Pupils: Pupils " are equal, round, and reactive to light.   Cardiovascular:      Rate and Rhythm: Normal rate and regular rhythm.      Heart sounds: Normal heart sounds. No murmur heard.  Pulmonary:      Effort: Pulmonary effort is normal. No respiratory distress.      Breath sounds: Normal breath sounds. No rhonchi or rales.   Abdominal:      General: Abdomen is flat. Bowel sounds are normal.      Palpations: Abdomen is soft. There is mass.      Tenderness: There is abdominal tenderness. There is guarding.   Neurological:      General: No focal deficit present.      Mental Status: He is alert.        Result Review :                   Assessment and Plan   Diagnoses and all orders for this visit:    1. COPD, mild (Primary)  -     Budeson-Glycopyrrol-Formoterol (Breztri Aerosphere) 160-9-4.8 MCG/ACT aerosol inhaler; Inhale 2 puffs 2 (Two) Times a Day.  Dispense: 1 each; Refill: 11    2. Chronic pain due to trauma  -     oxyCODONE-acetaminophen (Percocet)  MG per tablet; Take 1 tablet by mouth Every 6 (Six) Hours As Needed for Moderate Pain.  Dispense: 120 tablet; Refill: 0    3. Long-term use of high-risk medication  -     oxyCODONE-acetaminophen (Percocet)  MG per tablet; Take 1 tablet by mouth Every 6 (Six) Hours As Needed for Moderate Pain.  Dispense: 120 tablet; Refill: 0  -     Pain Management Profile (13 Drugs) Urine - Urine, Clean Catch    4. Colon cancer screening  -     Cologuard - Stool, Per Rectum; Future                  As part of this patient's treatment plan, I am prescribing controlled substances. The patient has been made aware of appropriate use of such medications, including potential risk of somnolence, limited ability to drive and /or work safely, and potential for dependence or overdose. It has also been made clear that these medications are for use by this patient only, without concomitant use of alcohol or other substances unless prescribed.  Patient has been advised that PRN or as needed pain  medication allows the patient to skip doses if not needed, but that the medication is not to be taken more often or at higher doses than prescribed.    Patient has completed prescribing agreement detailing terms of continued prescribing of controlled substances, including monitoring LAMBERT reports, urine drug screening, and pill counts if necessary. The patient is aware that inappropriate use will result in cessation of prescribing such medications, and possible termination from this practice.    History and physical exam exhibit continued safe and appropriate use of controlled substances.    1.  Controlled substance abuse agreement discussed and copy in record: YES  2.  Discussed:   Risk of addiction: YES   Specific risk of medications:YES   Side effects of medications: YES   Reasonable expectations of the medication: YES  3.  Treatment Objectives:   Discussed management of constipation: YES   Discussed management of other side effects: YES  4.  eKASPER:     LAMBERT report has been reviewed by: Javier Raygoza MD on 05/30/24 in the PDMD in the electronic medical record.  [unfilled]   Results/Date of last LAMBERT:  appropriate  5.  Results/Date of last drug screen:  appropriate  6.  Follow up plan:    Follow Up in One Months Time              Continue on current medications as directed    EMR Dictation/Transcription disclaimer:   Some of this note may be an electronic transcription/translation of spoken language to printed text. The electronic translation of spoken language may permit erroneous, or at times, nonsensical words or phrases to be inadvertently transcribed; Although I have reviewed the note for such errors, some may still exist.          Follow Up   No follow-ups on file.  Patient was given instructions and counseling regarding his condition or for health maintenance advice. Please see specific information pulled into the AVS if appropriate.     Review of systems   negative unless otherwise  specified above in HPI

## 2024-06-03 LAB
AMPHETAMINES UR QL SCN: NEGATIVE NG/ML
BARBITURATES UR QL SCN: NEGATIVE NG/ML
BENZODIAZ UR QL SCN: NEGATIVE NG/ML
BZE UR QL SCN: NEGATIVE NG/ML
CANNABINOIDS UR QL SCN: NEGATIVE NG/ML
CREAT UR-MCNC: 169.2 MG/DL (ref 20–300)
FENTANYL UR-MCNC: NEGATIVE PG/ML
LABORATORY COMMENT REPORT: ABNORMAL
MEPERIDINE UR QL: NEGATIVE NG/ML
METHADONE UR QL SCN: NEGATIVE NG/ML
OPIATES UR QL SCN: NEGATIVE NG/ML
OXYCODONE+OXYMORPHONE UR QL SCN: POSITIVE
PCP UR QL: NEGATIVE NG/ML
PH UR: 5.4 [PH] (ref 4.5–8.9)
PROPOXYPH UR QL SCN: NEGATIVE NG/ML
SP GR UR: 1.01
TRAMADOL UR QL SCN: NEGATIVE NG/ML

## 2024-06-04 ENCOUNTER — OFFICE VISIT (OUTPATIENT)
Dept: SURGERY | Facility: CLINIC | Age: 49
End: 2024-06-04
Payer: MEDICARE

## 2024-06-04 VITALS
BODY MASS INDEX: 40.82 KG/M2 | HEART RATE: 87 BPM | WEIGHT: 308 LBS | DIASTOLIC BLOOD PRESSURE: 84 MMHG | SYSTOLIC BLOOD PRESSURE: 144 MMHG | OXYGEN SATURATION: 93 % | HEIGHT: 73 IN

## 2024-06-04 DIAGNOSIS — Z02.89 PAIN MANAGEMENT CONTRACT AGREEMENT: ICD-10-CM

## 2024-06-04 DIAGNOSIS — E66.01 SEVERE OBESITY (BMI >= 40): Primary | ICD-10-CM

## 2024-06-04 DIAGNOSIS — K42.0 INCARCERATED UMBILICAL HERNIA: ICD-10-CM

## 2024-06-04 PROCEDURE — 3077F SYST BP >= 140 MM HG: CPT | Performed by: SURGERY

## 2024-06-04 PROCEDURE — 1160F RVW MEDS BY RX/DR IN RCRD: CPT | Performed by: SURGERY

## 2024-06-04 PROCEDURE — 1159F MED LIST DOCD IN RCRD: CPT | Performed by: SURGERY

## 2024-06-04 PROCEDURE — 3079F DIAST BP 80-89 MM HG: CPT | Performed by: SURGERY

## 2024-06-04 PROCEDURE — 99204 OFFICE O/P NEW MOD 45 MIN: CPT | Performed by: SURGERY

## 2024-06-04 RX ORDER — SODIUM CHLORIDE 0.9 % (FLUSH) 0.9 %
10 SYRINGE (ML) INJECTION AS NEEDED
OUTPATIENT
Start: 2024-06-04

## 2024-06-04 RX ORDER — GABAPENTIN 250 MG/5ML
250 SOLUTION ORAL ONCE
OUTPATIENT
Start: 2024-06-04 | End: 2024-06-04

## 2024-06-04 RX ORDER — SODIUM CHLORIDE 0.9 % (FLUSH) 0.9 %
10 SYRINGE (ML) INJECTION EVERY 12 HOURS SCHEDULED
OUTPATIENT
Start: 2024-06-04

## 2024-06-04 RX ORDER — ACETAMINOPHEN 325 MG/1
650 TABLET ORAL ONCE
OUTPATIENT
Start: 2024-06-04 | End: 2024-06-04
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
## 2024-06-11 ENCOUNTER — PRE-ADMISSION TESTING (OUTPATIENT)
Dept: PREADMISSION TESTING | Facility: HOSPITAL | Age: 49
End: 2024-06-11
Payer: MEDICARE

## 2024-06-11 VITALS
DIASTOLIC BLOOD PRESSURE: 88 MMHG | BODY MASS INDEX: 42.66 KG/M2 | RESPIRATION RATE: 18 BRPM | HEART RATE: 75 BPM | OXYGEN SATURATION: 94 % | WEIGHT: 315 LBS | HEIGHT: 72 IN | SYSTOLIC BLOOD PRESSURE: 162 MMHG

## 2024-06-11 DIAGNOSIS — K42.0 INCARCERATED UMBILICAL HERNIA: ICD-10-CM

## 2024-06-11 LAB
ALBUMIN SERPL-MCNC: 4.3 G/DL (ref 3.5–5.2)
ALBUMIN/GLOB SERPL: 1.9 G/DL
ALP SERPL-CCNC: 57 U/L (ref 39–117)
ALT SERPL W P-5'-P-CCNC: 14 U/L (ref 1–41)
ANION GAP SERPL CALCULATED.3IONS-SCNC: 10 MMOL/L (ref 5–15)
AST SERPL-CCNC: 15 U/L (ref 1–40)
BILIRUB SERPL-MCNC: 0.5 MG/DL (ref 0–1.2)
BUN SERPL-MCNC: 13 MG/DL (ref 6–20)
BUN/CREAT SERPL: 17.6 (ref 7–25)
CALCIUM SPEC-SCNC: 9.1 MG/DL (ref 8.6–10.5)
CHLORIDE SERPL-SCNC: 100 MMOL/L (ref 98–107)
CO2 SERPL-SCNC: 26 MMOL/L (ref 22–29)
CREAT SERPL-MCNC: 0.74 MG/DL (ref 0.76–1.27)
DEPRECATED RDW RBC AUTO: 41.5 FL (ref 37–54)
EGFRCR SERPLBLD CKD-EPI 2021: 111.1 ML/MIN/1.73
ERYTHROCYTE [DISTWIDTH] IN BLOOD BY AUTOMATED COUNT: 13.8 % (ref 12.3–15.4)
GLOBULIN UR ELPH-MCNC: 2.3 GM/DL
GLUCOSE SERPL-MCNC: 97 MG/DL (ref 65–99)
HCT VFR BLD AUTO: 44.2 % (ref 37.5–51)
HGB BLD-MCNC: 14.5 G/DL (ref 13–17.7)
MCH RBC QN AUTO: 27.2 PG (ref 26.6–33)
MCHC RBC AUTO-ENTMCNC: 32.8 G/DL (ref 31.5–35.7)
MCV RBC AUTO: 82.9 FL (ref 79–97)
PLATELET # BLD AUTO: 193 10*3/MM3 (ref 140–450)
PMV BLD AUTO: 9.4 FL (ref 6–12)
POTASSIUM SERPL-SCNC: 4.2 MMOL/L (ref 3.5–5.2)
PROT SERPL-MCNC: 6.6 G/DL (ref 6–8.5)
RBC # BLD AUTO: 5.33 10*6/MM3 (ref 4.14–5.8)
SODIUM SERPL-SCNC: 136 MMOL/L (ref 136–145)
WBC NRBC COR # BLD AUTO: 7.68 10*3/MM3 (ref 3.4–10.8)

## 2024-06-11 PROCEDURE — 85027 COMPLETE CBC AUTOMATED: CPT

## 2024-06-11 PROCEDURE — 80053 COMPREHEN METABOLIC PANEL: CPT

## 2024-06-11 PROCEDURE — 36415 COLL VENOUS BLD VENIPUNCTURE: CPT

## 2024-06-11 PROCEDURE — 93005 ELECTROCARDIOGRAM TRACING: CPT

## 2024-06-11 NOTE — DISCHARGE INSTRUCTIONS
Preparing for Surgery  Follow these instructions before the procedure:  Several days or weeks before your procedure      Ask your health care provider about:  Changing or stopping your regular medicines. This is especially important if you are taking diabetes medicines or blood thinners.  Taking medicines such as aspirin and ibuprofen. These medicines can thin your blood. Do not take these medicines unless your health care provider tells you to take them.  Taking over-the-counter medicines, vitamins, herbs, and supplements.    Contact your surgeon if you:  Develop a fever of more than 100.4°F (38°C) or other feelings of illness during the 48 hours before your surgery.  Have symptoms that get worse.  Have questions or concerns about your surgery.  If you are going home the same day of your surgery you will need to arrange for a responsible adult, age 18 years old or older, to drive you home from the hospital and stay with you for 24 hours. Verification of the  will be made prior to any procedure requiring sedation. You may not go home in a taxi or any form of public transportation by yourself.     Day before your procedure  Medication(s) you need to stop the day before your surgery:PER MD     24 hours before your procedure DO NOT drink alcoholic beverages or smoke.  24 hours before your procedure STOP taking Erectile Dysfunction medication (i.e.,Cialis, Viagra)   You may be asked to shower with a germ-killing soap.  Day of your procedure   You may take the following medication(s) the morning of surgery with a sip of water: METOPROLOL, PERCOCET       8 hours before your procedure STOP all food, any dairy products, and full liquids. This includes hard candy, chewing gum or mints. This is extremely important to prevent serious complications.     Up to 2 hours before your scheduled arrival time, you may have clear liquids no cream, powder, or pulp of any kind. Safe options are water, black coffee, plain tea, soda,  Gatorade/Powerade, clear broth, apple juice.    2 hours before your scheduled arrival time, STOP drinking clear liquids.    You may need to take another shower with a germ-killing soap before you leave home in the morning. Do not use perfumes, colognes, or body lotions.  Wear comfortable loose-fitting clothing.  Remove all jewelry including body piercing and rings, dark colored nail polish, and make up prior to arrival at the hospital. Leave all valuables at home.   Bring your hearing aids if you rely on them.  Do not wear contact lenses. If you wear eyeglasses remember to bring a case to store them in while you are in surgery.  Do not use denture adhesives since you will be asked to remove them during your surgery.    You do not need to bring your home medications into the hospital.   Bring your sleep apnea device with you on the day of your surgery (if this applies to you).  If you wear portable oxygen, bring it with you.   If you are staying overnight, you may bring a bag of items you may need such as slippers, robe and a change of clothes for your discharge. You may want to leave these items in the car until you are ready for them since your family will take your belongings when you leave the pre-operative area.  Arrive at the hospital as scheduled by the office. You will be asked to arrive 2 hours prior to your surgery time in order to prepare for your procedure.  When you arrive at the hospital  Go to the registration desk located at the main entrance of the hospital.  After registration is completed, you will be given a beeper and a sticker sheet. Take the stickers to Outpatient Surgery and place in the tray at the end of the desk to notify the staff that you have arrived and registered.   Return to the lobby to wait. You are not always called back according to the time of arrival but rather the time your doctor will be ready.  When your beeper lights up and vibrates proceed through the double doors, under  the stairs, and a member of the Outpatient Surgery staff will escort you to your preoperative room.       __________________________________________________________________________________________________________________________        How to Use Chlorhexidine Before Surgery  Chlorhexidine gluconate (CHG) is a germ-killing (antiseptic) solution that is used to clean the skin. It can get rid of the bacteria that normally live on the skin and can keep them away for about 24 hours. To clean your skin with CHG, you may be given:  A CHG solution to use in the shower or as part of a sponge bath.  A prepackaged cloth that contains CHG.  Cleaning your skin with CHG may help lower the risk for infection:  While you are staying in the intensive care unit of the hospital.  If you have a vascular access, such as a central line, to provide short-term or long-term access to your veins.  If you have a catheter to drain urine from your bladder.  If you are on a ventilator. A ventilator is a machine that helps you breathe by moving air in and out of your lungs.  After surgery.  What are the risks?  Risks of using CHG include:  A skin reaction.  Hearing loss, if CHG gets in your ears and you have a perforated eardrum.  Eye injury, if CHG gets in your eyes and is not rinsed out.  The CHG product catching fire.  Make sure that you avoid smoking and flames after applying CHG to your skin.  Do not use CHG:  If you have a chlorhexidine allergy or have previously reacted to chlorhexidine.  On babies younger than 2 months of age.  How to use CHG solution  Use CHG only as told by your health care provider, and follow the instructions on the label.  Use the full amount of CHG as directed. Usually, this is one bottle.  During a shower    Follow these steps when using CHG solution during a shower (unless your health care provider gives you different instructions):  Start the shower.  Use your normal soap and shampoo to wash your face and  hair.  Turn off the shower or move out of the shower stream.  Pour the CHG onto a clean washcloth. Do not use any type of brush or rough-edged sponge.  Starting at your neck, lather your body down to your toes. Make sure you follow these instructions:  If you will be having surgery, pay special attention to the part of your body where you will be having surgery. Scrub this area for at least 1 minute.  Do not use CHG on your head or face. If the solution gets into your ears or eyes, rinse them well with water.  Avoid your genital area.  Avoid any areas of skin that have broken skin, cuts, or scrapes.  Scrub your back and under your arms. Make sure to wash skin folds.  Let the lather sit on your skin for 1-2 minutes or as long as told by your health care provider.  Thoroughly rinse your entire body in the shower. Make sure that all body creases and crevices are rinsed well.  Dry off with a clean towel. Do not put any substances on your body afterward--such as powder, lotion, or perfume--unless you are told to do so by your health care provider. Only use lotions that are recommended by the .  Put on clean clothes or pajamas.  If it is the night before your surgery, sleep in clean sheets.     During a sponge bath  Follow these steps when using CHG solution during a sponge bath (unless your health care provider gives you different instructions):  Use your normal soap and shampoo to wash your face and hair.  Pour the CHG onto a clean washcloth.  Starting at your neck, lather your body down to your toes. Make sure you follow these instructions:  If you will be having surgery, pay special attention to the part of your body where you will be having surgery. Scrub this area for at least 1 minute.  Do not use CHG on your head or face. If the solution gets into your ears or eyes, rinse them well with water.  Avoid your genital area.  Avoid any areas of skin that have broken skin, cuts, or scrapes.  Scrub your back  and under your arms. Make sure to wash skin folds.  Let the lather sit on your skin for 1-2 minutes or as long as told by your health care provider.  Using a different clean, wet washcloth, thoroughly rinse your entire body. Make sure that all body creases and crevices are rinsed well.  Dry off with a clean towel. Do not put any substances on your body afterward--such as powder, lotion, or perfume--unless you are told to do so by your health care provider. Only use lotions that are recommended by the .  Put on clean clothes or pajamas.  If it is the night before your surgery, sleep in clean sheets.  How to use CHG prepackaged cloths  Only use CHG cloths as told by your health care provider, and follow the instructions on the label.  Use the CHG cloth on clean, dry skin.  Do not use the CHG cloth on your head or face unless your health care provider tells you to.  When washing with the CHG cloth:  Avoid your genital area.  Avoid any areas of skin that have broken skin, cuts, or scrapes.  Before surgery    Follow these steps when using a CHG cloth to clean before surgery (unless your health care provider gives you different instructions):  Using the CHG cloth, vigorously scrub the part of your body where you will be having surgery. Scrub using a back-and-forth motion for 3 minutes. The area on your body should be completely wet with CHG when you are done scrubbing.  Do not rinse. Discard the cloth and let the area air-dry. Do not put any substances on the area afterward, such as powder, lotion, or perfume.  Put on clean clothes or pajamas.  If it is the night before your surgery, sleep in clean sheets.     For general bathing  Follow these steps when using CHG cloths for general bathing (unless your health care provider gives you different instructions).  Use a separate CHG cloth for each area of your body. Make sure you wash between any folds of skin and between your fingers and toes. Wash your body in  the following order, switching to a new cloth after each step:  The front of your neck, shoulders, and chest.  Both of your arms, under your arms, and your hands.  Your stomach and groin area, avoiding the genitals.  Your right leg and foot.  Your left leg and foot.  The back of your neck, your back, and your buttocks.  Do not rinse. Discard the cloth and let the area air-dry. Do not put any substances on your body afterward--such as powder, lotion, or perfume--unless you are told to do so by your health care provider. Only use lotions that are recommended by the .  Put on clean clothes or pajamas.  Contact a health care provider if:  Your skin gets irritated after scrubbing.  You have questions about using your solution or cloth.  You swallow any chlorhexidine. Call your local poison control center (1-827.133.7892 in the [place]  Get help right away if:  Your eyes itch badly, or they become very red or swollen.  Your skin itches badly and is red or swollen.  Your hearing changes.  You have trouble seeing.  You have swelling or tingling in your mouth or throat.  You have trouble breathing.  These symptoms may represent a serious problem that is an emergency. Do not wait to see if the symptoms will go away. Get medical help right away. Call your local emergency services (087 in the [place] Do not drive yourself to the hospital.  Summary  Chlorhexidine gluconate (CHG) is a germ-killing (antiseptic) solution that is used to clean the skin. Cleaning your skin with CHG may help to lower your risk for infection.  You may be given CHG to use for bathing. It may be in a bottle or in a prepackaged cloth to use on your skin. Carefully follow your health care provider's instructions and the instructions on the product label.  Do not use CHG if you have a chlorhexidine allergy.  Contact your health care provider if your skin gets irritated after scrubbing.  This information is not intended to replace advice given to  you by your health care provider. Make sure you discuss any questions you have with your health care provider.  Document Revised: 04/17/2023 Document Reviewed: 02/28/2022  Elsevier Patient Education © 2023 [employer]

## 2024-06-14 LAB
QT INTERVAL: 376 MS
QTC INTERVAL: 408 MS

## 2024-06-17 ENCOUNTER — HOSPITAL ENCOUNTER (OUTPATIENT)
Facility: HOSPITAL | Age: 49
Setting detail: HOSPITAL OUTPATIENT SURGERY
Discharge: HOME | End: 2024-06-17
Attending: SURGERY | Admitting: SURGERY
Payer: MEDICARE

## 2024-06-17 ENCOUNTER — ANESTHESIA (OUTPATIENT)
Dept: PERIOP | Facility: HOSPITAL | Age: 49
End: 2024-06-17
Payer: MEDICARE

## 2024-06-17 ENCOUNTER — ANESTHESIA EVENT (OUTPATIENT)
Dept: PERIOP | Facility: HOSPITAL | Age: 49
End: 2024-06-17
Payer: MEDICARE

## 2024-06-17 VITALS
TEMPERATURE: 98 F | OXYGEN SATURATION: 93 % | DIASTOLIC BLOOD PRESSURE: 56 MMHG | HEART RATE: 81 BPM | SYSTOLIC BLOOD PRESSURE: 118 MMHG | RESPIRATION RATE: 16 BRPM

## 2024-06-17 DIAGNOSIS — G89.21 CHRONIC PAIN DUE TO TRAUMA: Primary | Chronic | ICD-10-CM

## 2024-06-17 DIAGNOSIS — E66.01 SEVERE OBESITY (BMI >= 40): ICD-10-CM

## 2024-06-17 DIAGNOSIS — K42.0 INCARCERATED UMBILICAL HERNIA: ICD-10-CM

## 2024-06-17 PROCEDURE — 25010000002 PROPOFOL 10 MG/ML EMULSION: Performed by: NURSE ANESTHETIST, CERTIFIED REGISTERED

## 2024-06-17 PROCEDURE — 25010000002 ROPIVACAINE PER 1 MG: Performed by: SURGERY

## 2024-06-17 PROCEDURE — 49592 RPR AA HRN 1ST < 3 NCR/STRN: CPT | Performed by: SURGERY

## 2024-06-17 PROCEDURE — 25810000003 SODIUM CHLORIDE PER 500 ML: Performed by: SURGERY

## 2024-06-17 PROCEDURE — 25010000002 DROPERIDOL PER 5 MG: Performed by: ANESTHESIOLOGY

## 2024-06-17 PROCEDURE — 25010000002 CEFAZOLIN 3 G RECONSTITUTED SOLUTION 1 EACH VIAL: Performed by: SURGERY

## 2024-06-17 PROCEDURE — 25010000002 FENTANYL CITRATE (PF) 250 MCG/5ML SOLUTION: Performed by: NURSE ANESTHETIST, CERTIFIED REGISTERED

## 2024-06-17 PROCEDURE — 25010000002 HYDROMORPHONE PER 4 MG: Performed by: NURSE ANESTHETIST, CERTIFIED REGISTERED

## 2024-06-17 PROCEDURE — C1781 MESH (IMPLANTABLE): HCPCS | Performed by: SURGERY

## 2024-06-17 PROCEDURE — 25810000003 LACTATED RINGERS PER 1000 ML: Performed by: ANESTHESIOLOGY

## 2024-06-17 PROCEDURE — 25810000003 LACTATED RINGERS PER 1000 ML: Performed by: SURGERY

## 2024-06-17 DEVICE — IMPLANTABLE DEVICE: Type: IMPLANTABLE DEVICE | Site: ABDOMEN | Status: FUNCTIONAL

## 2024-06-17 DEVICE — ABSORBABLE WOUND CLOSURE DEVICE
Type: IMPLANTABLE DEVICE | Site: ABDOMEN | Status: FUNCTIONAL
Brand: V-LOC 90

## 2024-06-17 DEVICE — DEV WND/CLS CONTRL TISS STRATAFIX SYMM PDS PLS SZ0 45CM VIL: Type: IMPLANTABLE DEVICE | Site: ABDOMEN | Status: FUNCTIONAL

## 2024-06-17 RX ORDER — IPRATROPIUM BROMIDE AND ALBUTEROL SULFATE 2.5; .5 MG/3ML; MG/3ML
3 SOLUTION RESPIRATORY (INHALATION) ONCE AS NEEDED
Status: COMPLETED | OUTPATIENT
Start: 2024-06-17 | End: 2024-06-17

## 2024-06-17 RX ORDER — LIDOCAINE HYDROCHLORIDE 20 MG/ML
INJECTION, SOLUTION EPIDURAL; INFILTRATION; INTRACAUDAL; PERINEURAL AS NEEDED
Status: DISCONTINUED | OUTPATIENT
Start: 2024-06-17 | End: 2024-06-17 | Stop reason: SURG

## 2024-06-17 RX ORDER — DROPERIDOL 2.5 MG/ML
0.62 INJECTION, SOLUTION INTRAMUSCULAR; INTRAVENOUS ONCE AS NEEDED
Status: COMPLETED | OUTPATIENT
Start: 2024-06-17 | End: 2024-06-17

## 2024-06-17 RX ORDER — EPHEDRINE SULFATE 50 MG/ML
INJECTION, SOLUTION INTRAVENOUS AS NEEDED
Status: DISCONTINUED | OUTPATIENT
Start: 2024-06-17 | End: 2024-06-17 | Stop reason: SURG

## 2024-06-17 RX ORDER — ROCURONIUM BROMIDE 10 MG/ML
INJECTION, SOLUTION INTRAVENOUS AS NEEDED
Status: DISCONTINUED | OUTPATIENT
Start: 2024-06-17 | End: 2024-06-17 | Stop reason: SURG

## 2024-06-17 RX ORDER — LIDOCAINE HYDROCHLORIDE 10 MG/ML
0.5 INJECTION, SOLUTION EPIDURAL; INFILTRATION; INTRACAUDAL; PERINEURAL ONCE AS NEEDED
Status: DISCONTINUED | OUTPATIENT
Start: 2024-06-17 | End: 2024-06-17 | Stop reason: HOSPADM

## 2024-06-17 RX ORDER — SODIUM CHLORIDE 0.9 % (FLUSH) 0.9 %
10 SYRINGE (ML) INJECTION EVERY 12 HOURS SCHEDULED
Status: DISCONTINUED | OUTPATIENT
Start: 2024-06-17 | End: 2024-06-17 | Stop reason: HOSPADM

## 2024-06-17 RX ORDER — ONDANSETRON 2 MG/ML
4 INJECTION INTRAMUSCULAR; INTRAVENOUS ONCE AS NEEDED
Status: DISCONTINUED | OUTPATIENT
Start: 2024-06-17 | End: 2024-06-17 | Stop reason: HOSPADM

## 2024-06-17 RX ORDER — FENTANYL CITRATE 50 UG/ML
INJECTION, SOLUTION INTRAMUSCULAR; INTRAVENOUS AS NEEDED
Status: DISCONTINUED | OUTPATIENT
Start: 2024-06-17 | End: 2024-06-17 | Stop reason: SURG

## 2024-06-17 RX ORDER — ACETAMINOPHEN 325 MG/1
650 TABLET ORAL ONCE
Status: COMPLETED | OUTPATIENT
Start: 2024-06-17 | End: 2024-06-17

## 2024-06-17 RX ORDER — LABETALOL HYDROCHLORIDE 5 MG/ML
5 INJECTION, SOLUTION INTRAVENOUS
Status: DISCONTINUED | OUTPATIENT
Start: 2024-06-17 | End: 2024-06-17 | Stop reason: HOSPADM

## 2024-06-17 RX ORDER — NALOXONE HCL 0.4 MG/ML
0.4 VIAL (ML) INJECTION AS NEEDED
Status: DISCONTINUED | OUTPATIENT
Start: 2024-06-17 | End: 2024-06-17 | Stop reason: HOSPADM

## 2024-06-17 RX ORDER — SODIUM CHLORIDE, SODIUM LACTATE, POTASSIUM CHLORIDE, CALCIUM CHLORIDE 600; 310; 30; 20 MG/100ML; MG/100ML; MG/100ML; MG/100ML
1000 INJECTION, SOLUTION INTRAVENOUS CONTINUOUS
Status: DISCONTINUED | OUTPATIENT
Start: 2024-06-17 | End: 2024-06-17 | Stop reason: HOSPADM

## 2024-06-17 RX ORDER — SODIUM CHLORIDE 0.9 % (FLUSH) 0.9 %
3-10 SYRINGE (ML) INJECTION AS NEEDED
Status: DISCONTINUED | OUTPATIENT
Start: 2024-06-17 | End: 2024-06-17 | Stop reason: HOSPADM

## 2024-06-17 RX ORDER — SODIUM CHLORIDE 0.9 % (FLUSH) 0.9 %
3 SYRINGE (ML) INJECTION EVERY 12 HOURS SCHEDULED
Status: DISCONTINUED | OUTPATIENT
Start: 2024-06-17 | End: 2024-06-17 | Stop reason: HOSPADM

## 2024-06-17 RX ORDER — SODIUM CHLORIDE 9 MG/ML
40 INJECTION, SOLUTION INTRAVENOUS AS NEEDED
Status: DISCONTINUED | OUTPATIENT
Start: 2024-06-17 | End: 2024-06-17 | Stop reason: HOSPADM

## 2024-06-17 RX ORDER — GABAPENTIN 250 MG/5ML
250 SOLUTION ORAL ONCE
Status: COMPLETED | OUTPATIENT
Start: 2024-06-17 | End: 2024-06-17

## 2024-06-17 RX ORDER — SODIUM CHLORIDE 9 MG/ML
INJECTION, SOLUTION INTRAVENOUS AS NEEDED
Status: DISCONTINUED | OUTPATIENT
Start: 2024-06-17 | End: 2024-06-17 | Stop reason: HOSPADM

## 2024-06-17 RX ORDER — SODIUM CHLORIDE 0.9 % (FLUSH) 0.9 %
3 SYRINGE (ML) INJECTION AS NEEDED
Status: DISCONTINUED | OUTPATIENT
Start: 2024-06-17 | End: 2024-06-17 | Stop reason: HOSPADM

## 2024-06-17 RX ORDER — PROPOFOL 10 MG/ML
VIAL (ML) INTRAVENOUS AS NEEDED
Status: DISCONTINUED | OUTPATIENT
Start: 2024-06-17 | End: 2024-06-17 | Stop reason: SURG

## 2024-06-17 RX ORDER — IPRATROPIUM BROMIDE AND ALBUTEROL SULFATE 2.5; .5 MG/3ML; MG/3ML
SOLUTION RESPIRATORY (INHALATION)
Status: COMPLETED
Start: 2024-06-17 | End: 2024-06-17

## 2024-06-17 RX ORDER — HYDROCODONE BITARTRATE AND ACETAMINOPHEN 5; 325 MG/1; MG/1
1 TABLET ORAL EVERY 4 HOURS PRN
Status: DISCONTINUED | OUTPATIENT
Start: 2024-06-17 | End: 2024-06-17 | Stop reason: HOSPADM

## 2024-06-17 RX ORDER — MIDAZOLAM HYDROCHLORIDE 1 MG/ML
1 INJECTION INTRAMUSCULAR; INTRAVENOUS
Status: DISCONTINUED | OUTPATIENT
Start: 2024-06-17 | End: 2024-06-17 | Stop reason: HOSPADM

## 2024-06-17 RX ORDER — ACETAMINOPHEN 500 MG
1000 TABLET ORAL ONCE
Status: DISCONTINUED | OUTPATIENT
Start: 2024-06-17 | End: 2024-06-17 | Stop reason: HOSPADM

## 2024-06-17 RX ORDER — FLUMAZENIL 0.1 MG/ML
0.2 INJECTION INTRAVENOUS AS NEEDED
Status: DISCONTINUED | OUTPATIENT
Start: 2024-06-17 | End: 2024-06-17 | Stop reason: HOSPADM

## 2024-06-17 RX ORDER — SODIUM CHLORIDE 0.9 % (FLUSH) 0.9 %
10 SYRINGE (ML) INJECTION AS NEEDED
Status: DISCONTINUED | OUTPATIENT
Start: 2024-06-17 | End: 2024-06-17 | Stop reason: HOSPADM

## 2024-06-17 RX ORDER — IBUPROFEN 600 MG/1
600 TABLET ORAL EVERY 6 HOURS PRN
Status: DISCONTINUED | OUTPATIENT
Start: 2024-06-17 | End: 2024-06-17 | Stop reason: HOSPADM

## 2024-06-17 RX ORDER — OXYCODONE AND ACETAMINOPHEN 10; 325 MG/1; MG/1
1 TABLET ORAL EVERY 4 HOURS PRN
Qty: 18 TABLET | Refills: 0 | Status: SHIPPED | OUTPATIENT
Start: 2024-06-17 | End: 2024-06-20

## 2024-06-17 RX ORDER — FENTANYL CITRATE 50 UG/ML
50 INJECTION, SOLUTION INTRAMUSCULAR; INTRAVENOUS
Status: DISCONTINUED | OUTPATIENT
Start: 2024-06-17 | End: 2024-06-17 | Stop reason: HOSPADM

## 2024-06-17 RX ORDER — HYDROMORPHONE HYDROCHLORIDE 2 MG/ML
INJECTION, SOLUTION INTRAMUSCULAR; INTRAVENOUS; SUBCUTANEOUS AS NEEDED
Status: DISCONTINUED | OUTPATIENT
Start: 2024-06-17 | End: 2024-06-17 | Stop reason: SURG

## 2024-06-17 RX ORDER — HYDROCODONE BITARTRATE AND ACETAMINOPHEN 10; 325 MG/1; MG/1
1 TABLET ORAL EVERY 4 HOURS PRN
Status: DISCONTINUED | OUTPATIENT
Start: 2024-06-17 | End: 2024-06-17 | Stop reason: HOSPADM

## 2024-06-17 RX ORDER — SODIUM CHLORIDE, SODIUM LACTATE, POTASSIUM CHLORIDE, CALCIUM CHLORIDE 600; 310; 30; 20 MG/100ML; MG/100ML; MG/100ML; MG/100ML
100 INJECTION, SOLUTION INTRAVENOUS CONTINUOUS
Status: DISCONTINUED | OUTPATIENT
Start: 2024-06-17 | End: 2024-06-17 | Stop reason: HOSPADM

## 2024-06-17 RX ADMIN — DROPERIDOL 0.62 MG: 2.5 INJECTION, SOLUTION INTRAMUSCULAR; INTRAVENOUS at 15:19

## 2024-06-17 RX ADMIN — FENTANYL CITRATE 100 MCG: 50 INJECTION, SOLUTION INTRAMUSCULAR; INTRAVENOUS at 13:28

## 2024-06-17 RX ADMIN — IPRATROPIUM BROMIDE AND ALBUTEROL SULFATE 3 ML: .5; 3 SOLUTION RESPIRATORY (INHALATION) at 15:01

## 2024-06-17 RX ADMIN — SODIUM CHLORIDE, POTASSIUM CHLORIDE, SODIUM LACTATE AND CALCIUM CHLORIDE 1000 ML: 600; 310; 30; 20 INJECTION, SOLUTION INTRAVENOUS at 10:56

## 2024-06-17 RX ADMIN — LIDOCAINE HYDROCHLORIDE 100 MG: 20 INJECTION, SOLUTION EPIDURAL; INFILTRATION; INTRACAUDAL; PERINEURAL at 13:19

## 2024-06-17 RX ADMIN — EPHEDRINE SULFATE 10 MG: 50 INJECTION INTRAVENOUS at 13:39

## 2024-06-17 RX ADMIN — HYDROMORPHONE HYDROCHLORIDE 2 MG: 2 INJECTION, SOLUTION INTRAMUSCULAR; INTRAVENOUS; SUBCUTANEOUS at 14:19

## 2024-06-17 RX ADMIN — PROPOFOL 200 MG: 10 INJECTION, EMULSION INTRAVENOUS at 13:20

## 2024-06-17 RX ADMIN — FENTANYL CITRATE 150 MCG: 50 INJECTION, SOLUTION INTRAMUSCULAR; INTRAVENOUS at 13:50

## 2024-06-17 RX ADMIN — HYDROCODONE BITARTRATE AND ACETAMINOPHEN 1 TABLET: 10; 325 TABLET ORAL at 15:41

## 2024-06-17 RX ADMIN — FENTANYL CITRATE 150 MCG: 50 INJECTION, SOLUTION INTRAMUSCULAR; INTRAVENOUS at 13:19

## 2024-06-17 RX ADMIN — ROCURONIUM BROMIDE 50 MG: 10 INJECTION, SOLUTION INTRAVENOUS at 13:20

## 2024-06-17 RX ADMIN — ROCURONIUM BROMIDE 50 MG: 10 INJECTION, SOLUTION INTRAVENOUS at 13:45

## 2024-06-17 RX ADMIN — ACETAMINOPHEN 650 MG: 325 TABLET, FILM COATED ORAL at 10:57

## 2024-06-17 RX ADMIN — IPRATROPIUM BROMIDE AND ALBUTEROL SULFATE 3 ML: 2.5; .5 SOLUTION RESPIRATORY (INHALATION) at 15:01

## 2024-06-17 RX ADMIN — SODIUM CHLORIDE 3000 MG: 900 INJECTION INTRAVENOUS at 13:25

## 2024-06-17 RX ADMIN — SODIUM CHLORIDE, SODIUM LACTATE, POTASSIUM CHLORIDE, AND CALCIUM CHLORIDE 100 ML/HR: .6; .31; .03; .02 INJECTION, SOLUTION INTRAVENOUS at 15:20

## 2024-06-17 RX ADMIN — FENTANYL CITRATE 100 MCG: 50 INJECTION, SOLUTION INTRAMUSCULAR; INTRAVENOUS at 14:44

## 2024-06-17 RX ADMIN — GABAPENTIN 250 MG: 250 SOLUTION ORAL at 10:57

## 2024-06-17 RX ADMIN — FENTANYL CITRATE 100 MCG: 50 INJECTION, SOLUTION INTRAMUSCULAR; INTRAVENOUS at 13:44

## 2024-06-17 NOTE — ANESTHESIA POSTPROCEDURE EVALUATION
Patient: Clyde Tamayo    Procedure Summary       Date: 06/17/24 Room / Location:  [facility] OR 06 /  [facility] OR    Anesthesia Start: 1315 Anesthesia Stop: 1502    Procedure: ROBOTIC LAPAROSCOPIC UMBILICAL HERNIA REPAIR WITH MESH (Abdomen) Diagnosis:       Incarcerated umbilical hernia      (Incarcerated umbilical hernia [K42.0])    Surgeons: Britton Felipe MD Provider: Paramjit Stanley CRNA    Anesthesia Type: general ASA Status: 3            Anesthesia Type: general    Vitals  Vitals Value Taken Time   /71 06/17/24 1618   Temp 98 °F (36.7 °C) 06/17/24 1455   Pulse 88 06/17/24 1620   Resp 15 06/17/24 1610   SpO2 87 % 06/17/24 1620   Vitals shown include unfiled device data.        Post Anesthesia Care and Evaluation    Patient location during evaluation: PACU  Patient participation: complete - patient participated  Level of consciousness: awake and alert  Pain management: adequate    Airway patency: patent  Anesthetic complications: No anesthetic complications    Cardiovascular status: acceptable  Respiratory status: acceptable  Hydration status: acceptable    Comments: Blood pressure 114/65, pulse 83, temperature 98 °F (36.7 °C), temperature source Temporal, resp. rate 15, SpO2 90%.    Pt discharged from PACU based on jacobo score >8

## 2024-06-17 NOTE — ANESTHESIA PROCEDURE NOTES
Airway  Urgency: elective    Date/Time: 6/17/2024 1:23 PM  Airway not difficult    General Information and Staff    Patient location during procedure: OR  CRNA/CAA: Aaron Alcantar CRNA    Indications and Patient Condition  Indications for airway management: airway protection  Mask difficulty assessment: 0 - not attempted    Final Airway Details  Final airway type: endotracheal airway      Successful airway: ETT  Cuffed: yes   Successful intubation technique: direct laryngoscopy  Blade size: 3.5  ETT size (mm): 7.5  Cormack-Lehane Classification: grade I - full view of glottis  Placement verified by: capnometry   Cuff volume (mL): 8  Measured from: teeth  ETT/EBT  to teeth (cm): 23  Number of attempts at approach: 1

## 2024-06-17 NOTE — INTERVAL H&P NOTE
H&P reviewed. The patient was examined and there are no changes to the H&P.   Questions answered, consent signed

## 2024-06-17 NOTE — OP NOTE
OPERATIVE NOTE    Patient Name:  Clyde Tamayo  YOB: 1975  MRN:  [id number]      Date of Surgery:  6/17/2024      PREOPERATIVE DIAGNOSIS: Incarcerated umbilical hernia, obesity BMI 42, history of opioid use      POSTOPERATIVE DIAGNOSIS: 2 cm Incarcerated umbilical hernia, obesity BMI 42, history of opioid use       PROCEDURE(S):  Robotic umbilical hernia repair, incarcerated, 2 cm       ATTENDING SURGEON:  Britton Felipe MD      ADDITIONAL SURGEON:  None      ASSISTANT(S):  Compa MASSEY, Radha MASSEY       SPECIMENS:  Hernia sac       ANESTHESIA:  General endotracheal anesthesia with TAP block      ESTIMATED BLOOD LOSS:  5 mL      FLUIDS:  1200 mL      WOUND CLASSIFICATION:  Class 1, clean      IMPLANTS: 10 x 15 cm Phasix ST mesh      DRAINS:  None       FINDINGS:   1.  2 centimeter umbilical hernia with incarcerated fat.  Hernia contents and hernia sac reduced and excised.  Defect closed under minimal tension using a #1 STRATAFIX PDS suture.  Defect reinforced using a 10 x 15 cm phasic's ST mesh.  Meticulous hemostasis.       INDICATIONS: The patient is a 48 y.o. male with a symptomatic, incarcerated umbilical hernia       DESCRIPTION OF PROCEDURE:   The patient was seen in the preoperative holding area and the history and physical exam was updated. Informed consent was obtained from the patient. The patient was taken to the operating room and placed on the operating room table in the supine position. SCDs were placed on both legs. General anesthesia was administered and the patient was intubated without difficulty. The patient was prepped and draped in the usual sterile fashion. A full surgical timeout was performed verifying the correct patient, correct procedure and correct site for surgery.     Local anesthesia was infiltrated in the left upper quadrant at Jaramillo's point. A small skin incision was made using an 11 blade scalpel and a Veress needle was inserted with 2 satisfactory clicks. An excellent  saline drop test confirmed correct intra-abdominal placement. Pneumoperitoneum was initiated to 15 mmHg.  Local anesthesia was injected at the usual trocar sites.  Small incisions were made and a n 8 mm robotic trocar was was inserted under direct visualization.. Safe abdominal entry was confirmed after examining the peritoneal cavity. The Veress needle stick site was examined and was hemostatic.  A bilateral tap block was performed.  The remainder of the trocars were inserted under direct visualization.  The robot was docked and instruments were inserted.  At the umbilicus, the patient had a 2 cm hernia with incarcerated fat.  An incision was made in the peritoneum just lateral to the defect at the preperitoneal fat pad.  The incarcerated fat and hernia sac were reduced and excised.  The remainder of the preperitoneal fat pad was also dissected off of the anterior abdominal wall.  Pneumoperitoneum was decreased to 8 mmHg.  The hernia defect was closed under minimal tension using a running #1 STRATAFIX PDS suture.  The 2 cm defect reinforced using a 10 x 15 cm Phasix ST mesh in an IPOM fashion, and secured in place using 3-0 absorbable V-Loc sutures. The mesh laid nice and flat.  The Echo 2 positioning system was removed along with the suture needles.  The 12 mm trocar site in the left upper quadrant was closed using a 0 Vicryl suture in a figure-of-eight fashion using a PMI transfascial suture passer.  Pneumoperitoneum was released and the remaining of the trocars were removed under direct visualization.  The incisions were closed using 4-0 Vicryl suture and dressed Mastisol Steri-Strips.    At the completion of the procedure, all sharp, sponge and instrument counts were correct x2. The patient was awoken from anesthesia and extubated in the operating room.  The patient was taken to the postanesthesia care unit in stable condition without any immediate complications.      Britton Felipe MD  6/17/2024  14:45  CDT    Please note that portions of this note were completed with a voice recognition program.

## 2024-06-17 NOTE — ANESTHESIA PREPROCEDURE EVALUATION
Anesthesia Evaluation     Patient summary reviewed   no history of anesthetic complications:   NPO Solid Status: > 8 hours             Airway   Mallampati: II  Dental    (+) poor dentition    Pulmonary    (+) a smoker Current, COPD,  Cardiovascular   Exercise tolerance: good (4-7 METS)    (+) hypertension  (-) past MI, cardiac stents, CABG      Neuro/Psych  GI/Hepatic/Renal/Endo    (+) morbid obesity  (-) diabetes    Musculoskeletal     Abdominal   (+) obese   Substance History      OB/GYN          Other   arthritis,                       Anesthesia Plan    ASA 3     general     intravenous induction     Anesthetic plan, risks, benefits, and alternatives have been provided, discussed and informed consent has been obtained with: patient.        CODE STATUS:

## 2024-06-24 DIAGNOSIS — E29.1 HYPOGONADISM MALE: ICD-10-CM

## 2024-06-24 DIAGNOSIS — G89.21 CHRONIC PAIN DUE TO TRAUMA: ICD-10-CM

## 2024-06-24 DIAGNOSIS — Z79.899 LONG-TERM USE OF HIGH-RISK MEDICATION: ICD-10-CM

## 2024-06-24 NOTE — TELEPHONE ENCOUNTER
Caller: Clyde Tmaayo    Relationship: Self    Best call back number: [phone]     Requested Prescriptions:   Requested Prescriptions     Pending Prescriptions Disp Refills    Testosterone Cypionate (Depo-Testosterone) 200 MG/ML injection 2 mL 2     Sig: Inject 1 mL into the appropriate muscle as directed by prescriber Every 14 (Fourteen) Days.    oxyCODONE-acetaminophen (Percocet)  MG per tablet 120 tablet 0     Sig: Take 1 tablet by mouth Every 6 (Six) Hours As Needed for Moderate Pain.        Pharmacy where request should be sent:  [facility]    Last office visit with prescribing clinician: 5/30/2024   Last telemedicine visit with prescribing clinician: 3/4/2024   Next office visit with prescribing clinician: 7/1/2024     Additional details provided by patient: NOT DUE FOR REFILL UNTIL 6/30, HAS APPT 7/1/2024    Does the patient have less than a 3 day supply:  [] Yes  [x] No    Would you like a call back once the refill request has been completed: [] Yes [x] No    If the office needs to give you a call back, can they leave a voicemail: [] Yes [x] No    Shama Mcghee Rep   06/24/24 10:48 CDT

## 2024-06-24 NOTE — TELEPHONE ENCOUNTER
Patient will not get refill until seen. Can one of you see if patient still wants to be seen on 7/1, or if he wants to see Dr. SAM later this week? Probably needs to be Thursday, so that it is closer to his needed fill date.

## 2024-06-25 RX ORDER — TESTOSTERONE CYPIONATE 200 MG/ML
200 INJECTION, SOLUTION INTRAMUSCULAR
Qty: 2 ML | Refills: 2 | OUTPATIENT
Start: 2024-06-25

## 2024-06-25 RX ORDER — OXYCODONE AND ACETAMINOPHEN 10; 325 MG/1; MG/1
1 TABLET ORAL EVERY 6 HOURS PRN
Qty: 120 TABLET | Refills: 0 | OUTPATIENT
Start: 2024-06-25

## 2024-07-01 ENCOUNTER — OFFICE VISIT (OUTPATIENT)
Dept: INTERNAL MEDICINE | Facility: CLINIC | Age: 49
End: 2024-07-01
Payer: MEDICARE

## 2024-07-01 VITALS
HEIGHT: 73 IN | SYSTOLIC BLOOD PRESSURE: 139 MMHG | BODY MASS INDEX: 39.63 KG/M2 | OXYGEN SATURATION: 99 % | DIASTOLIC BLOOD PRESSURE: 96 MMHG | TEMPERATURE: 98.4 F | WEIGHT: 299 LBS | HEART RATE: 77 BPM

## 2024-07-01 DIAGNOSIS — Z79.899 LONG-TERM USE OF HIGH-RISK MEDICATION: ICD-10-CM

## 2024-07-01 DIAGNOSIS — L08.9 SKIN INFECTION: Primary | ICD-10-CM

## 2024-07-01 DIAGNOSIS — G89.21 CHRONIC PAIN DUE TO TRAUMA: ICD-10-CM

## 2024-07-01 PROCEDURE — 99213 OFFICE O/P EST LOW 20 MIN: CPT | Performed by: FAMILY MEDICINE

## 2024-07-01 PROCEDURE — 1159F MED LIST DOCD IN RCRD: CPT | Performed by: FAMILY MEDICINE

## 2024-07-01 PROCEDURE — 1125F AMNT PAIN NOTED PAIN PRSNT: CPT | Performed by: FAMILY MEDICINE

## 2024-07-01 PROCEDURE — 1160F RVW MEDS BY RX/DR IN RCRD: CPT | Performed by: FAMILY MEDICINE

## 2024-07-01 PROCEDURE — 3080F DIAST BP >= 90 MM HG: CPT | Performed by: FAMILY MEDICINE

## 2024-07-01 PROCEDURE — 3075F SYST BP GE 130 - 139MM HG: CPT | Performed by: FAMILY MEDICINE

## 2024-07-01 RX ORDER — OXYCODONE AND ACETAMINOPHEN 10; 325 MG/1; MG/1
1 TABLET ORAL EVERY 4 HOURS PRN
Qty: 180 TABLET | Refills: 0 | Status: SHIPPED | OUTPATIENT
Start: 2024-07-01

## 2024-07-01 RX ORDER — SULFAMETHOXAZOLE AND TRIMETHOPRIM 800; 160 MG/1; MG/1
1 TABLET ORAL 2 TIMES DAILY
Qty: 14 TABLET | Refills: 0 | Status: SHIPPED | OUTPATIENT
Start: 2024-07-01 | End: 2024-07-08
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
## 2024-07-05 ENCOUNTER — TELEPHONE (OUTPATIENT)
Dept: SURGERY | Facility: CLINIC | Age: 49
End: 2024-07-05

## 2024-07-05 DIAGNOSIS — E29.1 HYPOGONADISM MALE: ICD-10-CM

## 2024-07-05 RX ORDER — TESTOSTERONE CYPIONATE 200 MG/ML
200 INJECTION, SOLUTION INTRAMUSCULAR
Qty: 2 ML | Refills: 2 | Status: SHIPPED | OUTPATIENT
Start: 2024-07-05

## 2024-07-05 NOTE — TELEPHONE ENCOUNTER
Spoke with PT regarding todays appt. PT stated he did not have a vehicle at this time and he would call us back to reschedule when he could make it in.    CBC  7/5

## 2024-07-05 NOTE — TELEPHONE ENCOUNTER
Caller: Clyde Tamayo    Relationship: Self    Best call back number: [phone]    Requested Prescriptions     Pending Prescriptions Disp Refills    Testosterone Cypionate (Depo-Testosterone) 200 MG/ML injection 2 mL 2     Sig: Inject 1 mL into the appropriate muscle as directed by prescriber Every 14 (Fourteen) Days.        Pharmacy where request should be sent: [facility] [address] [place] [address] [phone] [facility] [phone]      Last office visit with prescribing clinician: 7/1/2024   Last telemedicine visit with prescribing clinician: 3/4/2024   Next office visit with prescribing clinician: 7/29/2024     Additional details provided by patient:     TOTALLY OUT - SHOULD HAVE TAKEN SHOT YESTERDAY    Does the patient have less than a 3 day supply:  [x] Yes  [] No    Would you like a call back once the refill request has been completed: [] Yes [] No    If the office needs to give you a call back, can they leave a voicemail: [] Yes [] No    Shama Jensen Rep   07/05/24 09:29 CDT

## 2024-07-25 ENCOUNTER — PATIENT OUTREACH (OUTPATIENT)
Dept: CASE MANAGEMENT | Facility: OTHER | Age: 49
End: 2024-07-25
Payer: MEDICARE

## 2024-07-25 NOTE — OUTREACH NOTE
[facility] Hypertension Care Companion Enrollment    Was the enrollment attempt to reach the patient successful?: yes  Date/Time of successful contact: 7/25/2024 11:21 AM  Patient response: not interested       Patient said he has a wrist BP monitor, and he would rather monitor his BP and log it on paper for the doctor; he does not want to use [employer]. - Patient offered his new phone number, [phone], for [employer].     Key [id number]  Ambulatory Case Management    7/25/2024, 11:21 CDT

## 2024-07-29 ENCOUNTER — OFFICE VISIT (OUTPATIENT)
Dept: INTERNAL MEDICINE | Facility: CLINIC | Age: 49
End: 2024-07-29
Payer: MEDICARE

## 2024-07-29 VITALS
BODY MASS INDEX: 40.77 KG/M2 | DIASTOLIC BLOOD PRESSURE: 78 MMHG | SYSTOLIC BLOOD PRESSURE: 129 MMHG | HEART RATE: 71 BPM | WEIGHT: 307.6 LBS | HEIGHT: 73 IN | TEMPERATURE: 97.8 F | OXYGEN SATURATION: 97 %

## 2024-07-29 DIAGNOSIS — G89.21 CHRONIC PAIN DUE TO TRAUMA: ICD-10-CM

## 2024-07-29 DIAGNOSIS — F41.9 ANXIETY: ICD-10-CM

## 2024-07-29 DIAGNOSIS — Z79.899 LONG-TERM USE OF HIGH-RISK MEDICATION: ICD-10-CM

## 2024-07-29 DIAGNOSIS — Z00.00 MEDICARE ANNUAL WELLNESS VISIT, SUBSEQUENT: Primary | ICD-10-CM

## 2024-07-29 DIAGNOSIS — T81.49XA SURGICAL SITE INFECTION: ICD-10-CM

## 2024-07-29 PROCEDURE — 3078F DIAST BP <80 MM HG: CPT | Performed by: FAMILY MEDICINE

## 2024-07-29 PROCEDURE — 99497 ADVNCD CARE PLAN 30 MIN: CPT | Performed by: FAMILY MEDICINE

## 2024-07-29 PROCEDURE — 1125F AMNT PAIN NOTED PAIN PRSNT: CPT | Performed by: FAMILY MEDICINE

## 2024-07-29 PROCEDURE — 99213 OFFICE O/P EST LOW 20 MIN: CPT | Performed by: FAMILY MEDICINE

## 2024-07-29 PROCEDURE — G0439 PPPS, SUBSEQ VISIT: HCPCS | Performed by: FAMILY MEDICINE

## 2024-07-29 PROCEDURE — 3074F SYST BP LT 130 MM HG: CPT | Performed by: FAMILY MEDICINE

## 2024-07-29 PROCEDURE — 1170F FXNL STATUS ASSESSED: CPT | Performed by: FAMILY MEDICINE

## 2024-07-29 RX ORDER — OXYCODONE AND ACETAMINOPHEN 10; 325 MG/1; MG/1
1 TABLET ORAL EVERY 4 HOURS PRN
Qty: 180 TABLET | Refills: 0 | Status: SHIPPED | OUTPATIENT
Start: 2024-07-29

## 2024-07-29 RX ORDER — SULFAMETHOXAZOLE AND TRIMETHOPRIM 800; 160 MG/1; MG/1
1 TABLET ORAL 2 TIMES DAILY
Qty: 14 TABLET | Refills: 0 | Status: SHIPPED | OUTPATIENT
Start: 2024-07-29 | End: 2024-08-05

## 2024-07-29 RX ORDER — DIVALPROEX SODIUM 250 MG/1
250 TABLET, DELAYED RELEASE ORAL 3 TIMES DAILY
Qty: 90 TABLET | Refills: 11 | Status: SHIPPED | OUTPATIENT
Start: 2024-07-29

## 2024-07-29 NOTE — PROGRESS NOTES
Subjective   The ABCs of the Annual Wellness Visit  Medicare Wellness Visit      Clyde Tamayo is a 48 y.o. patient who presents for a Medicare Wellness Visit.    The following portions of the patient's history were reviewed and   updated as appropriate: allergies, current medications, past family history, past medical history, past social history, past surgical history, and problem list.    Compared to one year ago, the patient's physical   health is the same.  Compared to one year ago, the patient's mental   health is the same.    Recent Hospitalizations:  He was admitted within the past 365 days at [facility].     Current Medical Providers:  Patient Care Team:  Javier Raygoza MD as PCP - General (Family Medicine)  Salvador Morris APRN as Nurse Practitioner (Nurse Practitioner)  Marilynn Tang APRN as Nurse Practitioner (Pulmonary Disease)  Britton Felipe MD as Surgeon (General Surgery)    Outpatient Medications Prior to Visit   Medication Sig Dispense Refill    albuterol sulfate  (90 Base) MCG/ACT inhaler Inhale 2 puffs Every 4 (Four) Hours As Needed for Wheezing. 18 g 3    amLODIPine (NORVASC) 10 MG tablet Take 1 tablet by mouth Daily. 90 tablet 3    atorvastatin (Lipitor) 80 MG tablet Take 1 tablet by mouth Daily. 90 tablet 1    Budeson-Glycopyrrol-Formoterol (Breztri Aerosphere) 160-9-4.8 MCG/ACT aerosol inhaler Inhale 2 puffs 2 (Two) Times a Day. 1 each 11    citalopram (CeleXA) 40 MG tablet Take 1 tablet by mouth Daily. 30 tablet 11    fluticasone (Flovent HFA) 220 MCG/ACT inhaler Inhale 1 puff 2 (Two) Times a Day. 12 g 11    Homeopathic Products (Leg Cramps) tablet Place 3 tablets under the tongue Every 4 (Four) Hours As Needed (leg cramps). 100 tablet 0    hydrOXYzine (ATARAX) 50 MG tablet Take 1 tablet by mouth 3 (Three) Times a Day As Needed for Anxiety. 90 tablet 11    ipratropium-albuterol (Combivent Respimat)  MCG/ACT inhaler Inhale 1 puff 4 (Four) Times a  "Day. 1 each 3    methocarbamol (Robaxin) 500 MG tablet Take 1 tablet by mouth 3 (Three) Times a Day. 90 tablet 11    metoprolol tartrate (LOPRESSOR) 50 MG tablet Take 1 tablet by mouth 2 (Two) Times a Day. 180 tablet 3    naloxone (NARCAN) 4 MG/0.1ML nasal spray       nicotine (NICODERM CQ) 21 MG/24HR patch Place 1 patch on the skin as directed by provider Daily. 14 each 0    NON FORMULARY Hemp products      QUEtiapine (SEROquel) 300 MG tablet Take 1 tablet by mouth Every Night. 30 tablet 11    Syringe/Needle, Disp, (B-D SYRINGE/NEEDLE 3CC/22GX1.5) 22G X 1-1/2\" 3 ML misc 2 syringes to use wwith testosterone 2 each 11    Testosterone Cypionate (Depo-Testosterone) 200 MG/ML injection Inject 1 mL into the appropriate muscle as directed by prescriber Every 14 (Fourteen) Days. 2 mL 2    traZODone (DESYREL) 50 MG tablet Take 1 tablet by mouth Every Night. 90 tablet 3    divalproex (Depakote) 250 MG DR tablet Take 1 tablet by mouth 2 (Two) Times a Day. 60 tablet 11    oxyCODONE-acetaminophen (Percocet)  MG per tablet Take 1 tablet by mouth Every 4 (Four) Hours As Needed for Moderate Pain. 180 tablet 0     No facility-administered medications prior to visit.     Opioid medication/s are on active medication list.  and I have evaluated his active treatment plan and pain score trends (see table).  Vitals:    07/29/24 1019   PainSc:   9   PainLoc: Back     I have reviewed the chart for potential of high risk medication and harmful drug interactions in the elderly.        Aspirin is not on active medication list.  Aspirin use is indicated based on review of current medical condition/s. Pros and cons of this therapy have been discussed with this patient. Benefits of this medication outweigh potential harm.  Patient has been instructed to start taking this medication..    Patient Active Problem List   Diagnosis    Chronic pain due to trauma    Essential hypertension    Long term use of drug    Bipolar 1 disorder    Anxiety "    Muscle spasm    Left knee pain    Pain, lumbar region    Pain of right hip joint    Chronic pain of both knees    Narcotic use agreement exists    Carpal tunnel syndrome of left wrist    Morbidly obese    High risk medication use    Mixed hyperlipidemia    Low testosterone    Schizoaffective disorder, bipolar type    Hallucination, visual    Auditory hallucination    Other psychoactive substance dependence, uncomplicated    Seasonal allergies    Chronic back pain    COPD, mild    Incarcerated umbilical hernia     Advance Care Planning (Click this link to access ACP Navigator)  Advance Directive is not on file.  ACP discussion was held with the patient during this visit. Patient has an advance directive (not in EMR), copy requested. Most form Filled out      Advanced Directives  I discussed with this patient the importance of advanced directives and planning ahead in the event that they are unable to make decisions on their own due to illness or incapacity.  We filled out a MOST form and discussed the different options including DNR, DNI, feeding tubes, Long term iv fluids and the use of antibiotics.  I discussed other options such as a health care surrogate, and or placing time limits on life saving measures should the need arise.  I discussed the importance of having molly discussions with their family/healthcare surrogate about their wishes so they are well known.   I spent 16 minutes discussing/counseling these issues with the patient and MOST form will be scanned into the chart, also advised the patient to keep a copy and also make sure local hospital services also have a copy on file.  Patient also advised if they have a copy to their healthcare surrogate if they have chosen one.  Lastly we also advised the patient that if they want to change their wishes at any time that a new MOST form can be drafted to accommodate any changes.       Objective   Vitals:    07/29/24 1019   BP: 129/78   BP Location: Left arm  "  Patient Position: Sitting   Cuff Size: Adult   Pulse: 71   Temp: 97.8 °F (36.6 °C)   TempSrc: Infrared   SpO2: 97%   Weight: (!) 140 kg (307 lb 9.6 oz)   Height: 185.4 cm (73\")   PainSc:   9   PainLoc: Back       Estimated body mass index is 40.58 kg/m² as calculated from the following:    Height as of this encounter: 185.4 cm (73\").    Weight as of this encounter: 140 kg (307 lb 9.6 oz).               Does the patient have evidence of cognitive impairment? No                                                                                                Health  Risk Assessment    Smoking Status:  Social History     Tobacco Use   Smoking Status Former    Current packs/day: 0.00    Types: Cigarettes    Quit date: 7/15/2024    Years since quittin.0   Smokeless Tobacco Never     Alcohol Consumption:  Social History     Substance and Sexual Activity   Alcohol Use No     Fall Risk Screen:  PARISH Fall Risk Assessment has not been completed.        Depression Screenin/29/2024    10:26 AM   PHQ-2/PHQ-9 Depression Screening   Little Interest or Pleasure in Doing Things 0-->not at all   Feeling Down, Depressed or Hopeless 0-->not at all   PHQ-9: Brief Depression Severity Measure Score 0     Health Habits and Functional and Cognitive Screenin/29/2024    10:30 AM   Functional & Cognitive Status   Do you have difficulty preparing food and eating? No   Do you have difficulty bathing yourself, getting dressed or grooming yourself? No   Do you have difficulty using the toilet? No   Do you have difficulty moving around from place to place? No   Do you have trouble with steps or getting out of a bed or a chair? Yes   Current Diet Well Balanced Diet   Dental Exam Not up to date   Eye Exam Up to date   Exercise (times per week) 2 times per week   Current Exercises Include Walking   Do you need help using the phone?  No   Are you deaf or do you have serious difficulty hearing?  No   Do you need help to go to " places out of walking distance? Yes   Do you need help shopping? No   Do you need help preparing meals?  No   Do you need help with housework?  No   Do you need help with laundry? No   Do you need help taking your medications? No   Do you need help managing money? No   Do you ever drive or ride in a car without wearing a seat belt? No   Have you felt unusual stress, anger or loneliness in the last month? No   Who do you live with? Other   If you need help, do you have trouble finding someone available to you? No   Do you have difficulty concentrating, remembering or making decisions? Yes             Age-appropriate Screening Schedule:  Refer to the list below for future screening recommendations based on patient's age, sex and/or medical conditions. Orders for these recommended tests are listed in the plan section. The patient has been provided with a written plan.    Health Maintenance List  Health Maintenance   Topic Date Due    DXA SCAN  Never done    COLORECTAL CANCER SCREENING  Never done    Pneumococcal Vaccine 0-64 (2 of 2 - PPSV23 or PCV20) 01/15/2020    LIPID PANEL  07/25/2024    COVID-19 Vaccine (2 - 2023-24 season) 10/18/2024 (Originally 9/1/2023)    INFLUENZA VACCINE  08/01/2024    BMI FOLLOWUP  06/04/2025    ANNUAL WELLNESS VISIT  07/29/2025    TDAP/TD VACCINES (2 - Td or Tdap) 06/28/2029    HEPATITIS C SCREENING  Completed                                                                                                                                                CMS Preventative Services Quick Reference  Risk Factors Identified During Encounter  Chronic Pain: Natural history and expected course discussed. Questions answered.  Chronic Pain Educational material Discussed and Shared in After Visit Summary for Patient.    The above risks/problems have been discussed with the patient.  Pertinent information has been shared with the patient in the After Visit Summary.  An After Visit Summary and PPPS were  made available to the patient.    Follow Up:   Next Medicare Wellness visit to be scheduled in 1 year.         Additional E&M Note during same encounter follows:  Patient has additional, significant, and separately identifiable condition(s)/problem(s) that require work above and beyond the Medicare Wellness Visit     Chief Complaint  Anxiety, Hypertension, Pain, and Medicare Wellness-subsequent    Subjective     HPI  Clyde is also being seen today for additional medical problem/s.  He returns for follow-up of chronic pain related to his midline low back.  his pain is stable and well controlled.     Pain described as sharp, shooting, and stabbing. does radiate and occurs continuously. Patient also experiences the following associated symptoms and patient denies any problems .    Current pain level is a 9/10 .  Patient states that durring the past week that his pain has interfered with his enjoyment of life 9/10  with 10 bieng complete interference. The patient also states that during the past week that that the pain has interfered with his general activity 9/10  with 10 bieng complete interference. Patient reports that the pain improves with pain medication, and gets worse with squatting, standing, and walking .      The treatment plan and the above PEG score are reviewed with the patient.  Patient feels that he is doing well with the pain and improvement provided to him by the medication.  Patient states he can do more activities of daily living while taking the medication that it significantly impacts the quality of his life.  The patient reports no side effects of his medication, and have no evidence of oversedation, confusion, slurred speech, or abnormal gait at this time.  Patient reports he is compliant with his regimen and not modifying his own dosage and/or timing.  The patient reports that he is not taking any illicit substances, or alcohol.  Patient continues to do well with the current treatment plan and  "states that he would like to continue with opioid therapy at this time.  The patient has never had an overdose and needed a rescue medication at this time.    Final Peg score: 9    Patient has a small surgical site infection and would like to go ahead and get antibiotics to help with this at this time.    Otherwise no other problems, complaints, or concerns.        He states hi testosterone working well.       No other problems, complaints. or concerns noted.      Current MME total: 30     Objective Radiological Findings: moderate degenerative change on lumbar xray     Trearment goals: reduced pain, Increased activities of daily living     History:              Duration of the patient's pain: since before 1990              Legal issues? NO              Current controlled medication and doses: norco              Medications that have failed: percocet, nsaids, tylenol, tramadol, morphine, soma, darvocet              Side effects of current medications: no              Satisfaction with treatment: yes     Functional Status:              Able to feed self: YES              Able to bathe self: YES              Able to use the toilet independently: YES              Able to dress self: YES              Able to get up from bed or chair without assistance: yes     Adverse Events:              Constipation: yes              Lethargy: NO                Aberrant Behavior:              Over dose: NO              Run out of medications early: NO              Last UDS consistent: yes              Taking medications as prescribed: YES                 Objective   Vital Signs:  /78 (BP Location: Left arm, Patient Position: Sitting, Cuff Size: Adult)   Pulse 71   Temp 97.8 °F (36.6 °C) (Infrared)   Ht 185.4 cm (73\")   Wt (!) 140 kg (307 lb 9.6 oz)   SpO2 97%   BMI 40.58 kg/m²   Physical Exam  Vitals and nursing note reviewed.   Constitutional:       General: He is not in acute distress.     Appearance: Normal appearance. "   HENT:      Head: Normocephalic.   Eyes:      Extraocular Movements: Extraocular movements intact.      Pupils: Pupils are equal, round, and reactive to light.   Cardiovascular:      Rate and Rhythm: Normal rate and regular rhythm.      Heart sounds: Normal heart sounds. No murmur heard.  Pulmonary:      Effort: Pulmonary effort is normal. No respiratory distress.      Breath sounds: Normal breath sounds. No rhonchi or rales.   Abdominal:      General: Abdomen is flat. Bowel sounds are normal.      Palpations: Abdomen is soft.   Neurological:      General: No focal deficit present.      Mental Status: He is alert.         The following data was reviewed by: Javier Raygoza MD on 07/29/2024:        Assessment and Plan                 Medicare annual wellness visit, subsequent    Surgical site infection    Anxiety    Chronic pain due to trauma    Long-term use of high-risk medication    No orders of the defined types were placed in this encounter.    As part of this patient's treatment plan, I am prescribing controlled substances. The patient has been made aware of appropriate use of such medications, including potential risk of somnolence, limited ability to drive and /or work safely, and potential for dependence or overdose. It has also been made clear that these medications are for use by this patient only, without concomitant use of alcohol or other substances unless prescribed.  Patient has been advised that PRN or as needed pain medication allows the patient to skip doses if not needed, but that the medication is not to be taken more often or at higher doses than prescribed.    Patient has completed prescribing agreement detailing terms of continued prescribing of controlled substances, including monitoring LAMBERT reports, urine drug screening, and pill counts if necessary. The patient is aware that inappropriate use will result in cessation of prescribing such medications, and possible termination  from this practice.    History and physical exam exhibit continued safe and appropriate use of controlled substances.    1.  Controlled substance abuse agreement discussed and copy in record: YES  2.  Discussed:   Risk of addiction: YES   Specific risk of medications:YES   Side effects of medications: YES   Reasonable expectations of the medication: YES  3.  Treatment Objectives:   Discussed management of constipation: YES   Discussed management of other side effects: YES  4.  eKASPER:     LAMBERT report has been reviewed by: Javier Raygoza MD on 07/29/24 in the PDMD in the electronic medical record.  [unfilled]   Results/Date of last LAMBERT:  appropriate  5.  Results/Date of last drug screen:  appropriate  6.  Follow up plan:    Follow Up in One Months Time              Continue on current medications as directed    EMR Dictation/Transcription disclaimer:   Some of this note may be an electronic transcription/translation of spoken language to printed text. The electronic translation of spoken language may permit erroneous, or at times, nonsensical words or phrases to be inadvertently transcribed; Although I have reviewed the note for such errors, some may still exist.     New Medications Ordered This Visit   Medications    sulfamethoxazole-trimethoprim (Bactrim DS) 800-160 MG per tablet     Sig: Take 1 tablet by mouth 2 (Two) Times a Day for 7 days.     Dispense:  14 tablet     Refill:  0    divalproex (Depakote) 250 MG DR tablet     Sig: Take 1 tablet by mouth 3 (Three) Times a Day.     Dispense:  90 tablet     Refill:  11    oxyCODONE-acetaminophen (Percocet)  MG per tablet     Sig: Take 1 tablet by mouth Every 4 (Four) Hours As Needed for Moderate Pain.     Dispense:  180 tablet     Refill:  0     Had recent abdominal surgery ok to fill today to cover additional surgical pain          Follow Up   Return in about 4 weeks (around 8/26/2024), or one year for medicare wellness.  Patient was  given instructions and counseling regarding his condition or for health maintenance advice. Please see specific information pulled into the AVS if appropriate.

## 2024-07-29 NOTE — PATIENT INSTRUCTIONS
Advance Care Planning and Advance Directives     You make decisions on a daily basis - decisions about where you want to live, your career, your home, your life. Perhaps one of the most important decisions you face is your choice for future medical care. Take time to talk with your family and your healthcare team and start planning today.  Advance Care Planning is a process that can help you:  Understand possible future healthcare decisions in light of your own experiences  Reflect on those decision in light of your goals and values  Discuss your decisions with those closest to you and the healthcare professionals that care for you  Make a plan by creating a document that reflects your wishes    Surrogate Decision Maker  In the event of a medical emergency, which has left you unable to communicate or to make your own decisions, you would need someone to make decisions for you.  It is important to discuss your preferences for medical treatment with this person while you are in good health.     Qualities of a surrogate decision maker:  Willing to take on this role and responsibility  Knows what you want for future medical care  Willing to follow your wishes even if they don't agree with them  Able to make difficult medical decisions under stressful circumstances    Advance Directives  These are legal documents you can create that will guide your healthcare team and decision maker(s) when needed. These documents can be stored in the electronic medical record.    Living Will - a legal document to guide your care if you have a terminal condition or a serious illness and are unable to communicate. States vary by statute in document names/types, but most forms may include one or more of the following:        -  Directions regarding life-prolonging treatments        -  Directions regarding artificially provided nutrition/hydration        -  Choosing a healthcare decision maker        -  Direction regarding organ/tissue  donation    Durable Power of  for Healthcare - this document names an -in-fact to make medical decisions for you, but it may also allow this person to make personal and financial decisions for you. Please seek the advice of an  if you need this type of document.    **Advance Directives are not required and no one may discriminate against you if you do not sign one.    Medical Orders  Many states allow specific forms/orders signed by your physician to record your wishes for medical treatment in your current state of health. This form, signed in personal communication with your physician, addresses resuscitation and other medical interventions that you may or may not want.      For more information or to schedule a time with a [facility] Advance Care Planning Facilitator contact: [employer] or call [phone] and someone will contact you directly.    Medicare Wellness  Personal Prevention Plan of Service     Date of Office Visit:    Encounter Provider:  Javier Raygoza MD  Place of Service:  [facility]  Patient Name: Clyde Tamayo  :  1975    As part of the Medicare Wellness portion of your visit today, we are providing you with this personalized preventive plan of services (PPPS). This plan is based upon recommendations of the [place] Preventive Services Task Force (USPSTF) and the Advisory Committee on Immunization Practices (ACIP).    This lists the preventive care services that should be considered, and provides dates of when you are due. Items listed as completed are up-to-date and do not require any further intervention.    Health Maintenance   Topic Date Due   • DXA SCAN  Never done   • COLORECTAL CANCER SCREENING  Never done   • Pneumococcal Vaccine 0-64 (2 of 2 - PPSV23 or PCV20) 01/15/2020   • LIPID PANEL  2024   • ANNUAL WELLNESS VISIT  2024   • COVID-19 Vaccine (2 - - season) 10/18/2024  (Originally 9/1/2023)   • INFLUENZA VACCINE  08/01/2024   • BMI FOLLOWUP  06/04/2025   • TDAP/TD VACCINES (2 - Td or Tdap) 06/28/2029   • HEPATITIS C SCREENING  Completed       No orders of the defined types were placed in this encounter.      Return in about 4 weeks (around 8/26/2024), or one year for medicare wellness.

## 2024-08-26 ENCOUNTER — PRIOR AUTHORIZATION (OUTPATIENT)
Dept: INTERNAL MEDICINE | Facility: CLINIC | Age: 49
End: 2024-08-26
Payer: MEDICARE

## 2024-08-26 NOTE — TELEPHONE ENCOUNTER
GINO LERNER (Key: [id number])  Need Help? Call us at [phone]  Outcome  Additional Information Required  Available without authorization. The member is able to fill the requested drug at the pharmacy. If coverage is still needed or requesting prior to the expiration of a current authorization, a request can be made by sending a fax or calling the number on the back of the member's ID card.  Drug  Testosterone Cypionate 200MG/ML intramuscular solution  ePA cloud logo  Form  [employer] Medicare Electronic PA Form (2017 NCPDP)

## 2024-08-27 ENCOUNTER — PRIOR AUTHORIZATION (OUTPATIENT)
Dept: INTERNAL MEDICINE | Facility: CLINIC | Age: 49
End: 2024-08-27
Payer: MEDICARE

## 2024-08-27 NOTE — TELEPHONE ENCOUNTER
Nino Tamayo (Key: [id number])  Need Help? Call us at [phone]  Outcome  Additional Information Required  Available without authorization. The member is able to fill the requested drug at the pharmacy. If coverage is still needed or requesting prior to the expiration of a current authorization, a request can be made by sending a fax or calling the number on the back of the member's ID card.  Drug  Testosterone Cypionate 200MG/ML intramuscular solution  ePA cloud logo  Form  [employer] Medicare Electronic PA Form (2017 NCPDP)

## 2024-08-28 ENCOUNTER — TELEPHONE (OUTPATIENT)
Dept: INTERNAL MEDICINE | Facility: CLINIC | Age: 49
End: 2024-08-28
Payer: MEDICARE

## 2024-08-28 DIAGNOSIS — Z12.11 COLON CANCER SCREENING: Primary | ICD-10-CM

## 2024-08-28 NOTE — TELEPHONE ENCOUNTER
I contacted patient to inquire about cologuard status and he states he never received the kit to complete the test. He will need a new order.

## 2024-09-03 ENCOUNTER — OFFICE VISIT (OUTPATIENT)
Dept: INTERNAL MEDICINE | Facility: CLINIC | Age: 49
End: 2024-09-03
Payer: MEDICARE

## 2024-09-03 VITALS
DIASTOLIC BLOOD PRESSURE: 81 MMHG | HEIGHT: 73 IN | TEMPERATURE: 97.1 F | SYSTOLIC BLOOD PRESSURE: 137 MMHG | HEART RATE: 97 BPM | WEIGHT: 315 LBS | BODY MASS INDEX: 41.75 KG/M2 | OXYGEN SATURATION: 98 %

## 2024-09-03 DIAGNOSIS — M54.41 CHRONIC MIDLINE LOW BACK PAIN WITH BILATERAL SCIATICA: ICD-10-CM

## 2024-09-03 DIAGNOSIS — J06.9 UPPER RESPIRATORY TRACT INFECTION, UNSPECIFIED TYPE: ICD-10-CM

## 2024-09-03 DIAGNOSIS — G89.29 CHRONIC MIDLINE LOW BACK PAIN WITH BILATERAL SCIATICA: ICD-10-CM

## 2024-09-03 DIAGNOSIS — Z79.899 LONG-TERM USE OF HIGH-RISK MEDICATION: ICD-10-CM

## 2024-09-03 DIAGNOSIS — M54.42 CHRONIC MIDLINE LOW BACK PAIN WITH BILATERAL SCIATICA: ICD-10-CM

## 2024-09-03 DIAGNOSIS — R06.2 WHEEZING: ICD-10-CM

## 2024-09-03 DIAGNOSIS — R06.02 SHORTNESS OF BREATH: ICD-10-CM

## 2024-09-03 DIAGNOSIS — J44.9 COPD, MILD: ICD-10-CM

## 2024-09-03 DIAGNOSIS — J40 BRONCHITIS: Primary | ICD-10-CM

## 2024-09-03 DIAGNOSIS — G89.21 CHRONIC PAIN DUE TO TRAUMA: ICD-10-CM

## 2024-09-03 PROCEDURE — 1159F MED LIST DOCD IN RCRD: CPT | Performed by: FAMILY MEDICINE

## 2024-09-03 PROCEDURE — 99214 OFFICE O/P EST MOD 30 MIN: CPT | Performed by: FAMILY MEDICINE

## 2024-09-03 PROCEDURE — 1160F RVW MEDS BY RX/DR IN RCRD: CPT | Performed by: FAMILY MEDICINE

## 2024-09-03 PROCEDURE — 1125F AMNT PAIN NOTED PAIN PRSNT: CPT | Performed by: FAMILY MEDICINE

## 2024-09-03 PROCEDURE — 87428 SARSCOV & INF VIR A&B AG IA: CPT | Performed by: FAMILY MEDICINE

## 2024-09-03 PROCEDURE — 3075F SYST BP GE 130 - 139MM HG: CPT | Performed by: FAMILY MEDICINE

## 2024-09-03 PROCEDURE — 3079F DIAST BP 80-89 MM HG: CPT | Performed by: FAMILY MEDICINE

## 2024-09-03 PROCEDURE — 96372 THER/PROPH/DIAG INJ SC/IM: CPT | Performed by: FAMILY MEDICINE

## 2024-09-03 RX ORDER — LEVOFLOXACIN 500 MG/1
500 TABLET, FILM COATED ORAL DAILY
Qty: 7 TABLET | Refills: 0 | Status: SHIPPED | OUTPATIENT
Start: 2024-09-03 | End: 2024-09-10

## 2024-09-03 RX ORDER — BUDESONIDE, GLYCOPYRROLATE, AND FORMOTEROL FUMARATE 160; 9; 4.8 UG/1; UG/1; UG/1
2 AEROSOL, METERED RESPIRATORY (INHALATION) 2 TIMES DAILY
Qty: 1 EACH | Refills: 11 | Status: SHIPPED | OUTPATIENT
Start: 2024-09-03

## 2024-09-03 RX ORDER — ALBUTEROL SULFATE 90 UG/1
2 AEROSOL, METERED RESPIRATORY (INHALATION) EVERY 4 HOURS PRN
Qty: 18 G | Refills: 3 | Status: SHIPPED | OUTPATIENT
Start: 2024-09-03

## 2024-09-03 RX ORDER — METHYLPREDNISOLONE SODIUM SUCCINATE 125 MG/2ML
125 INJECTION, POWDER, LYOPHILIZED, FOR SOLUTION INTRAMUSCULAR; INTRAVENOUS ONCE
Status: COMPLETED | OUTPATIENT
Start: 2024-09-03 | End: 2024-09-03

## 2024-09-03 RX ORDER — OXYCODONE AND ACETAMINOPHEN 10; 325 MG/1; MG/1
1 TABLET ORAL EVERY 4 HOURS PRN
Qty: 180 TABLET | Refills: 0 | Status: SHIPPED | OUTPATIENT
Start: 2024-09-03

## 2024-09-03 RX ADMIN — METHYLPREDNISOLONE SODIUM SUCCINATE 125 MG: 125 INJECTION, POWDER, LYOPHILIZED, FOR SOLUTION INTRAMUSCULAR; INTRAVENOUS at 10:45
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
## 2024-10-03 ENCOUNTER — OFFICE VISIT (OUTPATIENT)
Dept: INTERNAL MEDICINE | Facility: CLINIC | Age: 49
End: 2024-10-03
Payer: MEDICARE

## 2024-10-03 VITALS
TEMPERATURE: 97 F | HEART RATE: 74 BPM | HEIGHT: 73 IN | SYSTOLIC BLOOD PRESSURE: 138 MMHG | BODY MASS INDEX: 41.75 KG/M2 | WEIGHT: 315 LBS | OXYGEN SATURATION: 95 % | DIASTOLIC BLOOD PRESSURE: 72 MMHG

## 2024-10-03 DIAGNOSIS — E29.1 HYPOGONADISM MALE: ICD-10-CM

## 2024-10-03 DIAGNOSIS — Z23 NEED FOR PNEUMOCOCCAL 20-VALENT CONJUGATE VACCINATION: ICD-10-CM

## 2024-10-03 DIAGNOSIS — I10 ESSENTIAL HYPERTENSION: ICD-10-CM

## 2024-10-03 DIAGNOSIS — Z23 NEED FOR INFLUENZA VACCINATION: Primary | ICD-10-CM

## 2024-10-03 DIAGNOSIS — Z79.899 LONG-TERM USE OF HIGH-RISK MEDICATION: ICD-10-CM

## 2024-10-03 DIAGNOSIS — G89.21 CHRONIC PAIN DUE TO TRAUMA: ICD-10-CM

## 2024-10-03 PROCEDURE — G0008 ADMIN INFLUENZA VIRUS VAC: HCPCS | Performed by: FAMILY MEDICINE

## 2024-10-03 PROCEDURE — 90656 IIV3 VACC NO PRSV 0.5 ML IM: CPT | Performed by: FAMILY MEDICINE

## 2024-10-03 PROCEDURE — 3075F SYST BP GE 130 - 139MM HG: CPT | Performed by: FAMILY MEDICINE

## 2024-10-03 PROCEDURE — 99214 OFFICE O/P EST MOD 30 MIN: CPT | Performed by: FAMILY MEDICINE

## 2024-10-03 PROCEDURE — 1125F AMNT PAIN NOTED PAIN PRSNT: CPT | Performed by: FAMILY MEDICINE

## 2024-10-03 PROCEDURE — G0009 ADMIN PNEUMOCOCCAL VACCINE: HCPCS | Performed by: FAMILY MEDICINE

## 2024-10-03 PROCEDURE — 3078F DIAST BP <80 MM HG: CPT | Performed by: FAMILY MEDICINE

## 2024-10-03 PROCEDURE — 1160F RVW MEDS BY RX/DR IN RCRD: CPT | Performed by: FAMILY MEDICINE

## 2024-10-03 PROCEDURE — 90677 PCV20 VACCINE IM: CPT | Performed by: FAMILY MEDICINE

## 2024-10-03 PROCEDURE — 1159F MED LIST DOCD IN RCRD: CPT | Performed by: FAMILY MEDICINE

## 2024-10-03 RX ORDER — TESTOSTERONE CYPIONATE 200 MG/ML
200 INJECTION, SOLUTION INTRAMUSCULAR
Qty: 2 ML | Refills: 2 | Status: SHIPPED | OUTPATIENT
Start: 2024-10-03

## 2024-10-03 RX ORDER — OXYCODONE AND ACETAMINOPHEN 10; 325 MG/1; MG/1
1 TABLET ORAL EVERY 4 HOURS PRN
Qty: 180 TABLET | Refills: 0 | Status: SHIPPED | OUTPATIENT
Start: 2024-10-03

## 2024-10-03 NOTE — LETTER
[facility]  Vaccine Consent Form    Patient Name:  Clyde Tamayo  Patient :  1975     Vaccine(s) Ordered    Fluzone >6mos ([id number])  Pneumococcal Conjugate Vaccine 20-Valent (PCV20)        Screening Checklist  The following questions should be completed prior to vaccination. If you answer “yes” to any question, it does not necessarily mean you should not be vaccinated. It just means we may need to clarify or ask more questions. If a question is unclear, please ask your healthcare provider to explain it.    Yes No   Any fever or moderate to severe illness today (mild illness and/or antibiotic treatment are not contraindications)?     Do you have a history of a serious reaction to any previous vaccinations, such as anaphylaxis, encephalopathy within 7 days, Guillain-Westphalia syndrome within 6 weeks, seizure?     Have you received any live vaccine(s) (e.g MMR, PRATEEK) or any other vaccines in the last month (to ensure duplicate doses aren't given)?     Do you have an anaphylactic allergy to latex (DTaP, DTaP-IPV, Hep A, Hep B, MenB, RV, Td, Tdap), baker’s yeast (Hep B, HPV), polysorbates (RSV, nirsevimab, PCV 20, Rotavirrus, Tdap, Shingrix), or gelatin (PRATEEK, MMR)?     Do you have an anaphylactic allergy to neomycin (Rabies, PRATEEK, MMR, IPV, Hep A), polymyxin B (IPV), or streptomycin (IPV)?      Any cancer, leukemia, AIDS, or other immune system disorder? (PRATEEK, MMR, RV)     Do you have a parent, brother, or sister with an immune system problem (if immune competence of vaccine recipient clinically verified, can proceed)? (MMR, PRATEEK)     Any recent steroid treatments for >2 weeks, chemotherapy, or radiation treatment? (PRATEEK, MMR)     Have you received antibody-containing blood transfusions or IVIG in the past 11 months (recommended interval is dependent on product)? (MMR, PRATEEK)     Have you taken antiviral drugs (acyclovir, famciclovir, valacyclovir for PRATEEK) in the last 24 or 48 hours, respectively?      Are you  "pregnant or planning to become pregnant within 1 month? (PRATEEK, MMR, HPV, IPV, MenB, Abrexvy; For Hep B- refer to Engerix-B; For RSV - Abrysvo is indicated for 32-36 weeks of pregnancy from September to January)     For infants, have you ever been told your child has had intussusception or a medical emergency involving obstruction of the intestine (Rotavirus)? If not for an infant, can skip this question.         *Ordering Physicians/APC should be consulted if \"yes\" is checked by the patient or guardian above.  I have received, read, and understand the Vaccine Information Statement (VIS) for each vaccine ordered.  I have considered my or my child's health status as well as the health status of my close contacts.  I have taken the opportunity to discuss my vaccine questions with my or my child's health care provider.   I have requested that the ordered vaccine(s) be given to me or my child.  I understand the benefits and risks of the vaccines.  I understand that I should remain in the clinic for 15 minutes after receiving the vaccine(s).  _________________________________________________________  Signature of Patient or Parent/Legal Guardian ____________________  Date     "
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
## 2024-10-04 LAB
ALBUMIN SERPL-MCNC: 5 G/DL (ref 4.1–5.1)
ALP SERPL-CCNC: 74 IU/L (ref 44–121)
ALT SERPL-CCNC: 17 IU/L (ref 0–44)
AST SERPL-CCNC: 17 IU/L (ref 0–40)
BASOPHILS # BLD AUTO: 0.1 X10E3/UL (ref 0–0.2)
BASOPHILS NFR BLD AUTO: 1 %
BILIRUB SERPL-MCNC: 0.7 MG/DL (ref 0–1.2)
BUN SERPL-MCNC: 17 MG/DL (ref 6–24)
BUN/CREAT SERPL: 17 (ref 9–20)
CALCIUM SERPL-MCNC: 10.4 MG/DL (ref 8.7–10.2)
CHLORIDE SERPL-SCNC: 100 MMOL/L (ref 96–106)
CHOLEST SERPL-MCNC: 250 MG/DL (ref 100–199)
CO2 SERPL-SCNC: 21 MMOL/L (ref 20–29)
CREAT SERPL-MCNC: 1 MG/DL (ref 0.76–1.27)
EGFRCR SERPLBLD CKD-EPI 2021: 92 ML/MIN/1.73
EOSINOPHIL # BLD AUTO: 0.2 X10E3/UL (ref 0–0.4)
EOSINOPHIL NFR BLD AUTO: 3 %
ERYTHROCYTE [DISTWIDTH] IN BLOOD BY AUTOMATED COUNT: 13.9 % (ref 11.6–15.4)
GLOBULIN SER CALC-MCNC: 2.6 G/DL (ref 1.5–4.5)
GLUCOSE SERPL-MCNC: 106 MG/DL (ref 70–99)
HCT VFR BLD AUTO: 54.3 % (ref 37.5–51)
HDLC SERPL-MCNC: 40 MG/DL
HGB BLD-MCNC: 17.7 G/DL (ref 13–17.7)
IMM GRANULOCYTES # BLD AUTO: 0.1 X10E3/UL (ref 0–0.1)
IMM GRANULOCYTES NFR BLD AUTO: 1 %
LDLC SERPL CALC-MCNC: 135 MG/DL (ref 0–99)
LYMPHOCYTES # BLD AUTO: 2.5 X10E3/UL (ref 0.7–3.1)
LYMPHOCYTES NFR BLD AUTO: 30 %
MCH RBC QN AUTO: 28 PG (ref 26.6–33)
MCHC RBC AUTO-ENTMCNC: 32.6 G/DL (ref 31.5–35.7)
MCV RBC AUTO: 86 FL (ref 79–97)
MONOCYTES # BLD AUTO: 0.7 X10E3/UL (ref 0.1–0.9)
MONOCYTES NFR BLD AUTO: 8 %
NEUTROPHILS # BLD AUTO: 4.9 X10E3/UL (ref 1.4–7)
NEUTROPHILS NFR BLD AUTO: 57 %
PLATELET # BLD AUTO: 275 X10E3/UL (ref 150–450)
POTASSIUM SERPL-SCNC: 4.8 MMOL/L (ref 3.5–5.2)
PROT SERPL-MCNC: 7.6 G/DL (ref 6–8.5)
RBC # BLD AUTO: 6.32 X10E6/UL (ref 4.14–5.8)
SODIUM SERPL-SCNC: 138 MMOL/L (ref 134–144)
TESTOST SERPL-MCNC: 152 NG/DL (ref 264–916)
TRIGL SERPL-MCNC: 415 MG/DL (ref 0–149)
VLDLC SERPL CALC-MCNC: 75 MG/DL (ref 5–40)
WBC # BLD AUTO: 8.5 X10E3/UL (ref 3.4–10.8)

## 2024-10-04 RX ORDER — FENOFIBRATE 145 MG/1
145 TABLET, COATED ORAL DAILY
Qty: 30 TABLET | Refills: 11 | Status: SHIPPED | OUTPATIENT
Start: 2024-10-04

## 2024-10-23 DIAGNOSIS — F41.9 ANXIETY: ICD-10-CM

## 2024-10-23 RX ORDER — CITALOPRAM HYDROBROMIDE 40 MG/1
40 TABLET ORAL DAILY
Qty: 30 TABLET | Refills: 11 | Status: SHIPPED | OUTPATIENT
Start: 2024-10-23

## 2024-10-29 DIAGNOSIS — G89.21 CHRONIC PAIN DUE TO TRAUMA: ICD-10-CM

## 2024-10-29 DIAGNOSIS — Z79.899 LONG-TERM USE OF HIGH-RISK MEDICATION: ICD-10-CM

## 2024-10-29 RX ORDER — OXYCODONE AND ACETAMINOPHEN 10; 325 MG/1; MG/1
1 TABLET ORAL EVERY 4 HOURS PRN
Qty: 180 TABLET | Refills: 0 | OUTPATIENT
Start: 2024-10-29

## 2024-10-29 NOTE — TELEPHONE ENCOUNTER
Caller: MaureenTony mcgrawon S    Relationship: Self    Best call back number: [phone]     Requested Prescriptions:   Requested Prescriptions     Pending Prescriptions Disp Refills    oxyCODONE-acetaminophen (Percocet)  MG per tablet 180 tablet 0     Sig: Take 1 tablet by mouth Every 4 (Four) Hours As Needed for Moderate Pain.        Pharmacy where request should be sent: [address] [phone] [facility] [phone]      Last office visit with prescribing clinician: 10/3/2024   Last telemedicine visit with prescribing clinician: Visit date not found   Next office visit with prescribing clinician: 11/4/2024     Additional details provided by patient: PATIENT STATES THIS PRESCRIPTION IS DUE ON 11.02.24.     Does the patient have less than a 3 day supply:  [] Yes  [x] No    Would you like a call back once the refill request has been completed: [] Yes [] No    If the office needs to give you a call back, can they leave a voicemail: [] Yes [] No    Shama Dean Rep   10/29/24 11:13 CDT

## 2024-10-30 ENCOUNTER — OFFICE VISIT (OUTPATIENT)
Dept: INTERNAL MEDICINE | Facility: CLINIC | Age: 49
End: 2024-10-30
Payer: MEDICARE

## 2024-10-30 VITALS
HEIGHT: 73 IN | WEIGHT: 315 LBS | HEART RATE: 78 BPM | BODY MASS INDEX: 41.75 KG/M2 | TEMPERATURE: 97.3 F | OXYGEN SATURATION: 98 % | DIASTOLIC BLOOD PRESSURE: 78 MMHG | SYSTOLIC BLOOD PRESSURE: 134 MMHG

## 2024-10-30 DIAGNOSIS — Z79.899 LONG-TERM USE OF HIGH-RISK MEDICATION: ICD-10-CM

## 2024-10-30 DIAGNOSIS — M54.42 CHRONIC MIDLINE LOW BACK PAIN WITH BILATERAL SCIATICA: Primary | ICD-10-CM

## 2024-10-30 DIAGNOSIS — F41.9 ANXIETY: ICD-10-CM

## 2024-10-30 DIAGNOSIS — G89.29 CHRONIC MIDLINE LOW BACK PAIN WITH BILATERAL SCIATICA: Primary | ICD-10-CM

## 2024-10-30 DIAGNOSIS — G89.21 CHRONIC PAIN DUE TO TRAUMA: ICD-10-CM

## 2024-10-30 DIAGNOSIS — M54.41 CHRONIC MIDLINE LOW BACK PAIN WITH BILATERAL SCIATICA: Primary | ICD-10-CM

## 2024-10-30 PROCEDURE — G2211 COMPLEX E/M VISIT ADD ON: HCPCS | Performed by: FAMILY MEDICINE

## 2024-10-30 PROCEDURE — 3075F SYST BP GE 130 - 139MM HG: CPT | Performed by: FAMILY MEDICINE

## 2024-10-30 PROCEDURE — 99214 OFFICE O/P EST MOD 30 MIN: CPT | Performed by: FAMILY MEDICINE

## 2024-10-30 PROCEDURE — 1125F AMNT PAIN NOTED PAIN PRSNT: CPT | Performed by: FAMILY MEDICINE

## 2024-10-30 PROCEDURE — 3078F DIAST BP <80 MM HG: CPT | Performed by: FAMILY MEDICINE

## 2024-10-30 RX ORDER — QUETIAPINE FUMARATE 400 MG/1
400 TABLET, FILM COATED ORAL NIGHTLY
Qty: 30 TABLET | Refills: 11 | Status: SHIPPED | OUTPATIENT
Start: 2024-10-30

## 2024-10-30 RX ORDER — OXYCODONE AND ACETAMINOPHEN 10; 325 MG/1; MG/1
1 TABLET ORAL EVERY 4 HOURS PRN
Qty: 180 TABLET | Refills: 0 | Status: SHIPPED | OUTPATIENT
Start: 2024-10-30
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
## 2024-11-01 ENCOUNTER — TELEPHONE (OUTPATIENT)
Dept: INTERNAL MEDICINE | Facility: CLINIC | Age: 49
End: 2024-11-01

## 2024-11-01 NOTE — TELEPHONE ENCOUNTER
Caller: NELY    Relationship: Other    Best call back number:     [phone]       What is the best time to reach you: ANY    Who are you requesting to speak with (clinical staff, provider,  specific staff member): CLINICAL    Do you know the name of the person who called: NARA MCKAY    What was the call regarding: CALLING TO GET MORE INFORMATION ON A PRIOR AUTHORIZATION FOR A CT SCAN . MORE INFORMATION IS NEEDED REGARDING LAST IMAGING DONE AND WHAT IS TRYING BE BE RULED OUT.

## 2024-11-04 NOTE — TELEPHONE ENCOUNTER
I don't know anything about it besides that I submitted the Auth on Friday. I think he is calling to get more information from you or Dr. Raygoza.  I don't have a fax number, but he might be able to provide you with one if he needs records faxed.

## 2024-11-06 ENCOUNTER — OFFICE VISIT (OUTPATIENT)
Dept: INTERNAL MEDICINE | Facility: CLINIC | Age: 49
End: 2024-11-06
Payer: MEDICARE

## 2024-11-06 VITALS
HEIGHT: 73 IN | SYSTOLIC BLOOD PRESSURE: 132 MMHG | DIASTOLIC BLOOD PRESSURE: 84 MMHG | OXYGEN SATURATION: 98 % | HEART RATE: 93 BPM | BODY MASS INDEX: 41.75 KG/M2 | WEIGHT: 315 LBS | TEMPERATURE: 98 F

## 2024-11-06 DIAGNOSIS — L03.119 CELLULITIS OF LOWER EXTREMITY, UNSPECIFIED LATERALITY: Primary | ICD-10-CM
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
## 2024-11-06 DIAGNOSIS — R60.9 EDEMA, UNSPECIFIED TYPE: ICD-10-CM

## 2024-11-06 DIAGNOSIS — M79.661 PAIN OF RIGHT LOWER LEG: ICD-10-CM

## 2024-11-06 PROCEDURE — 1159F MED LIST DOCD IN RCRD: CPT | Performed by: FAMILY MEDICINE

## 2024-11-06 PROCEDURE — 99214 OFFICE O/P EST MOD 30 MIN: CPT | Performed by: FAMILY MEDICINE

## 2024-11-06 PROCEDURE — G2211 COMPLEX E/M VISIT ADD ON: HCPCS | Performed by: FAMILY MEDICINE

## 2024-11-06 PROCEDURE — 3079F DIAST BP 80-89 MM HG: CPT | Performed by: FAMILY MEDICINE

## 2024-11-06 PROCEDURE — 1125F AMNT PAIN NOTED PAIN PRSNT: CPT | Performed by: FAMILY MEDICINE

## 2024-11-06 PROCEDURE — 1160F RVW MEDS BY RX/DR IN RCRD: CPT | Performed by: FAMILY MEDICINE

## 2024-11-06 PROCEDURE — 3075F SYST BP GE 130 - 139MM HG: CPT | Performed by: FAMILY MEDICINE

## 2024-11-06 RX ORDER — TAMSULOSIN HYDROCHLORIDE 0.4 MG/1
1 CAPSULE ORAL DAILY
Qty: 30 CAPSULE | Refills: 11 | Status: SHIPPED | OUTPATIENT
Start: 2024-11-06

## 2024-11-06 RX ORDER — CLINDAMYCIN HYDROCHLORIDE 300 MG/1
300 CAPSULE ORAL 3 TIMES DAILY
Qty: 21 CAPSULE | Refills: 0 | Status: SHIPPED | OUTPATIENT
Start: 2024-11-06 | End: 2024-11-13

## 2024-11-06 RX ORDER — FUROSEMIDE 20 MG/1
20 TABLET ORAL 2 TIMES DAILY
Qty: 30 TABLET | Refills: 1 | Status: SHIPPED | OUTPATIENT
Start: 2024-11-06
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
## 2024-11-21 ENCOUNTER — OFFICE VISIT (OUTPATIENT)
Dept: INTERNAL MEDICINE | Facility: CLINIC | Age: 49
End: 2024-11-21
Payer: MEDICARE

## 2024-11-21 VITALS
BODY MASS INDEX: 41.75 KG/M2 | OXYGEN SATURATION: 98 % | WEIGHT: 315 LBS | HEIGHT: 73 IN | TEMPERATURE: 98.6 F | DIASTOLIC BLOOD PRESSURE: 105 MMHG | SYSTOLIC BLOOD PRESSURE: 185 MMHG

## 2024-11-21 DIAGNOSIS — F31.9 BIPOLAR 1 DISORDER: ICD-10-CM

## 2024-11-21 DIAGNOSIS — A08.4 VIRAL GASTROENTERITIS: ICD-10-CM

## 2024-11-21 DIAGNOSIS — I10 ESSENTIAL HYPERTENSION: Primary | ICD-10-CM

## 2024-11-21 DIAGNOSIS — Z79.899 LONG-TERM USE OF HIGH-RISK MEDICATION: ICD-10-CM

## 2024-11-21 DIAGNOSIS — G89.21 CHRONIC PAIN DUE TO TRAUMA: ICD-10-CM

## 2024-11-21 DIAGNOSIS — F41.9 ANXIETY: ICD-10-CM

## 2024-11-21 DIAGNOSIS — I10 UNCONTROLLED HYPERTENSION: ICD-10-CM

## 2024-11-21 RX ORDER — AMITRIPTYLINE HYDROCHLORIDE 50 MG/1
50 TABLET ORAL NIGHTLY
Qty: 30 TABLET | Refills: 11 | Status: SHIPPED | OUTPATIENT
Start: 2024-11-21

## 2024-11-21 RX ORDER — LOPERAMIDE HYDROCHLORIDE 2 MG/1
2 CAPSULE ORAL 4 TIMES DAILY PRN
Qty: 20 CAPSULE | Refills: 0 | Status: SHIPPED | OUTPATIENT
Start: 2024-11-21

## 2024-11-21 RX ORDER — ONDANSETRON 4 MG/1
4 TABLET, ORALLY DISINTEGRATING ORAL EVERY 8 HOURS PRN
Qty: 30 TABLET | Refills: 0 | Status: SHIPPED | OUTPATIENT
Start: 2024-11-21

## 2024-11-21 RX ORDER — OXYCODONE AND ACETAMINOPHEN 10; 325 MG/1; MG/1
1 TABLET ORAL EVERY 4 HOURS PRN
Qty: 180 TABLET | Refills: 0 | Status: SHIPPED | OUTPATIENT
Start: 2024-11-21
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
## 2024-11-27 DIAGNOSIS — F41.0 PANIC ATTACKS: ICD-10-CM

## 2024-11-27 DIAGNOSIS — F41.9 ANXIETY: ICD-10-CM

## 2024-11-27 RX ORDER — HYDROXYZINE HYDROCHLORIDE 50 MG/1
50 TABLET, FILM COATED ORAL 3 TIMES DAILY PRN
Qty: 90 TABLET | Refills: 11 | Status: SHIPPED | OUTPATIENT
Start: 2024-11-27

## 2024-12-03 DIAGNOSIS — G89.29 CHRONIC MIDLINE LOW BACK PAIN WITH BILATERAL SCIATICA: Primary | ICD-10-CM

## 2024-12-03 DIAGNOSIS — M54.41 CHRONIC MIDLINE LOW BACK PAIN WITH BILATERAL SCIATICA: Primary | ICD-10-CM

## 2024-12-03 DIAGNOSIS — M54.42 CHRONIC MIDLINE LOW BACK PAIN WITH BILATERAL SCIATICA: Primary | ICD-10-CM

## 2024-12-18 ENCOUNTER — OFFICE VISIT (OUTPATIENT)
Dept: INTERNAL MEDICINE | Facility: CLINIC | Age: 49
End: 2024-12-18
Payer: MEDICARE

## 2024-12-18 VITALS
SYSTOLIC BLOOD PRESSURE: 159 MMHG | HEART RATE: 102 BPM | WEIGHT: 314 LBS | OXYGEN SATURATION: 98 % | TEMPERATURE: 96.8 F | BODY MASS INDEX: 41.62 KG/M2 | HEIGHT: 73 IN | DIASTOLIC BLOOD PRESSURE: 92 MMHG

## 2024-12-18 DIAGNOSIS — Z79.899 LONG-TERM USE OF HIGH-RISK MEDICATION: ICD-10-CM
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
## 2024-12-18 DIAGNOSIS — Z79.891 NARCOTIC USE AGREEMENT EXISTS: ICD-10-CM

## 2024-12-18 DIAGNOSIS — G89.21 CHRONIC PAIN DUE TO TRAUMA: ICD-10-CM

## 2024-12-18 DIAGNOSIS — M25.511 ACUTE PAIN OF RIGHT SHOULDER: ICD-10-CM

## 2024-12-18 PROCEDURE — 1159F MED LIST DOCD IN RCRD: CPT | Performed by: FAMILY MEDICINE

## 2024-12-18 PROCEDURE — G2211 COMPLEX E/M VISIT ADD ON: HCPCS | Performed by: FAMILY MEDICINE

## 2024-12-18 PROCEDURE — 99214 OFFICE O/P EST MOD 30 MIN: CPT | Performed by: FAMILY MEDICINE

## 2024-12-18 PROCEDURE — 3080F DIAST BP >= 90 MM HG: CPT | Performed by: FAMILY MEDICINE

## 2024-12-18 PROCEDURE — 3077F SYST BP >= 140 MM HG: CPT | Performed by: FAMILY MEDICINE

## 2024-12-18 PROCEDURE — 1160F RVW MEDS BY RX/DR IN RCRD: CPT | Performed by: FAMILY MEDICINE

## 2024-12-18 PROCEDURE — 1125F AMNT PAIN NOTED PAIN PRSNT: CPT | Performed by: FAMILY MEDICINE

## 2024-12-18 RX ORDER — OXYCODONE AND ACETAMINOPHEN 10; 325 MG/1; MG/1
1 TABLET ORAL EVERY 4 HOURS PRN
Qty: 180 TABLET | Refills: 0 | Status: SHIPPED | OUTPATIENT
Start: 2024-12-18

## 2024-12-18 RX ORDER — LISINOPRIL 20 MG/1
20 TABLET ORAL DAILY
Qty: 30 TABLET | Refills: 11 | Status: SHIPPED | OUTPATIENT
Start: 2024-12-18
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
## 2024-12-30 DIAGNOSIS — E29.1 HYPOGONADISM MALE: ICD-10-CM

## 2024-12-31 RX ORDER — TESTOSTERONE CYPIONATE 200 MG/ML
200 INJECTION, SOLUTION INTRAMUSCULAR
Qty: 2 ML | Refills: 2 | Status: SHIPPED | OUTPATIENT
Start: 2024-12-31

## 2025-01-29 ENCOUNTER — OFFICE VISIT (OUTPATIENT)
Dept: INTERNAL MEDICINE | Facility: CLINIC | Age: 50
End: 2025-01-29
Payer: MEDICARE

## 2025-01-29 VITALS
BODY MASS INDEX: 41.75 KG/M2 | DIASTOLIC BLOOD PRESSURE: 90 MMHG | HEART RATE: 106 BPM | TEMPERATURE: 97.7 F | HEIGHT: 73 IN | OXYGEN SATURATION: 97 % | WEIGHT: 315 LBS | SYSTOLIC BLOOD PRESSURE: 136 MMHG

## 2025-01-29 DIAGNOSIS — M54.42 CHRONIC MIDLINE LOW BACK PAIN WITH BILATERAL SCIATICA: Primary | ICD-10-CM

## 2025-01-29 DIAGNOSIS — G89.21 CHRONIC PAIN DUE TO TRAUMA: ICD-10-CM

## 2025-01-29 DIAGNOSIS — G89.29 CHRONIC MIDLINE LOW BACK PAIN WITH BILATERAL SCIATICA: Primary | ICD-10-CM

## 2025-01-29 DIAGNOSIS — M54.41 CHRONIC MIDLINE LOW BACK PAIN WITH BILATERAL SCIATICA: Primary | ICD-10-CM

## 2025-01-29 DIAGNOSIS — Z79.899 LONG-TERM USE OF HIGH-RISK MEDICATION: ICD-10-CM

## 2025-01-29 DIAGNOSIS — I10 ESSENTIAL HYPERTENSION: ICD-10-CM

## 2025-01-29 DIAGNOSIS — F41.9 ANXIETY: ICD-10-CM

## 2025-01-29 PROCEDURE — 3077F SYST BP >= 140 MM HG: CPT | Performed by: FAMILY MEDICINE

## 2025-01-29 PROCEDURE — 1160F RVW MEDS BY RX/DR IN RCRD: CPT | Performed by: FAMILY MEDICINE

## 2025-01-29 PROCEDURE — 3080F DIAST BP >= 90 MM HG: CPT | Performed by: FAMILY MEDICINE

## 2025-01-29 PROCEDURE — 1125F AMNT PAIN NOTED PAIN PRSNT: CPT | Performed by: FAMILY MEDICINE

## 2025-01-29 PROCEDURE — 1159F MED LIST DOCD IN RCRD: CPT | Performed by: FAMILY MEDICINE

## 2025-01-29 PROCEDURE — G2211 COMPLEX E/M VISIT ADD ON: HCPCS | Performed by: FAMILY MEDICINE

## 2025-01-29 PROCEDURE — 99214 OFFICE O/P EST MOD 30 MIN: CPT | Performed by: FAMILY MEDICINE

## 2025-01-29 RX ORDER — OXYCODONE AND ACETAMINOPHEN 10; 325 MG/1; MG/1
1 TABLET ORAL EVERY 4 HOURS PRN
Qty: 180 TABLET | Refills: 0 | Status: SHIPPED | OUTPATIENT
Start: 2025-01-29

## 2025-01-29 RX ORDER — AMLODIPINE BESYLATE 5 MG/1
5 TABLET ORAL DAILY
Qty: 30 TABLET | Refills: 11 | Status: SHIPPED | OUTPATIENT
Start: 2025-01-29
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
## 2025-01-31 ENCOUNTER — TELEPHONE (OUTPATIENT)
Dept: NEUROSURGERY | Facility: CLINIC | Age: 50
End: 2025-01-31
Payer: MEDICARE

## 2025-01-31 NOTE — TELEPHONE ENCOUNTER
Caller: Clyde Tamayo    Relationship to patient: Self    Best call back number: [phone]    Patient is needing: CALLED PATIENT, SCHEDULED NEW PATIENT APPT. PATIENT IS REQUESTING APPT REMINDER BE MAILED TO HIM. PATIENT STATES HE [facility]NOT [facility] [employer] [facility] DOES NOT HAVE CAPABILITY TO MAIL REMINDER. PLEASE MAIL PAPER REMINDER TO [facility] FOR APPT.    THANK YOU

## 2025-02-03 LAB
AMPHETAMINES UR QL SCN: NEGATIVE NG/ML
BARBITURATES UR QL SCN: NEGATIVE NG/ML
BENZODIAZ UR QL SCN: NEGATIVE NG/ML
BZE UR QL SCN: NEGATIVE NG/ML
CANNABINOIDS UR QL CFM: NEGATIVE
CREAT UR-MCNC: 285.2 MG/DL (ref 20–300)
FENTANYL UR-MCNC: NEGATIVE PG/ML
LABORATORY COMMENT REPORT: ABNORMAL
MEPERIDINE UR QL: NEGATIVE NG/ML
METHADONE UR QL SCN: NEGATIVE NG/ML
OPIATES UR QL SCN: NEGATIVE NG/ML
OXYCODONE+OXYMORPHONE UR QL SCN: POSITIVE
PCP UR QL: NEGATIVE NG/ML
PH UR: 5 [PH] (ref 4.5–8.9)
PROPOXYPH UR QL SCN: NEGATIVE NG/ML
SP GR UR: 1.02
TRAMADOL UR QL SCN: NEGATIVE NG/ML

## 2025-02-24 DIAGNOSIS — F41.9 ANXIETY: ICD-10-CM

## 2025-02-24 RX ORDER — QUETIAPINE FUMARATE 400 MG/1
400 TABLET, FILM COATED ORAL NIGHTLY
Qty: 30 TABLET | Refills: 11 | Status: CANCELLED | OUTPATIENT
Start: 2025-02-24

## 2025-02-27 ENCOUNTER — OFFICE VISIT (OUTPATIENT)
Dept: INTERNAL MEDICINE | Facility: CLINIC | Age: 50
End: 2025-02-27
Payer: MEDICARE

## 2025-02-27 VITALS
HEART RATE: 71 BPM | DIASTOLIC BLOOD PRESSURE: 108 MMHG | BODY MASS INDEX: 41.75 KG/M2 | TEMPERATURE: 97.8 F | SYSTOLIC BLOOD PRESSURE: 163 MMHG | OXYGEN SATURATION: 97 % | WEIGHT: 315 LBS | HEIGHT: 73 IN

## 2025-02-27 DIAGNOSIS — F41.0 PANIC ATTACKS: ICD-10-CM

## 2025-02-27 DIAGNOSIS — G89.29 CHRONIC MIDLINE LOW BACK PAIN WITH BILATERAL SCIATICA: ICD-10-CM

## 2025-02-27 DIAGNOSIS — M54.42 CHRONIC MIDLINE LOW BACK PAIN WITH BILATERAL SCIATICA: ICD-10-CM

## 2025-02-27 DIAGNOSIS — F25.0 SCHIZOAFFECTIVE DISORDER, BIPOLAR TYPE: ICD-10-CM

## 2025-02-27 DIAGNOSIS — F31.9 BIPOLAR 1 DISORDER: ICD-10-CM

## 2025-02-27 DIAGNOSIS — M54.41 CHRONIC MIDLINE LOW BACK PAIN WITH BILATERAL SCIATICA: ICD-10-CM

## 2025-02-27 DIAGNOSIS — I10 ESSENTIAL HYPERTENSION: Primary | ICD-10-CM

## 2025-02-27 DIAGNOSIS — G89.21 CHRONIC PAIN DUE TO TRAUMA: ICD-10-CM

## 2025-02-27 DIAGNOSIS — F41.9 ANXIETY: ICD-10-CM

## 2025-02-27 DIAGNOSIS — Z79.899 LONG-TERM USE OF HIGH-RISK MEDICATION: ICD-10-CM

## 2025-02-27 RX ORDER — HYDROXYZINE HYDROCHLORIDE 50 MG/1
50 TABLET, FILM COATED ORAL 3 TIMES DAILY PRN
Qty: 90 TABLET | Refills: 11 | Status: SHIPPED | OUTPATIENT
Start: 2025-02-27

## 2025-02-27 RX ORDER — KETOROLAC TROMETHAMINE 30 MG/ML
60 INJECTION, SOLUTION INTRAMUSCULAR; INTRAVENOUS ONCE
Status: COMPLETED | OUTPATIENT
Start: 2025-02-27 | End: 2025-02-27

## 2025-02-27 RX ORDER — OXYCODONE AND ACETAMINOPHEN 10; 325 MG/1; MG/1
1 TABLET ORAL EVERY 4 HOURS PRN
Qty: 180 TABLET | Refills: 0 | Status: SHIPPED | OUTPATIENT
Start: 2025-02-27

## 2025-02-27 RX ORDER — METHYLPREDNISOLONE SODIUM SUCCINATE 125 MG/2ML
125 INJECTION INTRAMUSCULAR; INTRAVENOUS ONCE
Status: COMPLETED | OUTPATIENT
Start: 2025-02-27 | End: 2025-02-27

## 2025-02-27 RX ORDER — AMLODIPINE BESYLATE 10 MG/1
10 TABLET ORAL DAILY
Qty: 30 TABLET | Refills: 11 | Status: SHIPPED | OUTPATIENT
Start: 2025-02-27

## 2025-02-27 RX ADMIN — METHYLPREDNISOLONE SODIUM SUCCINATE 125 MG: 125 INJECTION INTRAMUSCULAR; INTRAVENOUS at 11:19

## 2025-02-27 RX ADMIN — KETOROLAC TROMETHAMINE 60 MG: 30 INJECTION, SOLUTION INTRAMUSCULAR; INTRAVENOUS at 11:19
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
## 2025-03-11 ENCOUNTER — OFFICE VISIT (OUTPATIENT)
Dept: NEUROSURGERY | Facility: CLINIC | Age: 50
End: 2025-03-11
Payer: MEDICARE

## 2025-03-11 VITALS — WEIGHT: 315 LBS | BODY MASS INDEX: 41.75 KG/M2 | HEIGHT: 73 IN

## 2025-03-11 DIAGNOSIS — M54.50 LUMBAR BACK PAIN: ICD-10-CM

## 2025-03-11 DIAGNOSIS — R20.2 NUMBNESS AND TINGLING OF RIGHT UPPER EXTREMITY: ICD-10-CM

## 2025-03-11 DIAGNOSIS — R20.2 NUMBNESS AND TINGLING OF RIGHT LOWER EXTREMITY: ICD-10-CM

## 2025-03-11 DIAGNOSIS — R20.0 NUMBNESS AND TINGLING OF RIGHT LOWER EXTREMITY: ICD-10-CM

## 2025-03-11 DIAGNOSIS — E66.813 CLASS 3 SEVERE OBESITY DUE TO EXCESS CALORIES WITH BODY MASS INDEX (BMI) OF 40.0 TO 44.9 IN ADULT, UNSPECIFIED WHETHER SERIOUS COMORBIDITY PRESENT: ICD-10-CM

## 2025-03-11 DIAGNOSIS — M79.604 PAIN OF RIGHT LOWER EXTREMITY: ICD-10-CM

## 2025-03-11 DIAGNOSIS — M54.2 CERVICALGIA: Primary | ICD-10-CM

## 2025-03-11 DIAGNOSIS — M79.601 PAIN OF RIGHT UPPER EXTREMITY: ICD-10-CM

## 2025-03-11 DIAGNOSIS — E66.01 CLASS 3 SEVERE OBESITY DUE TO EXCESS CALORIES WITH BODY MASS INDEX (BMI) OF 40.0 TO 44.9 IN ADULT, UNSPECIFIED WHETHER SERIOUS COMORBIDITY PRESENT: ICD-10-CM

## 2025-03-11 DIAGNOSIS — R20.0 NUMBNESS AND TINGLING OF RIGHT UPPER EXTREMITY: ICD-10-CM

## 2025-03-11 RX ORDER — CYCLOBENZAPRINE HCL 10 MG
10 TABLET ORAL NIGHTLY
Qty: 30 TABLET | Refills: 0 | Status: SHIPPED | OUTPATIENT
Start: 2025-03-11

## 2025-03-11 RX ORDER — MELOXICAM 15 MG/1
15 TABLET ORAL DAILY
Qty: 30 TABLET | Refills: 0 | Status: SHIPPED | OUTPATIENT
Start: 2025-03-11
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
## 2025-03-11 NOTE — PATIENT INSTRUCTIONS
"DASH Eating Plan  DASH stands for Dietary Approaches to Stop Hypertension. The DASH eating plan is a healthy eating plan that has been shown to:  Lower high blood pressure (hypertension).  Reduce your risk for type 2 diabetes, heart disease, and stroke.  Help with weight loss.  What are tips for following this plan?  Reading food labels  Check food labels for the amount of salt (sodium) per serving. Choose foods with less than 5 percent of the Daily Value (DV) of sodium. In general, foods with less than 300 milligrams (mg) of sodium per serving fit into this eating plan.  To find whole grains, look for the word \"whole\" as the first word in the ingredient list.  Shopping  Buy products labeled as \"low-sodium\" or \"no salt added.\"  Buy fresh foods. Avoid canned foods and pre-made or frozen meals.  Cooking  Try not to add salt when you cook. Use salt-free seasonings or herbs instead of table salt or sea salt. Check with your health care provider or pharmacist before using salt substitutes.  Do not gibbons foods. Cook foods in healthy ways, such as baking, boiling, grilling, roasting, or broiling.  Cook using oils that are good for your heart. These include olive, canola, avocado, soybean, and sunflower oil.  Meal planning    Eat a balanced diet. This should include:  4 or more servings of fruits and 4 or more servings of vegetables each day. Try to fill half of your plate with fruits and vegetables.  6-8 servings of whole grains each day.  6 or less servings of lean meat, poultry, or fish each day. 1 oz is 1 serving. A 3 oz (85 g) serving of meat is about the same size as the palm of your hand. One egg is 1 oz (28 g).  2-3 servings of low-fat dairy each day. One serving is 1 cup (237 mL).  1 serving of nuts, seeds, or beans 5 times each week.  2-3 servings of heart-healthy fats. Healthy fats called omega-3 fatty acids are found in foods such as walnuts, flaxseeds, fortified milks, and eggs. These fats are also found in " cold-water fish, such as sardines, salmon, and mackerel.  Limit how much you eat of:  Canned or prepackaged foods.  Food that is high in trans fat, such as fried foods.  Food that is high in saturated fat, such as fatty meat.  Desserts and other sweets, sugary drinks, and other foods with added sugar.  Full-fat dairy products.  Do not salt foods before eating.  Do not eat more than 4 egg yolks a week.  Try to eat at least 2 vegetarian meals a week.  Eat more home-cooked food and less restaurant, buffet, and fast food.  Lifestyle  When eating at a restaurant, ask if your food can be made with less salt or no salt.  If you drink alcohol:  Limit how much you have to:  0-1 drink a day if you are female.  0-2 drinks a day if you are male.  Know how much alcohol is in your drink. In the [place] one drink is one 12 oz bottle of beer (355 mL), one 5 oz glass of wine (148 mL), or one 1½ oz glass of hard liquor (44 mL).  General information  Avoid eating more than 2,300 mg of salt a day. If you have hypertension, you may need to reduce your sodium intake to 1,500 mg a day.  Work with your provider to stay at a healthy body weight or lose weight. Ask what the best weight range is for you.  On most days of the week, get at least 30 minutes of exercise that causes your heart to beat faster. This may include walking, swimming, or biking.  Work with your provider or dietitian to adjust your eating plan to meet your specific calorie needs.  What foods should I eat?  Fruits  All fresh, dried, or frozen fruit. Canned fruits that are in their natural juice and do not have sugar added to them.  Vegetables  Fresh or frozen vegetables that are raw, steamed, roasted, or grilled. Low-sodium or reduced-sodium tomato and vegetable juice. Low-sodium or reduced-sodium tomato sauce and tomato paste. Low-sodium or reduced-sodium canned vegetables.  Grains  Whole-grain or whole-wheat bread. Whole-grain or whole-wheat pasta. Brown rice. Oatmeal.  Quinoa. Bulgur. Whole-grain and low-sodium cereals. Samantha bread. Low-fat, low-sodium crackers. Whole-wheat flour tortillas.  Meats and other proteins  Skinless chicken or turkey. Ground chicken or turkey. Pork with fat trimmed off. Fish and seafood. Egg whites. Dried beans, peas, or lentils. Unsalted nuts, nut butters, and seeds. Unsalted canned beans. Lean cuts of beef with fat trimmed off. Low-sodium, lean precooked or cured meat, such as sausages or meat loaves.  Dairy  Low-fat (1%) or fat-free (skim) milk. Reduced-fat, low-fat, or fat-free cheeses. Nonfat, low-sodium ricotta or cottage cheese. Low-fat or nonfat yogurt. Low-fat, low-sodium cheese.  Fats and oils  Soft margarine without trans fats. Vegetable oil. Reduced-fat, low-fat, or light mayonnaise and salad dressings (reduced-sodium). Canola, safflower, olive, avocado, soybean, and sunflower oils. Avocado.  Seasonings and condiments  Herbs. Spices. Seasoning mixes without salt.  Other foods  Unsalted popcorn and pretzels. Fat-free sweets.  The items listed above may not be all the foods and drinks you can have. Talk to a dietitian to learn more.  What foods should I avoid?  Fruits  Canned fruit in a light or heavy syrup. Fried fruit. Fruit in cream or butter sauce.  Vegetables  Creamed or fried vegetables. Vegetables in a cheese sauce. Regular canned vegetables that are not marked as low-sodium or reduced-sodium. Regular canned tomato sauce and paste that are not marked as low-sodium or reduced-sodium. Regular tomato and vegetable juices that are not marked as low-sodium or reduced-sodium. Pickles. Olives.  Grains  Baked goods made with fat, such as croissants, muffins, or some breads. Dry pasta or rice meal packs.  Meats and other proteins  Fatty cuts of meat. Ribs. Fried meat. Rosa. Bologna, salami, and other precooked or cured meats, such as sausages or meat loaves, that are not lean and low in sodium. Fat from the back of a pig (fatback). Maia.  Salted nuts and seeds. Canned beans with added salt. Canned or smoked fish. Whole eggs or egg yolks. Chicken or turkey with skin.  Dairy  Whole or 2% milk, cream, and half-and-half. Whole or full-fat cream cheese. Whole-fat or sweetened yogurt. Full-fat cheese. Nondairy creamers. Whipped toppings. Processed cheese and cheese spreads.  Fats and oils  Butter. Stick margarine. Lard. Shortening. Ghee. Rosa fat. Tropical oils, such as coconut, palm kernel, or palm oil.  Seasonings and condiments  Onion salt, garlic salt, seasoned salt, table salt, and sea salt. Worcestershire sauce. Tartar sauce. Barbecue sauce. Teriyaki sauce. Soy sauce, including reduced-sodium soy sauce. Steak sauce. Canned and packaged gravies. Fish sauce. Oyster sauce. Cocktail sauce. Store-bought horseradish. Ketchup. Mustard. Meat flavorings and tenderizers. Bouillon cubes. Hot sauces. Pre-made or packaged marinades. Pre-made or packaged taco seasonings. Relishes. Regular salad dressings.  Other foods  Salted popcorn and pretzels.  The items listed above may not be all the foods and drinks you should avoid. Talk to a dietitian to learn more.  Where to find more information  National Heart, Lung, and Blood [place] (NHLBI): nhlbi.nih.gov  American Heart Association (AHA): heart.org  Academy of Nutrition and Dietetics: eatright.org  National Kidney Foundation (NKF): kidney.org  This information is not intended to replace advice given to you by your health care provider. Make sure you discuss any questions you have with your health care provider.  Document Revised: 01/04/2024 Document Reviewed: 01/04/2024  Elsevier Patient Education © 2024 Elsevier Inc.

## 2025-03-27 ENCOUNTER — OFFICE VISIT (OUTPATIENT)
Dept: INTERNAL MEDICINE | Facility: CLINIC | Age: 50
End: 2025-03-27
Payer: MEDICARE

## 2025-03-27 VITALS
BODY MASS INDEX: 41.48 KG/M2 | DIASTOLIC BLOOD PRESSURE: 107 MMHG | WEIGHT: 313 LBS | HEART RATE: 86 BPM | OXYGEN SATURATION: 98 % | TEMPERATURE: 97 F | HEIGHT: 73 IN | SYSTOLIC BLOOD PRESSURE: 152 MMHG

## 2025-03-27 DIAGNOSIS — E29.1 HYPOGONADISM MALE: ICD-10-CM

## 2025-03-27 DIAGNOSIS — Z79.899 LONG-TERM USE OF HIGH-RISK MEDICATION: ICD-10-CM

## 2025-03-27 DIAGNOSIS — M54.41 CHRONIC MIDLINE LOW BACK PAIN WITH BILATERAL SCIATICA: ICD-10-CM

## 2025-03-27 DIAGNOSIS — J01.00 ACUTE MAXILLARY SINUSITIS, RECURRENCE NOT SPECIFIED: Primary | ICD-10-CM

## 2025-03-27 DIAGNOSIS — G89.29 CHRONIC MIDLINE LOW BACK PAIN WITH BILATERAL SCIATICA: ICD-10-CM

## 2025-03-27 DIAGNOSIS — H10.021 PINK EYE DISEASE OF RIGHT EYE: ICD-10-CM

## 2025-03-27 DIAGNOSIS — M54.42 CHRONIC MIDLINE LOW BACK PAIN WITH BILATERAL SCIATICA: ICD-10-CM

## 2025-03-27 DIAGNOSIS — G89.21 CHRONIC PAIN DUE TO TRAUMA: ICD-10-CM

## 2025-03-27 RX ORDER — AMOXICILLIN 500 MG/1
500 CAPSULE ORAL 2 TIMES DAILY
Qty: 20 CAPSULE | Refills: 0 | Status: SHIPPED | OUTPATIENT
Start: 2025-03-27 | End: 2025-04-06

## 2025-03-27 RX ORDER — MOXIFLOXACIN 5 MG/ML
1 SOLUTION/ DROPS OPHTHALMIC 3 TIMES DAILY
Qty: 2 ML | Refills: 0 | Status: SHIPPED | OUTPATIENT
Start: 2025-03-27 | End: 2025-04-03

## 2025-03-27 RX ORDER — OXYCODONE AND ACETAMINOPHEN 10; 325 MG/1; MG/1
1 TABLET ORAL EVERY 4 HOURS PRN
Qty: 180 TABLET | Refills: 0 | Status: SHIPPED | OUTPATIENT
Start: 2025-03-27

## 2025-03-27 RX ORDER — METHYLPREDNISOLONE SODIUM SUCCINATE 125 MG/2ML
125 INJECTION INTRAMUSCULAR; INTRAVENOUS ONCE
Status: COMPLETED | OUTPATIENT
Start: 2025-03-27 | End: 2025-03-27

## 2025-03-27 RX ORDER — TESTOSTERONE CYPIONATE 200 MG/ML
200 INJECTION, SOLUTION INTRAMUSCULAR
Qty: 2 ML | Refills: 2 | Status: SHIPPED | OUTPATIENT
Start: 2025-03-27

## 2025-03-27 RX ADMIN — METHYLPREDNISOLONE SODIUM SUCCINATE 125 MG: 125 INJECTION INTRAMUSCULAR; INTRAVENOUS at 10:28
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
## 2025-04-08 DIAGNOSIS — M54.50 LUMBAR BACK PAIN: ICD-10-CM

## 2025-04-08 DIAGNOSIS — M54.2 CERVICALGIA: ICD-10-CM

## 2025-04-08 RX ORDER — MELOXICAM 15 MG/1
15 TABLET ORAL DAILY
Qty: 30 TABLET | Refills: 0 | Status: SHIPPED | OUTPATIENT
Start: 2025-04-08

## 2025-04-24 DIAGNOSIS — I10 ESSENTIAL HYPERTENSION: ICD-10-CM

## 2025-04-24 RX ORDER — METOPROLOL TARTRATE 50 MG
50 TABLET ORAL 2 TIMES DAILY
Qty: 180 TABLET | Refills: 3 | Status: SHIPPED | OUTPATIENT
Start: 2025-04-24

## 2025-04-28 ENCOUNTER — OFFICE VISIT (OUTPATIENT)
Dept: INTERNAL MEDICINE | Facility: CLINIC | Age: 50
End: 2025-04-28
Payer: MEDICARE

## 2025-04-28 VITALS
HEART RATE: 69 BPM | SYSTOLIC BLOOD PRESSURE: 155 MMHG | DIASTOLIC BLOOD PRESSURE: 80 MMHG | HEIGHT: 73 IN | TEMPERATURE: 97 F | OXYGEN SATURATION: 97 % | WEIGHT: 315 LBS | BODY MASS INDEX: 41.75 KG/M2

## 2025-04-28 DIAGNOSIS — G89.29 CHRONIC MIDLINE LOW BACK PAIN WITH BILATERAL SCIATICA: ICD-10-CM

## 2025-04-28 DIAGNOSIS — Z79.899 LONG-TERM USE OF HIGH-RISK MEDICATION: ICD-10-CM

## 2025-04-28 DIAGNOSIS — R60.9 EDEMA, UNSPECIFIED TYPE: ICD-10-CM

## 2025-04-28 DIAGNOSIS — M54.42 CHRONIC MIDLINE LOW BACK PAIN WITH BILATERAL SCIATICA: ICD-10-CM

## 2025-04-28 DIAGNOSIS — I10 ESSENTIAL HYPERTENSION: Primary | ICD-10-CM

## 2025-04-28 DIAGNOSIS — G89.21 CHRONIC PAIN DUE TO TRAUMA: ICD-10-CM

## 2025-04-28 DIAGNOSIS — M54.41 CHRONIC MIDLINE LOW BACK PAIN WITH BILATERAL SCIATICA: ICD-10-CM

## 2025-04-28 RX ORDER — OXYCODONE AND ACETAMINOPHEN 10; 325 MG/1; MG/1
1 TABLET ORAL EVERY 4 HOURS PRN
Qty: 180 TABLET | Refills: 0 | Status: SHIPPED | OUTPATIENT
Start: 2025-04-28

## 2025-04-28 RX ORDER — TAMSULOSIN HYDROCHLORIDE 0.4 MG/1
1 CAPSULE ORAL DAILY
Qty: 30 CAPSULE | Refills: 11 | Status: SHIPPED | OUTPATIENT
Start: 2025-04-28

## 2025-04-28 RX ORDER — LISINOPRIL 40 MG/1
40 TABLET ORAL DAILY
Qty: 30 TABLET | Refills: 11 | Status: SHIPPED | OUTPATIENT
Start: 2025-04-28
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
## 2025-05-07 ENCOUNTER — OFFICE VISIT (OUTPATIENT)
Dept: INTERNAL MEDICINE | Facility: CLINIC | Age: 50
End: 2025-05-07
Payer: MEDICARE

## 2025-05-07 VITALS
HEART RATE: 74 BPM | WEIGHT: 315 LBS | DIASTOLIC BLOOD PRESSURE: 79 MMHG | OXYGEN SATURATION: 98 % | HEIGHT: 73 IN | SYSTOLIC BLOOD PRESSURE: 152 MMHG | RESPIRATION RATE: 22 BRPM | BODY MASS INDEX: 41.75 KG/M2 | TEMPERATURE: 97.5 F

## 2025-05-07 DIAGNOSIS — I10 ESSENTIAL HYPERTENSION: ICD-10-CM

## 2025-05-07 DIAGNOSIS — R73.9 ELEVATED BLOOD SUGAR: ICD-10-CM

## 2025-05-07 DIAGNOSIS — R55 SYNCOPE, UNSPECIFIED SYNCOPE TYPE: Primary | ICD-10-CM

## 2025-05-07 DIAGNOSIS — L02.91 CUTANEOUS ABSCESS, UNSPECIFIED SITE: ICD-10-CM

## 2025-05-07 LAB
EXPIRATION DATE: ABNORMAL
GLUCOSE BLDC GLUCOMTR-MCNC: 114 MG/DL (ref 70–130)
HBA1C MFR BLD: 6.2 % (ref 4.5–5.7)
Lab: ABNORMAL

## 2025-05-07 PROCEDURE — 3077F SYST BP >= 140 MM HG: CPT | Performed by: FAMILY MEDICINE

## 2025-05-07 PROCEDURE — 3078F DIAST BP <80 MM HG: CPT | Performed by: FAMILY MEDICINE

## 2025-05-07 PROCEDURE — 93005 ELECTROCARDIOGRAM TRACING: CPT | Performed by: FAMILY MEDICINE

## 2025-05-07 RX ORDER — CLINDAMYCIN HYDROCHLORIDE 300 MG/1
300 CAPSULE ORAL 3 TIMES DAILY
Qty: 21 CAPSULE | Refills: 0 | Status: SHIPPED | OUTPATIENT
Start: 2025-05-07 | End: 2025-05-14
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
## 2025-05-08 LAB
ALBUMIN SERPL-MCNC: 4.4 G/DL (ref 4.1–5.1)
ALP SERPL-CCNC: 71 IU/L (ref 44–121)
ALT SERPL-CCNC: 22 IU/L (ref 0–44)
AST SERPL-CCNC: 18 IU/L (ref 0–40)
BASOPHILS # BLD AUTO: 0.1 X10E3/UL (ref 0–0.2)
BASOPHILS NFR BLD AUTO: 1 %
BILIRUB SERPL-MCNC: 0.5 MG/DL (ref 0–1.2)
BUN SERPL-MCNC: 10 MG/DL (ref 6–24)
BUN/CREAT SERPL: 10 (ref 9–20)
CALCIUM SERPL-MCNC: 9.2 MG/DL (ref 8.7–10.2)
CHLORIDE SERPL-SCNC: 100 MMOL/L (ref 96–106)
CO2 SERPL-SCNC: 22 MMOL/L (ref 20–29)
CREAT SERPL-MCNC: 0.97 MG/DL (ref 0.76–1.27)
EGFRCR SERPLBLD CKD-EPI 2021: 95 ML/MIN/1.73
EOSINOPHIL # BLD AUTO: 0.2 X10E3/UL (ref 0–0.4)
EOSINOPHIL NFR BLD AUTO: 2 %
ERYTHROCYTE [DISTWIDTH] IN BLOOD BY AUTOMATED COUNT: 13.3 % (ref 11.6–15.4)
GLOBULIN SER CALC-MCNC: 2.1 G/DL (ref 1.5–4.5)
GLUCOSE SERPL-MCNC: 110 MG/DL (ref 70–99)
HCT VFR BLD AUTO: 46.4 % (ref 37.5–51)
HGB BLD-MCNC: 14.9 G/DL (ref 13–17.7)
IMM GRANULOCYTES # BLD AUTO: 0 X10E3/UL (ref 0–0.1)
IMM GRANULOCYTES NFR BLD AUTO: 1 %
LYMPHOCYTES # BLD AUTO: 2 X10E3/UL (ref 0.7–3.1)
LYMPHOCYTES NFR BLD AUTO: 24 %
MCH RBC QN AUTO: 28 PG (ref 26.6–33)
MCHC RBC AUTO-ENTMCNC: 32.1 G/DL (ref 31.5–35.7)
MCV RBC AUTO: 87 FL (ref 79–97)
MONOCYTES # BLD AUTO: 0.5 X10E3/UL (ref 0.1–0.9)
MONOCYTES NFR BLD AUTO: 6 %
NEUTROPHILS # BLD AUTO: 5.3 X10E3/UL (ref 1.4–7)
NEUTROPHILS NFR BLD AUTO: 66 %
PLATELET # BLD AUTO: 237 X10E3/UL (ref 150–450)
POTASSIUM SERPL-SCNC: 4.8 MMOL/L (ref 3.5–5.2)
PROT SERPL-MCNC: 6.5 G/DL (ref 6–8.5)
RBC # BLD AUTO: 5.32 X10E6/UL (ref 4.14–5.8)
SODIUM SERPL-SCNC: 137 MMOL/L (ref 134–144)
WBC # BLD AUTO: 8.1 X10E3/UL (ref 3.4–10.8)

## 2025-05-29 ENCOUNTER — OFFICE VISIT (OUTPATIENT)
Dept: INTERNAL MEDICINE | Facility: CLINIC | Age: 50
End: 2025-05-29
Payer: MEDICARE

## 2025-05-29 VITALS
TEMPERATURE: 98.6 F | HEART RATE: 74 BPM | OXYGEN SATURATION: 98 % | SYSTOLIC BLOOD PRESSURE: 122 MMHG | BODY MASS INDEX: 41.75 KG/M2 | HEIGHT: 73 IN | WEIGHT: 315 LBS | DIASTOLIC BLOOD PRESSURE: 70 MMHG

## 2025-05-29 DIAGNOSIS — M54.42 CHRONIC MIDLINE LOW BACK PAIN WITH BILATERAL SCIATICA: ICD-10-CM

## 2025-05-29 DIAGNOSIS — M54.41 CHRONIC MIDLINE LOW BACK PAIN WITH BILATERAL SCIATICA: ICD-10-CM

## 2025-05-29 DIAGNOSIS — G89.21 CHRONIC PAIN DUE TO TRAUMA: ICD-10-CM

## 2025-05-29 DIAGNOSIS — G89.29 CHRONIC MIDLINE LOW BACK PAIN WITH BILATERAL SCIATICA: ICD-10-CM

## 2025-05-29 DIAGNOSIS — R07.9 CHEST PAIN, UNSPECIFIED TYPE: Primary | ICD-10-CM

## 2025-05-29 DIAGNOSIS — Z79.899 LONG-TERM USE OF HIGH-RISK MEDICATION: ICD-10-CM

## 2025-05-29 RX ORDER — OXYCODONE AND ACETAMINOPHEN 10; 325 MG/1; MG/1
1 TABLET ORAL EVERY 4 HOURS PRN
Qty: 180 TABLET | Refills: 0 | Status: SHIPPED | OUTPATIENT
Start: 2025-05-29
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
## 2025-06-05 DIAGNOSIS — R06.02 SHORTNESS OF BREATH: ICD-10-CM

## 2025-06-05 DIAGNOSIS — R06.2 WHEEZING: ICD-10-CM

## 2025-06-05 RX ORDER — ALBUTEROL SULFATE 90 UG/1
2 INHALANT RESPIRATORY (INHALATION) EVERY 4 HOURS PRN
Qty: 18 G | Refills: 3 | Status: SHIPPED | OUTPATIENT
Start: 2025-06-05

## 2025-06-06 ENCOUNTER — TELEPHONE (OUTPATIENT)
Dept: INTERNAL MEDICINE | Facility: CLINIC | Age: 50
End: 2025-06-06
Payer: MEDICARE

## 2025-06-06 NOTE — TELEPHONE ENCOUNTER
FOR DR. MOISÉS CHAPMAN - [facility] - [phone]  CMS MANDATED DRUG SAFETY FORM - SHE WILL BE FAXING, TO BE FILLED OUT,  TIME SENSITIVE, SIGN ASAP AS PATIENT WOULD HAVE AN INTERRUPTION IN HIS MEDICATIONS IF NOT SIGNED AND SENT BACK WITHIN 10 DAYS

## 2025-06-12 ENCOUNTER — TELEPHONE (OUTPATIENT)
Dept: INTERNAL MEDICINE | Facility: CLINIC | Age: 50
End: 2025-06-12

## 2025-06-12 NOTE — TELEPHONE ENCOUNTER
Caller: [facility]    Relationship: Other    Best call back number: [phone]     What is the best time to reach you: 8 AM TO 8 PM EST    Who are you requesting to speak with (clinical staff, provider,  specific staff member): NONE SPECIFIED     What was the call regarding:     [facility] CALLED TO CHECK ON THE STATUS OF FAX SENT ON 06.06.25. THIS FAX IS IN REGARDS TO PATIENT'S MEDICATION AND CMS MANADATED DRUG SAFETY PROGRAM.

## 2025-06-13 NOTE — TELEPHONE ENCOUNTER
Records printed and put with provider form.  Form needs to be filled out by Dr. Raygoza, scanned, and then fax back.    All paperwork placed on Dr. Raygoza's desk.

## 2025-06-19 ENCOUNTER — OFFICE VISIT (OUTPATIENT)
Dept: INTERNAL MEDICINE | Facility: CLINIC | Age: 50
End: 2025-06-19
Payer: MEDICARE

## 2025-06-19 VITALS
SYSTOLIC BLOOD PRESSURE: 138 MMHG | TEMPERATURE: 97.5 F | HEIGHT: 73 IN | BODY MASS INDEX: 41.75 KG/M2 | WEIGHT: 315 LBS | OXYGEN SATURATION: 98 % | RESPIRATION RATE: 16 BRPM | HEART RATE: 69 BPM | DIASTOLIC BLOOD PRESSURE: 80 MMHG

## 2025-06-19 DIAGNOSIS — G89.29 CHRONIC MIDLINE LOW BACK PAIN WITH BILATERAL SCIATICA: ICD-10-CM

## 2025-06-19 DIAGNOSIS — M54.42 CHRONIC MIDLINE LOW BACK PAIN WITH BILATERAL SCIATICA: ICD-10-CM

## 2025-06-19 DIAGNOSIS — M54.41 CHRONIC MIDLINE LOW BACK PAIN WITH BILATERAL SCIATICA: ICD-10-CM

## 2025-06-19 DIAGNOSIS — E29.1 HYPOGONADISM MALE: ICD-10-CM

## 2025-06-19 DIAGNOSIS — I10 ESSENTIAL HYPERTENSION: ICD-10-CM

## 2025-06-19 DIAGNOSIS — G89.21 CHRONIC PAIN DUE TO TRAUMA: ICD-10-CM

## 2025-06-19 DIAGNOSIS — L08.9 SKIN INFECTION: Primary | ICD-10-CM

## 2025-06-19 DIAGNOSIS — Z79.899 LONG-TERM USE OF HIGH-RISK MEDICATION: ICD-10-CM

## 2025-06-19 RX ORDER — OXYCODONE AND ACETAMINOPHEN 10; 325 MG/1; MG/1
1 TABLET ORAL EVERY 4 HOURS PRN
Qty: 180 TABLET | Refills: 0 | Status: SHIPPED | OUTPATIENT
Start: 2025-06-19

## 2025-06-19 RX ORDER — TESTOSTERONE CYPIONATE 200 MG/ML
200 INJECTION, SOLUTION INTRAMUSCULAR
Qty: 2 ML | Refills: 2 | Status: SHIPPED | OUTPATIENT
Start: 2025-06-19

## 2025-06-19 RX ORDER — CLINDAMYCIN HYDROCHLORIDE 300 MG/1
300 CAPSULE ORAL 3 TIMES DAILY
Qty: 21 CAPSULE | Refills: 0 | Status: SHIPPED | OUTPATIENT
Start: 2025-06-19 | End: 2025-06-26
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
## 2025-07-29 ENCOUNTER — OFFICE VISIT (OUTPATIENT)
Dept: INTERNAL MEDICINE | Facility: CLINIC | Age: 50
End: 2025-07-29
Payer: MEDICARE

## 2025-07-29 VITALS
HEART RATE: 75 BPM | BODY MASS INDEX: 41.75 KG/M2 | WEIGHT: 315 LBS | HEIGHT: 73 IN | SYSTOLIC BLOOD PRESSURE: 130 MMHG | TEMPERATURE: 98.2 F | DIASTOLIC BLOOD PRESSURE: 82 MMHG | OXYGEN SATURATION: 98 %

## 2025-07-29 DIAGNOSIS — M54.42 CHRONIC MIDLINE LOW BACK PAIN WITH BILATERAL SCIATICA: ICD-10-CM

## 2025-07-29 DIAGNOSIS — G89.29 CHRONIC MIDLINE LOW BACK PAIN WITH BILATERAL SCIATICA: ICD-10-CM

## 2025-07-29 DIAGNOSIS — R07.9 CHEST PAIN, UNSPECIFIED TYPE: Primary | ICD-10-CM

## 2025-07-29 DIAGNOSIS — M54.41 CHRONIC MIDLINE LOW BACK PAIN WITH BILATERAL SCIATICA: ICD-10-CM

## 2025-07-29 DIAGNOSIS — G89.21 CHRONIC PAIN DUE TO TRAUMA: ICD-10-CM

## 2025-07-29 DIAGNOSIS — Z79.899 LONG-TERM USE OF HIGH-RISK MEDICATION: ICD-10-CM

## 2025-07-29 PROCEDURE — G2211 COMPLEX E/M VISIT ADD ON: HCPCS | Performed by: FAMILY MEDICINE

## 2025-07-29 PROCEDURE — 99213 OFFICE O/P EST LOW 20 MIN: CPT | Performed by: FAMILY MEDICINE

## 2025-07-29 PROCEDURE — 3079F DIAST BP 80-89 MM HG: CPT | Performed by: FAMILY MEDICINE

## 2025-07-29 PROCEDURE — 1125F AMNT PAIN NOTED PAIN PRSNT: CPT | Performed by: FAMILY MEDICINE

## 2025-07-29 PROCEDURE — 3075F SYST BP GE 130 - 139MM HG: CPT | Performed by: FAMILY MEDICINE

## 2025-07-29 RX ORDER — OXYCODONE AND ACETAMINOPHEN 10; 325 MG/1; MG/1
1 TABLET ORAL EVERY 4 HOURS PRN
Qty: 180 TABLET | Refills: 0 | Status: SHIPPED | OUTPATIENT
Start: 2025-07-29
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
## 2025-08-02 LAB
AMPHETAMINES UR QL SCN: NEGATIVE NG/ML
BARBITURATES UR QL SCN: NEGATIVE NG/ML
BENZODIAZ UR QL SCN: NEGATIVE NG/ML
BZE UR QL SCN: NEGATIVE NG/ML
CANNABINOIDS UR QL SCN: NEGATIVE NG/ML
CREAT UR-MCNC: 275.6 MG/DL (ref 20–300)
FENTANYL UR-MCNC: NEGATIVE PG/ML
LABORATORY COMMENT REPORT: ABNORMAL
MEPERIDINE UR QL: NEGATIVE NG/ML
METHADONE UR QL SCN: NEGATIVE NG/ML
OPIATES UR QL SCN: NEGATIVE NG/ML
OXYCODONE+OXYMORPHONE UR QL SCN: POSITIVE
PCP UR QL: NEGATIVE NG/ML
PH UR: 5.3 [PH] (ref 4.5–8.9)
PROPOXYPH UR QL SCN: NEGATIVE NG/ML
SP GR UR: 1.03
TRAMADOL UR QL SCN: NEGATIVE NG/ML

## 2025-08-28 ENCOUNTER — OFFICE VISIT (OUTPATIENT)
Dept: INTERNAL MEDICINE | Facility: CLINIC | Age: 50
End: 2025-08-28
Payer: MEDICARE

## 2025-08-28 VITALS
BODY MASS INDEX: 41.75 KG/M2 | HEART RATE: 76 BPM | DIASTOLIC BLOOD PRESSURE: 99 MMHG | SYSTOLIC BLOOD PRESSURE: 147 MMHG | TEMPERATURE: 98.7 F | WEIGHT: 315 LBS | OXYGEN SATURATION: 98 % | HEIGHT: 73 IN

## 2025-08-28 DIAGNOSIS — E55.9 VITAMIN D DEFICIENCY: ICD-10-CM

## 2025-08-28 DIAGNOSIS — Z79.899 LONG-TERM USE OF HIGH-RISK MEDICATION: ICD-10-CM

## 2025-08-28 DIAGNOSIS — M54.41 CHRONIC MIDLINE LOW BACK PAIN WITH BILATERAL SCIATICA: ICD-10-CM

## 2025-08-28 DIAGNOSIS — G89.29 CHRONIC MIDLINE LOW BACK PAIN WITH BILATERAL SCIATICA: ICD-10-CM

## 2025-08-28 DIAGNOSIS — Z00.00 MEDICARE ANNUAL WELLNESS VISIT, SUBSEQUENT: ICD-10-CM

## 2025-08-28 DIAGNOSIS — E29.1 HYPOGONADISM MALE: ICD-10-CM

## 2025-08-28 DIAGNOSIS — F25.0 SCHIZOAFFECTIVE DISORDER, BIPOLAR TYPE: ICD-10-CM

## 2025-08-28 DIAGNOSIS — M54.42 CHRONIC MIDLINE LOW BACK PAIN WITH BILATERAL SCIATICA: ICD-10-CM

## 2025-08-28 DIAGNOSIS — N52.9 ERECTILE DYSFUNCTION, UNSPECIFIED ERECTILE DYSFUNCTION TYPE: ICD-10-CM

## 2025-08-28 DIAGNOSIS — Z12.5 SCREENING PSA (PROSTATE SPECIFIC ANTIGEN): Primary | ICD-10-CM

## 2025-08-28 DIAGNOSIS — G89.21 CHRONIC PAIN DUE TO TRAUMA: ICD-10-CM

## 2025-08-28 RX ORDER — QUETIAPINE 400 MG/1
400 TABLET, FILM COATED, EXTENDED RELEASE ORAL NIGHTLY
Qty: 30 TABLET | Refills: 11 | Status: SHIPPED | OUTPATIENT
Start: 2025-08-28

## 2025-08-28 RX ORDER — OXYCODONE AND ACETAMINOPHEN 10; 325 MG/1; MG/1
1 TABLET ORAL EVERY 4 HOURS PRN
Qty: 180 TABLET | Refills: 0 | Status: SHIPPED | OUTPATIENT
Start: 2025-08-28

## 2025-08-28 RX ORDER — SILDENAFIL 100 MG/1
100 TABLET, FILM COATED ORAL DAILY PRN
Qty: 30 TABLET | Refills: 3 | Status: SHIPPED | OUTPATIENT
Start: 2025-08-28

## 2025-08-28 RX ORDER — TESTOSTERONE CYPIONATE 200 MG/ML
200 INJECTION, SOLUTION INTRAMUSCULAR
Qty: 2 ML | Refills: 2 | Status: SHIPPED | OUTPATIENT
Start: 2025-08-28 | End: 2025-08-29 | Stop reason: SDUPTHER

## 2025-08-29 DIAGNOSIS — E29.1 HYPOGONADISM MALE: ICD-10-CM

## 2025-08-29 LAB
25(OH)D3+25(OH)D2 SERPL-MCNC: 22 NG/ML (ref 30–100)
ALBUMIN SERPL-MCNC: 4.8 G/DL (ref 4.1–5.1)
ALP SERPL-CCNC: 71 IU/L (ref 44–121)
ALT SERPL-CCNC: 22 IU/L (ref 0–44)
AST SERPL-CCNC: 20 IU/L (ref 0–40)
BASOPHILS # BLD AUTO: 0.1 X10E3/UL (ref 0–0.2)
BASOPHILS NFR BLD AUTO: 1 %
BILIRUB SERPL-MCNC: 0.5 MG/DL (ref 0–1.2)
BUN SERPL-MCNC: 15 MG/DL (ref 6–24)
BUN/CREAT SERPL: 16 (ref 9–20)
CALCIUM SERPL-MCNC: 9.9 MG/DL (ref 8.7–10.2)
CHLORIDE SERPL-SCNC: 102 MMOL/L (ref 96–106)
CHOLEST SERPL-MCNC: 201 MG/DL (ref 100–199)
CO2 SERPL-SCNC: 21 MMOL/L (ref 20–29)
CREAT SERPL-MCNC: 0.93 MG/DL (ref 0.76–1.27)
EGFRCR SERPLBLD CKD-EPI 2021: 100 ML/MIN/1.73
EOSINOPHIL # BLD AUTO: 0.2 X10E3/UL (ref 0–0.4)
EOSINOPHIL NFR BLD AUTO: 3 %
ERYTHROCYTE [DISTWIDTH] IN BLOOD BY AUTOMATED COUNT: 13.8 % (ref 11.6–15.4)
GLOBULIN SER CALC-MCNC: 2.2 G/DL (ref 1.5–4.5)
GLUCOSE SERPL-MCNC: 110 MG/DL (ref 70–99)
HBA1C MFR BLD: 6 % (ref 4.8–5.6)
HCT VFR BLD AUTO: 53.5 % (ref 37.5–51)
HDLC SERPL-MCNC: 38 MG/DL
HGB BLD-MCNC: 17.3 G/DL (ref 13–17.7)
IMM GRANULOCYTES # BLD AUTO: 0 X10E3/UL (ref 0–0.1)
IMM GRANULOCYTES NFR BLD AUTO: 0 %
LDLC SERPL CALC-MCNC: 113 MG/DL (ref 0–99)
LYMPHOCYTES # BLD AUTO: 1.8 X10E3/UL (ref 0.7–3.1)
LYMPHOCYTES NFR BLD AUTO: 27 %
MCH RBC QN AUTO: 27.9 PG (ref 26.6–33)
MCHC RBC AUTO-ENTMCNC: 32.3 G/DL (ref 31.5–35.7)
MCV RBC AUTO: 86 FL (ref 79–97)
MONOCYTES # BLD AUTO: 0.5 X10E3/UL (ref 0.1–0.9)
MONOCYTES NFR BLD AUTO: 7 %
NEUTROPHILS # BLD AUTO: 4.3 X10E3/UL (ref 1.4–7)
NEUTROPHILS NFR BLD AUTO: 62 %
PLATELET # BLD AUTO: 246 X10E3/UL (ref 150–450)
POTASSIUM SERPL-SCNC: 4.4 MMOL/L (ref 3.5–5.2)
PROT SERPL-MCNC: 7 G/DL (ref 6–8.5)
PSA SERPL-MCNC: 0.6 NG/ML (ref 0–4)
RBC # BLD AUTO: 6.2 X10E6/UL (ref 4.14–5.8)
SODIUM SERPL-SCNC: 139 MMOL/L (ref 134–144)
TESTOST SERPL-MCNC: 184 NG/DL (ref 264–916)
TRIGL SERPL-MCNC: 285 MG/DL (ref 0–149)
TSH SERPL DL<=0.005 MIU/L-ACNC: 1.32 UIU/ML (ref 0.45–4.5)
VIT B12 SERPL-MCNC: 324 PG/ML (ref 232–1245)
VLDLC SERPL CALC-MCNC: 50 MG/DL (ref 5–40)
WBC # BLD AUTO: 6.9 X10E3/UL (ref 3.4–10.8)

## 2025-08-29 RX ORDER — ERGOCALCIFEROL 1.25 MG/1
50000 CAPSULE, LIQUID FILLED ORAL WEEKLY
Qty: 5 CAPSULE | Refills: 11 | Status: SHIPPED | OUTPATIENT
Start: 2025-08-29

## 2025-08-29 RX ORDER — TESTOSTERONE CYPIONATE 200 MG/ML
200 INJECTION, SOLUTION INTRAMUSCULAR
Qty: 3 ML | Refills: 2 | Status: SHIPPED | OUTPATIENT
Start: 2025-08-29

## (undated) DEVICE — ARM DRAPE

## (undated) DEVICE — 4-PORT MANIFOLD: Brand: NEPTUNE 2

## (undated) DEVICE — SEAL

## (undated) DEVICE — NDL FLTR BLNT 18G 1 1/2IN

## (undated) DEVICE — GLV SURG SENSICARE W/ALOE PF LF 7.5 STRL

## (undated) DEVICE — DRSNG SURESITE HNDL 4X5

## (undated) DEVICE — PATIENT RETURN ELECTRODE, SINGLE-USE, CONTACT QUALITY MONITORING, ADULT, WITH 9FT CORD, FOR PATIENTS WEIGING OVER 33LBS. (15KG): Brand: MEGADYNE

## (undated) DEVICE — DAVINCI: Brand: MEDLINE INDUSTRIES, INC.

## (undated) DEVICE — KT CLN CLEANOR SCPE

## (undated) DEVICE — BLADELESS OBTURATOR: Brand: WECK VISTA

## (undated) DEVICE — STRIP,CLOSURE,WOUND,MEDI-STRIP,1/2X4: Brand: MEDLINE

## (undated) DEVICE — ADHS LIQ MASTISOL 2/3ML

## (undated) DEVICE — 3M™ IOBAN™ 2 ANTIMICROBIAL INCISE DRAPE 6650EZ: Brand: IOBAN™ 2

## (undated) DEVICE — ANTIBACTERIAL UNDYED BRAIDED (POLYGLACTIN 910), SYNTHETIC ABSORBABLE SUTURE: Brand: COATED VICRYL

## (undated) DEVICE — SUT VIC 0 UR6 27IN VCP603H

## (undated) DEVICE — SUT MNCRYL 4/0 PS2 27IN UD MCP426H

## (undated) DEVICE — TIP COVER ACCESSORY

## (undated) DEVICE — SYR SLP TP 10ML DISP